# Patient Record
Sex: FEMALE | Race: WHITE | NOT HISPANIC OR LATINO | Employment: OTHER | URBAN - METROPOLITAN AREA
[De-identification: names, ages, dates, MRNs, and addresses within clinical notes are randomized per-mention and may not be internally consistent; named-entity substitution may affect disease eponyms.]

---

## 2018-04-24 ENCOUNTER — OFFICE VISIT (OUTPATIENT)
Dept: FAMILY MEDICINE CLINIC | Facility: CLINIC | Age: 68
End: 2018-04-24

## 2018-04-24 VITALS
HEIGHT: 57 IN | TEMPERATURE: 98 F | WEIGHT: 122 LBS | BODY MASS INDEX: 26.32 KG/M2 | DIASTOLIC BLOOD PRESSURE: 60 MMHG | OXYGEN SATURATION: 97 % | RESPIRATION RATE: 18 BRPM | HEART RATE: 69 BPM | SYSTOLIC BLOOD PRESSURE: 138 MMHG

## 2018-04-24 DIAGNOSIS — M54.41 RIGHT-SIDED LOW BACK PAIN WITH RIGHT-SIDED SCIATICA, UNSPECIFIED CHRONICITY: Primary | ICD-10-CM

## 2018-04-24 PROCEDURE — 72100 X-RAY EXAM L-S SPINE 2/3 VWS: CPT | Performed by: FAMILY MEDICINE

## 2018-04-24 PROCEDURE — 99203 OFFICE O/P NEW LOW 30 MIN: CPT | Performed by: FAMILY MEDICINE

## 2018-04-24 RX ORDER — METHYLPREDNISOLONE 4 MG/1
TABLET ORAL
Qty: 21 TABLET | Refills: 0 | Status: SHIPPED | OUTPATIENT
Start: 2018-04-24 | End: 2018-06-07

## 2018-04-24 RX ORDER — AMLODIPINE BESYLATE 10 MG/1
10 TABLET ORAL DAILY
COMMUNITY
End: 2018-06-07 | Stop reason: ALTCHOICE

## 2018-04-24 RX ORDER — ATORVASTATIN CALCIUM 10 MG/1
10 TABLET, FILM COATED ORAL NIGHTLY
COMMUNITY
End: 2022-03-15 | Stop reason: HOSPADM

## 2018-04-24 NOTE — PROGRESS NOTES
SUBJECTIVE:  The patient is a 68-year-old white female who comes in primarily because of low back pain with radiation of pain down her leg and numbness and tingling.  She has a history of degenerative disc disease and spinal stenosis.  She did not improve with epidurals.  She is unable to get any relief with Advil or Tylenol.  His gotten worse over the last couple months.  No new injury.  She is unable to take NSAIDs but she has taken Advil on occasion anyway.  She is a kidney transplant.  She has a history of hypertension and hyperlipidemia.  She is followed by a nephrologist.  She has no  symptoms    PAST MEDICAL HISTORY:  Reviewed.    REVIEW OF SYSTEMS:  Please see above; 14 point ROS negative.      OBJECTIVE: Vitals signs are reviewed and are stable.    .   Neck:  Supple.   Lungs:  Clear.    Heart:  Regular rate and rhythm.   Abdomen:   Soft, nontender.   Back: Tenderness is present in the right lower lumbar region.  Pain is present with flexion and extension.  Right straight leg raise equivocal positive at 90°.  Extremities:  No cyanosis, clubbing or edema.  Neurological.  Deep tendon reflexes symmetrical.  No gross motor or sensory deficits at this time.   Two-view x-ray of the lumbar spine is done here interpreted by me.  None for comparison.  Indication low back pain with radicular pain and paresthesia.  X-ray shows generous changes with scoliosis  disc space narrowing L2-L3      ASSESSMENT:   Low back pain with radicular pain and paresthesia.  History of spinal stenosis and degenerative disc disease.  She has not responded to conservative therapy.     PLAN: The patient does not want pain medication.  She is  Willing to take steroids.  I will encourage her to take a Medrol Dosepak know she has concerns about this as well.  I will prescribe Flexeril 5 mg 3 times a day when necessary but she states this almost made her pass out once little these will not be prescribed.  An MRI will be ordered.    Dictated  utilizing Dragon dictation.

## 2018-05-02 ENCOUNTER — HOSPITAL ENCOUNTER (OUTPATIENT)
Dept: MRI IMAGING | Facility: HOSPITAL | Age: 68
Discharge: HOME | End: 2018-05-02
Admitting: FAMILY MEDICINE

## 2018-05-02 PROCEDURE — 72148 MRI LUMBAR SPINE W/O DYE: CPT

## 2018-05-03 DIAGNOSIS — R93.7 ABNORMAL MRI, LUMBAR SPINE: Primary | ICD-10-CM

## 2018-06-07 ENCOUNTER — OFFICE VISIT (OUTPATIENT)
Dept: NEUROSURGERY | Facility: CLINIC | Age: 68
End: 2018-06-07

## 2018-06-07 VITALS
SYSTOLIC BLOOD PRESSURE: 152 MMHG | HEART RATE: 65 BPM | WEIGHT: 120 LBS | HEIGHT: 58 IN | DIASTOLIC BLOOD PRESSURE: 71 MMHG | BODY MASS INDEX: 25.19 KG/M2

## 2018-06-07 DIAGNOSIS — M54.16 LUMBAR RADICULOPATHY: Primary | ICD-10-CM

## 2018-06-07 PROCEDURE — 99204 OFFICE O/P NEW MOD 45 MIN: CPT | Performed by: NEUROLOGICAL SURGERY

## 2018-06-07 RX ORDER — AMLODIPINE BESYLATE 5 MG/1
5 TABLET ORAL NIGHTLY
Refills: 11 | COMMUNITY
Start: 2018-06-03 | End: 2022-03-15 | Stop reason: HOSPADM

## 2018-06-07 RX ORDER — DEXAMETHASONE 4 MG/1
8 TABLET ORAL TAKE AS DIRECTED
Qty: 2 TABLET | Refills: 0 | Status: SHIPPED | OUTPATIENT
Start: 2018-06-07 | End: 2018-06-14 | Stop reason: HOSPADM

## 2018-06-08 ENCOUNTER — TELEPHONE (OUTPATIENT)
Dept: NEUROSURGERY | Facility: CLINIC | Age: 68
End: 2018-06-08

## 2018-06-08 NOTE — TELEPHONE ENCOUNTER
Pt saw nephrologist today. He says myelo is ok but we need to order bolus before and after her myelo. Could you please put in the order for it?

## 2018-06-11 DIAGNOSIS — M48.00 CENTRAL SPINAL STENOSIS: Primary | ICD-10-CM

## 2018-06-14 ENCOUNTER — HOSPITAL ENCOUNTER (OUTPATIENT)
Dept: GENERAL RADIOLOGY | Facility: HOSPITAL | Age: 68
Discharge: HOME | End: 2018-06-14
Attending: NEUROLOGICAL SURGERY

## 2018-06-14 ENCOUNTER — HOSPITAL ENCOUNTER (OUTPATIENT)
Dept: CT IMAGING | Facility: HOSPITAL | Age: 68
Discharge: HOME | End: 2018-06-14
Attending: NEUROLOGICAL SURGERY | Admitting: NEUROLOGICAL SURGERY

## 2018-06-14 VITALS
WEIGHT: 120 LBS | BODY MASS INDEX: 25.19 KG/M2 | SYSTOLIC BLOOD PRESSURE: 140 MMHG | DIASTOLIC BLOOD PRESSURE: 76 MMHG | HEART RATE: 81 BPM | RESPIRATION RATE: 16 BRPM | OXYGEN SATURATION: 97 % | HEIGHT: 58 IN

## 2018-06-14 DIAGNOSIS — M54.16 LUMBAR RADICULOPATHY: ICD-10-CM

## 2018-06-14 LAB — CREAT BLDA-MCNC: 1.4 MG/DL (ref 0.6–1.3)

## 2018-06-14 PROCEDURE — 63710000001 HYDROCODONE-ACETAMINOPHEN 5-325 MG TABLET: Performed by: NEUROLOGICAL SURGERY

## 2018-06-14 PROCEDURE — 72240 MYELOGRAPHY NECK SPINE: CPT

## 2018-06-14 PROCEDURE — 62304 MYELOGRAPHY LUMBAR INJECTION: CPT

## 2018-06-14 PROCEDURE — A9270 NON-COVERED ITEM OR SERVICE: HCPCS | Performed by: NEUROLOGICAL SURGERY

## 2018-06-14 PROCEDURE — 0 IOPAMIDOL 41 % SOLUTION: Performed by: NEUROLOGICAL SURGERY

## 2018-06-14 PROCEDURE — 82565 ASSAY OF CREATININE: CPT

## 2018-06-14 PROCEDURE — 72114 X-RAY EXAM L-S SPINE BENDING: CPT

## 2018-06-14 PROCEDURE — 72131 CT LUMBAR SPINE W/O DYE: CPT

## 2018-06-14 RX ORDER — ACETAMINOPHEN,DIPHENHYDRAMINE HCL 500; 25 MG/1; MG/1
2 TABLET, FILM COATED ORAL NIGHTLY
COMMUNITY
End: 2018-08-19

## 2018-06-14 RX ORDER — LIDOCAINE HYDROCHLORIDE 10 MG/ML
10 INJECTION, SOLUTION INFILTRATION; PERINEURAL ONCE
Status: COMPLETED | OUTPATIENT
Start: 2018-06-14 | End: 2018-06-14

## 2018-06-14 RX ORDER — SODIUM CHLORIDE, SODIUM LACTATE, POTASSIUM CHLORIDE, CALCIUM CHLORIDE 600; 310; 30; 20 MG/100ML; MG/100ML; MG/100ML; MG/100ML
500 INJECTION, SOLUTION INTRAVENOUS CONTINUOUS
Status: DISCONTINUED | OUTPATIENT
Start: 2018-06-14 | End: 2018-06-15 | Stop reason: HOSPADM

## 2018-06-14 RX ORDER — ACETAMINOPHEN 325 MG/1
650 TABLET ORAL EVERY 4 HOURS PRN
Status: DISCONTINUED | OUTPATIENT
Start: 2018-06-14 | End: 2018-06-15 | Stop reason: HOSPADM

## 2018-06-14 RX ORDER — HYDROCODONE BITARTRATE AND ACETAMINOPHEN 5; 325 MG/1; MG/1
1 TABLET ORAL EVERY 4 HOURS PRN
Status: DISCONTINUED | OUTPATIENT
Start: 2018-06-14 | End: 2018-06-15 | Stop reason: HOSPADM

## 2018-06-14 RX ADMIN — SODIUM CHLORIDE, POTASSIUM CHLORIDE, SODIUM LACTATE AND CALCIUM CHLORIDE 500 ML/HR: 600; 310; 30; 20 INJECTION, SOLUTION INTRAVENOUS at 06:15

## 2018-06-14 RX ADMIN — LIDOCAINE HYDROCHLORIDE 7 ML: 10 INJECTION, SOLUTION INFILTRATION; PERINEURAL at 07:06

## 2018-06-14 RX ADMIN — IOPAMIDOL 20 ML: 408 INJECTION, SOLUTION INTRATHECAL at 07:10

## 2018-06-14 RX ADMIN — HYDROCODONE BITARTATE AND ACETAMINOPHEN 1 TABLET: 5; 325 TABLET ORAL at 08:00

## 2018-06-14 NOTE — H&P (VIEW-ONLY)
Subjective   Patient ID: Marlena Navarrete is a 68 y.o. female is being seen for consultation today at the request of Bradley Hernandez MD right side low back pain that radiates into her lower extremity with numbness and tingling.    History of Present Illness    This patient has been having pain in her lower back with radiation primarily into her right leg for about 5 years.  The pain is sharp and stabbing and quite severe.  It has been getting steadily worse.  It is located in the right side of her lower back and radiates into her right buttock and down the posterior lateral thigh posterior lateral calf and into the right foot.  She is also recently developed some pain in the left side as well although not as bad as the right.  She has no difficulty with bowel or bladder control or other associated symptoms.  She says that any kind of activity makes the pain worse.  She was treated with epidural blocks about 5 years ago.    The following portions of the patient's history were reviewed and updated as appropriate: allergies, current medications, past family history, past medical history, past social history, past surgical history and problem list.    Review of Systems   Constitutional: Positive for activity change.   Respiratory: Negative for chest tightness and shortness of breath.    Cardiovascular: Negative for chest pain.   Musculoskeletal: Positive for back pain and neck pain.        Lower extremity pain   Neurological: Positive for numbness.        Tingling   Psychiatric/Behavioral: Positive for agitation, dysphoric mood and sleep disturbance.   All other systems reviewed and are negative.      Objective   Physical Exam   Constitutional: She is oriented to person, place, and time. She appears well-developed and well-nourished.   HENT:   Head: Normocephalic and atraumatic.   Eyes: Conjunctivae and EOM are normal. Pupils are equal, round, and reactive to light.   Fundoscopic exam:       The right eye shows no papilledema.  The right eye shows venous pulsations.        The left eye shows no papilledema. The left eye shows venous pulsations.   Neck: Carotid bruit is not present.   Neurological: She is oriented to person, place, and time. She has a normal Finger-Nose-Finger Test and a normal Heel to Shin Test. Gait normal.   Reflex Scores:       Tricep reflexes are 2+ on the right side and 2+ on the left side.       Bicep reflexes are 2+ on the right side and 2+ on the left side.       Brachioradialis reflexes are 2+ on the right side and 2+ on the left side.       Patellar reflexes are 2+ on the right side and 2+ on the left side.       Achilles reflexes are 2+ on the right side and 2+ on the left side.  Psychiatric: Her speech is normal.     Neurologic Exam     Mental Status   Oriented to person, place, and time.   Registration of memory: Good recent and remote memory.   Attention: normal. Concentration: normal.   Speech: speech is normal   Level of consciousness: alert  Knowledge: consistent with education.     Cranial Nerves     CN II   Visual fields full to confrontation.   Visual acuity: normal    CN III, IV, VI   Pupils are equal, round, and reactive to light.  Extraocular motions are normal.     CN V   Facial sensation intact.   Right corneal reflex: normal  Left corneal reflex: normal    CN VII   Facial expression full, symmetric.   Right facial weakness: none  Left facial weakness: none    CN VIII   Hearing: intact    CN IX, X   Palate: symmetric    CN XI   Right sternocleidomastoid strength: normal  Left sternocleidomastoid strength: normal    CN XII   Tongue: not atrophic  Tongue deviation: none    Motor Exam   Muscle bulk: normal  Right arm tone: normal  Left arm tone: normal  Right leg tone: normal  Left leg tone: normal    Strength   Strength 5/5 except as noted.     Sensory Exam   Light touch normal.     Gait, Coordination, and Reflexes     Gait  Gait: normal    Coordination   Finger to nose coordination: normal  Heel to  shin coordination: normal    Reflexes   Right brachioradialis: 2+  Left brachioradialis: 2+  Right biceps: 2+  Left biceps: 2+  Right triceps: 2+  Left triceps: 2+  Right patellar: 2+  Left patellar: 2+  Right achilles: 2+  Left achilles: 2+  Right : 2+  Left : 2+      Assessment/Plan   Independent Review of Radiographic Studies:      I reviewed an MRI of the lumbar spine done on May 2 myself.  This looks okay in the lower thoracic spine at T12-L1 and L1 2 looks okay.  L2-3 shows some disc bulging especially to the right side and some bulging into the neural foramen at that level.  L3 4 is widely open as is L4 5 and L5-S1.  There is also an annular tear at L5-S1.    Medical Decision Making:      I told the patient about the imaging.  I really don't see anything here that I could correct with surgery.  Since she has had such severe pain I think we need to consider a lumbar myelogram.  I told the patient what a myelogram involves.  I explained that there is a 50% chance of developing a bad headache and nausea as a result of the test.  I explained that there is also a very small chance of infection, seizures, and bleeding.  I explained how we would treat a post myelogram headache including bedrest, caffeinated fluids, steroids, and blood patch.  The patient does ask to proceed.    Marlena was seen today for back pain and leg pain.    Diagnoses and all orders for this visit:    Lumbar radiculopathy  -     Obtain Informed Consent; Standing  -     IR Myelogram Lumbar Spine; Future  -     CT Lumbar Spine Without Contrast; Future  -     No Lab Testing Needed; Standing  -     dexamethasone (DECADRON) 4 MG tablet; Take 2 tablets by mouth Take As Directed. Take both tablets by mouth 2 hours before myelogram  -     XR Spine Lumbar Complete With Flex & Ext; Future      Return for After radiology test.

## 2018-06-14 NOTE — DISCHARGE INSTRUCTIONS
EDUCATION /DISCHARGE INSTRUCTIONS:  A myelogram is a special radiology procedure of the spinal cord, spinal nerves and other related structures.  You will be awake during the examination.  An area of your lower back will be cleansed with an antiseptic solution.  The physician will inject a numbing medication in your lower back.  While your back is numb, a needle will be placed in the lower back area.  A small amount of spinal fluid may be withdrawn and sent to the lab if ordered by your physician. While the needle is in the back, an injection of a contrast material (xray dye) will be given through the needle.  The contrast material will allow the physician to see the spinal cord and spinal nerves.  Once injected, the needle will be removed and a band aid will be placed over the injection site.  The table will be tilted during the process to allow the contrast material to flow to particular areas in the spine.  Following the injection and xrays, you will be taken to the CT scan where more pictures will be taken. After the procedure is finished, the contrast material will be absorbed by your body and eliminated through your kidneys.  The radiologist will study and interpret your myelogram and send the results to your physician.    Procedure risks of a myelogram include, but are not limited to:  *  Bleeding   *  seizure  *  Infection   *  Headache, possibly severe requiring  *  Contrast reaction      a blood patch  *  Nerve or cord injury  *  Paralysis and death    Benefits of the procedure:  *  Best examination for delineating pathology related to spinal cord compression from a disc and/or nerve root compression    Alternatives to the procedure:  MRI - a non invasive procedure requiring intravenous contrast injection.  Cannot be done on patients with certain pacemakers or metal in the body.  MRI risks include possible reaction to the contrast material, movement of metal located in the body.  Benefit to MRI:   Non-invasive and usually painless procedure.  THIS EDUCATION INFORMATION WAS REVIEWED PRIOR TO PROCEDURE AND CONSENT. Patient initials________________Time____0626______________    Important information following your myelogram:  *  Lie down with your head elevated no more than 2 pillows high today & tonight  *  Tomorrow, lie down with 1 pillow all day and all night  *  Sit up to eat meals and use the bathroom, otherwise, lie down  *  Do not drive for 48 hours following a myelogram  *  You may remove the bandage and shower in the morning  *  Increase your fluids for the next 24 hours.  Caffeinated drinks are encouraged.    Resume taking blood thinner or aspirin on _No Aspirin for 24 hours after the myelogram_    CALL YOUR PHYSICIAN FOR THE FOLLOWING:  * Pain at the injections site  * Reddness, swelling, bruising or drainage at the injection site.  * A fever by mouth of 101.0  * Any new symptoms    Headaches are a common side effect after a myelogram.  If you get a headache, you should stay flat in bed and drink plenty of fluids. If the headache persist and does not go away with rest/medication, CALL Dr. Paulino at [phone]

## 2018-06-14 NOTE — NURSING NOTE
Verbal and written D/C instructions given to patient and .  They voice understanding and are able to teach back D/C instructions.

## 2018-06-15 ENCOUNTER — TELEPHONE (OUTPATIENT)
Dept: INTERVENTIONAL RADIOLOGY/VASCULAR | Facility: HOSPITAL | Age: 68
End: 2018-06-15

## 2018-07-03 ENCOUNTER — OFFICE VISIT (OUTPATIENT)
Dept: NEUROSURGERY | Facility: CLINIC | Age: 68
End: 2018-07-03

## 2018-07-03 VITALS
BODY MASS INDEX: 25.19 KG/M2 | SYSTOLIC BLOOD PRESSURE: 140 MMHG | HEART RATE: 71 BPM | HEIGHT: 58 IN | DIASTOLIC BLOOD PRESSURE: 80 MMHG | WEIGHT: 120 LBS

## 2018-07-03 DIAGNOSIS — M54.16 LUMBAR RADICULOPATHY: Primary | ICD-10-CM

## 2018-07-03 PROCEDURE — 99213 OFFICE O/P EST LOW 20 MIN: CPT | Performed by: NEUROLOGICAL SURGERY

## 2018-07-03 RX ORDER — DEXAMETHASONE 1.5 MG 1.5 MG/1
1.5 TABLET ORAL TAKE AS DIRECTED
Qty: 51 EACH | Refills: 0 | Status: SHIPPED | OUTPATIENT
Start: 2018-07-03 | End: 2018-07-26 | Stop reason: HOSPADM

## 2018-07-16 ENCOUNTER — APPOINTMENT (OUTPATIENT)
Dept: PREADMISSION TESTING | Facility: HOSPITAL | Age: 68
End: 2018-07-16

## 2018-07-18 ENCOUNTER — APPOINTMENT (OUTPATIENT)
Dept: PREADMISSION TESTING | Facility: HOSPITAL | Age: 68
End: 2018-07-18

## 2018-07-18 ENCOUNTER — TELEPHONE (OUTPATIENT)
Dept: NEUROSURGERY | Facility: CLINIC | Age: 68
End: 2018-07-18

## 2018-07-18 VITALS
OXYGEN SATURATION: 98 % | HEART RATE: 66 BPM | SYSTOLIC BLOOD PRESSURE: 134 MMHG | BODY MASS INDEX: 29.43 KG/M2 | DIASTOLIC BLOOD PRESSURE: 82 MMHG | WEIGHT: 127.19 LBS | HEIGHT: 55 IN | TEMPERATURE: 98 F | RESPIRATION RATE: 16 BRPM

## 2018-07-18 LAB
ANION GAP SERPL CALCULATED.3IONS-SCNC: 13.6 MMOL/L
BASOPHILS # BLD AUTO: 0 10*3/MM3 (ref 0–0.2)
BASOPHILS NFR BLD AUTO: 0 % (ref 0–1.5)
BUN BLD-MCNC: 34 MG/DL (ref 8–23)
BUN/CREAT SERPL: 27 (ref 7–25)
CALCIUM SPEC-SCNC: 8.8 MG/DL (ref 8.6–10.5)
CHLORIDE SERPL-SCNC: 103 MMOL/L (ref 98–107)
CO2 SERPL-SCNC: 23.4 MMOL/L (ref 22–29)
CREAT BLD-MCNC: 1.26 MG/DL (ref 0.57–1)
DEPRECATED RDW RBC AUTO: 50.3 FL (ref 37–54)
EOSINOPHIL # BLD AUTO: 0 10*3/MM3 (ref 0–0.7)
EOSINOPHIL NFR BLD AUTO: 0 % (ref 0.3–6.2)
ERYTHROCYTE [DISTWIDTH] IN BLOOD BY AUTOMATED COUNT: 13.5 % (ref 11.7–13)
GFR SERPL CREATININE-BSD FRML MDRD: 42 ML/MIN/1.73
GLUCOSE BLD-MCNC: 102 MG/DL (ref 65–99)
HCT VFR BLD AUTO: 38.7 % (ref 35.6–45.5)
HGB BLD-MCNC: 11.9 G/DL (ref 11.9–15.5)
IMM GRANULOCYTES # BLD: 0.09 10*3/MM3 (ref 0–0.03)
IMM GRANULOCYTES NFR BLD: 0.5 % (ref 0–0.5)
LYMPHOCYTES # BLD AUTO: 0.96 10*3/MM3 (ref 0.9–4.8)
LYMPHOCYTES NFR BLD AUTO: 5.6 % (ref 19.6–45.3)
MCH RBC QN AUTO: 31.2 PG (ref 26.9–32)
MCHC RBC AUTO-ENTMCNC: 30.7 G/DL (ref 32.4–36.3)
MCV RBC AUTO: 101.6 FL (ref 80.5–98.2)
MONOCYTES # BLD AUTO: 0.48 10*3/MM3 (ref 0.2–1.2)
MONOCYTES NFR BLD AUTO: 2.8 % (ref 5–12)
NEUTROPHILS # BLD AUTO: 15.62 10*3/MM3 (ref 1.9–8.1)
NEUTROPHILS NFR BLD AUTO: 91.1 % (ref 42.7–76)
PLATELET # BLD AUTO: 349 10*3/MM3 (ref 140–500)
PMV BLD AUTO: 10.4 FL (ref 6–12)
POTASSIUM BLD-SCNC: 5 MMOL/L (ref 3.5–5.2)
RBC # BLD AUTO: 3.81 10*6/MM3 (ref 3.9–5.2)
SODIUM BLD-SCNC: 140 MMOL/L (ref 136–145)
WBC NRBC COR # BLD: 17.15 10*3/MM3 (ref 4.5–10.7)

## 2018-07-18 PROCEDURE — 36415 COLL VENOUS BLD VENIPUNCTURE: CPT

## 2018-07-18 PROCEDURE — 93005 ELECTROCARDIOGRAM TRACING: CPT

## 2018-07-18 PROCEDURE — 85025 COMPLETE CBC W/AUTO DIFF WBC: CPT | Performed by: NEUROLOGICAL SURGERY

## 2018-07-18 PROCEDURE — 80048 BASIC METABOLIC PNL TOTAL CA: CPT | Performed by: NEUROLOGICAL SURGERY

## 2018-07-18 PROCEDURE — 93010 ELECTROCARDIOGRAM REPORT: CPT | Performed by: INTERNAL MEDICINE

## 2018-07-18 NOTE — DISCHARGE INSTRUCTIONS
NO medications the morning of surgery:        General Instructions:  • Do not eat solid food after midnight the night before surgery.  • You may drink clear liquids day of surgery but must stop at least one hour before your hospital arrival time @ 0600 AM STOP DRINKING!!  • It is beneficial for you to have a clear drink that contains carbohydrates the day of surgery.  We suggest a 12 to 20 ounce bottle of Gatorade or Powerade for non-diabetic patients or a 12 to 20 ounce bottle of G2 or Powerade Zero for diabetic patients.     Clear liquids are liquids you can see through.  Nothing red in color.     Plain water                               Sports drinks  Sodas                                   Gelatin (Jell-O)  Fruit juices without pulp such as white grape juice and apple juice  Popsicles that contain no fruit or yogurt  Tea or coffee (no cream or milk added)  Gatorade / Powerade  G2 / Powerade Zero    • Patients who avoid smoking, chewing tobacco and alcohol for 4 weeks prior to surgery have a reduced risk of post-operative complications.  Quit smoking as many days before surgery as you can.  • Do not smoke, use chewing tobacco or drink alcohol the day of surgery.   • If applicable bring your C-PAP/ BI-PAP machine.  • Bring any papers given to you in the doctor’s office.  • Wear clean comfortable clothes and socks.  • Do not wear contact lenses or make-up.  Bring a case for your glasses.   • Bring crutches or walker if applicable.  • Remove all piercings.  Leave jewelry and any other valuables at home.  • Hair extensions with metal clips must be removed prior to surgery.  • The Pre-Admission Testing nurse will instruct you to bring medications if unable to obtain an accurate list in Pre-Admission Testing.        Preventing a Surgical Site Infection:  • For 2 to 3 days before surgery, avoid shaving with a razor because the razor can irritate skin and make it easier to develop an infection.    • Any areas of open  skin can increase the risk of a post-operative wound infection by allowing bacteria to enter and travel throughout the body.  Notify your surgeon if you have any skin wounds / rashes even if it is not near the expected surgical site.  The area will need assessed to determine if surgery should be delayed until it is healed.  • The night prior to surgery sleep in a clean bed with clean clothing.  Do not allow pets to sleep with you.  • Shower on the morning of surgery using a fresh bar of anti-bacterial soap (such as Dial) and clean washcloth.  Dry with a clean towel and dress in clean clothing.  • Ask your surgeon if you will be receiving antibiotics prior to surgery.  • Make sure you, your family, and all healthcare providers clean their hands with soap and water or an alcohol based hand  before caring for you or your wound.    Day of surgery:07- ARRIVE @ 0700 AM REPORT TO THE MAIN OR   Upon arrival, a Pre-op nurse and Anesthesiologist will review your health history, obtain vital signs, and answer questions you may have.  The only belongings needed at this time will be your home medications and if applicable your C-PAP/BI-PAP machine.  If you are staying overnight your family can leave the rest of your belongings in the car and bring them to your room later.  A Pre-op nurse will start an IV and you may receive medication in preparation for surgery, including something to help you relax.  Your family will be able to see you in the Pre-op area.  While you are in surgery your family should notify the waiting room  if they leave the waiting room area and provide a contact phone number.    Please be aware that surgery does come with discomfort.  We want to make every effort to control your discomfort so please discuss any uncontrolled symptoms with your nurse.   Your doctor will most likely have prescribed pain medications.      If you are going home after surgery you will receive  individualized written care instructions before being discharged.  A responsible adult must drive you to and from the hospital on the day of your surgery and stay with you for 24 hours.    If you are staying overnight following surgery, you will be transported to your hospital room following the recovery period.  [facility] has all private rooms.    You have received a list of surgical assistants for your reference.  If you have any questions please call Pre-Admission Testing at [phone].  Deductibles and co-payments are collected on the day of service. Please be prepared to pay the required co-pay, deductible or deposit on the day of service as defined by your plan.

## 2018-07-18 NOTE — TELEPHONE ENCOUNTER
PAT called as this patient had her PAT labs and on her CBC her WBC was elevated at 17.15. She is currently still taking the 13 day decadron that we prescribed her. She denies any fevers or any other associated symptoms other than her back problems.

## 2018-07-19 NOTE — TELEPHONE ENCOUNTER
Called the patient back and let her know that the longer she is off the steroids the count will come down.  The patient stated she feels good and when they took her temp yesterday she was not running one.  Patient said she will call if anything changes.

## 2018-07-19 NOTE — TELEPHONE ENCOUNTER
Called the patient to advise her of the below from Dr. Paulino she did not answer. I LVM for her to call the office. If she calls back please advise her that her elevated WBC are more than likely from her steroids.

## 2018-07-25 ENCOUNTER — APPOINTMENT (OUTPATIENT)
Dept: GENERAL RADIOLOGY | Facility: HOSPITAL | Age: 68
End: 2018-07-25

## 2018-07-25 ENCOUNTER — ANESTHESIA (OUTPATIENT)
Dept: PERIOP | Facility: HOSPITAL | Age: 68
End: 2018-07-25

## 2018-07-25 ENCOUNTER — HOSPITAL ENCOUNTER (INPATIENT)
Facility: HOSPITAL | Age: 68
LOS: 1 days | Discharge: HOME HEALTH | End: 2018-07-26
Attending: NEUROLOGICAL SURGERY | Admitting: NEUROLOGICAL SURGERY

## 2018-07-25 ENCOUNTER — ANESTHESIA EVENT (OUTPATIENT)
Dept: PERIOP | Facility: HOSPITAL | Age: 68
End: 2018-07-25

## 2018-07-25 DIAGNOSIS — R53.1 GENERALIZED WEAKNESS: ICD-10-CM

## 2018-07-25 DIAGNOSIS — M54.16 LUMBAR RADICULOPATHY: ICD-10-CM

## 2018-07-25 DIAGNOSIS — Z74.09 IMPAIRED FUNCTIONAL MOBILITY AND ACTIVITY TOLERANCE: Primary | ICD-10-CM

## 2018-07-25 PROCEDURE — 72100 X-RAY EXAM L-S SPINE 2/3 VWS: CPT

## 2018-07-25 PROCEDURE — 25010000002 METHOCARBAMOL 1000 MG/10ML SOLUTION: Performed by: NEUROLOGICAL SURGERY

## 2018-07-25 PROCEDURE — 25010000002 ONDANSETRON PER 1 MG: Performed by: NEUROLOGICAL SURGERY

## 2018-07-25 PROCEDURE — 01NB0ZZ RELEASE LUMBAR NERVE, OPEN APPROACH: ICD-10-PCS | Performed by: NEUROLOGICAL SURGERY

## 2018-07-25 PROCEDURE — 0SB20ZZ EXCISION OF LUMBAR VERTEBRAL DISC, OPEN APPROACH: ICD-10-PCS | Performed by: NEUROLOGICAL SURGERY

## 2018-07-25 PROCEDURE — 25010000002 PROPOFOL 10 MG/ML EMULSION: Performed by: NURSE ANESTHETIST, CERTIFIED REGISTERED

## 2018-07-25 PROCEDURE — 25010000002 HYDROMORPHONE PER 4 MG: Performed by: NURSE ANESTHETIST, CERTIFIED REGISTERED

## 2018-07-25 PROCEDURE — 25010000002 PROMETHAZINE PER 50 MG: Performed by: PHYSICIAN ASSISTANT

## 2018-07-25 PROCEDURE — 25010000002 DEXAMETHASONE PER 1 MG: Performed by: NURSE ANESTHETIST, CERTIFIED REGISTERED

## 2018-07-25 PROCEDURE — 00QT0ZZ REPAIR SPINAL MENINGES, OPEN APPROACH: ICD-10-PCS | Performed by: NEUROLOGICAL SURGERY

## 2018-07-25 PROCEDURE — 25010000002 ONDANSETRON PER 1 MG: Performed by: NURSE ANESTHETIST, CERTIFIED REGISTERED

## 2018-07-25 PROCEDURE — G0378 HOSPITAL OBSERVATION PER HR: HCPCS

## 2018-07-25 PROCEDURE — 25010000002 FENTANYL CITRATE (PF) 100 MCG/2ML SOLUTION: Performed by: NURSE ANESTHETIST, CERTIFIED REGISTERED

## 2018-07-25 PROCEDURE — 76000 FLUOROSCOPY <1 HR PHYS/QHP: CPT

## 2018-07-25 PROCEDURE — 25010000003 HYDROCORTISONE SOD SUCCINATE PF 250 MG RECONSTITUTED SOLUTION 1 EACH VIAL: Performed by: NEUROLOGICAL SURGERY

## 2018-07-25 PROCEDURE — 25010000002 MIDAZOLAM PER 1 MG: Performed by: ANESTHESIOLOGY

## 2018-07-25 PROCEDURE — 25010000002 PHENYLEPHRINE PER 1 ML: Performed by: NURSE ANESTHETIST, CERTIFIED REGISTERED

## 2018-07-25 PROCEDURE — 63047 LAM FACETEC & FORAMOT LUMBAR: CPT | Performed by: NEUROLOGICAL SURGERY

## 2018-07-25 PROCEDURE — 25010000002 HYDROCORTISONE SODIUM SUCCINATE 100 MG RECONSTITUTED SOLUTION 1 EACH VIAL: Performed by: NEUROLOGICAL SURGERY

## 2018-07-25 PROCEDURE — 25010000003 CEFAZOLIN IN DEXTROSE 2-4 GM/100ML-% SOLUTION: Performed by: NEUROLOGICAL SURGERY

## 2018-07-25 RX ORDER — LIDOCAINE HYDROCHLORIDE 10 MG/ML
0.5 INJECTION, SOLUTION EPIDURAL; INFILTRATION; INTRACAUDAL; PERINEURAL ONCE AS NEEDED
Status: DISCONTINUED | OUTPATIENT
Start: 2018-07-25 | End: 2018-07-25 | Stop reason: HOSPADM

## 2018-07-25 RX ORDER — ONDANSETRON 2 MG/ML
INJECTION INTRAMUSCULAR; INTRAVENOUS AS NEEDED
Status: DISCONTINUED | OUTPATIENT
Start: 2018-07-25 | End: 2018-07-25 | Stop reason: SURG

## 2018-07-25 RX ORDER — PROMETHAZINE HYDROCHLORIDE 25 MG/ML
12.5 INJECTION, SOLUTION INTRAMUSCULAR; INTRAVENOUS ONCE AS NEEDED
Status: DISCONTINUED | OUTPATIENT
Start: 2018-07-25 | End: 2018-07-25 | Stop reason: HOSPADM

## 2018-07-25 RX ORDER — PROMETHAZINE HYDROCHLORIDE 25 MG/1
12.5 TABLET ORAL ONCE AS NEEDED
Status: DISCONTINUED | OUTPATIENT
Start: 2018-07-25 | End: 2018-07-25

## 2018-07-25 RX ORDER — CEFAZOLIN SODIUM 2 G/100ML
2 INJECTION, SOLUTION INTRAVENOUS ONCE
Status: COMPLETED | OUTPATIENT
Start: 2018-07-25 | End: 2018-07-25

## 2018-07-25 RX ORDER — HYDROCODONE BITARTRATE AND ACETAMINOPHEN 7.5; 325 MG/1; MG/1
1 TABLET ORAL ONCE AS NEEDED
Status: COMPLETED | OUTPATIENT
Start: 2018-07-25 | End: 2018-07-25

## 2018-07-25 RX ORDER — SODIUM CHLORIDE 0.9 % (FLUSH) 0.9 %
1-10 SYRINGE (ML) INJECTION AS NEEDED
Status: DISCONTINUED | OUTPATIENT
Start: 2018-07-25 | End: 2018-07-26 | Stop reason: HOSPADM

## 2018-07-25 RX ORDER — OXYCODONE AND ACETAMINOPHEN 7.5; 325 MG/1; MG/1
1 TABLET ORAL ONCE AS NEEDED
Status: DISCONTINUED | OUTPATIENT
Start: 2018-07-25 | End: 2018-07-25

## 2018-07-25 RX ORDER — LABETALOL HYDROCHLORIDE 5 MG/ML
5 INJECTION, SOLUTION INTRAVENOUS
Status: DISCONTINUED | OUTPATIENT
Start: 2018-07-25 | End: 2018-07-25

## 2018-07-25 RX ORDER — EPHEDRINE SULFATE 50 MG/ML
5 INJECTION, SOLUTION INTRAVENOUS ONCE AS NEEDED
Status: DISCONTINUED | OUTPATIENT
Start: 2018-07-25 | End: 2018-07-25

## 2018-07-25 RX ORDER — PROMETHAZINE HYDROCHLORIDE 25 MG/1
25 SUPPOSITORY RECTAL ONCE AS NEEDED
Status: DISCONTINUED | OUTPATIENT
Start: 2018-07-25 | End: 2018-07-25

## 2018-07-25 RX ORDER — PROMETHAZINE HYDROCHLORIDE 25 MG/ML
6.25 INJECTION, SOLUTION INTRAMUSCULAR; INTRAVENOUS EVERY 6 HOURS PRN
Status: DISCONTINUED | OUTPATIENT
Start: 2018-07-25 | End: 2018-07-26 | Stop reason: HOSPADM

## 2018-07-25 RX ORDER — PROMETHAZINE HYDROCHLORIDE 25 MG/ML
12.5 INJECTION, SOLUTION INTRAMUSCULAR; INTRAVENOUS ONCE AS NEEDED
Status: DISCONTINUED | OUTPATIENT
Start: 2018-07-25 | End: 2018-07-25

## 2018-07-25 RX ORDER — HYDROMORPHONE HCL 110MG/55ML
PATIENT CONTROLLED ANALGESIA SYRINGE INTRAVENOUS AS NEEDED
Status: DISCONTINUED | OUTPATIENT
Start: 2018-07-25 | End: 2018-07-25 | Stop reason: SURG

## 2018-07-25 RX ORDER — DEXAMETHASONE SODIUM PHOSPHATE 10 MG/ML
INJECTION INTRAMUSCULAR; INTRAVENOUS AS NEEDED
Status: DISCONTINUED | OUTPATIENT
Start: 2018-07-25 | End: 2018-07-25 | Stop reason: SURG

## 2018-07-25 RX ORDER — ONDANSETRON 2 MG/ML
4 INJECTION INTRAMUSCULAR; INTRAVENOUS EVERY 6 HOURS PRN
Status: DISCONTINUED | OUTPATIENT
Start: 2018-07-25 | End: 2018-07-26 | Stop reason: HOSPADM

## 2018-07-25 RX ORDER — DIPHENHYDRAMINE HYDROCHLORIDE 50 MG/ML
12.5 INJECTION INTRAMUSCULAR; INTRAVENOUS
Status: DISCONTINUED | OUTPATIENT
Start: 2018-07-25 | End: 2018-07-25 | Stop reason: HOSPADM

## 2018-07-25 RX ORDER — SODIUM CHLORIDE 9 MG/ML
100 INJECTION, SOLUTION INTRAVENOUS CONTINUOUS
Status: DISCONTINUED | OUTPATIENT
Start: 2018-07-25 | End: 2018-07-26

## 2018-07-25 RX ORDER — ROCURONIUM BROMIDE 10 MG/ML
INJECTION, SOLUTION INTRAVENOUS AS NEEDED
Status: DISCONTINUED | OUTPATIENT
Start: 2018-07-25 | End: 2018-07-25 | Stop reason: SURG

## 2018-07-25 RX ORDER — FENTANYL CITRATE 50 UG/ML
INJECTION, SOLUTION INTRAMUSCULAR; INTRAVENOUS AS NEEDED
Status: DISCONTINUED | OUTPATIENT
Start: 2018-07-25 | End: 2018-07-25 | Stop reason: SURG

## 2018-07-25 RX ORDER — HYDROCODONE BITARTRATE AND ACETAMINOPHEN 5; 325 MG/1; MG/1
1 TABLET ORAL EVERY 4 HOURS PRN
Qty: 35 TABLET | Refills: 0
Start: 2018-07-25 | End: 2018-08-15 | Stop reason: SDUPTHER

## 2018-07-25 RX ORDER — PROPOFOL 10 MG/ML
VIAL (ML) INTRAVENOUS AS NEEDED
Status: DISCONTINUED | OUTPATIENT
Start: 2018-07-25 | End: 2018-07-25 | Stop reason: SURG

## 2018-07-25 RX ORDER — ATORVASTATIN CALCIUM 10 MG/1
10 TABLET, FILM COATED ORAL NIGHTLY
Status: DISCONTINUED | OUTPATIENT
Start: 2018-07-25 | End: 2018-07-26 | Stop reason: HOSPADM

## 2018-07-25 RX ORDER — HYDROMORPHONE HYDROCHLORIDE 1 MG/ML
0.5 INJECTION, SOLUTION INTRAMUSCULAR; INTRAVENOUS; SUBCUTANEOUS
Status: DISCONTINUED | OUTPATIENT
Start: 2018-07-25 | End: 2018-07-25

## 2018-07-25 RX ORDER — EPHEDRINE SULFATE 50 MG/ML
INJECTION, SOLUTION INTRAVENOUS AS NEEDED
Status: DISCONTINUED | OUTPATIENT
Start: 2018-07-25 | End: 2018-07-25 | Stop reason: SURG

## 2018-07-25 RX ORDER — PROMETHAZINE HYDROCHLORIDE 25 MG/ML
12.5 INJECTION, SOLUTION INTRAMUSCULAR; INTRAVENOUS EVERY 6 HOURS PRN
Status: DISCONTINUED | OUTPATIENT
Start: 2018-07-25 | End: 2018-07-26 | Stop reason: HOSPADM

## 2018-07-25 RX ORDER — FAMOTIDINE 10 MG/ML
20 INJECTION, SOLUTION INTRAVENOUS EVERY 12 HOURS SCHEDULED
Status: DISCONTINUED | OUTPATIENT
Start: 2018-07-25 | End: 2018-07-26

## 2018-07-25 RX ORDER — PROMETHAZINE HYDROCHLORIDE 25 MG/1
25 TABLET ORAL ONCE AS NEEDED
Status: DISCONTINUED | OUTPATIENT
Start: 2018-07-25 | End: 2018-07-25

## 2018-07-25 RX ORDER — HYDROCODONE BITARTRATE AND ACETAMINOPHEN 5; 325 MG/1; MG/1
1 TABLET ORAL EVERY 4 HOURS PRN
Status: DISCONTINUED | OUTPATIENT
Start: 2018-07-25 | End: 2018-07-26 | Stop reason: HOSPADM

## 2018-07-25 RX ORDER — MORPHINE SULFATE 2 MG/ML
1 INJECTION, SOLUTION INTRAMUSCULAR; INTRAVENOUS EVERY 4 HOURS PRN
Status: DISCONTINUED | OUTPATIENT
Start: 2018-07-25 | End: 2018-07-26 | Stop reason: HOSPADM

## 2018-07-25 RX ORDER — HYDROCODONE BITARTRATE AND ACETAMINOPHEN 5; 325 MG/1; MG/1
1 TABLET ORAL ONCE AS NEEDED
Status: DISCONTINUED | OUTPATIENT
Start: 2018-07-25 | End: 2018-07-26 | Stop reason: HOSPADM

## 2018-07-25 RX ORDER — NALOXONE HCL 0.4 MG/ML
0.4 VIAL (ML) INJECTION
Status: DISCONTINUED | OUTPATIENT
Start: 2018-07-25 | End: 2018-07-26 | Stop reason: HOSPADM

## 2018-07-25 RX ORDER — FENTANYL CITRATE 50 UG/ML
50 INJECTION, SOLUTION INTRAMUSCULAR; INTRAVENOUS
Status: DISCONTINUED | OUTPATIENT
Start: 2018-07-25 | End: 2018-07-25 | Stop reason: HOSPADM

## 2018-07-25 RX ORDER — SODIUM CHLORIDE, SODIUM LACTATE, POTASSIUM CHLORIDE, CALCIUM CHLORIDE 600; 310; 30; 20 MG/100ML; MG/100ML; MG/100ML; MG/100ML
9 INJECTION, SOLUTION INTRAVENOUS CONTINUOUS
Status: DISCONTINUED | OUTPATIENT
Start: 2018-07-25 | End: 2018-07-25

## 2018-07-25 RX ORDER — MIDAZOLAM HYDROCHLORIDE 1 MG/ML
1 INJECTION INTRAMUSCULAR; INTRAVENOUS
Status: DISCONTINUED | OUTPATIENT
Start: 2018-07-25 | End: 2018-07-25 | Stop reason: HOSPADM

## 2018-07-25 RX ORDER — METHOCARBAMOL 100 MG/ML
1000 INJECTION, SOLUTION INTRAMUSCULAR; INTRAVENOUS ONCE
Status: COMPLETED | OUTPATIENT
Start: 2018-07-25 | End: 2018-07-25

## 2018-07-25 RX ORDER — MIDAZOLAM HYDROCHLORIDE 1 MG/ML
2 INJECTION INTRAMUSCULAR; INTRAVENOUS
Status: DISCONTINUED | OUTPATIENT
Start: 2018-07-25 | End: 2018-07-25 | Stop reason: HOSPADM

## 2018-07-25 RX ORDER — SODIUM CHLORIDE 0.9 % (FLUSH) 0.9 %
1-10 SYRINGE (ML) INJECTION AS NEEDED
Status: DISCONTINUED | OUTPATIENT
Start: 2018-07-25 | End: 2018-07-25 | Stop reason: HOSPADM

## 2018-07-25 RX ORDER — FAMOTIDINE 10 MG/ML
20 INJECTION, SOLUTION INTRAVENOUS ONCE
Status: COMPLETED | OUTPATIENT
Start: 2018-07-25 | End: 2018-07-25

## 2018-07-25 RX ORDER — DOCUSATE SODIUM 100 MG/1
100 CAPSULE, LIQUID FILLED ORAL 2 TIMES DAILY
Status: DISCONTINUED | OUTPATIENT
Start: 2018-07-25 | End: 2018-07-26 | Stop reason: HOSPADM

## 2018-07-25 RX ORDER — LIDOCAINE HYDROCHLORIDE 40 MG/ML
SOLUTION TOPICAL AS NEEDED
Status: DISCONTINUED | OUTPATIENT
Start: 2018-07-25 | End: 2018-07-25 | Stop reason: SURG

## 2018-07-25 RX ORDER — ONDANSETRON 2 MG/ML
4 INJECTION INTRAMUSCULAR; INTRAVENOUS ONCE AS NEEDED
Status: COMPLETED | OUTPATIENT
Start: 2018-07-25 | End: 2018-07-25

## 2018-07-25 RX ORDER — FLUMAZENIL 0.1 MG/ML
0.2 INJECTION INTRAVENOUS AS NEEDED
Status: DISCONTINUED | OUTPATIENT
Start: 2018-07-25 | End: 2018-07-25

## 2018-07-25 RX ORDER — FENTANYL CITRATE 50 UG/ML
50 INJECTION, SOLUTION INTRAMUSCULAR; INTRAVENOUS
Status: DISCONTINUED | OUTPATIENT
Start: 2018-07-25 | End: 2018-07-25

## 2018-07-25 RX ORDER — AMLODIPINE BESYLATE 5 MG/1
5 TABLET ORAL NIGHTLY
Status: DISCONTINUED | OUTPATIENT
Start: 2018-07-25 | End: 2018-07-26 | Stop reason: HOSPADM

## 2018-07-25 RX ORDER — LIDOCAINE HYDROCHLORIDE 20 MG/ML
INJECTION, SOLUTION INFILTRATION; PERINEURAL AS NEEDED
Status: DISCONTINUED | OUTPATIENT
Start: 2018-07-25 | End: 2018-07-25 | Stop reason: SURG

## 2018-07-25 RX ORDER — NALOXONE HCL 0.4 MG/ML
0.2 VIAL (ML) INJECTION AS NEEDED
Status: DISCONTINUED | OUTPATIENT
Start: 2018-07-25 | End: 2018-07-25

## 2018-07-25 RX ADMIN — FAMOTIDINE 20 MG: 10 INJECTION INTRAVENOUS at 08:11

## 2018-07-25 RX ADMIN — PROPOFOL 120 MG: 10 INJECTION, EMULSION INTRAVENOUS at 08:29

## 2018-07-25 RX ADMIN — HYDROMORPHONE HYDROCHLORIDE 0.25 MG: 2 INJECTION INTRAMUSCULAR; INTRAVENOUS; SUBCUTANEOUS at 09:59

## 2018-07-25 RX ADMIN — SODIUM CHLORIDE 250 MG: 9 INJECTION, SOLUTION INTRAVENOUS at 16:00

## 2018-07-25 RX ADMIN — SODIUM CHLORIDE, POTASSIUM CHLORIDE, SODIUM LACTATE AND CALCIUM CHLORIDE 9 ML/HR: 600; 310; 30; 20 INJECTION, SOLUTION INTRAVENOUS at 08:11

## 2018-07-25 RX ADMIN — ONDANSETRON 4 MG: 2 INJECTION INTRAMUSCULAR; INTRAVENOUS at 11:20

## 2018-07-25 RX ADMIN — FENTANYL CITRATE 50 MCG: 50 INJECTION INTRAMUSCULAR; INTRAVENOUS at 10:06

## 2018-07-25 RX ADMIN — FAMOTIDINE 20 MG: 10 INJECTION, SOLUTION INTRAVENOUS at 16:00

## 2018-07-25 RX ADMIN — DOCUSATE SODIUM 100 MG: 100 CAPSULE, LIQUID FILLED ORAL at 14:51

## 2018-07-25 RX ADMIN — MIDAZOLAM 2 MG: 1 INJECTION INTRAMUSCULAR; INTRAVENOUS at 08:11

## 2018-07-25 RX ADMIN — DEXAMETHASONE SODIUM PHOSPHATE 8 MG: 10 INJECTION INTRAMUSCULAR; INTRAVENOUS at 08:55

## 2018-07-25 RX ADMIN — PROMETHAZINE HYDROCHLORIDE 6.25 MG: 25 INJECTION INTRAMUSCULAR; INTRAVENOUS at 14:51

## 2018-07-25 RX ADMIN — ONDANSETRON 4 MG: 2 INJECTION INTRAMUSCULAR; INTRAVENOUS at 21:49

## 2018-07-25 RX ADMIN — EPHEDRINE SULFATE 10 MG: 50 INJECTION INTRAMUSCULAR; INTRAVENOUS; SUBCUTANEOUS at 08:45

## 2018-07-25 RX ADMIN — CEFAZOLIN SODIUM 1 G: 2 INJECTION, SOLUTION INTRAVENOUS at 09:50

## 2018-07-25 RX ADMIN — SODIUM CHLORIDE, POTASSIUM CHLORIDE, SODIUM LACTATE AND CALCIUM CHLORIDE: 600; 310; 30; 20 INJECTION, SOLUTION INTRAVENOUS at 09:40

## 2018-07-25 RX ADMIN — DOCUSATE SODIUM 100 MG: 100 CAPSULE, LIQUID FILLED ORAL at 21:50

## 2018-07-25 RX ADMIN — ROCURONIUM BROMIDE 30 MG: 10 INJECTION INTRAVENOUS at 08:29

## 2018-07-25 RX ADMIN — CEFAZOLIN SODIUM 2 G: 2 INJECTION, SOLUTION INTRAVENOUS at 08:35

## 2018-07-25 RX ADMIN — AMLODIPINE BESYLATE 5 MG: 5 TABLET ORAL at 23:05

## 2018-07-25 RX ADMIN — HYDROCODONE BITARTRATE AND ACETAMINOPHEN 1 TABLET: 7.5; 325 TABLET ORAL at 11:08

## 2018-07-25 RX ADMIN — ATORVASTATIN CALCIUM 10 MG: 10 TABLET, FILM COATED ORAL at 21:50

## 2018-07-25 RX ADMIN — HYDROMORPHONE HYDROCHLORIDE 0.5 MG: 1 INJECTION, SOLUTION INTRAMUSCULAR; INTRAVENOUS; SUBCUTANEOUS at 11:55

## 2018-07-25 RX ADMIN — HYDROMORPHONE HYDROCHLORIDE 0.25 MG: 2 INJECTION INTRAMUSCULAR; INTRAVENOUS; SUBCUTANEOUS at 09:58

## 2018-07-25 RX ADMIN — LIDOCAINE HYDROCHLORIDE 1 EACH: 40 SOLUTION TOPICAL at 08:31

## 2018-07-25 RX ADMIN — EPHEDRINE SULFATE 10 MG: 50 INJECTION INTRAMUSCULAR; INTRAVENOUS; SUBCUTANEOUS at 08:40

## 2018-07-25 RX ADMIN — PHENYLEPHRINE HYDROCHLORIDE 100 MCG: 10 INJECTION INTRAVENOUS at 09:27

## 2018-07-25 RX ADMIN — FENTANYL CITRATE 50 MCG: 50 INJECTION INTRAMUSCULAR; INTRAVENOUS at 10:10

## 2018-07-25 RX ADMIN — PHENYLEPHRINE HYDROCHLORIDE 100 MCG: 10 INJECTION INTRAVENOUS at 08:50

## 2018-07-25 RX ADMIN — FENTANYL CITRATE 50 MCG: 50 INJECTION, SOLUTION INTRAMUSCULAR; INTRAVENOUS at 10:15

## 2018-07-25 RX ADMIN — HYDROMORPHONE HYDROCHLORIDE 0.5 MG: 1 INJECTION, SOLUTION INTRAMUSCULAR; INTRAVENOUS; SUBCUTANEOUS at 11:21

## 2018-07-25 RX ADMIN — PHENYLEPHRINE HYDROCHLORIDE 100 MCG: 10 INJECTION INTRAVENOUS at 09:10

## 2018-07-25 RX ADMIN — METHOCARBAMOL 1000 MG: 100 INJECTION INTRAMUSCULAR; INTRAVENOUS at 10:55

## 2018-07-25 RX ADMIN — SODIUM CHLORIDE 250 MG: 9 INJECTION, SOLUTION INTRAVENOUS at 21:51

## 2018-07-25 RX ADMIN — HYDROCODONE BITARTRATE AND ACETAMINOPHEN 1 TABLET: 5; 325 TABLET ORAL at 15:59

## 2018-07-25 RX ADMIN — LIDOCAINE HYDROCHLORIDE 60 MG: 20 INJECTION, SOLUTION INFILTRATION; PERINEURAL at 08:29

## 2018-07-25 RX ADMIN — FENTANYL CITRATE 50 MCG: 50 INJECTION, SOLUTION INTRAMUSCULAR; INTRAVENOUS at 10:30

## 2018-07-25 RX ADMIN — ONDANSETRON 4 MG: 2 INJECTION INTRAMUSCULAR; INTRAVENOUS at 09:52

## 2018-07-25 RX ADMIN — SUGAMMADEX 200 MG: 100 INJECTION, SOLUTION INTRAVENOUS at 09:55

## 2018-07-25 RX ADMIN — SODIUM CHLORIDE 100 ML/HR: 9 INJECTION, SOLUTION INTRAVENOUS at 14:52

## 2018-07-25 RX ADMIN — HYDROCODONE BITARTRATE AND ACETAMINOPHEN 1 TABLET: 5; 325 TABLET ORAL at 21:52

## 2018-07-25 RX ADMIN — FENTANYL CITRATE 100 MCG: 50 INJECTION INTRAMUSCULAR; INTRAVENOUS at 08:29

## 2018-07-25 RX ADMIN — HYDROMORPHONE HYDROCHLORIDE 0.5 MG: 1 INJECTION, SOLUTION INTRAMUSCULAR; INTRAVENOUS; SUBCUTANEOUS at 10:35

## 2018-07-25 RX ADMIN — FAMOTIDINE 20 MG: 10 INJECTION, SOLUTION INTRAVENOUS at 23:05

## 2018-07-25 NOTE — ANESTHESIA PROCEDURE NOTES
Airway  Urgency: elective    Airway not difficult    General Information and Staff    Patient location during procedure: OR  Anesthesiologist: ISABEL MARTINEZ  CRNA: BERNIE WHITE    Indications and Patient Condition  Indications for airway management: airway protection    Preoxygenated: yes  MILS not maintained throughout  Mask difficulty assessment: 1 - vent by mask    Final Airway Details  Final airway type: endotracheal airway      Successful airway: ETT  Cuffed: yes   Successful intubation technique: direct laryngoscopy  Facilitating devices/methods: cricoid pressure  Endotracheal tube insertion site: oral  Blade: Annabelle  Blade size: #3  Cormack-Lehane Classification: grade I - full view of glottis  Placement verified by: chest auscultation and capnometry   Measured from: lips  Number of attempts at approach: 1    Additional Comments  Dentition intact and unchanged. CBEBS.  +ETCO2.

## 2018-07-25 NOTE — ANESTHESIA POSTPROCEDURE EVALUATION
"Patient: Marlena Navarrete    Procedure Summary     Date:  07/25/18 Room / Location:  [facility] OR  / [facility] MAIN OR    Anesthesia Start:  0827 Anesthesia Stop:  1013    Procedure:  Right L2-3 laminectomy with Metrix (Right Spine Lumbar) Diagnosis:       Lumbar radiculopathy      (Lumbar radiculopathy [M54.16])    Surgeon:  Oscar Paulino MD Provider:  Sun Florence MD    Anesthesia Type:  general ASA Status:  2          Anesthesia Type: general  Last vitals  BP   129/54 (07/25/18 1300)   Temp   36.6 °C (97.9 °F) (07/25/18 1010)   Pulse   76 (07/25/18 1300)   Resp   16 (07/25/18 1300)     SpO2   100 % (07/25/18 1300)     Post Anesthesia Care and Evaluation    Patient location during evaluation: PACU  Patient participation: complete - patient participated  Level of consciousness: awake and alert  Pain management: adequate  Airway patency: patent  Anesthetic complications: No anesthetic complications    Cardiovascular status: acceptable  Respiratory status: acceptable  Hydration status: acceptable    Comments: /54   Pulse 76   Temp 36.6 °C (97.9 °F) (Oral)   Resp 16   Ht 146.1 cm (57.5\")   Wt 57.3 kg (126 lb 5 oz)   SpO2 100%   BMI 26.86 kg/m²               "

## 2018-07-25 NOTE — PLAN OF CARE
Problem: Patient Care Overview  Goal: Plan of Care Review  Outcome: Ongoing (interventions implemented as appropriate)   07/25/18 3200   Coping/Psychosocial   Plan of Care Reviewed With patient;spouse   Plan of Care Review   Progress no change   OTHER   Outcome Summary Patient A+Ox4. Still c/o numbness right side down posterior hip to toes. Ambulated x2 in room w/ walker and gait belt. Patient unable to bear weight on right side and leg buckle when she did apply weight. HOB flat. Had to give phenergan on top of zofran for nausea. Pain well controlled with norco- incision on back feels good; leg pain is worse. Solu-cortef IVPB given. Dressing is CDI will monitor.

## 2018-07-25 NOTE — ANESTHESIA PREPROCEDURE EVALUATION
Anesthesia Evaluation     Patient summary reviewed and Nursing notes reviewed   NPO Solid Status: > 8 hours  NPO Liquid Status: > 2 hours           Airway   Mallampati: II  TM distance: >3 FB  Neck ROM: full  Dental - normal exam     Pulmonary - normal exam   (+) a smoker Former,   Cardiovascular - normal exam    ECG reviewed    (+) hypertension, hyperlipidemia,     ROS comment: SINUS RHYTHM  NO SIGNIFICANT CHANGE FROM PREVIOUS ECG    Neuro/Psych  (+) headaches, numbness,     GI/Hepatic/Renal/Endo    (+)   renal disease,     ROS Comment: Renal transplant    Musculoskeletal     Abdominal  - normal exam    Bowel sounds: normal.   Substance History - negative use     OB/GYN negative ob/gyn ROS         Other   (+) arthritis                     Anesthesia Plan    ASA 2     general     intravenous induction   Anesthetic plan and risks discussed with patient.

## 2018-07-25 NOTE — H&P (VIEW-ONLY)
Subjective   Patient ID: Marlena Navarrete is a 68 y.o. female is here today for follow-up with a new Lumbar Myelogram ordered for right side low back pain that radiates into his lower extremity with numbness and tingling. Patients symptoms remain unchanged since last visit.    History of Present Illness    This patient has been having severe pain in her back with radiation into her right leg and right buttock for a long time.  Over the past week or 2 the pain has developed more in her right anterior thigh.  She still has some pain radiating all the way down her right leg into her foot.    The following portions of the patient's history were reviewed and updated as appropriate: allergies, current medications, past family history, past medical history, past social history, past surgical history and problem list.    Review of Systems   Respiratory: Negative for chest tightness and shortness of breath.    Cardiovascular: Negative for chest pain.   Musculoskeletal: Positive for back pain.        Leg pain   Neurological: Positive for numbness.        Tingling   All other systems reviewed and are negative.      Objective   Physical Exam   Constitutional: She is oriented to person, place, and time. She appears well-developed and well-nourished.   Eyes: EOM are normal. Pupils are equal, round, and reactive to light.   Neurological: She is oriented to person, place, and time. She has a normal Finger-Nose-Finger Test and a normal Heel to Shin Test. Gait normal.   Reflex Scores:       Tricep reflexes are 2+ on the right side and 2+ on the left side.       Bicep reflexes are 2+ on the right side and 2+ on the left side.       Brachioradialis reflexes are 2+ on the right side and 2+ on the left side.       Patellar reflexes are 2+ on the right side and 2+ on the left side.       Achilles reflexes are 2+ on the right side and 2+ on the left side.  Psychiatric: Her speech is normal.     Neurologic Exam     Mental Status   Oriented to  person, place, and time.   Registration of memory: Good recent and remote memory.   Attention: normal. Concentration: normal.   Speech: speech is normal   Level of consciousness: alert  Knowledge: consistent with education.     Cranial Nerves     CN II   Visual fields full to confrontation.   Visual acuity: normal    CN III, IV, VI   Pupils are equal, round, and reactive to light.  Extraocular motions are normal.     CN V   Facial sensation intact.   Right corneal reflex: normal  Left corneal reflex: normal    CN VII   Facial expression full, symmetric.   Right facial weakness: none  Left facial weakness: none    CN VIII   Hearing: intact    CN IX, X   Palate: symmetric    CN XI   Right sternocleidomastoid strength: normal  Left sternocleidomastoid strength: normal    CN XII   Tongue: not atrophic  Tongue deviation: none    Motor Exam   Muscle bulk: normal  Right arm tone: normal  Left arm tone: normal  Right leg tone: normal  Left leg tone: normal    Strength   Strength 5/5 except as noted.     Sensory Exam   Light touch normal.     Gait, Coordination, and Reflexes     Gait  Gait: normal    Coordination   Finger to nose coordination: normal  Heel to shin coordination: normal    Reflexes   Right brachioradialis: 2+  Left brachioradialis: 2+  Right biceps: 2+  Left biceps: 2+  Right triceps: 2+  Left triceps: 2+  Right patellar: 2+  Left patellar: 2+  Right achilles: 2+  Left achilles: 2+  Right : 2+  Left : 2+      Assessment/Plan   Independent Review of Radiographic Studies:      I reviewed her plain films, myelogram, and CT scan myself.  The plain films show severe degenerative disease but no evidence of instability on flexion and extension.  On the myelogram itself there is really no evidence of nerve root compression at any level of the lumbar spine except on the standing films where there is some nerve root compression on the right side at L2-3.  This can also be seen on the lateral films.  On the post  myelographic CT scan there is some spondylitic change in the lower thoracic spine but no evidence of neural compression down to the middle of the L2 vertebral body.  At L2-3 there is some stenosis in the right lateral recess consistent with the myelogram films.  L3 4 looks okay as does L4 5 and L5-S1.  The radiologist unfortunately did not read the myelogram itself which shows the significant nerve root compression on the right side at L2-3.    Medical Decision Making:      I told the patient and her  about the imaging.  I told them that could certainly consider surgery on the right side at L2-3.  I explained that we could either do a minimally invasive surgery or we could do a more major decompression and fusion.  I repeated the discussion several times because I was not sure that she was hearing me.  I told the patient about the risks, complications and expected outcome of the lumbar surgery.  I explained that there was an 80% chance of getting rid of the pain in the leg.  I explained that there would still be back pain after the surgery.  Initially this will be quite severe but will improve over time.  There is a 2 or 3% chance of infection, bleeding, CSF leak, damage to the nerve as a result of surgery, paralysis, as well as anesthetic risk.  There is a 5% chance of late instability which could require a fusion later on and a 10% chance of recurrent disc herniation.  We discussed the postoperative hospital and home course.  She is concerned about putting up with the pain during a fishing trip they have scheduled for next week but does want to proceed with surgery.  I told her that we could try her on a stronger steroid but that I cannot give her any narcotic medications.    She would like to proceed and needs to be scheduled for a: Right L2-3 laminectomy with Metriestela Mendiola was seen today for follow-up and back pain.    Diagnoses and all orders for this visit:    Lumbar radiculopathy  -     Case Request;  Standing  -     ceFAZolin (ANCEF) 2 g in sodium chloride 0.9 % 100 mL IVPB; Infuse 2 g into a venous catheter 1 (One) Time.  -     Case Request    Other orders  -     Follow anesthesia standing orders.  -     Obtain informed consent  -     Follow anesthesia standing orders.; Standing  -     TIMOTHY hose- To be placed on patient in pre-op; Standing  -     SCD (sequential compression device)- to be placed on patient in Pre-op; Standing  -     dexamethasone (DEACDRON) 1.5 MG (51) tablet therapy pack; Take 2.25 mg by mouth Take As Directed.      Return for 2-3 week post op.

## 2018-07-25 NOTE — DISCHARGE INSTRUCTIONS
LUMBAR SURGERY - ELIZABETH GIRALDO M.D.  [address], [address]  [place] [address]  [phone]    Instructions & Care After Your Lumbar Surgery    1. No sitting except on the commode.  You may lie on a firm couch but not on a waterbed or recliner.  You may lie in any position that is comfortable, using only one pillow under your head. Either stand at a counter or lie on your side for meals. Three weeks after surgery you may begin sitting for 30 minutes 3 times per day.    2. No driving for three weeks.  You may ride in the car in a passenger seat that reclines or lying down in the back seat.      3. No bending. If you drop something allow someone else to pick it up.    4. Don’t lift anything heavier than a coffee cup or paperback book.    5. Gradually increase your activity each day.  You should get out of bed every hour during the day.  Walk outside as soon as you feel up to it.  Walk short distances frequently rather than making a long trip.  Frequency is more important than distance.  Your goal is to be walking 2 to 3 miles per day when you return for your post-operative visit. (Never do this in one trip.)    6. You may climb stairs.    7. Remove your bandage the second day after surgery and leave it off.  If you notice any redness, swelling or drainage, call the office.  There are steri-strips across the incision.  If these are still present ten days after surgery, remove them gently.      8. You may shower five days after surgery.  Keep the incision dry until then.  Don’t let the water beat directly on the incision and gently pat it dry.    9. Physical Therapy will be arranged at your post-operative visit if needed.    10. Your prescription for pain medication may be filled for half the original amount prior to your return office visit.  Due to changes in Federal Law in order to have this medication refilled you must contact the office four days prior to the date and make arrangements to pick the  prescription up in the office.  This prescription refill cannot be called in to the pharmacy. Your prescription will be ready for pick-up the day the refill is due.    Don’t be alarmed if you experience some of your pre-operative symptoms after going home.  This is not uncommon and normally goes away in a few days but may last longer.  If you have any questions or concerns, please call our office.

## 2018-07-25 NOTE — BRIEF OP NOTE
LUMBAR DISCECTOMY POSTERIOR WITH METRIX  Progress Note    Marlena Navarrete  7/25/2018    Pre-op Diagnosis:   Lumbar radiculopathy [M54.16]       Post-Op Diagnosis Codes:     * Lumbar radiculopathy [M54.16]    Procedure/CPT® Codes:      Procedure(s):  Right L2-3 laminectomy with Metrix    Surgeon(s):  Oscar Paulino MD    Anesthesia: General    Staff:   Circulator: Chey Jewell RN; Guillermina Pickett RN  Radiology Technologist: Isabella Saenz RRT  Scrub Person: Kathia Bui  Assistant: Zofai Rangel CSA    Estimated Blood Loss: 100ml    Urine Voided: * No values recorded between 7/25/2018  8:26 AM and 7/25/2018 10:04 AM *    Specimens:                None      Drains:      Findings: herniated disc and dural dehisence    Complications: none      Oscar Paulino MD     Date: 7/25/2018  Time: 10:13 AM

## 2018-07-26 VITALS
TEMPERATURE: 97.9 F | OXYGEN SATURATION: 96 % | HEIGHT: 58 IN | SYSTOLIC BLOOD PRESSURE: 114 MMHG | DIASTOLIC BLOOD PRESSURE: 51 MMHG | RESPIRATION RATE: 18 BRPM | BODY MASS INDEX: 26.51 KG/M2 | WEIGHT: 126.31 LBS | HEART RATE: 64 BPM

## 2018-07-26 LAB
ANION GAP SERPL CALCULATED.3IONS-SCNC: 9.1 MMOL/L
BUN BLD-MCNC: 22 MG/DL (ref 8–23)
BUN/CREAT SERPL: 17.3 (ref 7–25)
CALCIUM SPEC-SCNC: 8.7 MG/DL (ref 8.6–10.5)
CHLORIDE SERPL-SCNC: 110 MMOL/L (ref 98–107)
CO2 SERPL-SCNC: 21.9 MMOL/L (ref 22–29)
CREAT BLD-MCNC: 1.27 MG/DL (ref 0.57–1)
DEPRECATED RDW RBC AUTO: 48.4 FL (ref 37–54)
ERYTHROCYTE [DISTWIDTH] IN BLOOD BY AUTOMATED COUNT: 13.3 % (ref 11.7–13)
GFR SERPL CREATININE-BSD FRML MDRD: 42 ML/MIN/1.73
GLUCOSE BLD-MCNC: 144 MG/DL (ref 65–99)
HCT VFR BLD AUTO: 27.7 % (ref 35.6–45.5)
HGB BLD-MCNC: 8.9 G/DL (ref 11.9–15.5)
MCH RBC QN AUTO: 31.9 PG (ref 26.9–32)
MCHC RBC AUTO-ENTMCNC: 32.1 G/DL (ref 32.4–36.3)
MCV RBC AUTO: 99.3 FL (ref 80.5–98.2)
PLATELET # BLD AUTO: 227 10*3/MM3 (ref 140–500)
PMV BLD AUTO: 9.9 FL (ref 6–12)
POTASSIUM BLD-SCNC: 4.8 MMOL/L (ref 3.5–5.2)
RBC # BLD AUTO: 2.79 10*6/MM3 (ref 3.9–5.2)
SODIUM BLD-SCNC: 141 MMOL/L (ref 136–145)
WBC NRBC COR # BLD: 16.56 10*3/MM3 (ref 4.5–10.7)

## 2018-07-26 PROCEDURE — 80048 BASIC METABOLIC PNL TOTAL CA: CPT | Performed by: NEUROLOGICAL SURGERY

## 2018-07-26 PROCEDURE — G8979 MOBILITY GOAL STATUS: HCPCS

## 2018-07-26 PROCEDURE — 97162 PT EVAL MOD COMPLEX 30 MIN: CPT

## 2018-07-26 PROCEDURE — G8980 MOBILITY D/C STATUS: HCPCS

## 2018-07-26 PROCEDURE — 25010000003 HYDROCORTISONE SOD SUCCINATE PF 250 MG RECONSTITUTED SOLUTION 1 EACH VIAL: Performed by: NEUROLOGICAL SURGERY

## 2018-07-26 PROCEDURE — G8978 MOBILITY CURRENT STATUS: HCPCS

## 2018-07-26 PROCEDURE — 97110 THERAPEUTIC EXERCISES: CPT

## 2018-07-26 PROCEDURE — 99024 POSTOP FOLLOW-UP VISIT: CPT | Performed by: NURSE PRACTITIONER

## 2018-07-26 PROCEDURE — 85027 COMPLETE CBC AUTOMATED: CPT | Performed by: NEUROLOGICAL SURGERY

## 2018-07-26 RX ORDER — HYDROCODONE BITARTRATE AND ACETAMINOPHEN 5; 325 MG/1; MG/1
1 TABLET ORAL EVERY 4 HOURS PRN
Qty: 35 TABLET | Refills: 0
Start: 2018-07-26 | End: 2018-08-01 | Stop reason: HOSPADM

## 2018-07-26 RX ORDER — METHYLPREDNISOLONE 4 MG/1
TABLET ORAL
Qty: 1 EACH | Refills: 0 | Status: SHIPPED | OUTPATIENT
Start: 2018-07-26 | End: 2018-08-01 | Stop reason: HOSPADM

## 2018-07-26 RX ORDER — CEPHALEXIN 500 MG/1
500 CAPSULE ORAL 4 TIMES DAILY
Qty: 20 CAPSULE | Refills: 0
Start: 2018-07-26 | End: 2018-08-01 | Stop reason: HOSPADM

## 2018-07-26 RX ORDER — FAMOTIDINE 10 MG/ML
20 INJECTION, SOLUTION INTRAVENOUS DAILY
Status: DISCONTINUED | OUTPATIENT
Start: 2018-07-27 | End: 2018-07-26 | Stop reason: HOSPADM

## 2018-07-26 RX ADMIN — FAMOTIDINE 20 MG: 10 INJECTION, SOLUTION INTRAVENOUS at 09:17

## 2018-07-26 RX ADMIN — DOCUSATE SODIUM 100 MG: 100 CAPSULE, LIQUID FILLED ORAL at 09:17

## 2018-07-26 RX ADMIN — SODIUM CHLORIDE 250 MG: 9 INJECTION, SOLUTION INTRAVENOUS at 03:58

## 2018-07-26 RX ADMIN — HYDROCODONE BITARTRATE AND ACETAMINOPHEN 1 TABLET: 5; 325 TABLET ORAL at 02:32

## 2018-07-26 RX ADMIN — HYDROCODONE BITARTRATE AND ACETAMINOPHEN 1 TABLET: 5; 325 TABLET ORAL at 06:51

## 2018-07-26 RX ADMIN — SODIUM CHLORIDE 250 MG: 9 INJECTION, SOLUTION INTRAVENOUS at 10:35

## 2018-07-26 RX ADMIN — HYDROCODONE BITARTRATE AND ACETAMINOPHEN 1 TABLET: 5; 325 TABLET ORAL at 14:51

## 2018-07-26 NOTE — PLAN OF CARE
"Problem: Patient Care Overview  Goal: Plan of Care Review  Outcome: Ongoing (interventions implemented as appropriate)   07/25/18 2152 07/26/18 0600   Coping/Psychosocial   Plan of Care Reviewed With patient;family --    Plan of Care Review   Progress --  no change   OTHER   Outcome Summary --  c/o persistent parasthesia manifested by tingling pain RLE radiating from hip to foot rated at \"5\"/10 consistently. c/o persistent weakness in RLE, weak plantar flex, absent dorsiflex. Ax1 w/ walker to BR; tolerates moderately well. Refuses to bear weight on RLE citing weakness. HOB flat. Initial post-op nausea reported, now resolved.       Problem: Pain, Acute (Adult)  Goal: Identify Related Risk Factors and Signs and Symptoms  Outcome: Ongoing (interventions implemented as appropriate)   07/26/18 0742   Pain, Acute (Adult)   Related Risk Factors (Acute Pain) patient perception;procedure/treatment   Signs and Symptoms (Acute Pain) fatigue/weakness     Goal: Acceptable Pain Control/Comfort Level  Outcome: Ongoing (interventions implemented as appropriate)   07/26/18 0600   Pain, Acute (Adult)   Acceptable Pain Control/Comfort Level making progress toward outcome         "

## 2018-07-26 NOTE — PLAN OF CARE
Problem: Patient Care Overview  Goal: Plan of Care Review   07/26/18 1329   Coping/Psychosocial   Plan of Care Reviewed With patient;spouse   Plan of Care Review   Progress improving   OTHER   Outcome Summary VSS. Up with walker. Pain meds given once. DC to home with [facility] to follow.

## 2018-07-26 NOTE — PLAN OF CARE
Problem: Patient Care Overview  Goal: Plan of Care Review   07/26/18 1701   Coping/Psychosocial   Plan of Care Reviewed With patient   Plan of Care Review   Progress improving   OTHER   Outcome Summary Oral pain meds given once. PCA Dilaudid in use. New IV ordered R/T pulling out from rolling in bed. Pt likes to be naked in bed. No numbness or tingling.

## 2018-07-26 NOTE — PROGRESS NOTES
Post Op Visit      Unable to go home post op due to R leg pain and associated numbness/weakness. Reports it is getting better with IV steroids. Numbness now only from knee down whereas yesterday it involved the entire R leg. Still having weakness in R foot but was able to ambulate with PT this am. PT to see later today. Denies headache. Voiding okay.     Vitals:    07/26/18 1330   BP: 114/51   Pulse: 64   Resp: 18   Temp: 97.9 °F (36.6 °C)   SpO2: 96%     Lumbar incision okay.   NO calf swelling or tenderness with palpation.        Ref. Range 7/26/2018 05:59   WBC Latest Ref Range: 4.50 - 10.70 10*3/mm3 16.56 (H)   Hemoglobin Latest Ref Range: 11.9 - 15.5 g/dL 8.9 (L)   Hematocrit Latest Ref Range: 35.6 - 45.5 % 27.7 (L)       POD 1 R L2/3 lami with MetRx and repair of CSF leak  Post op radiculitis - secondary to nerve manipulation during surgery; improved with steroids  Post op leukocytosis r/t steroids      Continue to mobilize  CBC in am  Continue steroids  Home later today if doing better      ADDENDUM    Patient did well with PT this afternoon. Able to ascend and descend stairs well with shoes on. Pain/numbness R leg much better. Now just having some numbness in R foot. Patient states she is voiding well. She wishes to go home. [facility] has arranged home health.

## 2018-07-26 NOTE — PLAN OF CARE
Problem: Patient Care Overview  Goal: Plan of Care Review  Outcome: Ongoing (interventions implemented as appropriate)   07/26/18 1431   Coping/Psychosocial   Plan of Care Reviewed With patient   Plan of Care Review   Progress improving   OTHER   Outcome Summary Pt tolerated treatment well this afternoon. Practiced stair training, and pt performed well using Rw and backwards technique to ascend w/ CGA.  educated on technique as well. Pt would benefit from continued skilled PT from home health once dc'd home to address functional mobility and strength deficits.

## 2018-07-26 NOTE — DISCHARGE SUMMARY
Marlena Navarrete  1950    Patient Care Team:  Bradley Hernandez MD as PCP - General (Family Medicine)    Date of Admit: 7/25/2018    Date of Discharge:  7/26/2018    Discharge Diagnosis:Principal Problem:    Lumbar radiculopathy      Procedures Performed  Procedure(s):  Right L2-3 laminectomy with Metrix  Post op radiculitis; R foot weakness/numbness       Complications: None    Consultants:   Consults     No orders found for last 30 day(s).          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 Images revealed severe R lateral recess stenosis. Patient was brought to the OR electively for the above surgical procedure. Please see admission H&P for further details.     Hospital Course: Patient was scheduled as an outpatient. There was some dural dehiscence that was repaired during the surgery. Post op, the patient had difficulty ambulating due to numbness and weakness in the R leg. She was subsequently admitted and started on IV solucortef. This morning, after working with PT, the patient noted resolution in numbness and weakness in R thigh. Numbness still persisted below the knee. Dr. Paulino is aware. He had to perform quite a bit of nerve manipulation during the surgery. Symptoms should improve. The patient has continued to mobilize through the day. PT re-evaluated her this afternoon and weakness is significantly better. Patient able to navigate stairs and ambulate in halls safely. Patient feels comfortable with going home. She was offered another night's stay but declined. The patient will be followed by home health for PT at home. She will also continue on an oral steroid taper after discharge. She had also been on oral steroids pre-operatively. This is the cause of the persistent leukocytosis. The patient has remained afebrile, vital signs stable. Lumbar incision well approximated. No redness, swelling or  drainage. She denies any headache. She is voiding and tolerating a diet without difficulty. Dr. Paulino and the patient are in agreement with the discharge plan.       Temp:  [97.8 °F (36.6 °C)-98.1 °F (36.7 °C)] 97.9 °F (36.6 °C)  Heart Rate:  [64-88] 64  Resp:  [18] 18  BP: (102-114)/(48-82) 114/51    Current labs:  Lab Results (last 24 hours)     Procedure Component Value Units Date/Time    CBC (No Diff) [[id number]]  (Abnormal) Collected:  07/26/18 0559    Specimen:  Blood Updated:  07/26/18 0652     WBC 16.56 (H) 10*3/mm3      RBC 2.79 (L) 10*6/mm3      Hemoglobin 8.9 (L) g/dL      Hematocrit 27.7 (L) %      MCV 99.3 (H) fL      MCH 31.9 pg      MCHC 32.1 (L) g/dL      RDW 13.3 (H) %      RDW-SD 48.4 fl      MPV 9.9 fL      Platelets 227 10*3/mm3     Basic Metabolic Panel [[id number]]  (Abnormal) Collected:  07/26/18 0559    Specimen:  Blood Updated:  07/26/18 0751     Glucose 144 (H) mg/dL      BUN 22 mg/dL      Creatinine 1.27 (H) mg/dL      Sodium 141 mmol/L      Potassium 4.8 mmol/L      Chloride 110 (H) mmol/L      CO2 21.9 (L) mmol/L      Calcium 8.7 mg/dL      eGFR Non African Amer 42 (L) mL/min/1.73      BUN/Creatinine Ratio 17.3     Anion Gap 9.1 mmol/L     Narrative:       GFR Normal >60  Chronic Kidney Disease <60  Kidney Failure <15            Discharge Medications  Brody has been reviewed and narcotic consent is on file in the patient's chart.     Your medication list      START taking these medications      Instructions Last Dose Given Next Dose Due   cephalexin 500 MG capsule  Commonly known as:  KEFLEX      Take 1 capsule by mouth 4 (Four) Times a Day for 5 days.       HYDROcodone-acetaminophen 5-325 MG per tablet  Commonly known as:  NORCO      Take 1 tablet by mouth Every 4 (Four) Hours As Needed for Moderate Pain  (pain).       HYDROcodone-acetaminophen 5-325 MG per tablet  Commonly known as:  NORCO      Take 1 tablet by mouth Every 4 (Four) Hours As Needed for Moderate Pain  (Pain).        MethylPREDNISolone 4 MG tablet  Commonly known as:  MEDROL (MARILEE)      follow package directions          CONTINUE taking these medications      Instructions Last Dose Given Next Dose Due   amLODIPine 5 MG tablet  Commonly known as:  NORVASC      Take 5 mg by mouth Every Night.       atorvastatin 10 MG tablet  Commonly known as:  LIPITOR      Take 10 mg by mouth Every Night.       diphenhydrAMINE-acetaminophen  MG tablet per tablet  Commonly known as:  TYLENOL PM      Take 2 tablets by mouth Every Night.          STOP taking these medications    dexamethasone 1.5 MG (51) tablet therapy pack  Commonly known as:  DEACDRON              Where to Get Your Medications      These medications were sent to [facility] [address] [place] [address] - [address] [facility] AT [facility] [address] - [phone]  - [phone] FX  [address] [facility] [address]    Phone:  [phone]   · MethylPREDNISolone 4 MG tablet     Information about where to get these medications is not yet available    Ask your nurse or doctor about these medications  · cephalexin 500 MG capsule  · HYDROcodone-acetaminophen 5-325 MG per tablet  · HYDROcodone-acetaminophen 5-325 MG per tablet         Discharge Diet:   Diet Instructions     Advance Diet as Tolerated           Diet Order   Procedures   • Diet Regular       Activity at Discharge:   Activity Instructions     Other Restrictions (Specify)       Patient to follow activities listed on instruction sheet          Call for: any incisional redness, swelling or drainage, T> 100.5; worsening pain; questions or concerns.     Follow-up Appointments  Future Appointments  Date Time Provider Department Center   8/10/2018 12:30 PM DANY Garcia [facility] None     Follow-up Information     Bradley Hernandez MD .    Specialty:  Family Medicine  Contact information:  [address] [place] [id number] [phone]                 Additional Instructions for the  Follow-ups that You Need to Schedule     Discharge Follow-up with Specialty: keep previously arranged appointment with Dr. Giraldo in 2 weeks.; 2 Weeks    As directed      Specialty:  keep previously arranged appointment with Dr. Giraldo in 2 weeks.    Follow Up:  2 Weeks         Referral to Home Health    As directed      Face to Face Visit Date:  7/26/2018    Follow-up Provider for Plan of Care?:  I will be treating the patient on an ongoing basis.  Please send me the Plan of Care for signature.    Follow-up Provider:  ELIZABETH GIRALDO [[id number]]    Reason/Clinical Findings:  post op    Describe mobility limitations that make leaving home difficult:  R foot numbness/weak    Nursing/Therapeutic Services Requested:  Physical Therapy Skilled Nursing    Skilled nursing orders:  Neurovascular assessments    PT orders:  Gait Training Transfer training Strengthening    Weight Bearing Status:  As Tolerated    Frequency:  1 Week 1               Test Results Pending at Discharge     None    I discussed the discharge instructions with patient and family    DANY Garcia  07/26/18  4:21 PM

## 2018-07-26 NOTE — THERAPY EVALUATION
"Acute Care - Physical Therapy Initial Evaluation  [facility]     Patient Name: Marlena Navarrete  : 1950  MRN: [id number]  Today's Date: 2018   Onset of Illness/Injury or Date of Surgery: 18  Date of Referral to PT: 18  Referring Physician: Dr. Paulino      Admit Date: 2018    Visit Dx:     ICD-10-CM ICD-9-CM   1. Impaired functional mobility and activity tolerance Z74.09 V49.89   2. Lumbar radiculopathy M54.16 724.4   3. Generalized weakness R53.1 780.79     Patient Active Problem List   Diagnosis   • Lumbar radiculopathy     Past Medical History:   Diagnosis Date   • Arthritis     OSTEO   • Elevated cholesterol    • History of transfusion    • Hypertension    • Kidney failure     [facility]   • Kidney transplant recipient 1979   • Spinal headache      Past Surgical History:   Procedure Laterality Date   • ADENOIDECTOMY     •  SECTION     •  SECTION     • COLONOSCOPY     • SHOULDER ROTATOR CUFF REPAIR Left    • TONSILLECTOMY     • TRANSPLANTATION RENAL Right     [facility]    • TUBAL ABDOMINAL LIGATION          PT ASSESSMENT (last 12 hours)      Physical Therapy Evaluation     Row Name 18 0800          PT Evaluation Time/Intention    Subjective Information complains of;weakness;fatigue;pain;numbness   \"can't lift R foot up\"  -MA     Document Type evaluation  -MA     Mode of Treatment physical therapy  -MA (r) YB (t) MA (c)     Patient Effort good  -MA (r) YB (t) MA (c)     Symptoms Noted During/After Treatment fatigue  -MA (r) YB (t) MA (c)     Comment Pt in no acute distress upon PT arrival. Noted impaired motor control with R ankle DF- unable to perform actively.  -MA     Row Name 18 0800          General Information    Patient Profile Reviewed? yes  -MA (r) YB (t) MA (c)     Onset of Illness/Injury or Date of Surgery 18  -MA (r) YB (t) MA (c)     Referring Physician Dr. Paulino  -MA     Patient Observations " alert;cooperative;agree to therapy  -MA (r) YB (t) MA (c)     Patient/Family Observations Pt supine with SCDs donned and family in room upon PT arrival  -MA (r) YB (t) MA (c)     Prior Level of Function independent:;all household mobility;community mobility;ADL's  -MA (r) YB (t) MA (c)     Equipment Currently Used at Home cane, straight;walker, standard   does not use at baseline- access to  -MA     Pertinent History of Current Functional Problem Pt admitted for lumbar radiculopathy with RLE numbness/tingling. POD1 R L2-3 laminectomy, foraminotomy, medial facetectomy & discectomy.  -MA (r) YB (t) MA (c)     Existing Precautions/Restrictions fall;spinal;other (see comments)   High difficulty with WB on RLE, c/o buckling of knee   -MA (r) YB (t) MA (c)     Equipment Issued to Patient gait belt  -MA (r) YB (t) MA (c)     Risks Reviewed patient and family:;increased discomfort  -MA (r) YB (t) MA (c)     Benefits Reviewed patient and family:;improve function;increase independence;increase strength;increase balance;decrease pain;increase knowledge  -MA (r) YB (t) MA (c)     Barriers to Rehab medically complex  -MA (r) YB (t) MA (c)     Row Name 07/26/18 0800          Relationship/Environment    Primary Source of Support/Comfort spouse  -MA (r) YB (t) MA (c)     Lives With spouse  -MA (r) YB (t) MA (c)     Family Caregiver if Needed spouse  -MA (r) YB (t) MA (c)     Row Name 07/26/18 0800          Resource/Environmental Concerns    Current Living Arrangements home/apartment/condo  -MA (r) YB (t) MA (c)     Resource/Environmental Concerns none  -MA (r) YB (t) MA (c)     Home Accessibility Concerns stairs to enter home  -MA (r) YB (t) MA (c)     Row Name 07/26/18 0800          Home Main Entrance    Number of Stairs, Main Entrance two  -MA (r) YB (t) MA (c)     Stairs Comment, Main Entrance Has two steps to enter in back of house  -MA (r) YB (t) MA (c)     Row Name 07/26/18 0800          Cognitive Assessment/Intervention-  PT/OT    Orientation Status (Cognition) oriented x 4  -MA (r) YB (t) MA (c)     Follows Commands (Cognition) WFL  -MA (r) YB (t) MA (c)     Personal Safety Interventions fall prevention program maintained;gait belt;nonskid shoes/slippers when out of bed  -MA (r) YB (t) MA (c)     Row Name 07/26/18 0800          Safety Issues, Functional Mobility    Safety Issues Affecting Function (Mobility) impulsivity;positioning of assistive device  -MA (r) YB (t) MA (c)     Impairments Affecting Function (Mobility) balance;endurance/activity tolerance;motor control;pain;sensation/sensory awareness;strength  -MA (r) YB (t) MA (c)     Comment, Safety Issues/Impairments (Mobility) Limited control of RLE, evidence of foot drop, all weight on LLE and RW  -MA (r) YB (t) MA (c)     Row Name 07/26/18 0800          Bed Mobility Assessment/Treatment    Bed Mobility Assessment/Treatment bed mobility (all) activities  -MA (r) YB (t) MA (c)     [place] Level (Bed Mobility) standby assist;1 person assist;verbal cues  -MA (r) YB (t) MA (c)     Bed Mobility, Safety Issues decreased use of legs for bridging/pushing  -MA (r) YB (t) MA (c)     Assistive Device (Bed Mobility) bed rails  -MA (r) YB (t) MA (c)     Comment (Bed Mobility) Demonstrated proper log-roll technique  -MA (r) YB (t) MA (c)     Row Name 07/26/18 0800          Transfer Assessment/Treatment    Transfer Assessment/Treatment sit-stand transfer;stand-sit transfer  -MA (r) YB (t) MA (c)     Comment (Transfers) All weight on LLE  -MA (r) YB (t) MA (c)     Sit-Stand [place] (Transfers) contact guard;1 person assist;nonverbal cues (demo/gesture)  -MA (r) YB (t) MA (c)     Stand-Sit [place] (Transfers) contact guard;1 person assist;verbal cues  -MA (r) YB (t) MA (c)     Row Name 07/26/18 0800          Sit-Stand Transfer    Assistive Device (Sit-Stand Transfers) walker, front-wheeled  -MA (r) YB (t) MA (c)     Row Name 07/26/18 0800          Stand-Sit Transfer    Assistive  Device (Stand-Sit Transfers) walker, front-wheeled  -MA (r) YB (t) MA (c)     Row Name 07/26/18 0800          Gait/Stairs Assessment/Training    Gait/Stairs Assessment/Training gait/ambulation assistive device;distance ambulated  -MA (r) YB (t) MA (c)     [place] Level (Gait) minimum assist (75% patient effort);1 person assist;verbal cues  -MA (r) YB (t) MA (c)     Assistive Device (Gait) walker, front-wheeled  -MA (r) YB (t) MA (c)     Distance in Feet (Gait) 5  -MA (r) YB (t) MA (c)     Pattern (Gait) 3-point;other (see comments)   attempted to WB on R unsuccessfully  -MA (r) YB (t) MA (c)     Deviations/Abnormal Patterns (Gait) antalgic;gait speed decreased;steppage  -MA (r) YB (t) MA (c)     Right Sided Gait Deviations foot drop/toe drag;forward flexed posture;knee buckling, right side   Pt stated knee buckling much worse yesterday  -MA (r) YB (t) MA (c)     Comment (Gait/Stairs) Distance limited due to fatigue and faulty gait mechanics  -MA (r) YB (t) MA (c)     Row Name 07/26/18 0800          General ROM    GENERAL ROM COMMENTS LLE WFL, RLE- hip flexion decreased and DF absent  -MA (r) YB (t) MA (c)     Row Name 07/26/18 0800          General Assessment (Manual Muscle Testing)    General Manual Muscle Testing (MMT) Assessment --  -MA (r) YB (t) MA (c)     Comment, General Manual Muscle Testing (MMT) Assessment LLE WFL, RLE- hip flexion 2+/5, knee extension 4/5, DF 2-/5  -MA (r) YB (t) MA (c)     Row Name 07/26/18 0800          Motor Assessment/Intervention    Additional Documentation Balance (Group);Therapeutic Exercise Interventions (Group)  -MA (r) YB (t) MA (c)     Row Name 07/26/18 0800          Balance    Balance static sitting balance;static standing balance  -MA (r) YB (t) MA (c)     Row Name 07/26/18 0800          Static Sitting Balance    Level of [place] (Unsupported Sitting, Static Balance) supervision  -MA (r) YB (t) MA (c)     Sitting Position (Unsupported Sitting, Static Balance)  sitting on edge of bed  -MA (r) YB (t) MA (c)     Time Able to Maintain Position (Unsupported Sitting, Static Balance) 45 to 60 seconds  -MA (r) YB (t) MA (c)     Level of [place] (Supported Sitting, Static Balance) supervision  -MA (r) YB (t) MA (c)     Sitting Position (Supported Sitting, Static Balance) sitting on edge of bed  -MA (r) YB (t) MA (c)     Time Able to Maintain Position (Supported Sitting, Static Balance) 2 to 3 minutes  -MA (r) YB (t) MA (c)     Row Name 07/26/18 00          Static Standing Balance    Level of [place] (Supported Standing, Static Balance) contact guard assist  -MA (r) YB (t) MA (c)     Time Able to Maintain Position (Supported Standing, Static Balance) 1 to 2 minutes  -MA (r) YB (t) MA (c)     Assistive Device Utilized (Supported Standing, Static Balance) rolling walker  -MA (r) YB (t) MA (c)     Row Name 07/26/18 00          Sensory Assessment/Intervention    Sensory General Assessment light touch sensation deficits identified   Only on RLE, LLE intact  -MA (r) YB (t) MA (c)     Row Name 07/26/18 0900 07/26/18 0800       Vision Assessment/Intervention    Visual Impairment/Limitations --  -MA (r) YB (t) MA (c) WFL  -MA (r) YB (t) MA (c)    Row Name 07/26/18 0900 07/26/18 0800       Pain Assessment    Additional Documentation --  -MA (r) YB (t) MA (c) Pain Scale: Numbers Pre/Post-Treatment (Group)  -MA (r) YB (t) MA (c)    Row Name 07/26/18 00          Pain Scale: Numbers Pre/Post-Treatment    Pain Scale: Numbers, Pretreatment 4/10  -MA (r) YB (t) MA (c)     Pain Scale: Numbers, Post-Treatment 4/10  -MA (r) YB (t) MA (c)     Pain Location - Orientation incisional  -MA (r) YB (t) MA (c)     Pain Location back  -MA (r) YB (t) MA (c)     Pre/Post Treatment Pain Comment Incisional back pain and some RLE pain  -MA (r) YB (t) MA (c)     Pain Intervention(s) Repositioned;Rest  -MA (r) YB (t) MA (c)     Row Name             Wound 07/25/18 0956 Right back incision    Wound -  Properties Group Date first assessed: 07/25/18  -KM Time first assessed: 0956 -KM Side: Right  -KM Location: back  -KM Type: incision  -KM    Row Name 07/26/18 0800          Plan of Care Review    Plan of Care Reviewed With patient;family  -MA (r) NABEEL (t) MA (c)     Row Name 07/26/18 0800          Physical Therapy Clinical Impression    Date of Referral to PT 07/26/18  -MA (r) YB (t) MA (c)     PT Diagnosis (PT Clinical Impression) Generalized weakness and impaired functional mobility  -MA (r) YB (t) MA (c)     Prognosis (PT Clinical Impression) Fair, due to PLOF, family support and clinical presentation  -MA (r) YB (t) MA (c)     Criteria for Skilled Interventions Met (PT Clinical Impression) yes;treatment indicated  -MA (r) YB (t) MA (c)     Pathology/Pathophysiology Noted (Describe Specifically for Each System) musculoskeletal;neuromuscular  -MA (r) YB (t) MA (c)     Impairments Found (describe specific impairments) aerobic capacity/endurance;ergonomics and body mechanics;gait, locomotion, and balance;motor function;neuromotor development and sensory integration;posture;ROM;sensory Integrity  -MA (r) YB (t) MA (c)     Rehab Potential (PT Clinical Summary) fair, will monitor progress closely  -MA (r) YB (t) MA (c)     Care Plan Review (PT) evaluation/treatment results reviewed;risks/benefits reviewed;care plan/treatment goals reviewed  -MA (r) YB (t) MA (c)     Care Plan Review, Other Participant (PT Clinical Impression) significant other  -MA (r) YB (t) MA (c)     Row Name 07/26/18 0800          Vital Signs    O2 Delivery Pre Treatment room air  -MA (r) YB (t) MA (c)     O2 Delivery Intra Treatment room air  -MA (r) YB (t) MA (c)     O2 Delivery Post Treatment room air  -MA (r) YB (t) MA (c)     Pre Patient Position --  -MA (r) YB (t) MA (c)     Post Patient Position --  -MA (r) YB (t) MA (c)     Row Name 07/26/18 0800          Physical Therapy Goals    Bed Mobility Goal Selection (PT) bed mobility, PT goal 1   -MA (r) YB (t) MA (c)     Transfer Goal Selection (PT) transfer, PT goal 1  -MA (r) YB (t) MA (c)     Gait Training Goal Selection (PT) gait training, PT goal 1  -MA (r) YB (t) MA (c)     Stairs Goal Selection (PT) stairs, PT goal 1  -MA (r) YB (t) MA (c)     Additional Documentation Stairs Goal Selection (PT) (Row)  -MA (r) YB (t) MA (c)     Row Name 07/26/18 0800          Bed Mobility Goal 1 (PT)    Activity/Assistive Device (Bed Mobility Goal 1, PT) bed mobility activities, all  -MA (r) YB (t) MA (c)     [place] Level/Cues Needed (Bed Mobility Goal 1, PT) conditional independence  -MA (r) YB (t) MA (c)     Time Frame (Bed Mobility Goal 1, PT) 1 week  -MA (r) YB (t) MA (c)     Progress/Outcomes (Bed Mobility Goal 1, PT) goal ongoing  -MA (r) YB (t) MA (c)     Row Name 07/26/18 0800          Transfer Goal 1 (PT)    Activity/Assistive Device (Transfer Goal 1, PT) sit-to-stand/stand-to-sit;walker, rolling  -MA (r) YB (t) MA (c)     [place] Level/Cues Needed (Transfer Goal 1, PT) supervision required  -MA (r) YB (t) MA (c)     Time Frame (Transfer Goal 1, PT) 1 week  -MA (r) YB (t) MA (c)     Progress/Outcome (Transfer Goal 1, PT) goal ongoing  -MA (r) YB (t) MA (c)     Row Name 07/26/18 0800          Gait Training Goal 1 (PT)    Activity/Assistive Device (Gait Training Goal 1, PT) gait (walking locomotion);assistive device use;decrease fall risk;improve balance and speed;increase endurance/gait distance;walker, rolling  -MA (r) YB (t) MA (c)     [place] Level (Gait Training Goal 1, PT) supervision required  -MA (r) YB (t) MA (c)     Distance (Gait Goal 1, PT) 25  -MA (r) YB (t) MA (c)     Time Frame (Gait Training Goal 1, PT) 1 week  -MA (r) YB (t) MA (c)     Barriers (Gait Training Goal 1, PT) Limited WB on R  -MA (r) YB (t) MA (c)     Progress/Outcome (Gait Training Goal 1, PT) goal ongoing  -MA (r) YB (t) MA (c)     Row Name 07/26/18 0800          Stairs Goal 1 (PT)    Activity/Assistive Device  (Stairs Goal 1, PT) ascending stairs;descending stairs;step-to-step  -MA (r) YB (t) MA (c)     [place] Level/Cues Needed (Stairs Goal 1, PT) minimum assist (75% or more patient effort)  -MA (r) YB (t) MA (c)     Number of Stairs (Stairs Goal 1, PT) 2  -MA (r) YB (t) MA (c)     Time Frame (Stairs Goal 1, PT) 1 week  -MA (r) YB (t) MA (c)     Barriers (Stairs Goal 1, PT) Limited WB on R  -MA (r) YB (t) MA (c)     Progress/Outcome (Stairs Goal 1, PT) goal ongoing  -MA (r) YB (t) MA (c)     Row Name 07/26/18 0800          Positioning and Restraints    Pre-Treatment Position in bed  -MA (r) YB (t) MA (c)     Post Treatment Position bed  -MA (r) YB (t) MA (c)     In Bed notified nsg;supine;call light within reach;encouraged to call for assist;with family/caregiver;SCD pump applied  -MA (r) YB (t) MA (c)     Row Name 07/26/18 0800          Living Environment    Home Accessibility stairs to enter home  -MA (r) YB (t) MA (c)       User Key  (r) = Recorded By, (t) = Taken By, (c) = Cosigned By    Initials Name Provider Type    THAO Pickett, RN Registered Nurse    TODD Casey, PT Physical Therapist    NABEEL Jacobs, PT Student PT Student          Physical Therapy Education     Title: PT OT SLP Therapies (Active)     Topic: Physical Therapy (Active)     Point: Mobility training (Active)    Learning Progress Summary     Learner Status Readiness Method Response Comment Documented by    Patient Active Acceptance E NR   07/26/18 0851    Family Active Acceptance E NR   07/26/18 0851          Point: Home exercise program (Active)    Learning Progress Summary     Learner Status Readiness Method Response Comment Documented by    Patient Active Acceptance E NR   07/26/18 0851    Family Active Acceptance E NR   07/26/18 0851          Point: Body mechanics (Active)    Learning Progress Summary     Learner Status Readiness Method Response Comment Documented by    Patient Active Acceptance E NR   07/26/18  0851    Family Active Acceptance E NR  YB 07/26/18 0851          Point: Precautions (Active)    Learning Progress Summary     Learner Status Readiness Method Response Comment Documented by    Patient Active Acceptance E NR  YB 07/26/18 0851    Family Active Acceptance E NR  YB 07/26/18 0851                      User Key     Initials Effective Dates Name Provider Type Discipline    YB 06/22/18 -  Сергей Jacobs, PT Student PT Student PT                PT Recommendation and Plan  Anticipated Discharge Disposition (PT): home with assist, home with home health, home with OP services, other (see comments) (pending pt progress)  Planned Therapy Interventions (PT Eval): balance training, bed mobility training, gait training, home exercise program, motor coordination training, neuromuscular re-education, patient/family education, ROM (range of motion), stair training, strengthening, transfer training  Outcome Summary/Treatment Plan (PT)  Anticipated Discharge Disposition (PT): home with assist, home with home health, home with OP services, other (see comments) (pending pt progress)  Plan of Care Reviewed With: patient, family  Outcome Summary: Pt is a pleasant 68 y.o. female admitted to [facility] for lumbar radiculopathy with RLE numbness/tingling. POD1 R L2-3 laminectomy, foraminotomy, medial facetectomy & discectomy. PLOF indep with household and community mobility with standard walker sometimes. At eval, pt c/o limited WB ability on RLE and 4/10 pain at back incision and RLE. She was SBAx1 for bed mobility, CGAx1 with RW for transfers, and minAx1 with RW for gait 5'. Pt WB on L and cautiously attempt on R with limited success but less knee buckling than she claimed to have had yesterday. R-sided foot drop and hip flexion deficits noted as well. May benefit from skilled PT acutely to address functional deficits as needed and improve strength, motor control, ROM, and independence with mobility.            Outcome Measures     Row  Name 07/26/18 0800             How much help from another person do you currently need...    Turning from your back to your side while in flat bed without using bedrails? 4  -MA (r) YB (t) MA (c)      Moving from lying on back to sitting on the side of a flat bed without bedrails? 4  -MA (r) YB (t) MA (c)      Moving to and from a bed to a chair (including a wheelchair)? 3  -MA (r) YB (t) MA (c)      Standing up from a chair using your arms (e.g., wheelchair, bedside chair)? 3  -MA (r) YB (t) MA (c)      Climbing 3-5 steps with a railing? 2  -MA (r) YB (t) MA (c)      To walk in hospital room? 3  -MA (r) YB (t) MA (c)      AM-PAC 6 Clicks Score 19  -MA (r) YB (t)         Functional Assessment    Outcome Measure Options --  -MA (r) YB (t) MA (c)        User Key  (r) = Recorded By, (t) = Taken By, (c) = Cosigned By    Initials Name Provider Type    TODD Casey, PT Physical Therapist    NABEEL Jacobs, PT Student PT Student           Time Calculation:         PT Charges     Row Name 07/26/18 0850             Time Calculation    Start Time 0828  -MA (r) YB (t) MA (c)      Stop Time 0845  -MA (r) YB (t) MA (c)      Time Calculation (min) 17 min  -MA (r) YB (t)      PT Received On 07/26/18  -MA (r) YB (t) MA (c)      PT - Next Appointment 07/26/18  -MA (r) YB (t) MA (c)      PT Goal Re-Cert Due Date 08/02/18  -MA (r) YB (t) MA (c)        User Key  (r) = Recorded By, (t) = Taken By, (c) = Cosigned By    Initials Name Provider Type    TODD Casey, PT Physical Therapist    NABEEL Jacobs, PT Student PT Student        Therapy Suggested Charges     Code   Minutes Charges    None           Therapy Charges for Today     Code Description Service Date Service Provider Modifiers Qty    73377363934 HC PT EVAL MOD COMPLEXITY 2 7/26/2018 Сергей Jacobs, PT Student GP 1    86451032619 HC PT THER PROC EA 15 MIN 7/26/2018 Сергей Jacobs, PT Student GP 1          PT G-Codes  PT Professional Judgement Used?: Yes  Functional  Limitation: Mobility: Walking and moving around  Mobility: Walking and Moving Around Current Status (): At least 20 percent but less than 40 percent impaired, limited or restricted  Mobility: Walking and Moving Around Goal Status (): At least 1 percent but less than 20 percent impaired, limited or restricted  Mobility: Walking and Moving Around Discharge Status (): 0 percent impaired, limited or restricted      Сергей Jacobs, PT Student  7/26/2018

## 2018-07-26 NOTE — THERAPY TREATMENT NOTE
Acute Care - Physical Therapy Treatment Note  [facility]     Patient Name: Marlena Navarrete  : 1950  MRN: [id number]  Today's Date: 2018  Onset of Illness/Injury or Date of Surgery: 18  Date of Referral to PT: 18  Referring Physician: Dr. Paulino    Admit Date: 2018    Visit Dx:    ICD-10-CM ICD-9-CM   1. Impaired functional mobility and activity tolerance Z74.09 V49.89   2. Lumbar radiculopathy M54.16 724.4   3. Generalized weakness R53.1 780.79     Patient Active Problem List   Diagnosis   • Lumbar radiculopathy       Therapy Treatment          Rehabilitation Treatment Summary     Row Name 18 1350             Treatment Time/Intention    Discipline physical therapy assistant  -      Document Type therapy note (daily note)  -SM      Subjective Information complains of;weakness  -SM      Mode of Treatment physical therapy  -SM      Patient Effort good  -SM2      Existing Precautions/Restrictions fall;spinal  -SM2      Treatment Considerations/Comments R foot drop  -SM2      Recorded by [SM] Davina Ames, [facility] 18 1419  [SM2] Davina Ames, [facility] 18 1430      Row Name 18 1350             Cognitive Assessment/Intervention- PT/OT    Orientation Status (Cognition) oriented x 4  -SM      Follows Commands (Cognition) WFL  -SM      Personal Safety Interventions fall prevention program maintained;gait belt;nonskid shoes/slippers when out of bed  -SM      Recorded by [SM] Davina Ames, [facility] 18 1430      Row Name 18 1350             Safety Issues, Functional Mobility    Safety Issues Affecting Function (Mobility) awareness of need for assistance  -SM      Impairments Affecting Function (Mobility) balance;sensation/sensory awareness;strength  -SM      Comment, Safety Issues/Impairments (Mobility) R foot drop  -SM      Recorded by [SM] Davina Ames [facility] 18 1430      Row Name 18 1350             Bed Mobility Assessment/Treatment    Bed  Mobility Assessment/Treatment rolling right;sidelying-sit;sit-sidelying  -SM      Rolling Right [place] (Bed Mobility) supervision  -      Sidelying-Sit [place] (Bed Mobility) supervision  -      Sit-Sidelying [place] (Bed Mobility) supervision  -      Assistive Device (Bed Mobility) bed rails  -SM      Recorded by [SM] Davina Ames, [facility] 07/26/18 1430      Row Name 07/26/18 1350             Transfer Assessment/Treatment    Transfer Assessment/Treatment sit-stand transfer;stand-sit transfer  -      Recorded by [SM] Davina Ames, [facility] 07/26/18 1430      Row Name 07/26/18 1350             Sit-Stand Transfer    Sit-Stand [place] (Transfers) contact guard  -      Assistive Device (Sit-Stand Transfers) walker, front-wheeled  -      Recorded by [SM] Davina Ames, [facility] 07/26/18 1430      Row Name 07/26/18 1350             Stand-Sit Transfer    Stand-Sit [place] (Transfers) contact guard  -      Assistive Device (Stand-Sit Transfers) walker, front-wheeled  -SM      Recorded by [SM] Daivna Ames, [facility] 07/26/18 1430      Row Name 07/26/18 1350             Gait/Stairs Assessment/Training    Gait/Stairs Assessment/Training gait/ambulation independence;gait/ambulation assistive device;distance ambulated;gait pattern;gait deviations;stairs negotiation  -      [place] Level (Gait) contact guard;minimum assist (75% patient effort)  -      Assistive Device (Gait) walker, front-wheeled  -      Distance in Feet (Gait) 50   total  -      Pattern (Gait) step-to  -      Deviations/Abnormal Patterns (Gait) antalgic;sanjuana decreased;steppage;stride length decreased  -      Right Sided Gait Deviations foot drop/toe drag;forward flexed posture  -      Negotiation (Stairs) stairs independence;stairs assistive device;handrail location;number of steps;ascending technique;descending technique  -      [place] Level (Stairs) contact guard;verbal cues;2 person  assist  -SM      Assistive Device (Stairs) walker, front-wheeled  -SM      Handrail Location (Stairs) none  -SM      Number of Steps (Stairs) 3  -SM      Ascending Technique (Stairs) step-to-step   backwards  -SM      Descending Technique (Stairs) step-to-step  -SM      Stairs, Impairments strength decreased  -SM      Comment (Gait/Stairs) pt's  educated on technique as well  -SM      Recorded by [] Davina Ames, \[facility]\"" 07/26/18 1430      Row Name 07/26/18 1350             Positioning and Restraints    Pre-Treatment Position in bed  -SM      Post Treatment Position bed  -SM      In Bed supine;call light within reach;encouraged to call for assist;notified nsg  -SM      Recorded by [] Davina Ames, \[facility]\"" 07/26/18 1430      Row Name 07/26/18 1350             Pain Assessment    Additional Documentation Pain Scale: Numbers Pre/Post-Treatment (Group)  -SM      Recorded by [] Davina Ames PTA 07/26/18 1430      Row Name 07/26/18 1350             Pain Scale: Numbers Pre/Post-Treatment    Pain Scale: Numbers, Pretreatment 0/10 - no pain  -SM      Recorded by [] Davina Ames, \[facility]\"" 07/26/18 1430      Row Name                Wound 07/25/18 0956 Right back incision    Wound - Properties Group Date first assessed: 07/25/18 [KM] Time first assessed: 0956 [KM] Side: Right [KM] Location: back [KM] Type: incision [KM] Recorded by:  [KM] Guillermina Pickett RN 07/25/18 0956      User Key  (r) = Recorded By, (t) = Taken By, (c) = Cosigned By    Initials Name Effective Dates Discipline    KM Guillermina Pickett RN 11/27/17 -  Nurse    SM Davina Ames, PTA 03/07/18 -  PT          Wound 07/25/18 0956 Right back incision (Active)   Dressing Appearance dry;intact;no drainage 7/26/2018  8:40 AM   Closure FARAZ 7/25/2018  9:52 PM   Base dressing in place, unable to visualize 7/25/2018  9:52 PM   Drainage Amount none 7/25/2018  9:52 PM   Dressing Care, Wound gauze, dry 7/25/2018  9:52 PM               PT Rehab  Goals     Row Name 07/26/18 0800             Bed Mobility Goal 1 (PT)    Activity/Assistive Device (Bed Mobility Goal 1, PT) bed mobility activities, all  -MA (r) YB (t) MA (c)      Cobb Level/Cues Needed (Bed Mobility Goal 1, PT) conditional independence  -MA (r) YB (t) MA (c)      Time Frame (Bed Mobility Goal 1, PT) 1 week  -MA (r) YB (t) MA (c)      Progress/Outcomes (Bed Mobility Goal 1, PT) goal ongoing  -MA (r) YB (t) MA (c)         Transfer Goal 1 (PT)    Activity/Assistive Device (Transfer Goal 1, PT) sit-to-stand/stand-to-sit;walker, rolling  -MA (r) YB (t) MA (c)      [place] Level/Cues Needed (Transfer Goal 1, PT) supervision required  -MA (r) YB (t) MA (c)      Time Frame (Transfer Goal 1, PT) 1 week  -MA (r) YB (t) MA (c)      Progress/Outcome (Transfer Goal 1, PT) goal ongoing  -MA (r) YB (t) MA (c)         Gait Training Goal 1 (PT)    Activity/Assistive Device (Gait Training Goal 1, PT) gait (walking locomotion);assistive device use;decrease fall risk;improve balance and speed;increase endurance/gait distance;walker, rolling  -MA (r) YB (t) MA (c)      [place]obb Level (Gait Training Goal 1, PT) supervision required  -MA (r) YB (t) MA (c)      Distance (Gait Goal 1, PT) 25  -MA (r) YB (t) MA (c)      Time Frame (Gait Training Goal 1, PT) 1 week  -MA (r) YB (t) MA (c)      Barriers (Gait Training Goal 1, PT) Limited WB on R  -MA (r) YB (t) MA (c)      Progress/Outcome (Gait Training Goal 1, PT) goal ongoing  -MA (r) YB (t) MA (c)         Stairs Goal 1 (PT)    Activity/Assistive Device (Stairs Goal 1, PT) ascending stairs;descending stairs;step-to-step  -MA (r) YB (t) MA (c)      [place] Level/Cues Needed (Stairs Goal 1, PT) minimum assist (75% or more patient effort)  -MA (r) YB (t) MA (c)      Number of Stairs (Stairs Goal 1, PT) 2  -MA (r) YB (t) MA (c)      Time Frame (Stairs Goal 1, PT) 1 week  -MA (r) YB (t) MA (c)      Barriers (Stairs Goal 1, PT) Limited WB on R  -MA (r) YB  (t) MA (c)      Progress/Outcome (Stairs Goal 1, PT) goal ongoing  -MA (r) YB (t) MA (c)        User Key  (r) = Recorded By, (t) = Taken By, (c) = Cosigned By    Initials Name Provider Type Discipline    TODD Casey, PT Physical Therapist PT    YB Сергей Jacobs, PT Student PT Student PT          Physical Therapy Education     Title: PT OT SLP Therapies (Active)     Topic: Physical Therapy (Active)     Point: Mobility training (Active)    Learning Progress Summary     Learner Status Readiness Method Response Comment Documented by    Patient Done Acceptance ELUCY D [facility] 07/26/18 1430     Active Acceptance E NR   07/26/18 0851    Family Active Acceptance E NR   07/26/18 0851          Point: Home exercise program (Active)    Learning Progress Summary     Learner Status Readiness Method Response Comment Documented by    Patient Done Acceptance LUCY BEARD D [facility] 07/26/18 1430     Active Acceptance E NR   07/26/18 0851    Family Active Acceptance E NR   07/26/18 0851          Point: Body mechanics (Active)    Learning Progress Summary     Learner Status Readiness Method Response Comment Documented by    Patient Done Acceptance LUCY BEARD D [facility] 07/26/18 1430     Active Acceptance E NR   07/26/18 0851    Family Active Acceptance E NR   07/26/18 0851          Point: Precautions (Active)    Learning Progress Summary     Learner Status Readiness Method Response Comment Documented by    Patient Done Acceptance LUCY BEARD D [facility] 07/26/18 1430     Active Acceptance E NR   07/26/18 0851    Family Active Acceptance E NR   07/26/18 0851                      User Key     Initials Effective Dates Name Provider Type Discipline     03/07/18 -  Davina Ames, PTA Physical Therapy Assistant PT     06/22/18 -  Сергей Jacobs, PT Student PT Student PT                    PT Recommendation and Plan     Plan of Care Reviewed With: patient  Progress: improving  Outcome Summary: Pt tolerated treatment well this afternoon.  Practiced stair training, and pt performed well using Rw and backwards technique to ascend w/ CGA.  educated on technique as well. Pt would benefit from continued skilled PT from home health once dc'd home to address functional mobility and strength deficits.          Outcome Measures     Row Name 07/26/18 1400 07/26/18 0800          How much help from another person do you currently need...    Turning from your back to your side while in flat bed without using bedrails? 4  -SM 4  -MA (r) YB (t) MA (c)     Moving from lying on back to sitting on the side of a flat bed without bedrails? 4  -SM 4  -MA (r) YB (t) MA (c)     Moving to and from a bed to a chair (including a wheelchair)? 3  -SM 3  -MA (r) YB (t) MA (c)     Standing up from a chair using your arms (e.g., wheelchair, bedside chair)? 3  -SM 3  -MA (r) YB (t) MA (c)     Climbing 3-5 steps with a railing? 3  -SM 2  -MA (r) YB (t) MA (c)     To walk in hospital room? 3  -SM 3  -MA (r) YB (t) MA (c)     AM-PAC 6 Clicks Score 20  - 19  -MA (r) YB (t)        Functional Assessment    Outcome Measure Options AM-PAC 6 Clicks Basic Mobility (PT)  - --  -MA (r) YB (t) MA (c)       User Key  (r) = Recorded By, (t) = Taken By, (c) = Cosigned By    Initials Name Provider Type     Davina Ames, PTA Physical Therapy Assistant    TODD Casey, PT Physical Therapist    NABEEL Jacobs, PT Student PT Student           Time Calculation:         PT Charges     Row Name 07/26/18 1434 07/26/18 0850          Time Calculation    Start Time 1350  - 0828  -MA (r) YB (t) MA (c)     Stop Time 1418  - 0845  -MA (r) YB (t) MA (c)     Time Calculation (min) 28 min  - 17 min  -MA (r) YB (t)     PT Received On 07/26/18  - 07/26/18  -MA (r) YB (t) MA (c)     PT - Next Appointment 07/27/18  -SM 07/26/18  -MA (r) NABEEL (t) MA (c)     PT Goal Re-Cert Due Date  -- 08/02/18  -MA (r) NABEEL (sandra) MA (valentín)       User Key  (r) = Recorded By, (t) = Taken By, (c) = Cosigned By     Initials Name Provider Type     Davina Ames PTA Physical Therapy Assistant    TODD Casey, PT Physical Therapist    NABEEL Jacobs, PT Student PT Student        Therapy Suggested Charges     Code   Minutes Charges    None           Therapy Charges for Today     Code Description Service Date Service Provider Modifiers Qty    73716263898 HC PT THER PROC EA 15 MIN 7/26/2018 Davina Ames PTA GP 2    11623226672 HC PT THER SUPP EA 15 MIN 7/26/2018 Davina Ames PTA GP 1          PT G-Codes  PT Professional Judgement Used?: Yes  Outcome Measure Options: AM-PAC 6 Clicks Basic Mobility (PT)  Functional Limitation: Mobility: Walking and moving around  Mobility: Walking and Moving Around Current Status (): At least 20 percent but less than 40 percent impaired, limited or restricted  Mobility: Walking and Moving Around Goal Status (): At least 1 percent but less than 20 percent impaired, limited or restricted  Mobility: Walking and Moving Around Discharge Status (): 0 percent impaired, limited or restricted    Davina Ames PTA  7/26/2018

## 2018-07-26 NOTE — PLAN OF CARE
Problem: Patient Care Overview  Goal: Plan of Care Review   07/26/18 0923   Coping/Psychosocial   Plan of Care Reviewed With patient;family   OTHER   Outcome Summary Pt is a pleasant 68 y.o. female admitted to [facility] for lumbar radiculopathy with RLE numbness/tingling. POD1 R L2-3 laminectomy, foraminotomy, medial facetectomy & discectomy. PLOF was indep with household and community mobility with standard walker sometimes. At [facility], pt c/o limited WB ability on RLE and 4/10 pain at back incision and RLE. She was SBAx1 for bed mobility, CGAx1 with RW for transfers, and minAx1 with RW for gait 5'. Pt WB on L and cautiously attempt on R with limited success but less knee buckling than she claimed to have had yesterday. R-sided foot drop and hip flexion deficits noted as well. May benefit from skilled PT acutely to address functional deficits as needed and improve strength, motor control, ROM, & independence with mobility.

## 2018-07-26 NOTE — PLAN OF CARE
Problem: Patient Care Overview  Goal: Plan of Care Review   07/26/18 0923   Coping/Psychosocial   Plan of Care Reviewed With patient;family   OTHER   Outcome Summary Pt is a pleasant 68 y.o. female admitted to [facility] for lumbar radiculopathy with RLE numbness/tingling. POD1 R L2-3 laminectomy, foraminotomy, medial facetectomy & discectomy. PLOF indep with household and community mobility with standard walker sometimes. At [facility], pt c/o limited WB ability on RLE and 4/10 pain at back incision and RLE. She was SBAx1 for bed mobility, CGAx1 with RW for transfers, and minAx1 with RW for gait 5'. Pt WB on L and cautiously attempt on R with limited success but less knee buckling than she claimed to have had yesterday. R-sided foot drop and hip flexion deficits noted as well. May benefit from skilled PT acutely to address functional deficits as needed and improve strength, motor control, ROM, and independence with mobility.

## 2018-07-27 NOTE — PROGRESS NOTES
Case Management Discharge Note    Final Note: HOme with [facility]    Destination     No service has been selected for the patient.      Durable Medical Equipment     No service has been selected for the patient.      Dialysis/Infusion     No service has been selected for the patient.      Home Medical Care     Service Request Status Selected Specialties Address Phone Number Fax Number    [facility] Selected [facility] Health Services [address] [id number] [phone]        Mary Kay Romeo RN 7/26/2018 1400    Referral called to [facility]     No service has been selected for the patient.             Final Discharge Disposition Code: 06 - home with home health care

## 2018-07-28 ENCOUNTER — HOSPITAL ENCOUNTER (INPATIENT)
Facility: HOSPITAL | Age: 68
LOS: 3 days | Discharge: HOME HEALTH | End: 2018-08-01
Attending: EMERGENCY MEDICINE | Admitting: NEUROLOGICAL SURGERY

## 2018-07-28 ENCOUNTER — APPOINTMENT (OUTPATIENT)
Dept: MRI IMAGING | Facility: HOSPITAL | Age: 68
End: 2018-07-28

## 2018-07-28 DIAGNOSIS — M54.16 LUMBAR RADICULOPATHY, ACUTE: Primary | ICD-10-CM

## 2018-07-28 DIAGNOSIS — M54.16 LUMBAR RADICULOPATHY: ICD-10-CM

## 2018-07-28 PROCEDURE — 25810000003 SODIUM CHLORIDE 0.9 % WITH KCL 20 MEQ 20-0.9 MEQ/L-% SOLUTION: Performed by: NEUROLOGICAL SURGERY

## 2018-07-28 PROCEDURE — G0378 HOSPITAL OBSERVATION PER HR: HCPCS

## 2018-07-28 PROCEDURE — 0 GADOBENATE DIMEGLUMINE 529 MG/ML SOLUTION: Performed by: EMERGENCY MEDICINE

## 2018-07-28 PROCEDURE — 99284 EMERGENCY DEPT VISIT MOD MDM: CPT

## 2018-07-28 PROCEDURE — A9577 INJ MULTIHANCE: HCPCS | Performed by: EMERGENCY MEDICINE

## 2018-07-28 PROCEDURE — 72158 MRI LUMBAR SPINE W/O & W/DYE: CPT

## 2018-07-28 PROCEDURE — 25010000002 DEXAMETHASONE PER 1 MG: Performed by: EMERGENCY MEDICINE

## 2018-07-28 PROCEDURE — 25010000003 HYDROCORTISONE SOD SUCCINATE PF 250 MG RECONSTITUTED SOLUTION 1 EACH VIAL: Performed by: NEUROLOGICAL SURGERY

## 2018-07-28 RX ORDER — FAMOTIDINE 10 MG/ML
20 INJECTION, SOLUTION INTRAVENOUS DAILY
Status: DISCONTINUED | OUTPATIENT
Start: 2018-07-28 | End: 2018-07-30 | Stop reason: CLARIF

## 2018-07-28 RX ORDER — ATORVASTATIN CALCIUM 10 MG/1
10 TABLET, FILM COATED ORAL NIGHTLY
Status: DISCONTINUED | OUTPATIENT
Start: 2018-07-28 | End: 2018-08-01 | Stop reason: HOSPADM

## 2018-07-28 RX ORDER — SODIUM CHLORIDE 0.9 % (FLUSH) 0.9 %
10 SYRINGE (ML) INJECTION AS NEEDED
Status: DISCONTINUED | OUTPATIENT
Start: 2018-07-28 | End: 2018-08-01 | Stop reason: HOSPADM

## 2018-07-28 RX ORDER — SODIUM CHLORIDE 0.9 % (FLUSH) 0.9 %
1-10 SYRINGE (ML) INJECTION AS NEEDED
Status: DISCONTINUED | OUTPATIENT
Start: 2018-07-28 | End: 2018-08-01 | Stop reason: HOSPADM

## 2018-07-28 RX ORDER — HYDROCODONE BITARTRATE AND ACETAMINOPHEN 5; 325 MG/1; MG/1
1 TABLET ORAL EVERY 4 HOURS PRN
Status: DISCONTINUED | OUTPATIENT
Start: 2018-07-28 | End: 2018-08-01 | Stop reason: HOSPADM

## 2018-07-28 RX ORDER — CEPHALEXIN 500 MG/1
500 CAPSULE ORAL 4 TIMES DAILY
Status: DISCONTINUED | OUTPATIENT
Start: 2018-07-28 | End: 2018-07-29

## 2018-07-28 RX ORDER — HYDROCODONE BITARTRATE AND ACETAMINOPHEN 5; 325 MG/1; MG/1
1 TABLET ORAL EVERY 4 HOURS PRN
Status: DISCONTINUED | OUTPATIENT
Start: 2018-07-28 | End: 2018-07-28 | Stop reason: SDUPTHER

## 2018-07-28 RX ORDER — ACETAMINOPHEN,DIPHENHYDRAMINE HCL 500; 25 MG/1; MG/1
2 TABLET, FILM COATED ORAL NIGHTLY
Status: DISCONTINUED | OUTPATIENT
Start: 2018-07-28 | End: 2018-07-28 | Stop reason: SDUPTHER

## 2018-07-28 RX ORDER — AMLODIPINE BESYLATE 5 MG/1
5 TABLET ORAL NIGHTLY
Status: DISCONTINUED | OUTPATIENT
Start: 2018-07-28 | End: 2018-08-01 | Stop reason: HOSPADM

## 2018-07-28 RX ORDER — SODIUM CHLORIDE AND POTASSIUM CHLORIDE 150; 900 MG/100ML; MG/100ML
100 INJECTION, SOLUTION INTRAVENOUS CONTINUOUS
Status: DISCONTINUED | OUTPATIENT
Start: 2018-07-28 | End: 2018-07-30

## 2018-07-28 RX ORDER — ACETAMINOPHEN 500 MG
1000 TABLET ORAL NIGHTLY
Status: DISCONTINUED | OUTPATIENT
Start: 2018-07-28 | End: 2018-08-01 | Stop reason: HOSPADM

## 2018-07-28 RX ORDER — DIPHENHYDRAMINE HCL 25 MG
50 CAPSULE ORAL NIGHTLY
Status: DISCONTINUED | OUTPATIENT
Start: 2018-07-28 | End: 2018-08-01 | Stop reason: HOSPADM

## 2018-07-28 RX ADMIN — ATORVASTATIN CALCIUM 10 MG: 10 TABLET, FILM COATED ORAL at 20:21

## 2018-07-28 RX ADMIN — POTASSIUM CHLORIDE AND SODIUM CHLORIDE 100 ML/HR: 900; 150 INJECTION, SOLUTION INTRAVENOUS at 18:13

## 2018-07-28 RX ADMIN — AMLODIPINE BESYLATE 5 MG: 5 TABLET ORAL at 20:21

## 2018-07-28 RX ADMIN — CEPHALEXIN 500 MG: 500 CAPSULE ORAL at 18:13

## 2018-07-28 RX ADMIN — GADOBENATE DIMEGLUMINE 12 ML: 529 INJECTION, SOLUTION INTRAVENOUS at 14:12

## 2018-07-28 RX ADMIN — DEXAMETHASONE SODIUM PHOSPHATE 10 MG: 10 INJECTION INTRAMUSCULAR; INTRAVENOUS at 13:14

## 2018-07-28 RX ADMIN — HYDROCODONE BITARTRATE AND ACETAMINOPHEN 1 TABLET: 5; 325 TABLET ORAL at 18:13

## 2018-07-28 RX ADMIN — SODIUM CHLORIDE 250 MG: 9 INJECTION, SOLUTION INTRAVENOUS at 18:13

## 2018-07-28 RX ADMIN — CEPHALEXIN 500 MG: 500 CAPSULE ORAL at 20:21

## 2018-07-28 RX ADMIN — FAMOTIDINE 20 MG: 10 INJECTION, SOLUTION INTRAVENOUS at 18:13

## 2018-07-28 RX ADMIN — HYDROCODONE BITARTRATE AND ACETAMINOPHEN 1 TABLET: 5; 325 TABLET ORAL at 22:40

## 2018-07-29 LAB
BASOPHILS # BLD AUTO: 0 10*3/MM3 (ref 0–0.2)
BASOPHILS NFR BLD AUTO: 0 % (ref 0–1.5)
DEPRECATED RDW RBC AUTO: 46 FL (ref 37–54)
EOSINOPHIL # BLD AUTO: 0 10*3/MM3 (ref 0–0.7)
EOSINOPHIL NFR BLD AUTO: 0 % (ref 0.3–6.2)
ERYTHROCYTE [DISTWIDTH] IN BLOOD BY AUTOMATED COUNT: 12.9 % (ref 11.7–13)
HCT VFR BLD AUTO: 31.3 % (ref 35.6–45.5)
HGB BLD-MCNC: 10.3 G/DL (ref 11.9–15.5)
IMM GRANULOCYTES # BLD: 0.03 10*3/MM3 (ref 0–0.03)
IMM GRANULOCYTES NFR BLD: 0.3 % (ref 0–0.5)
LYMPHOCYTES # BLD AUTO: 0.78 10*3/MM3 (ref 0.9–4.8)
LYMPHOCYTES NFR BLD AUTO: 8.5 % (ref 19.6–45.3)
MCH RBC QN AUTO: 32.2 PG (ref 26.9–32)
MCHC RBC AUTO-ENTMCNC: 32.9 G/DL (ref 32.4–36.3)
MCV RBC AUTO: 97.8 FL (ref 80.5–98.2)
MONOCYTES # BLD AUTO: 0.19 10*3/MM3 (ref 0.2–1.2)
MONOCYTES NFR BLD AUTO: 2.1 % (ref 5–12)
NEUTROPHILS # BLD AUTO: 8.17 10*3/MM3 (ref 1.9–8.1)
NEUTROPHILS NFR BLD AUTO: 89.4 % (ref 42.7–76)
PLATELET # BLD AUTO: 294 10*3/MM3 (ref 140–500)
PMV BLD AUTO: 10 FL (ref 6–12)
RBC # BLD AUTO: 3.2 10*6/MM3 (ref 3.9–5.2)
WBC NRBC COR # BLD: 9.14 10*3/MM3 (ref 4.5–10.7)

## 2018-07-29 PROCEDURE — 97110 THERAPEUTIC EXERCISES: CPT | Performed by: PHYSICAL THERAPIST

## 2018-07-29 PROCEDURE — 63710000001 DIPHENHYDRAMINE PER 50 MG: Performed by: NEUROLOGICAL SURGERY

## 2018-07-29 PROCEDURE — 25010000003 HYDROCORTISONE SOD SUCCINATE PF 250 MG RECONSTITUTED SOLUTION 1 EACH VIAL: Performed by: NEUROLOGICAL SURGERY

## 2018-07-29 PROCEDURE — 85025 COMPLETE CBC W/AUTO DIFF WBC: CPT | Performed by: NEUROLOGICAL SURGERY

## 2018-07-29 PROCEDURE — 97162 PT EVAL MOD COMPLEX 30 MIN: CPT | Performed by: PHYSICAL THERAPIST

## 2018-07-29 PROCEDURE — 25810000003 SODIUM CHLORIDE 0.9 % WITH KCL 20 MEQ 20-0.9 MEQ/L-% SOLUTION: Performed by: NEUROLOGICAL SURGERY

## 2018-07-29 RX ORDER — POLYETHYLENE GLYCOL 3350 17 G/17G
17 POWDER, FOR SOLUTION ORAL DAILY
Status: DISCONTINUED | OUTPATIENT
Start: 2018-07-29 | End: 2018-08-01 | Stop reason: HOSPADM

## 2018-07-29 RX ORDER — DOCUSATE SODIUM 100 MG/1
100 CAPSULE, LIQUID FILLED ORAL 2 TIMES DAILY
Status: DISCONTINUED | OUTPATIENT
Start: 2018-07-29 | End: 2018-07-30

## 2018-07-29 RX ADMIN — SODIUM CHLORIDE 250 MG: 9 INJECTION, SOLUTION INTRAVENOUS at 21:35

## 2018-07-29 RX ADMIN — HYDROCODONE BITARTRATE AND ACETAMINOPHEN 1 TABLET: 5; 325 TABLET ORAL at 04:56

## 2018-07-29 RX ADMIN — HYDROCODONE BITARTRATE AND ACETAMINOPHEN 1 TABLET: 5; 325 TABLET ORAL at 12:59

## 2018-07-29 RX ADMIN — CEPHALEXIN 500 MG: 500 CAPSULE ORAL at 11:44

## 2018-07-29 RX ADMIN — AMLODIPINE BESYLATE 5 MG: 5 TABLET ORAL at 21:36

## 2018-07-29 RX ADMIN — HYDROCODONE BITARTRATE AND ACETAMINOPHEN 1 TABLET: 5; 325 TABLET ORAL at 08:56

## 2018-07-29 RX ADMIN — POTASSIUM CHLORIDE AND SODIUM CHLORIDE 100 ML/HR: 900; 150 INJECTION, SOLUTION INTRAVENOUS at 16:45

## 2018-07-29 RX ADMIN — POTASSIUM CHLORIDE AND SODIUM CHLORIDE 100 ML/HR: 900; 150 INJECTION, SOLUTION INTRAVENOUS at 04:57

## 2018-07-29 RX ADMIN — POLYETHYLENE GLYCOL 3350 17 G: 17 POWDER, FOR SOLUTION ORAL at 11:43

## 2018-07-29 RX ADMIN — ACETAMINOPHEN 1000 MG: 500 TABLET, FILM COATED ORAL at 21:36

## 2018-07-29 RX ADMIN — FAMOTIDINE 20 MG: 10 INJECTION, SOLUTION INTRAVENOUS at 08:37

## 2018-07-29 RX ADMIN — SODIUM CHLORIDE 250 MG: 9 INJECTION, SOLUTION INTRAVENOUS at 00:22

## 2018-07-29 RX ADMIN — DIPHENHYDRAMINE HYDROCHLORIDE 50 MG: 25 CAPSULE ORAL at 21:36

## 2018-07-29 RX ADMIN — CEPHALEXIN 500 MG: 500 CAPSULE ORAL at 08:36

## 2018-07-29 RX ADMIN — DOCUSATE SODIUM 100 MG: 100 CAPSULE, LIQUID FILLED ORAL at 21:36

## 2018-07-29 RX ADMIN — DOCUSATE SODIUM 100 MG: 100 CAPSULE, LIQUID FILLED ORAL at 11:43

## 2018-07-29 RX ADMIN — ATORVASTATIN CALCIUM 10 MG: 10 TABLET, FILM COATED ORAL at 21:36

## 2018-07-29 RX ADMIN — SODIUM CHLORIDE 250 MG: 9 INJECTION, SOLUTION INTRAVENOUS at 05:53

## 2018-07-29 RX ADMIN — SODIUM CHLORIDE 250 MG: 9 INJECTION, SOLUTION INTRAVENOUS at 12:55

## 2018-07-29 RX ADMIN — HYDROCODONE BITARTRATE AND ACETAMINOPHEN 1 TABLET: 5; 325 TABLET ORAL at 17:04

## 2018-07-30 LAB
BASOPHILS # BLD AUTO: 0 10*3/MM3 (ref 0–0.2)
BASOPHILS NFR BLD AUTO: 0 % (ref 0–1.5)
DEPRECATED RDW RBC AUTO: 46.5 FL (ref 37–54)
EOSINOPHIL # BLD AUTO: 0 10*3/MM3 (ref 0–0.7)
EOSINOPHIL NFR BLD AUTO: 0 % (ref 0.3–6.2)
ERYTHROCYTE [DISTWIDTH] IN BLOOD BY AUTOMATED COUNT: 12.9 % (ref 11.7–13)
HCT VFR BLD AUTO: 31 % (ref 35.6–45.5)
HGB BLD-MCNC: 9.9 G/DL (ref 11.9–15.5)
IMM GRANULOCYTES # BLD: 0.03 10*3/MM3 (ref 0–0.03)
IMM GRANULOCYTES NFR BLD: 0.2 % (ref 0–0.5)
LYMPHOCYTES # BLD AUTO: 1.02 10*3/MM3 (ref 0.9–4.8)
LYMPHOCYTES NFR BLD AUTO: 8.4 % (ref 19.6–45.3)
MCH RBC QN AUTO: 31.5 PG (ref 26.9–32)
MCHC RBC AUTO-ENTMCNC: 31.9 G/DL (ref 32.4–36.3)
MCV RBC AUTO: 98.7 FL (ref 80.5–98.2)
MONOCYTES # BLD AUTO: 0.49 10*3/MM3 (ref 0.2–1.2)
MONOCYTES NFR BLD AUTO: 4 % (ref 5–12)
NEUTROPHILS # BLD AUTO: 10.58 10*3/MM3 (ref 1.9–8.1)
NEUTROPHILS NFR BLD AUTO: 87.4 % (ref 42.7–76)
PLATELET # BLD AUTO: 330 10*3/MM3 (ref 140–500)
PMV BLD AUTO: 10 FL (ref 6–12)
RBC # BLD AUTO: 3.14 10*6/MM3 (ref 3.9–5.2)
WBC NRBC COR # BLD: 12.12 10*3/MM3 (ref 4.5–10.7)

## 2018-07-30 PROCEDURE — 25010000003 HYDROCORTISONE SOD SUCCINATE PF 250 MG RECONSTITUTED SOLUTION 1 EACH VIAL: Performed by: NEUROLOGICAL SURGERY

## 2018-07-30 PROCEDURE — 97110 THERAPEUTIC EXERCISES: CPT

## 2018-07-30 PROCEDURE — 85025 COMPLETE CBC W/AUTO DIFF WBC: CPT | Performed by: NEUROLOGICAL SURGERY

## 2018-07-30 PROCEDURE — 25010000003 HYDROCORTISONE SOD SUCCINATE PF 250 MG RECONSTITUTED SOLUTION: Performed by: NURSE PRACTITIONER

## 2018-07-30 PROCEDURE — 63710000001 DIPHENHYDRAMINE PER 50 MG: Performed by: NEUROLOGICAL SURGERY

## 2018-07-30 PROCEDURE — 99024 POSTOP FOLLOW-UP VISIT: CPT | Performed by: NURSE PRACTITIONER

## 2018-07-30 RX ORDER — FAMOTIDINE 20 MG/1
20 TABLET, FILM COATED ORAL DAILY
Status: DISCONTINUED | OUTPATIENT
Start: 2018-07-31 | End: 2018-08-01 | Stop reason: HOSPADM

## 2018-07-30 RX ORDER — DOCUSATE SODIUM 100 MG/1
200 CAPSULE, LIQUID FILLED ORAL 2 TIMES DAILY
Status: DISCONTINUED | OUTPATIENT
Start: 2018-07-30 | End: 2018-08-01 | Stop reason: HOSPADM

## 2018-07-30 RX ADMIN — DOCUSATE SODIUM 200 MG: 100 CAPSULE, LIQUID FILLED ORAL at 20:14

## 2018-07-30 RX ADMIN — POLYETHYLENE GLYCOL 3350 17 G: 17 POWDER, FOR SOLUTION ORAL at 09:28

## 2018-07-30 RX ADMIN — HYDROCODONE BITARTRATE AND ACETAMINOPHEN 1 TABLET: 5; 325 TABLET ORAL at 09:28

## 2018-07-30 RX ADMIN — DOCUSATE SODIUM 100 MG: 100 CAPSULE, LIQUID FILLED ORAL at 09:28

## 2018-07-30 RX ADMIN — HYDROCORTISONE SODIUM SUCCINATE 125 MG: 250 INJECTION, POWDER, FOR SOLUTION INTRAMUSCULAR; INTRAVENOUS at 21:40

## 2018-07-30 RX ADMIN — FAMOTIDINE 20 MG: 10 INJECTION, SOLUTION INTRAVENOUS at 09:28

## 2018-07-30 RX ADMIN — DIPHENHYDRAMINE HYDROCHLORIDE 50 MG: 25 CAPSULE ORAL at 20:14

## 2018-07-30 RX ADMIN — HYDROCODONE BITARTRATE AND ACETAMINOPHEN 1 TABLET: 5; 325 TABLET ORAL at 04:16

## 2018-07-30 RX ADMIN — ACETAMINOPHEN 1000 MG: 500 TABLET, FILM COATED ORAL at 20:14

## 2018-07-30 RX ADMIN — SODIUM CHLORIDE 250 MG: 9 INJECTION, SOLUTION INTRAVENOUS at 09:28

## 2018-07-30 RX ADMIN — ATORVASTATIN CALCIUM 10 MG: 10 TABLET, FILM COATED ORAL at 20:14

## 2018-07-30 RX ADMIN — HYDROCODONE BITARTRATE AND ACETAMINOPHEN 1 TABLET: 5; 325 TABLET ORAL at 15:24

## 2018-07-30 RX ADMIN — AMLODIPINE BESYLATE 5 MG: 5 TABLET ORAL at 20:14

## 2018-07-30 RX ADMIN — SODIUM CHLORIDE 250 MG: 9 INJECTION, SOLUTION INTRAVENOUS at 16:19

## 2018-07-30 RX ADMIN — SODIUM CHLORIDE 250 MG: 9 INJECTION, SOLUTION INTRAVENOUS at 05:00

## 2018-07-31 LAB
DEPRECATED RDW RBC AUTO: 44.3 FL (ref 37–54)
ERYTHROCYTE [DISTWIDTH] IN BLOOD BY AUTOMATED COUNT: 12.8 % (ref 11.7–13)
HCT VFR BLD AUTO: 30.4 % (ref 35.6–45.5)
HGB BLD-MCNC: 10.1 G/DL (ref 11.9–15.5)
MCH RBC QN AUTO: 31.9 PG (ref 26.9–32)
MCHC RBC AUTO-ENTMCNC: 33.2 G/DL (ref 32.4–36.3)
MCV RBC AUTO: 95.9 FL (ref 80.5–98.2)
PLATELET # BLD AUTO: 404 10*3/MM3 (ref 140–500)
PMV BLD AUTO: 9.6 FL (ref 6–12)
RBC # BLD AUTO: 3.17 10*6/MM3 (ref 3.9–5.2)
WBC NRBC COR # BLD: 10.19 10*3/MM3 (ref 4.5–10.7)

## 2018-07-31 PROCEDURE — 25010000002 HYDROCORTISONE SODIUM SUCCINATE 100 MG RECONSTITUTED SOLUTION: Performed by: PHYSICIAN ASSISTANT

## 2018-07-31 PROCEDURE — 25010000003 HYDROCORTISONE SOD SUCCINATE PF 250 MG RECONSTITUTED SOLUTION: Performed by: PHYSICIAN ASSISTANT

## 2018-07-31 PROCEDURE — 99024 POSTOP FOLLOW-UP VISIT: CPT | Performed by: PHYSICIAN ASSISTANT

## 2018-07-31 PROCEDURE — 97110 THERAPEUTIC EXERCISES: CPT

## 2018-07-31 PROCEDURE — 85027 COMPLETE CBC AUTOMATED: CPT | Performed by: NURSE PRACTITIONER

## 2018-07-31 PROCEDURE — 25010000003 HYDROCORTISONE SOD SUCCINATE PF 250 MG RECONSTITUTED SOLUTION: Performed by: NURSE PRACTITIONER

## 2018-07-31 RX ADMIN — HYDROCORTISONE SODIUM SUCCINATE 125 MG: 250 INJECTION, POWDER, FOR SOLUTION INTRAMUSCULAR; INTRAVENOUS at 03:33

## 2018-07-31 RX ADMIN — HYDROCODONE BITARTRATE AND ACETAMINOPHEN 1 TABLET: 5; 325 TABLET ORAL at 09:00

## 2018-07-31 RX ADMIN — ATORVASTATIN CALCIUM 10 MG: 10 TABLET, FILM COATED ORAL at 20:11

## 2018-07-31 RX ADMIN — ACETAMINOPHEN 1000 MG: 500 TABLET, FILM COATED ORAL at 20:11

## 2018-07-31 RX ADMIN — HYDROCORTISONE SODIUM SUCCINATE 50 MG: 250 INJECTION, POWDER, FOR SOLUTION INTRAMUSCULAR; INTRAVENOUS at 21:59

## 2018-07-31 RX ADMIN — HYDROCORTISONE SODIUM SUCCINATE 125 MG: 250 INJECTION, POWDER, FOR SOLUTION INTRAMUSCULAR; INTRAVENOUS at 09:00

## 2018-07-31 RX ADMIN — DOCUSATE SODIUM 200 MG: 100 CAPSULE, LIQUID FILLED ORAL at 09:00

## 2018-07-31 RX ADMIN — HYDROCORTISONE SODIUM SUCCINATE 50 MG: 250 INJECTION, POWDER, FOR SOLUTION INTRAMUSCULAR; INTRAVENOUS at 15:37

## 2018-07-31 RX ADMIN — HYDROCODONE BITARTRATE AND ACETAMINOPHEN 1 TABLET: 5; 325 TABLET ORAL at 18:03

## 2018-07-31 RX ADMIN — HYDROCODONE BITARTRATE AND ACETAMINOPHEN 1 TABLET: 5; 325 TABLET ORAL at 00:32

## 2018-07-31 RX ADMIN — DOCUSATE SODIUM 200 MG: 100 CAPSULE, LIQUID FILLED ORAL at 20:10

## 2018-07-31 RX ADMIN — AMLODIPINE BESYLATE 5 MG: 5 TABLET ORAL at 20:11

## 2018-07-31 RX ADMIN — FAMOTIDINE 20 MG: 20 TABLET, FILM COATED ORAL at 09:00

## 2018-07-31 RX ADMIN — POLYETHYLENE GLYCOL 3350 17 G: 17 POWDER, FOR SOLUTION ORAL at 08:59

## 2018-08-01 VITALS
HEIGHT: 57 IN | WEIGHT: 131.4 LBS | OXYGEN SATURATION: 97 % | SYSTOLIC BLOOD PRESSURE: 148 MMHG | TEMPERATURE: 97.9 F | RESPIRATION RATE: 18 BRPM | BODY MASS INDEX: 28.35 KG/M2 | DIASTOLIC BLOOD PRESSURE: 75 MMHG | HEART RATE: 65 BPM

## 2018-08-01 LAB
BASOPHILS # BLD AUTO: 0 10*3/MM3 (ref 0–0.2)
BASOPHILS NFR BLD AUTO: 0 % (ref 0–1.5)
DEPRECATED RDW RBC AUTO: 44.8 FL (ref 37–54)
EOSINOPHIL # BLD AUTO: 0 10*3/MM3 (ref 0–0.7)
EOSINOPHIL NFR BLD AUTO: 0 % (ref 0.3–6.2)
ERYTHROCYTE [DISTWIDTH] IN BLOOD BY AUTOMATED COUNT: 12.9 % (ref 11.7–13)
HCT VFR BLD AUTO: 32.9 % (ref 35.6–45.5)
HGB BLD-MCNC: 10.7 G/DL (ref 11.9–15.5)
IMM GRANULOCYTES # BLD: 0.09 10*3/MM3 (ref 0–0.03)
IMM GRANULOCYTES NFR BLD: 0.8 % (ref 0–0.5)
LYMPHOCYTES # BLD AUTO: 1.83 10*3/MM3 (ref 0.9–4.8)
LYMPHOCYTES NFR BLD AUTO: 15.6 % (ref 19.6–45.3)
MCH RBC QN AUTO: 31.5 PG (ref 26.9–32)
MCHC RBC AUTO-ENTMCNC: 32.5 G/DL (ref 32.4–36.3)
MCV RBC AUTO: 96.8 FL (ref 80.5–98.2)
MONOCYTES # BLD AUTO: 0.9 10*3/MM3 (ref 0.2–1.2)
MONOCYTES NFR BLD AUTO: 7.7 % (ref 5–12)
NEUTROPHILS # BLD AUTO: 8.89 10*3/MM3 (ref 1.9–8.1)
NEUTROPHILS NFR BLD AUTO: 75.9 % (ref 42.7–76)
PLATELET # BLD AUTO: 453 10*3/MM3 (ref 140–500)
PMV BLD AUTO: 9.7 FL (ref 6–12)
RBC # BLD AUTO: 3.4 10*6/MM3 (ref 3.9–5.2)
WBC NRBC COR # BLD: 11.71 10*3/MM3 (ref 4.5–10.7)

## 2018-08-01 PROCEDURE — 85025 COMPLETE CBC W/AUTO DIFF WBC: CPT | Performed by: PHYSICIAN ASSISTANT

## 2018-08-01 PROCEDURE — 97110 THERAPEUTIC EXERCISES: CPT

## 2018-08-01 PROCEDURE — 25010000002 HYDROCORTISONE SODIUM SUCCINATE 100 MG RECONSTITUTED SOLUTION: Performed by: PHYSICIAN ASSISTANT

## 2018-08-01 RX ORDER — DEXAMETHASONE 1.5 MG 1.5 MG/1
1.5 TABLET ORAL 2 TIMES DAILY WITH MEALS
Status: DISCONTINUED | OUTPATIENT
Start: 2018-08-09 | End: 2018-08-01 | Stop reason: HOSPADM

## 2018-08-01 RX ORDER — DEXAMETHASONE 1.5 MG 1.5 MG/1
3 TABLET ORAL 2 TIMES DAILY WITH MEALS
Qty: 16 TABLET | Refills: 0 | Status: SHIPPED | OUTPATIENT
Start: 2018-08-06 | End: 2018-08-10

## 2018-08-01 RX ORDER — DEXAMETHASONE 1.5 MG 1.5 MG/1
3 TABLET ORAL 2 TIMES DAILY WITH MEALS
Status: DISCONTINUED | OUTPATIENT
Start: 2018-08-05 | End: 2018-08-01 | Stop reason: HOSPADM

## 2018-08-01 RX ORDER — DEXAMETHASONE 1.5 MG 1.5 MG/1
4.5 TABLET ORAL 2 TIMES DAILY WITH MEALS
Qty: 27 TABLET | Refills: 0 | Status: SHIPPED | OUTPATIENT
Start: 2018-08-01 | End: 2018-08-06

## 2018-08-01 RX ORDER — DEXAMETHASONE 1.5 MG 1.5 MG/1
1.5 TABLET ORAL 2 TIMES DAILY WITH MEALS
Qty: 8 TABLET | Refills: 0 | Status: SHIPPED | OUTPATIENT
Start: 2018-08-10 | End: 2018-08-14

## 2018-08-01 RX ORDER — DEXAMETHASONE 1.5 MG 1.5 MG/1
4.5 TABLET ORAL ONCE
Status: DISCONTINUED | OUTPATIENT
Start: 2018-08-01 | End: 2018-08-01 | Stop reason: HOSPADM

## 2018-08-01 RX ORDER — DEXAMETHASONE 1.5 MG 1.5 MG/1
4.5 TABLET ORAL 2 TIMES DAILY WITH MEALS
Status: DISCONTINUED | OUTPATIENT
Start: 2018-08-01 | End: 2018-08-01 | Stop reason: HOSPADM

## 2018-08-01 RX ADMIN — DOCUSATE SODIUM 200 MG: 100 CAPSULE, LIQUID FILLED ORAL at 09:12

## 2018-08-01 RX ADMIN — HYDROCODONE BITARTRATE AND ACETAMINOPHEN 1 TABLET: 5; 325 TABLET ORAL at 02:02

## 2018-08-01 RX ADMIN — HYDROCODONE BITARTRATE AND ACETAMINOPHEN 1 TABLET: 5; 325 TABLET ORAL at 11:54

## 2018-08-01 RX ADMIN — HYDROCORTISONE SODIUM SUCCINATE 50 MG: 250 INJECTION, POWDER, FOR SOLUTION INTRAMUSCULAR; INTRAVENOUS at 09:13

## 2018-08-01 RX ADMIN — HYDROCORTISONE SODIUM SUCCINATE 50 MG: 250 INJECTION, POWDER, FOR SOLUTION INTRAMUSCULAR; INTRAVENOUS at 04:01

## 2018-08-01 RX ADMIN — FAMOTIDINE 20 MG: 20 TABLET, FILM COATED ORAL at 09:12

## 2018-08-01 RX ADMIN — POLYETHYLENE GLYCOL 3350 17 G: 17 POWDER, FOR SOLUTION ORAL at 09:12

## 2018-08-01 RX ADMIN — HYDROCORTISONE SODIUM SUCCINATE 50 MG: 250 INJECTION, POWDER, FOR SOLUTION INTRAMUSCULAR; INTRAVENOUS at 16:04

## 2018-08-01 NOTE — PROGRESS NOTES
Subjective   Patient ID: Marlena Navarrete is a 68 y.o. female is here today for follow-up. She is 2 weeks out from a Right L2-3 laminectomy, foraminotomy, medial facetectomy and discectomy by Dr. Paulino on 07/25/2018. Patient is currently taking Norco 5-325 mg PRN for pain. Patient is currently going to PT and says it is helping with the upper thigh pain and weakness.  She continues to have rather severe right lateral calf and foot pain as well as complete numbness from the mid calf region down through the entire foot.    Ms. Navarrete is here for 2 week postoperative evaluation following a right L2-3 laminectomy, discectomy procedure with Dr. Paulino.  She was subsequently admitted for complete right leg numbness, pain, and weakness.  She was observed for a couple of days and given some IV steroids.  Her symptoms improved and she was at least able to get up and ambulate with a walker and assistance from physical therapy.  She was discharged home and then readmitted to the hospital the following day for return in severe right leg pain and assistance of worsening right leg weakness.  She was readmitted to the hospital an MRI of the lumbar spine with and without contrast was performed.  She was observed with IV steroids.  Her symptoms did improve somewhat.  She was able to walk with therapy but was still not able to completely bear weight on the right foot.  No real improvement in the pain in the mid lateral calf and right foot.  He is described as a severe burning sensation with associated numbness.  She cannot place the right foot on the floor without significant discomfort and she has significant right foot weakness.  She is still on the 13 day dexamethasone pack, she has only a couple of days left before she is finished with that.    Leg Pain    The incident occurred more than 1 week ago. The pain is present in the right ankle, right heel, right foot and right toes. The quality of the pain is described as burning. The pain  is moderate. The pain has been constant since onset. Associated symptoms include a loss of sensation, muscle weakness, numbness (From mid calf down through the foot on the right. ) and tingling. The symptoms are aggravated by movement and weight bearing. Treatments tried: Steroids. The treatment provided mild relief.       Review of Systems   Respiratory: Negative for chest tightness and shortness of breath.    Cardiovascular: Negative for chest pain.   Musculoskeletal: Positive for arthralgias and back pain.   Neurological: Positive for tingling, weakness (R foot weakness. ) and numbness (From mid calf down through the foot on the right. ).   All other systems reviewed and are negative.      Objective   Physical Exam   Constitutional: She is oriented to person, place, and time. She appears well-developed and well-nourished. She is cooperative. No distress.   HENT:   Head: Normocephalic and atraumatic.   Eyes: Conjunctivae are normal.   Neck: Normal range of motion. Neck supple.   Cardiovascular: Normal rate.    Pulmonary/Chest: Effort normal. No respiratory distress.   Abdominal: Soft. She exhibits no distension. There is no tenderness.   Musculoskeletal: Normal range of motion. She exhibits no edema.   Neurological: She is alert and oriented to person, place, and time. A sensory deficit is present. Coordination abnormal. GCS eye subscore is 4. GCS verbal subscore is 5. GCS motor subscore is 6.   Increased sensitivity to light touch in the R mid lateral calf down through the R foot. SLR + on the left at 30°.  Continues with significant right foot weakness tibialis anterior 0/5; right gastroc 3/5.  Patient is unable to raise the right heel or toes.  Patient observed walking with a walker and has a right steppage gait.   Skin: Skin is warm and dry. No rash noted. She is not diaphoretic.   The lumbar incision is healing well.   Psychiatric: She has a normal mood and affect. Thought content normal.   Vitals  reviewed.    Neurologic Exam     Mental Status   Oriented to person, place, and time.       Assessment/Plan       Medical Decision Making:      Ms. Navarrete returns to the office today.  This is her first postoperative visit since her lumbar surgery.  She had a complicated course with some severe right leg pain and weakness.  The upper leg pain and weakness has improved.  She is still having considerable amount of discomfort in the right lower leg and foot as well as weakness.  She is using the walker but has significance to steppage gait.  While sitting in a chair she prefers to keep her right foot elevated off the floor.  Straight leg raise positive on the right.  There is increased sensitivity to light touch in the right lower leg and foot.    I have discussed the above with Dr. Kemp and was in the office with me today.  Dr. Paulino is currently out of town.  Given the history and above exam findings, I have recommended thoracic and lumbar myelography.    I have discussed the myelogram procedure at length with the patient. The risks of the procedure were explained. There is a risk of infection, bleeding, increased pain and positional headache possibly requiring a blood patch. The best way to avoid the positional headache is by taking it easy and participating in no strenuous activity for a couple of days following the myelogram. Drinking plenty of fluids including caffeinated beverages was encouraged. Should the patient develop a positional headache, I recommended calling the office for further instructions. The patient verbalized understanding of these risks and asked to proceed.     We will contact her nephrologist for clearance for the myelogram as the patient has a history of kidney transplant 39 years ago.  We may need to administer IV hydration before and after.  We will wait for Dr. Erwin's recommendations.     Ms. Navarrete will return to the office after the myelogram for reevaluation with   Lora.      Marlena was seen today for post-op.    Diagnoses and all orders for this visit:    Lumbar neuritis  -     CT Thoracic Spine Without Contrast; Future  -     CT Lumbar Spine Without Contrast; Future  -     Obtain Informed Consent; Standing  -     IR Myelogram 2 or More Areas; Future  -     XR Spine Lumbar Complete With Flex & Ext; Future  -     No Lab Testing Needed; Standing  -     Ambulatory Referral to Nephrology    Right foot drop  -     CT Thoracic Spine Without Contrast; Future  -     CT Lumbar Spine Without Contrast; Future  -     Obtain Informed Consent; Standing  -     IR Myelogram 2 or More Areas; Future  -     XR Spine Lumbar Complete With Flex & Ext; Future  -     No Lab Testing Needed; Standing  -     Ambulatory Referral to Nephrology    Right foot paresthesias  -     CT Thoracic Spine Without Contrast; Future  -     CT Lumbar Spine Without Contrast; Future  -     Obtain Informed Consent; Standing  -     IR Myelogram 2 or More Areas; Future  -     XR Spine Lumbar Complete With Flex & Ext; Future  -     No Lab Testing Needed; Standing  -     Ambulatory Referral to Nephrology    Surgical followup visit  -     CT Thoracic Spine Without Contrast; Future  -     CT Lumbar Spine Without Contrast; Future  -     Obtain Informed Consent; Standing  -     IR Myelogram 2 or More Areas; Future  -     XR Spine Lumbar Complete With Flex & Ext; Future  -     No Lab Testing Needed; Standing  -     Ambulatory Referral to Nephrology      Return for after radiographic imaging please schedule ASAP and follow-up with Dr. Paulino, after radiographic josiah.

## 2018-08-01 NOTE — PLAN OF CARE
Problem: Patient Care Overview  Goal: Plan of Care Review   08/01/18 1104   Coping/Psychosocial   Plan of Care Reviewed With patient   OTHER   Outcome Summary Pt is progressing well with PT. Increased ambulation distance this date, for 400 ft with 2 standing rest breaks with FWW and SBA-CGA for safety. Pt will benefit from home health upon DC. Likely DC home today with assist from .

## 2018-08-01 NOTE — PLAN OF CARE
Problem: Patient Care Overview  Goal: Plan of Care Review  Outcome: Ongoing (interventions implemented as appropriate)   08/01/18 3253   Coping/Psychosocial   Plan of Care Reviewed With patient   Plan of Care Review   Progress improving   OTHER   Outcome Summary vitals stable. pt expressed pain. meds given per orders. HOB flat per MD order. will continue to monitor.        Problem: Fall Risk (Adult)  Goal: Absence of Fall  Outcome: Ongoing (interventions implemented as appropriate)      Problem: Skin Injury Risk (Adult)  Goal: Identify Related Risk Factors and Signs and Symptoms  Outcome: Ongoing (interventions implemented as appropriate)    Goal: Skin Health and Integrity  Outcome: Ongoing (interventions implemented as appropriate)

## 2018-08-01 NOTE — THERAPY TREATMENT NOTE
Acute Care - Physical Therapy Treatment Note  [facility]     Patient Name: Marlena Navarrete  : 1950  MRN: [id number]  Today's Date: 2018             Admit Date: 2018    Visit Dx:    ICD-10-CM ICD-9-CM   1. Lumbar radiculopathy, acute M54.16 724.4   2. Lumbar radiculopathy M54.16 724.4     Patient Active Problem List   Diagnosis   • Lumbar radiculopathy   • Lumbar radiculopathy, acute       Therapy Treatment          Rehabilitation Treatment Summary     Row Name 18 1101             Treatment Time/Intention    Discipline physical therapist  -CA      Document Type therapy note (daily note)  -CA      Subjective Information no complaints  -CA      Mode of Treatment physical therapy  -CA      Patient/Family Observations pt supine in bed, no acute distress, family at bedside  -CA      Patient Effort excellent  -CA      Existing Precautions/Restrictions fall;spinal  -CA      Recorded by [CA] Zoya Ortiz, PT 18 1104      Row Name 18 1101             Cognitive Assessment/Intervention- PT/OT    Orientation Status (Cognition) oriented x 4  -CA      Follows Commands (Cognition) WFL  -CA      Personal Safety Interventions fall prevention program maintained;gait belt   tennis shoes worn OOB  -CA      Recorded by [CA] Zoya Ortiz, PT 18 1104      Row Name 18 1101             Bed Mobility Assessment/Treatment    [place] Level (Bed Mobility) supervision  -CA      Rolling Right [place] (Bed Mobility) supervision  -CA      Sidelying-Sit [place] (Bed Mobility) supervision  -CA      Sit-Sidelying [place] (Bed Mobility) supervision  -CA      Assistive Device (Bed Mobility) bed rails  -CA      Recorded by [CA] Zoya Ortiz, PT 18 1104      Row Name 18 1101             Sit-Stand Transfer    Sit-Stand [place] (Transfers) supervision  -CA      Assistive Device (Sit-Stand Transfers) walker, front-wheeled  -CA      Recorded by [CA] Zoya Ortiz, PT  08/01/18 1104      Row Name 08/01/18 1101             Stand-Sit Transfer    Stand-Sit [place] (Transfers) supervision  -CA      Assistive Device (Stand-Sit Transfers) walker, front-wheeled  -CA      Recorded by [CA] Zoya Ortiz, PT 08/01/18 1104      Row Name 08/01/18 1101             Gait/Stairs Assessment/Training    Gait/Stairs Assessment/Training gait/ambulation independence;gait/ambulation assistive device;distance ambulated;gait pattern;gait deviations  -CA      [place] Level (Gait) stand by assist;contact guard  -CA      Assistive Device (Gait) walker, front-wheeled  -CA      Distance in Feet (Gait) 400  -CA      Pattern (Gait) step-through  -CA      Deviations/Abnormal Patterns (Gait) antalgic;base of support, narrow;[place] decreased;stride length decreased  -CA      Right Sided Gait Deviations foot drop/toe drag  -CA      Comment (Gait/Stairs) 2 standing rest breaks  -CA2      Recorded by [CA] Zoya Ortiz, PT 08/01/18 1104  [CA2] Zoya Ortiz, PT 08/01/18 1106      Row Name 08/01/18 1101             Positioning and Restraints    Pre-Treatment Position in bed  -CA      Post Treatment Position bed  -CA      In Bed supine;call light within reach;encouraged to call for assist;with family/caregiver;side rails up x2  -CA      Recorded by [CA] Zoya Ortiz, PT 08/01/18 1104      Row Name 08/01/18 1101             Pain Scale: Numbers Pre/Post-Treatment    Pain Scale: Numbers, Pretreatment 0/10 - no pain  -CA      Recorded by [CA] Zoya Ortiz, PT 08/01/18 1104      Row Name                Wound 07/31/18 2006 lower;midline back incision    Wound - Properties Group Date first assessed: 07/31/18 [JR] Time first assessed: 2006 [JR] Orientation: lower;midline [JR] Location: back [JR] Type: incision [JR] Recorded by:  [JR] Rishabh Rueda RN 07/31/18 7407    Row Name 08/01/18 1101             Outcome Summary/Treatment Plan (PT)    Daily Summary of Progress (PT) progress toward functional goals is  good  -CA      Anticipated Discharge Disposition (PT) home with home health  -CA      Recorded by [CA] Zoya Ortiz, PT 08/01/18 1104        User Key  (r) = Recorded By, (t) = Taken By, (c) = Cosigned By    Initials Name Effective Dates Discipline    JR Rishabh Rueda, RN 06/23/17 -  Nurse    SOBIA Ortiz, PT 06/13/18 -  PT          Wound 07/31/18 2006 lower;midline back incision (Active)   Dressing Appearance open to air 7/31/2018  8:06 PM   Closure Liquid skin adhesive 7/31/2018  8:06 PM   Base other (see comments) 7/31/2018  8:06 PM   Periwound intact 7/31/2018  8:06 PM   Drainage Amount none 7/31/2018  8:06 PM   Dressing Care, Wound open to air 7/31/2018  8:06 PM             Physical Therapy Education     Title: PT OT SLP Therapies (Done)     Topic: Physical Therapy (Done)     Point: Mobility training (Done)    Learning Progress Summary     Learner Status Readiness Method Response Comment Documented by    Patient Done Acceptance E VU  CA 08/01/18 1104     Done Acceptance E,TB,D VU  SM 07/31/18 1639     Done Acceptance E,TB,D VU  SM 07/30/18 1520          Point: Home exercise program (Done)    Learning Progress Summary     Learner Status Readiness Method Response Comment Documented by    Patient Done Acceptance E VU  CA 08/01/18 1104     Done Acceptance E,TB,D VU  SM 07/31/18 1639     Done Acceptance E,TB,D VU  SM 07/30/18 1520          Point: Body mechanics (Done)    Learning Progress Summary     Learner Status Readiness Method Response Comment Documented by    Patient Done Acceptance E VU  CA 08/01/18 1104     Done Acceptance E,TB,D VU  SM 07/31/18 1639     Done Acceptance E,TB,D VU  SM 07/30/18 1520          Point: Precautions (Done)    Learning Progress Summary     Learner Status Readiness Method Response Comment Documented by    Patient Done Acceptance E VU  CA 08/01/18 1104     Done Acceptance E,TB,D VU  SM 07/31/18 1639     Done Acceptance E,TB,D VU  SM 07/30/18 1520                      User Key      Initials Effective Dates Name Provider Type Discipline     03/07/18 -  Davina Ames, PTA Physical Therapy Assistant PT    CA 06/13/18 -  Zoya Ortiz, PT Physical Therapist PT                    PT Recommendation and Plan  Anticipated Discharge Disposition (PT): home with home health  Outcome Summary/Treatment Plan (PT)  Daily Summary of Progress (PT): progress toward functional goals is good  Anticipated Discharge Disposition (PT): home with home health  Plan of Care Reviewed With: patient  Outcome Summary: Pt is progressing well with PT. Increased ambulation distance this date, for 400 ft with 2 standing rest breaks with FWW and SBA-CGA for safety. Pt will benefit from home health upon DC. Likely DC home today with assist from .          Outcome Measures     Row Name 08/01/18 1100 07/31/18 1600 07/30/18 1500       How much help from another person do you currently need...    Turning from your back to your side while in flat bed without using bedrails? 4  -CA 4  -SM 3  -SM    Moving from lying on back to sitting on the side of a flat bed without bedrails? 4  -CA 4  -SM 3  -SM    Moving to and from a bed to a chair (including a wheelchair)? 3  -CA 3  -SM 3  -SM    Standing up from a chair using your arms (e.g., wheelchair, bedside chair)? 4  -CA 3  -SM 3  -SM    Climbing 3-5 steps with a railing? 3  -CA 3  -SM 3  -SM    To walk in hospital room? 3  -CA 3  -SM 3  -SM    AM-PAC 6 Clicks Score 21  -CA 20  -SM 18  -SM       Functional Assessment    Outcome Measure Options AM-PAC 6 Clicks Basic Mobility (PT)  -CA AM-PAC 6 Clicks Basic Mobility (PT)  -SM AM-PAC 6 Clicks Basic Mobility (PT)  -SM    Row Name 07/29/18 1400             How much help from another person do you currently need...    Turning from your back to your side while in flat bed without using bedrails? 3  -DH      Moving from lying on back to sitting on the side of a flat bed without bedrails? 3  -DH      Moving to and from a bed to a  chair (including a wheelchair)? 3  -DH      Standing up from a chair using your arms (e.g., wheelchair, bedside chair)? 3  -DH      Climbing 3-5 steps with a railing? 2  -DH      To walk in hospital room? 3  -DH      AM-PAC 6 Clicks Score 17  -DH         Functional Assessment    Outcome Measure Options AM-PAC 6 Clicks Basic Mobility (PT)  -        User Key  (r) = Recorded By, (t) = Taken By, (c) = Cosigned By    Initials Name Provider Type     Abilio Brooks, PT Physical Therapist     Davina Ames, PTA Physical Therapy Assistant    SOBIA Ortiz, PT Physical Therapist           Time Calculation:         PT Charges     Row Name 08/01/18 1106             Time Calculation    Start Time 1056  -CA      Stop Time 1106  -CA      Time Calculation (min) 10 min  -CA      PT Received On 08/01/18  -CA      PT - Next Appointment 08/02/18  -CA         Time Calculation- PT    Total Timed Code Minutes- PT 10 minute(s)  -CA        User Key  (r) = Recorded By, (t) = Taken By, (c) = Cosigned By    Initials Name Provider Type    SOBIA Ortiz, CARLOS ENRIQUE Physical Therapist        Therapy Suggested Charges     Code   Minutes Charges    None           Therapy Charges for Today     Code Description Service Date Service Provider Modifiers Qty    79575035659 HC PT THER PROC EA 15 MIN 8/1/2018 Zoya Ortiz, PT GP 1          PT G-Codes  Outcome Measure Options: AM-PAC 6 Clicks Basic Mobility (PT)    Zoya Ortiz PT  8/1/2018

## 2018-08-02 ENCOUNTER — READMISSION MANAGEMENT (OUTPATIENT)
Dept: CALL CENTER | Facility: HOSPITAL | Age: 68
End: 2018-08-02

## 2018-08-02 ENCOUNTER — EPISODE CHANGES (OUTPATIENT)
Dept: CASE MANAGEMENT | Facility: OTHER | Age: 68
End: 2018-08-02

## 2018-08-02 NOTE — PROGRESS NOTES
Case Management Discharge Note    Final Note: Home w/ spouse and [facility]    Destination     No service has been selected for the patient.      Durable Medical Equipment     No service has been selected for the patient.      Dialysis/Infusion     No service has been selected for the patient.      Home Medical Care     No service has been selected for the patient.      Social Care     No service has been selected for the patient.             Final Discharge Disposition Code: 06 - home with home health care     Continued Stay Note  [facility]     Patient Name: Marlena Navarrete  MRN: [id number]  Today's Date: 8/2/2018    Admit Date: 7/28/2018          Discharge Plan     Row Name 08/02/18 0842       Plan    Plan Home w/ spouse and [facility]    Plan Comments Orders faxed to [facility] for home delivery today.  Confirmed with Ruth that [facility] was aware of d/c and will follow.  Called patient and notified via .        Final Discharge Disposition Code 06 - home with home health care    Final Note Home w/ spouse and [facility]    Row Name 08/02/18 0756       Plan    Plan Comments Noted patient dc last evening- home health was ordered and a walker.                    Expected Discharge Date and Time     Expected Discharge Date Expected Discharge Time    Aug 1, 2018             Debby Trinidad RN

## 2018-08-02 NOTE — PROGRESS NOTES
Continued Stay Note  [facility]     Patient Name: Marlena Navarrete  MRN: [id number]  Today's Date: 8/2/2018    Admit Date: 7/28/2018          Discharge Plan     Row Name 08/02/18 0842       Plan    Plan Home w/ spouse and [facility]    Plan Comments Orders faxed to [facility] for home delivery today.  Confirmed with Ruth that [facility] was aware of d/c and will follow.  Called patient and notified.      Final Discharge Disposition Code 06 - home with home health care    Final Note Home w/ spouse and [facility]    Row Name 08/02/18 0756       Plan    Plan Comments Noted patient dc last evening- home health was ordered and a walker.                 Expected Discharge Date and Time     Expected Discharge Date Expected Discharge Time    Aug 1, 2018             Debby Trinidad RN  Case Management Discharge Note    Final Note: Home w/ spouse and [facility]    Destination     No service has been selected for the patient.      Durable Medical Equipment     No service has been selected for the patient.      Dialysis/Infusion     No service has been selected for the patient.      Home Medical Care - Selection Complete     Service Request Status Selected Specialties Address Phone Number Fax Number    [facility] Selected Home Health Services [address] [address] [phone]      Social Care     No service has been selected for the patient.             Final Discharge Disposition Code: 06 - home with home health care

## 2018-08-02 NOTE — OUTREACH NOTE
Prep Survey      Responses   Facility patient discharged from?  [place]   Is patient eligible?  Yes   Discharge diagnosis  Lumbar radiculopathy, acute     Postoperative lumbar radiculitis,  right foot weakness   Does the patient have one of the following disease processes/diagnoses(primary or secondary)?  Other   Does the patient have Home health ordered?  Yes   What is the Home health agency?   [facility]   Is there a DME ordered?  Yes   What DME was ordered?  [employer]  [facility] [employer]   Medication alerts for this patient  [facility] HAS MULTIPLE START DATES [[facility] HAS MULTIPLE START DATES]   Prep survey completed?  Yes          Sol Meyer RN

## 2018-08-03 ENCOUNTER — READMISSION MANAGEMENT (OUTPATIENT)
Dept: CALL CENTER | Facility: HOSPITAL | Age: 68
End: 2018-08-03

## 2018-08-03 NOTE — OUTREACH NOTE
Medical Week 1 Survey      Responses   Facility patient discharged from?  [place]   Does the patient have one of the following disease processes/diagnoses(primary or secondary)?  Other   Is there a successful TCM telephone encounter documented?  No   Week 1 attempt successful?  Yes   Call start time  1511   Call end time  1517   Discharge diagnosis  Lumbar radiculopathy, acute     Postoperative lumbar radiculitis,  right foot weakness   Meds reviewed with patient/caregiver?  Yes   Is the patient having any side effects they believe may be caused by any medication additions or changes?  No   Does the patient have all medications ordered at discharge?  Yes   Is the patient taking all medications as directed (includes completed medication regime)?  Yes   Medication comments  Doing well with meds. Weaning steroids.   Does the patient have a primary care provider?   Yes   Does the patient have an appointment with their PCP within 7 days of discharge?  No   Has the patient kept scheduled appointments due by today?  N/A   Comments  Reviewed appt.   What is the Home health agency?   [facility]   Psychosocial issues?  No   Did the patient receive a copy of their discharge instructions?  Yes   What is the patient's perception of their health status since discharge?  Improving   Is the patient/caregiver able to teach back signs and symptoms related to disease process for when to call PCP?  Yes   Is the patient/caregiver able to teach back signs and symptoms related to disease process for when to call 911?  Yes   Is the patient/caregiver able to teach back the hierarchy of who to call/visit for symptoms/problems? PCP, Specialist, Home health nurse, Urgent Care, ED, 911  No   Week 1 call completed?  Yes          Jonathan Pitt RN

## 2018-08-10 ENCOUNTER — READMISSION MANAGEMENT (OUTPATIENT)
Dept: CALL CENTER | Facility: HOSPITAL | Age: 68
End: 2018-08-10

## 2018-08-10 ENCOUNTER — OFFICE VISIT (OUTPATIENT)
Dept: NEUROSURGERY | Facility: CLINIC | Age: 68
End: 2018-08-10

## 2018-08-10 VITALS
WEIGHT: 131 LBS | BODY MASS INDEX: 28.26 KG/M2 | SYSTOLIC BLOOD PRESSURE: 141 MMHG | HEART RATE: 87 BPM | DIASTOLIC BLOOD PRESSURE: 72 MMHG | HEIGHT: 57 IN

## 2018-08-10 DIAGNOSIS — Z09 SURGICAL FOLLOWUP VISIT: ICD-10-CM

## 2018-08-10 DIAGNOSIS — M21.371 RIGHT FOOT DROP: ICD-10-CM

## 2018-08-10 DIAGNOSIS — M54.16 LUMBAR NEURITIS: Primary | ICD-10-CM

## 2018-08-10 DIAGNOSIS — R20.2 RIGHT LEG PARESTHESIAS: ICD-10-CM

## 2018-08-10 PROCEDURE — 99024 POSTOP FOLLOW-UP VISIT: CPT | Performed by: NURSE PRACTITIONER

## 2018-08-10 NOTE — OUTREACH NOTE
Medical Week 2 Survey      Responses   Facility patient discharged from?  [place]   Does the patient have one of the following disease processes/diagnoses(primary or secondary)?  Other   Week 2 attempt successful?  Yes   Call start time  1704   Discharge diagnosis  Lumbar radiculopathy, acute     Postoperative lumbar radiculitis,  right foot weakness   Call end time  1712   Meds reviewed with patient/caregiver?  Yes   Is the patient having any side effects they believe may be caused by any medication additions or changes?  No   Does the patient have all medications ordered at discharge?  Yes   Is the patient taking all medications as directed (includes completed medication regime)?  Yes   Comments regarding appointments  Had appt with neurosurgery today 08/10   Does the patient have a primary care provider?   Yes   Does the patient have an appointment with their PCP within 7 days of discharge?  No   Comments regarding PCP  Encouraged patient to followup with PCP as well.    What is preventing the patient from scheduling follow up appointments within 7 days of discharge?  Haven't had time   Nursing Interventions  Educated patient on importance of making appointment, Advised patient to make appointment, Verified appointment date/time/provider   Has the patient kept scheduled appointments due by today?  Yes   What is the Home health agency?   [facility]   What DME was ordered?  [employer]  [facility]   Psychosocial issues?  No   Comments  Patient is having some right foot weakness and numbness- seen by surgeon today 08/10 There are concerns for foot drop- office will schedule a mylegram for sometime next week.    Did the patient receive a copy of their discharge instructions?  Yes   Nursing interventions  Reviewed instructions with patient   What is the patient's perception of their health status since discharge?  Improving   Is the patient/caregiver able to teach back signs and symptoms related to disease process for  when to call PCP?  Yes   Is the patient/caregiver able to teach back signs and symptoms related to disease process for when to call 911?  Yes   Is the patient/caregiver able to teach back the hierarchy of who to call/visit for symptoms/problems? PCP, Specialist, Home health nurse, Urgent Care, ED, 911  Yes   Week 2 Call Completed?  Yes          Jovani Middleton RN

## 2018-08-15 RX ORDER — METHYLPREDNISOLONE 4 MG/1
TABLET ORAL
Qty: 21 TABLET | Refills: 0 | Status: SHIPPED | OUTPATIENT
Start: 2018-08-15 | End: 2018-10-25

## 2018-08-15 RX ORDER — HYDROCODONE BITARTRATE AND ACETAMINOPHEN 5; 325 MG/1; MG/1
1 TABLET ORAL EVERY 8 HOURS PRN
Qty: 35 TABLET | Refills: 0 | Status: SHIPPED | OUTPATIENT
Start: 2018-08-15 | End: 2018-08-29 | Stop reason: HOSPADM

## 2018-08-15 NOTE — TELEPHONE ENCOUNTER
Patient is requesting refill of Norco 5-325 mg. She is 3 weeks out from having a Right L2-3 laminectomy, foraminotomy, medial facetectomy and discectomy on 07/25/18. Patient is scheduled to have another myelogram on 08/23. Is this okay to refill?      Patient is also requesting a MDP. Per verbal conversation with , he said it is okay to give the patient a MDP. Patient is aware.

## 2018-08-17 RX ORDER — DEXAMETHASONE 4 MG/1
4 TABLET ORAL
Qty: 2 TABLET | Refills: 0 | Status: SHIPPED | OUTPATIENT
Start: 2018-08-17 | End: 2018-08-17

## 2018-08-23 ENCOUNTER — HOSPITAL ENCOUNTER (OUTPATIENT)
Dept: GENERAL RADIOLOGY | Facility: HOSPITAL | Age: 68
Discharge: HOME | End: 2018-08-23

## 2018-08-23 ENCOUNTER — READMISSION MANAGEMENT (OUTPATIENT)
Dept: CALL CENTER | Facility: HOSPITAL | Age: 68
End: 2018-08-23

## 2018-08-23 ENCOUNTER — HOSPITAL ENCOUNTER (OUTPATIENT)
Dept: CT IMAGING | Facility: HOSPITAL | Age: 68
Discharge: HOME | End: 2018-08-23
Admitting: NURSE PRACTITIONER

## 2018-08-23 VITALS
DIASTOLIC BLOOD PRESSURE: 83 MMHG | HEIGHT: 57 IN | WEIGHT: 125 LBS | BODY MASS INDEX: 26.97 KG/M2 | RESPIRATION RATE: 16 BRPM | SYSTOLIC BLOOD PRESSURE: 145 MMHG | HEART RATE: 98 BPM | TEMPERATURE: 97.8 F | OXYGEN SATURATION: 97 %

## 2018-08-23 DIAGNOSIS — Z09 SURGICAL FOLLOWUP VISIT: ICD-10-CM

## 2018-08-23 DIAGNOSIS — M21.371 RIGHT FOOT DROP: ICD-10-CM

## 2018-08-23 DIAGNOSIS — M54.16 LUMBAR NEURITIS: ICD-10-CM

## 2018-08-23 DIAGNOSIS — R20.2 RIGHT LEG PARESTHESIAS: ICD-10-CM

## 2018-08-23 LAB — CREAT BLDA-MCNC: 1.1 MG/DL (ref 0.6–1.3)

## 2018-08-23 PROCEDURE — 62305 MYELOGRAPHY LUMBAR INJECTION: CPT

## 2018-08-23 PROCEDURE — 82565 ASSAY OF CREATININE: CPT

## 2018-08-23 PROCEDURE — 63710000001 HYDROCODONE-ACETAMINOPHEN 5-325 MG TABLET: Performed by: NEUROLOGICAL SURGERY

## 2018-08-23 PROCEDURE — 72128 CT CHEST SPINE W/O DYE: CPT

## 2018-08-23 PROCEDURE — 25010000002 IOPAMIDOL 61 % SOLUTION: Performed by: NEUROLOGICAL SURGERY

## 2018-08-23 PROCEDURE — A9270 NON-COVERED ITEM OR SERVICE: HCPCS | Performed by: NEUROLOGICAL SURGERY

## 2018-08-23 PROCEDURE — 72240 MYELOGRAPHY NECK SPINE: CPT

## 2018-08-23 PROCEDURE — 72131 CT LUMBAR SPINE W/O DYE: CPT

## 2018-08-23 PROCEDURE — B01B1ZZ FLUOROSCOPY OF SPINAL CORD USING LOW OSMOLAR CONTRAST: ICD-10-PCS | Performed by: NEUROLOGICAL SURGERY

## 2018-08-23 PROCEDURE — 72114 X-RAY EXAM L-S SPINE BENDING: CPT

## 2018-08-23 RX ORDER — SODIUM CHLORIDE 9 MG/ML
100 INJECTION, SOLUTION INTRAVENOUS ONCE
Status: DISCONTINUED | OUTPATIENT
Start: 2018-08-23 | End: 2018-08-23

## 2018-08-23 RX ORDER — HYDROCODONE BITARTRATE AND ACETAMINOPHEN 5; 325 MG/1; MG/1
1 TABLET ORAL EVERY 4 HOURS PRN
Status: DISCONTINUED | OUTPATIENT
Start: 2018-08-23 | End: 2018-08-24 | Stop reason: HOSPADM

## 2018-08-23 RX ORDER — LIDOCAINE HYDROCHLORIDE 10 MG/ML
10 INJECTION, SOLUTION INFILTRATION; PERINEURAL ONCE
Status: COMPLETED | OUTPATIENT
Start: 2018-08-23 | End: 2018-08-23

## 2018-08-23 RX ORDER — SODIUM CHLORIDE 9 MG/ML
100 INJECTION, SOLUTION INTRAVENOUS CONTINUOUS
Status: ACTIVE | OUTPATIENT
Start: 2018-08-23 | End: 2018-08-23

## 2018-08-23 RX ORDER — ACETAMINOPHEN 325 MG/1
650 TABLET ORAL EVERY 4 HOURS PRN
Status: DISCONTINUED | OUTPATIENT
Start: 2018-08-23 | End: 2018-08-24 | Stop reason: HOSPADM

## 2018-08-23 RX ADMIN — SODIUM CHLORIDE 100 ML/HR: 9 INJECTION, SOLUTION INTRAVENOUS at 08:58

## 2018-08-23 RX ADMIN — LIDOCAINE HYDROCHLORIDE 8 ML: 10 INJECTION, SOLUTION INFILTRATION; PERINEURAL at 08:00

## 2018-08-23 RX ADMIN — SODIUM CHLORIDE 250 ML: 0.9 INJECTION, SOLUTION INTRAVENOUS at 06:45

## 2018-08-23 RX ADMIN — IOPAMIDOL 15 ML: 612 INJECTION, SOLUTION INTRATHECAL at 08:01

## 2018-08-23 RX ADMIN — HYDROCODONE BITARTATE AND ACETAMINOPHEN 1 TABLET: 5; 325 TABLET ORAL at 08:50

## 2018-08-23 NOTE — NURSING NOTE
Patient in radiology for a thoracic and lumbar myelogram by Dr. Paulino.  Patient also receiving IV hydration for kidneys.

## 2018-08-23 NOTE — DISCHARGE INSTRUCTIONS
EDUCATION /DISCHARGE INSTRUCTIONS:  A myelogram is a special radiology procedure of the spinal cord, spinal nerves and other related structures.  You will be awake during the examination.  An area of your lower back will be cleansed with an antiseptic solution.  The physician will inject a numbing medication in your lower back.  While your back is numb, a needle will be placed in the lower back area.  A small amount of spinal fluid may be withdrawn and sent to the lab if ordered by your physician. While the needle is in the back, an injection of a contrast material (xray dye) will be given through the needle.  The contrast material will allow the physician to see the spinal cord and spinal nerves.  Once injected, the needle will be removed and a band aid will be placed over the injection site.  The table will be tilted during the process to allow the contrast material to flow to particular areas in the spine.  Following the injection and xrays, you will be taken to the CT scan where more pictures will be taken. After the procedure is finished, the contrast material will be absorbed by your body and eliminated through your kidneys.  The radiologist will study and interpret your myelogram and send the results to your physician.    Procedure risks of a myelogram include, but are not limited to:  *  Bleeding   *  seizure  *  Infection   *  Headache, possibly severe requiring  *  Contrast reaction      a blood patch  *  Nerve or cord injury  *  Paralysis and death    Benefits of the procedure:  *  Best examination for delineating pathology related to spinal cord compression from a disc and/or nerve root compression    Alternatives to the procedure:  MRI - a non invasive procedure requiring intravenous contrast injection.  Cannot be done on patients with certain pacemakers or metal in the body.  MRI risks include possible reaction to the contrast material, movement of metal located in the body.  Benefit to MRI:   Non-invasive and usually painless procedure.  THIS EDUCATION INFORMATION WAS REVIEWED PRIOR TO PROCEDURE AND CONSENT. Patient initials____AH____________Time 0700    Important information following your myelogram:  * ACTIVITY:   *  Lie down with your head elevated no more than 2 pillows high today & tonight  *  Sit up to eat your meals and use the restroom, otherwise, lie down.  *  Remain less active for two to three days.  *  Do not drive for 48 hours following a myelogram  *  You may remove the bandage and shower in the morning  *  Increase your fluids for the next 24 hours.  Caffeinated drinks are encouraged.    Resume taking blood thinner or aspirin on 8/24/18    CALL YOUR PHYSICIAN FOR THE FOLLOWING:  * Pain at the injections site  * Reddness, swelling, bruising or drainage at the injection site.  * A fever by mouth of 101.0  * Any new symptoms    Headaches are a common side effect after a myelogram.  If you get a headache, you should stay flat in bed and drink plenty of fluids. If the headache persist and does not go away with rest/medication, CALL Dr. Paulino at [phone]

## 2018-08-23 NOTE — OUTREACH NOTE
Medical Week 3 Survey      Responses   Facility patient discharged from?  [place]   Does the patient have one of the following disease processes/diagnoses(primary or secondary)?  Other   Week 3 attempt successful?  No   Unsuccessful attempts  Attempt 1          Liane Guerra RN

## 2018-08-24 ENCOUNTER — TELEPHONE (OUTPATIENT)
Dept: INTERVENTIONAL RADIOLOGY/VASCULAR | Facility: HOSPITAL | Age: 68
End: 2018-08-24

## 2018-08-25 ENCOUNTER — READMISSION MANAGEMENT (OUTPATIENT)
Dept: CALL CENTER | Facility: HOSPITAL | Age: 68
End: 2018-08-25

## 2018-08-25 ENCOUNTER — HOSPITAL ENCOUNTER (INPATIENT)
Facility: HOSPITAL | Age: 68
LOS: 4 days | Discharge: HOME HEALTH | End: 2018-08-29
Attending: EMERGENCY MEDICINE | Admitting: NEUROLOGICAL SURGERY

## 2018-08-25 DIAGNOSIS — M21.371 FOOT DROP, RIGHT FOOT: ICD-10-CM

## 2018-08-25 DIAGNOSIS — M54.16 LUMBAR RADICULOPATHY: Primary | ICD-10-CM

## 2018-08-25 DIAGNOSIS — M21.371 FOOT DROP, RIGHT: ICD-10-CM

## 2018-08-25 DIAGNOSIS — Z74.09 IMPAIRED FUNCTIONAL MOBILITY, BALANCE, GAIT, AND ENDURANCE: ICD-10-CM

## 2018-08-25 DIAGNOSIS — M54.16 LUMBAR RADICULOPATHY, ACUTE: ICD-10-CM

## 2018-08-25 DIAGNOSIS — Z98.890 HISTORY OF LUMBAR LAMINECTOMY FOR SPINAL CORD DECOMPRESSION: ICD-10-CM

## 2018-08-25 PROBLEM — I10 HYPERTENSION: Status: ACTIVE | Noted: 2018-08-25

## 2018-08-25 PROCEDURE — 25010000002 HYDROMORPHONE PER 4 MG: Performed by: EMERGENCY MEDICINE

## 2018-08-25 PROCEDURE — 25010000002 ONDANSETRON PER 1 MG: Performed by: EMERGENCY MEDICINE

## 2018-08-25 PROCEDURE — 25010000002 HYDROMORPHONE PER 4 MG: Performed by: INTERNAL MEDICINE

## 2018-08-25 PROCEDURE — 25010000002 METHYLPREDNISOLONE PER 125 MG: Performed by: EMERGENCY MEDICINE

## 2018-08-25 PROCEDURE — 99284 EMERGENCY DEPT VISIT MOD MDM: CPT

## 2018-08-25 PROCEDURE — 99221 1ST HOSP IP/OBS SF/LOW 40: CPT | Performed by: NEUROLOGICAL SURGERY

## 2018-08-25 RX ORDER — AMLODIPINE BESYLATE 5 MG/1
5 TABLET ORAL NIGHTLY
Status: DISCONTINUED | OUTPATIENT
Start: 2018-08-25 | End: 2018-08-29 | Stop reason: HOSPADM

## 2018-08-25 RX ORDER — GABAPENTIN 100 MG/1
200 CAPSULE ORAL EVERY 8 HOURS SCHEDULED
Status: DISCONTINUED | OUTPATIENT
Start: 2018-08-25 | End: 2018-08-27

## 2018-08-25 RX ORDER — ATORVASTATIN CALCIUM 10 MG/1
10 TABLET, FILM COATED ORAL NIGHTLY
Status: DISCONTINUED | OUTPATIENT
Start: 2018-08-25 | End: 2018-08-29 | Stop reason: HOSPADM

## 2018-08-25 RX ORDER — METHYLPREDNISOLONE SODIUM SUCCINATE 125 MG/2ML
125 INJECTION, POWDER, LYOPHILIZED, FOR SOLUTION INTRAMUSCULAR; INTRAVENOUS ONCE
Status: COMPLETED | OUTPATIENT
Start: 2018-08-25 | End: 2018-08-25

## 2018-08-25 RX ORDER — ONDANSETRON 2 MG/ML
4 INJECTION INTRAMUSCULAR; INTRAVENOUS ONCE
Status: COMPLETED | OUTPATIENT
Start: 2018-08-25 | End: 2018-08-25

## 2018-08-25 RX ORDER — SODIUM CHLORIDE 0.9 % (FLUSH) 0.9 %
1-10 SYRINGE (ML) INJECTION AS NEEDED
Status: DISCONTINUED | OUTPATIENT
Start: 2018-08-25 | End: 2018-08-29

## 2018-08-25 RX ORDER — HYDROCODONE BITARTRATE AND ACETAMINOPHEN 5; 325 MG/1; MG/1
1 TABLET ORAL EVERY 8 HOURS PRN
Status: DISCONTINUED | OUTPATIENT
Start: 2018-08-25 | End: 2018-08-26

## 2018-08-25 RX ORDER — ONDANSETRON 2 MG/ML
4 INJECTION INTRAMUSCULAR; INTRAVENOUS EVERY 6 HOURS PRN
Status: DISCONTINUED | OUTPATIENT
Start: 2018-08-25 | End: 2018-08-29

## 2018-08-25 RX ORDER — PANTOPRAZOLE SODIUM 40 MG/1
40 TABLET, DELAYED RELEASE ORAL EVERY MORNING
Status: DISCONTINUED | OUTPATIENT
Start: 2018-08-26 | End: 2018-08-29 | Stop reason: HOSPADM

## 2018-08-25 RX ORDER — METHYLPREDNISOLONE SODIUM SUCCINATE 125 MG/2ML
125 INJECTION, POWDER, LYOPHILIZED, FOR SOLUTION INTRAMUSCULAR; INTRAVENOUS EVERY 8 HOURS
Status: DISCONTINUED | OUTPATIENT
Start: 2018-08-25 | End: 2018-08-25

## 2018-08-25 RX ORDER — METHYLPREDNISOLONE SODIUM SUCCINATE 40 MG/ML
20 INJECTION, POWDER, LYOPHILIZED, FOR SOLUTION INTRAMUSCULAR; INTRAVENOUS EVERY 6 HOURS
Status: DISCONTINUED | OUTPATIENT
Start: 2018-08-25 | End: 2018-08-27

## 2018-08-25 RX ORDER — ACETAMINOPHEN 325 MG/1
650 TABLET ORAL EVERY 4 HOURS PRN
Status: DISCONTINUED | OUTPATIENT
Start: 2018-08-25 | End: 2018-08-29 | Stop reason: HOSPADM

## 2018-08-25 RX ADMIN — HYDROMORPHONE HYDROCHLORIDE 1 MG: 1 INJECTION, SOLUTION INTRAMUSCULAR; INTRAVENOUS; SUBCUTANEOUS at 18:05

## 2018-08-25 RX ADMIN — GABAPENTIN 200 MG: 100 CAPSULE ORAL at 23:05

## 2018-08-25 RX ADMIN — HYDROMORPHONE HYDROCHLORIDE 0.5 MG: 1 INJECTION, SOLUTION INTRAMUSCULAR; INTRAVENOUS; SUBCUTANEOUS at 23:54

## 2018-08-25 RX ADMIN — METHYLPREDNISOLONE SODIUM SUCCINATE 125 MG: 125 INJECTION, POWDER, FOR SOLUTION INTRAMUSCULAR; INTRAVENOUS at 18:03

## 2018-08-25 RX ADMIN — ONDANSETRON 4 MG: 2 INJECTION INTRAMUSCULAR; INTRAVENOUS at 18:02

## 2018-08-26 LAB
ALBUMIN SERPL-MCNC: 3.4 G/DL (ref 3.5–5.2)
ALBUMIN/GLOB SERPL: 1.2 G/DL
ALP SERPL-CCNC: 65 U/L (ref 39–117)
ALT SERPL W P-5'-P-CCNC: 12 U/L (ref 1–33)
ANION GAP SERPL CALCULATED.3IONS-SCNC: 9 MMOL/L
AST SERPL-CCNC: 14 U/L (ref 1–32)
BASOPHILS # BLD AUTO: 0 10*3/MM3 (ref 0–0.2)
BASOPHILS NFR BLD AUTO: 0 % (ref 0–1.5)
BILIRUB SERPL-MCNC: 0.2 MG/DL (ref 0.1–1.2)
BUN BLD-MCNC: 29 MG/DL (ref 8–23)
BUN/CREAT SERPL: 27.9 (ref 7–25)
CALCIUM SPEC-SCNC: 9 MG/DL (ref 8.6–10.5)
CHLORIDE SERPL-SCNC: 104 MMOL/L (ref 98–107)
CO2 SERPL-SCNC: 21 MMOL/L (ref 22–29)
CREAT BLD-MCNC: 1.04 MG/DL (ref 0.57–1)
DEPRECATED RDW RBC AUTO: 50.9 FL (ref 37–54)
EOSINOPHIL # BLD AUTO: 0 10*3/MM3 (ref 0–0.7)
EOSINOPHIL NFR BLD AUTO: 0 % (ref 0.3–6.2)
ERYTHROCYTE [DISTWIDTH] IN BLOOD BY AUTOMATED COUNT: 13.8 % (ref 11.7–13)
GFR SERPL CREATININE-BSD FRML MDRD: 53 ML/MIN/1.73
GLOBULIN UR ELPH-MCNC: 2.8 GM/DL
GLUCOSE BLD-MCNC: 139 MG/DL (ref 65–99)
HCT VFR BLD AUTO: 36.2 % (ref 35.6–45.5)
HGB BLD-MCNC: 11.6 G/DL (ref 11.9–15.5)
IMM GRANULOCYTES # BLD: 0.05 10*3/MM3 (ref 0–0.03)
IMM GRANULOCYTES NFR BLD: 0.4 % (ref 0–0.5)
LYMPHOCYTES # BLD AUTO: 1.53 10*3/MM3 (ref 0.9–4.8)
LYMPHOCYTES NFR BLD AUTO: 12.4 % (ref 19.6–45.3)
MCH RBC QN AUTO: 32.3 PG (ref 26.9–32)
MCHC RBC AUTO-ENTMCNC: 32 G/DL (ref 32.4–36.3)
MCV RBC AUTO: 100.8 FL (ref 80.5–98.2)
MONOCYTES # BLD AUTO: 0.13 10*3/MM3 (ref 0.2–1.2)
MONOCYTES NFR BLD AUTO: 1.1 % (ref 5–12)
NEUTROPHILS # BLD AUTO: 10.66 10*3/MM3 (ref 1.9–8.1)
NEUTROPHILS NFR BLD AUTO: 86.5 % (ref 42.7–76)
PLATELET # BLD AUTO: 424 10*3/MM3 (ref 140–500)
PMV BLD AUTO: 9.5 FL (ref 6–12)
POTASSIUM BLD-SCNC: 4.9 MMOL/L (ref 3.5–5.2)
PROT SERPL-MCNC: 6.2 G/DL (ref 6–8.5)
RBC # BLD AUTO: 3.59 10*6/MM3 (ref 3.9–5.2)
SODIUM BLD-SCNC: 134 MMOL/L (ref 136–145)
WBC NRBC COR # BLD: 12.32 10*3/MM3 (ref 4.5–10.7)

## 2018-08-26 PROCEDURE — 85025 COMPLETE CBC W/AUTO DIFF WBC: CPT | Performed by: INTERNAL MEDICINE

## 2018-08-26 PROCEDURE — 97162 PT EVAL MOD COMPLEX 30 MIN: CPT

## 2018-08-26 PROCEDURE — 80053 COMPREHEN METABOLIC PANEL: CPT | Performed by: INTERNAL MEDICINE

## 2018-08-26 PROCEDURE — 99024 POSTOP FOLLOW-UP VISIT: CPT | Performed by: NEUROLOGICAL SURGERY

## 2018-08-26 PROCEDURE — 25010000002 METHYLPREDNISOLONE PER 40 MG: Performed by: NEUROLOGICAL SURGERY

## 2018-08-26 PROCEDURE — 97110 THERAPEUTIC EXERCISES: CPT

## 2018-08-26 RX ORDER — DOCUSATE SODIUM 100 MG/1
100 CAPSULE, LIQUID FILLED ORAL 2 TIMES DAILY
Status: DISCONTINUED | OUTPATIENT
Start: 2018-08-26 | End: 2018-08-29 | Stop reason: HOSPADM

## 2018-08-26 RX ORDER — HYDROCODONE BITARTRATE AND ACETAMINOPHEN 5; 325 MG/1; MG/1
1 TABLET ORAL EVERY 6 HOURS PRN
Status: DISCONTINUED | OUTPATIENT
Start: 2018-08-26 | End: 2018-08-29 | Stop reason: HOSPADM

## 2018-08-26 RX ORDER — POLYETHYLENE GLYCOL 3350 17 G/17G
17 POWDER, FOR SOLUTION ORAL DAILY
Status: DISCONTINUED | OUTPATIENT
Start: 2018-08-26 | End: 2018-08-29 | Stop reason: HOSPADM

## 2018-08-26 RX ADMIN — DOCUSATE SODIUM 100 MG: 100 CAPSULE, LIQUID FILLED ORAL at 14:34

## 2018-08-26 RX ADMIN — POLYETHYLENE GLYCOL 3350 17 G: 17 POWDER, FOR SOLUTION ORAL at 14:35

## 2018-08-26 RX ADMIN — DOCUSATE SODIUM 100 MG: 100 CAPSULE, LIQUID FILLED ORAL at 21:20

## 2018-08-26 RX ADMIN — METHYLPREDNISOLONE SODIUM SUCCINATE 20 MG: 40 INJECTION, POWDER, LYOPHILIZED, FOR SOLUTION INTRAMUSCULAR; INTRAVENOUS at 06:48

## 2018-08-26 RX ADMIN — METHYLPREDNISOLONE SODIUM SUCCINATE 20 MG: 40 INJECTION, POWDER, LYOPHILIZED, FOR SOLUTION INTRAMUSCULAR; INTRAVENOUS at 01:02

## 2018-08-26 RX ADMIN — HYDROCODONE BITARTRATE AND ACETAMINOPHEN 1 TABLET: 5; 325 TABLET ORAL at 17:20

## 2018-08-26 RX ADMIN — HYDROCODONE BITARTRATE AND ACETAMINOPHEN 1 TABLET: 5; 325 TABLET ORAL at 11:13

## 2018-08-26 RX ADMIN — ATORVASTATIN CALCIUM 10 MG: 10 TABLET, FILM COATED ORAL at 01:01

## 2018-08-26 RX ADMIN — AMLODIPINE BESYLATE 5 MG: 5 TABLET ORAL at 21:21

## 2018-08-26 RX ADMIN — GABAPENTIN 200 MG: 100 CAPSULE ORAL at 14:35

## 2018-08-26 RX ADMIN — HYDROCODONE BITARTRATE AND ACETAMINOPHEN 1 TABLET: 5; 325 TABLET ORAL at 23:37

## 2018-08-26 RX ADMIN — PANTOPRAZOLE SODIUM 40 MG: 40 TABLET, DELAYED RELEASE ORAL at 06:41

## 2018-08-26 RX ADMIN — GABAPENTIN 200 MG: 100 CAPSULE ORAL at 21:20

## 2018-08-26 RX ADMIN — ATORVASTATIN CALCIUM 10 MG: 10 TABLET, FILM COATED ORAL at 21:20

## 2018-08-26 RX ADMIN — AMLODIPINE BESYLATE 5 MG: 5 TABLET ORAL at 01:02

## 2018-08-26 RX ADMIN — HYDROCODONE BITARTRATE AND ACETAMINOPHEN 1 TABLET: 5; 325 TABLET ORAL at 05:05

## 2018-08-26 RX ADMIN — METHYLPREDNISOLONE SODIUM SUCCINATE 20 MG: 40 INJECTION, POWDER, LYOPHILIZED, FOR SOLUTION INTRAMUSCULAR; INTRAVENOUS at 14:36

## 2018-08-26 RX ADMIN — METHYLPREDNISOLONE SODIUM SUCCINATE 20 MG: 40 INJECTION, POWDER, LYOPHILIZED, FOR SOLUTION INTRAMUSCULAR; INTRAVENOUS at 19:08

## 2018-08-26 RX ADMIN — GABAPENTIN 200 MG: 100 CAPSULE ORAL at 06:41

## 2018-08-26 NOTE — OUTREACH NOTE
Medical Week 3 Survey      Responses   Facility patient discharged from?  [place]   Does the patient have one of the following disease processes/diagnoses(primary or secondary)?  Other   Week 3 attempt successful?  No   Revoke  Readmitted          Zoya Lau RN

## 2018-08-27 PROCEDURE — 25010000002 METHYLPREDNISOLONE PER 40 MG: Performed by: NEUROLOGICAL SURGERY

## 2018-08-27 PROCEDURE — 99024 POSTOP FOLLOW-UP VISIT: CPT | Performed by: NURSE PRACTITIONER

## 2018-08-27 PROCEDURE — 25010000003 HYDROCORTISONE SOD SUCCINATE PF 250 MG RECONSTITUTED SOLUTION: Performed by: NURSE PRACTITIONER

## 2018-08-27 PROCEDURE — 97110 THERAPEUTIC EXERCISES: CPT

## 2018-08-27 RX ORDER — GABAPENTIN 300 MG/1
300 CAPSULE ORAL EVERY 8 HOURS SCHEDULED
Status: DISCONTINUED | OUTPATIENT
Start: 2018-08-27 | End: 2018-08-29

## 2018-08-27 RX ADMIN — PANTOPRAZOLE SODIUM 40 MG: 40 TABLET, DELAYED RELEASE ORAL at 06:49

## 2018-08-27 RX ADMIN — GABAPENTIN 300 MG: 300 CAPSULE ORAL at 21:46

## 2018-08-27 RX ADMIN — GABAPENTIN 200 MG: 100 CAPSULE ORAL at 13:02

## 2018-08-27 RX ADMIN — HYDROCODONE BITARTRATE AND ACETAMINOPHEN 1 TABLET: 5; 325 TABLET ORAL at 12:58

## 2018-08-27 RX ADMIN — ATORVASTATIN CALCIUM 10 MG: 10 TABLET, FILM COATED ORAL at 21:46

## 2018-08-27 RX ADMIN — GABAPENTIN 200 MG: 100 CAPSULE ORAL at 05:32

## 2018-08-27 RX ADMIN — METHYLPREDNISOLONE SODIUM SUCCINATE 20 MG: 40 INJECTION, POWDER, LYOPHILIZED, FOR SOLUTION INTRAMUSCULAR; INTRAVENOUS at 06:49

## 2018-08-27 RX ADMIN — HYDROCODONE BITARTRATE AND ACETAMINOPHEN 1 TABLET: 5; 325 TABLET ORAL at 18:55

## 2018-08-27 RX ADMIN — HYDROCORTISONE SODIUM SUCCINATE 125 MG: 250 INJECTION, POWDER, FOR SOLUTION INTRAMUSCULAR; INTRAVENOUS at 18:55

## 2018-08-27 RX ADMIN — POLYETHYLENE GLYCOL 3350 17 G: 17 POWDER, FOR SOLUTION ORAL at 09:47

## 2018-08-27 RX ADMIN — AMLODIPINE BESYLATE 5 MG: 5 TABLET ORAL at 21:46

## 2018-08-27 RX ADMIN — HYDROCODONE BITARTRATE AND ACETAMINOPHEN 1 TABLET: 5; 325 TABLET ORAL at 05:32

## 2018-08-27 RX ADMIN — METHYLPREDNISOLONE SODIUM SUCCINATE 20 MG: 40 INJECTION, POWDER, LYOPHILIZED, FOR SOLUTION INTRAMUSCULAR; INTRAVENOUS at 13:01

## 2018-08-27 RX ADMIN — METHYLPREDNISOLONE SODIUM SUCCINATE 20 MG: 40 INJECTION, POWDER, LYOPHILIZED, FOR SOLUTION INTRAMUSCULAR; INTRAVENOUS at 00:49

## 2018-08-27 RX ADMIN — DOCUSATE SODIUM 100 MG: 100 CAPSULE, LIQUID FILLED ORAL at 09:47

## 2018-08-28 LAB
DEPRECATED RDW RBC AUTO: 52.3 FL (ref 37–54)
ERYTHROCYTE [DISTWIDTH] IN BLOOD BY AUTOMATED COUNT: 14.1 % (ref 11.7–13)
HCT VFR BLD AUTO: 35.5 % (ref 35.6–45.5)
HGB BLD-MCNC: 11.1 G/DL (ref 11.9–15.5)
MCH RBC QN AUTO: 31.9 PG (ref 26.9–32)
MCHC RBC AUTO-ENTMCNC: 31.3 G/DL (ref 32.4–36.3)
MCV RBC AUTO: 102 FL (ref 80.5–98.2)
PLATELET # BLD AUTO: 496 10*3/MM3 (ref 140–500)
PMV BLD AUTO: 9.7 FL (ref 6–12)
RBC # BLD AUTO: 3.48 10*6/MM3 (ref 3.9–5.2)
WBC NRBC COR # BLD: 18.29 10*3/MM3 (ref 4.5–10.7)

## 2018-08-28 PROCEDURE — 97110 THERAPEUTIC EXERCISES: CPT

## 2018-08-28 PROCEDURE — 99024 POSTOP FOLLOW-UP VISIT: CPT | Performed by: PHYSICIAN ASSISTANT

## 2018-08-28 PROCEDURE — 63710000001 METHYLPREDNISOLONE 4 MG TABLET THERAPY PACK 21 EACH DISP PACK: Performed by: PHYSICIAN ASSISTANT

## 2018-08-28 PROCEDURE — 85027 COMPLETE CBC AUTOMATED: CPT | Performed by: NURSE PRACTITIONER

## 2018-08-28 PROCEDURE — 25010000003 HYDROCORTISONE SOD SUCCINATE PF 250 MG RECONSTITUTED SOLUTION: Performed by: NURSE PRACTITIONER

## 2018-08-28 RX ORDER — METHYLPREDNISOLONE 4 MG/1
4 TABLET ORAL
Status: DISCONTINUED | OUTPATIENT
Start: 2018-08-29 | End: 2018-08-29 | Stop reason: HOSPADM

## 2018-08-28 RX ORDER — METHYLPREDNISOLONE 4 MG/1
4 TABLET ORAL
Status: COMPLETED | OUTPATIENT
Start: 2018-08-28 | End: 2018-08-28

## 2018-08-28 RX ORDER — METHYLPREDNISOLONE 4 MG/1
8 TABLET ORAL
Status: DISCONTINUED | OUTPATIENT
Start: 2018-08-29 | End: 2018-08-29 | Stop reason: HOSPADM

## 2018-08-28 RX ORDER — METHYLPREDNISOLONE 4 MG/1
4 TABLET ORAL 2 TIMES DAILY
Status: DISCONTINUED | OUTPATIENT
Start: 2018-09-01 | End: 2018-08-29 | Stop reason: HOSPADM

## 2018-08-28 RX ORDER — METHYLPREDNISOLONE 4 MG/1
4 TABLET ORAL
Status: DISCONTINUED | OUTPATIENT
Start: 2018-08-31 | End: 2018-08-29 | Stop reason: HOSPADM

## 2018-08-28 RX ORDER — METHYLPREDNISOLONE 4 MG/1
4 TABLET ORAL
Status: DISCONTINUED | OUTPATIENT
Start: 2018-09-02 | End: 2018-08-29 | Stop reason: HOSPADM

## 2018-08-28 RX ORDER — METHYLPREDNISOLONE 4 MG/1
4 TABLET ORAL
Status: DISCONTINUED | OUTPATIENT
Start: 2018-08-30 | End: 2018-08-29 | Stop reason: HOSPADM

## 2018-08-28 RX ORDER — LOPERAMIDE HYDROCHLORIDE 2 MG/1
2 CAPSULE ORAL 4 TIMES DAILY PRN
Status: DISCONTINUED | OUTPATIENT
Start: 2018-08-28 | End: 2018-08-29 | Stop reason: HOSPADM

## 2018-08-28 RX ORDER — METHYLPREDNISOLONE 4 MG/1
8 TABLET ORAL
Status: COMPLETED | OUTPATIENT
Start: 2018-08-28 | End: 2018-08-28

## 2018-08-28 RX ORDER — METHYLPREDNISOLONE 4 MG/1
8 TABLET ORAL
Status: COMPLETED | OUTPATIENT
Start: 2018-08-29 | End: 2018-08-29

## 2018-08-28 RX ADMIN — METHYLPREDNISOLONE 8 MG: 4 TABLET ORAL at 21:38

## 2018-08-28 RX ADMIN — HYDROCORTISONE SODIUM SUCCINATE 125 MG: 250 INJECTION, POWDER, FOR SOLUTION INTRAMUSCULAR; INTRAVENOUS at 06:41

## 2018-08-28 RX ADMIN — GABAPENTIN 300 MG: 300 CAPSULE ORAL at 06:41

## 2018-08-28 RX ADMIN — LOPERAMIDE HYDROCHLORIDE 2 MG: 2 CAPSULE ORAL at 09:27

## 2018-08-28 RX ADMIN — LOPERAMIDE HYDROCHLORIDE 2 MG: 2 CAPSULE ORAL at 15:11

## 2018-08-28 RX ADMIN — METHYLPREDNISOLONE 4 MG: 4 TABLET ORAL at 18:37

## 2018-08-28 RX ADMIN — ATORVASTATIN CALCIUM 10 MG: 10 TABLET, FILM COATED ORAL at 21:38

## 2018-08-28 RX ADMIN — HYDROCORTISONE SODIUM SUCCINATE 125 MG: 250 INJECTION, POWDER, FOR SOLUTION INTRAMUSCULAR; INTRAVENOUS at 11:58

## 2018-08-28 RX ADMIN — LOPERAMIDE HYDROCHLORIDE 2 MG: 2 CAPSULE ORAL at 21:38

## 2018-08-28 RX ADMIN — GABAPENTIN 300 MG: 300 CAPSULE ORAL at 21:38

## 2018-08-28 RX ADMIN — AMLODIPINE BESYLATE 5 MG: 5 TABLET ORAL at 21:38

## 2018-08-28 RX ADMIN — METHYLPREDNISOLONE 4 MG: 4 TABLET ORAL at 15:15

## 2018-08-28 RX ADMIN — HYDROCORTISONE SODIUM SUCCINATE 125 MG: 250 INJECTION, POWDER, FOR SOLUTION INTRAMUSCULAR; INTRAVENOUS at 01:11

## 2018-08-28 RX ADMIN — PANTOPRAZOLE SODIUM 40 MG: 40 TABLET, DELAYED RELEASE ORAL at 06:41

## 2018-08-28 RX ADMIN — GABAPENTIN 300 MG: 300 CAPSULE ORAL at 15:14

## 2018-08-29 ENCOUNTER — EPISODE CHANGES (OUTPATIENT)
Dept: CASE MANAGEMENT | Facility: OTHER | Age: 68
End: 2018-08-29

## 2018-08-29 VITALS
HEIGHT: 58 IN | RESPIRATION RATE: 18 BRPM | BODY MASS INDEX: 27.29 KG/M2 | DIASTOLIC BLOOD PRESSURE: 70 MMHG | HEART RATE: 87 BPM | TEMPERATURE: 97.6 F | SYSTOLIC BLOOD PRESSURE: 135 MMHG | WEIGHT: 130 LBS | OXYGEN SATURATION: 95 %

## 2018-08-29 LAB
BASOPHILS # BLD AUTO: 0.01 10*3/MM3 (ref 0–0.2)
BASOPHILS NFR BLD AUTO: 0 % (ref 0–1.5)
DEPRECATED RDW RBC AUTO: 52.4 FL (ref 37–54)
EOSINOPHIL # BLD AUTO: 0 10*3/MM3 (ref 0–0.7)
EOSINOPHIL NFR BLD AUTO: 0 % (ref 0.3–6.2)
ERYTHROCYTE [DISTWIDTH] IN BLOOD BY AUTOMATED COUNT: 14.1 % (ref 11.7–13)
HCT VFR BLD AUTO: 32 % (ref 35.6–45.5)
HGB BLD-MCNC: 10.1 G/DL (ref 11.9–15.5)
IMM GRANULOCYTES # BLD: 0.39 10*3/MM3 (ref 0–0.03)
IMM GRANULOCYTES NFR BLD: 1.9 % (ref 0–0.5)
LYMPHOCYTES # BLD AUTO: 2.3 10*3/MM3 (ref 0.9–4.8)
LYMPHOCYTES NFR BLD AUTO: 11.3 % (ref 19.6–45.3)
MCH RBC QN AUTO: 32.1 PG (ref 26.9–32)
MCHC RBC AUTO-ENTMCNC: 31.6 G/DL (ref 32.4–36.3)
MCV RBC AUTO: 101.6 FL (ref 80.5–98.2)
MONOCYTES # BLD AUTO: 1.37 10*3/MM3 (ref 0.2–1.2)
MONOCYTES NFR BLD AUTO: 6.7 % (ref 5–12)
NEUTROPHILS # BLD AUTO: 16.68 10*3/MM3 (ref 1.9–8.1)
NEUTROPHILS NFR BLD AUTO: 82 % (ref 42.7–76)
NRBC BLD MANUAL-RTO: 0.1 /100 WBC (ref 0–0)
PLATELET # BLD AUTO: 517 10*3/MM3 (ref 140–500)
PMV BLD AUTO: 9.8 FL (ref 6–12)
RBC # BLD AUTO: 3.15 10*6/MM3 (ref 3.9–5.2)
WBC NRBC COR # BLD: 20.36 10*3/MM3 (ref 4.5–10.7)

## 2018-08-29 PROCEDURE — 63710000001 METHYLPREDNISOLONE 4 MG TABLET THERAPY PACK 21 EACH DISP PACK: Performed by: PHYSICIAN ASSISTANT

## 2018-08-29 PROCEDURE — 85025 COMPLETE CBC W/AUTO DIFF WBC: CPT | Performed by: PHYSICIAN ASSISTANT

## 2018-08-29 PROCEDURE — 97110 THERAPEUTIC EXERCISES: CPT

## 2018-08-29 RX ORDER — GABAPENTIN 400 MG/1
400 CAPSULE ORAL EVERY 8 HOURS SCHEDULED
Status: DISCONTINUED | OUTPATIENT
Start: 2018-08-29 | End: 2018-08-29 | Stop reason: HOSPADM

## 2018-08-29 RX ORDER — GABAPENTIN 400 MG/1
400 CAPSULE ORAL EVERY 8 HOURS SCHEDULED
Qty: 90 CAPSULE | Refills: 1 | Status: SHIPPED | OUTPATIENT
Start: 2018-08-29 | End: 2018-08-30 | Stop reason: DRUGHIGH

## 2018-08-29 RX ORDER — HYDROCODONE BITARTRATE AND ACETAMINOPHEN 5; 325 MG/1; MG/1
1 TABLET ORAL EVERY 6 HOURS PRN
Qty: 35 TABLET | Refills: 0 | Status: SHIPPED | OUTPATIENT
Start: 2018-08-29 | End: 2018-12-04

## 2018-08-29 RX ORDER — METHYLPREDNISOLONE 4 MG/1
TABLET ORAL
Start: 2018-08-29 | End: 2018-10-25

## 2018-08-29 RX ADMIN — LOPERAMIDE HYDROCHLORIDE 2 MG: 2 CAPSULE ORAL at 14:18

## 2018-08-29 RX ADMIN — PANTOPRAZOLE SODIUM 40 MG: 40 TABLET, DELAYED RELEASE ORAL at 06:35

## 2018-08-29 RX ADMIN — HYDROCODONE BITARTRATE AND ACETAMINOPHEN 1 TABLET: 5; 325 TABLET ORAL at 14:18

## 2018-08-29 RX ADMIN — METHYLPREDNISOLONE 4 MG: 4 TABLET ORAL at 06:35

## 2018-08-29 RX ADMIN — GABAPENTIN 300 MG: 300 CAPSULE ORAL at 06:35

## 2018-08-29 RX ADMIN — METHYLPREDNISOLONE 8 MG: 4 TABLET ORAL at 06:35

## 2018-08-29 RX ADMIN — METHYLPREDNISOLONE 4 MG: 4 TABLET ORAL at 14:18

## 2018-08-30 ENCOUNTER — READMISSION MANAGEMENT (OUTPATIENT)
Dept: CALL CENTER | Facility: HOSPITAL | Age: 68
End: 2018-08-30

## 2018-08-30 RX ORDER — GABAPENTIN 300 MG/1
300 CAPSULE ORAL 3 TIMES DAILY
Qty: 90 CAPSULE | Refills: 0 | Status: SHIPPED | OUTPATIENT
Start: 2018-08-30 | End: 2018-09-11 | Stop reason: SDUPTHER

## 2018-08-30 NOTE — OUTREACH NOTE
Prep Survey      Responses   Facility patient discharged from?  [place]   Is patient eligible?  Yes   Discharge diagnosis  Lumbar radiculopathy, acute   Does the patient have one of the following disease processes/diagnoses(primary or secondary)?  Other   Does the patient have Home health ordered?  Yes   What is the Home health agency?   [facility]   Is there a DME ordered?  No   Prep survey completed?  Yes          Nikki Boyd RN

## 2018-08-30 NOTE — TELEPHONE ENCOUNTER
The patient states that her Gabapentin was changed to 400 mg after she left the hospital and she states that she is having side effects from it. She would like to go back down to 300 mg and she will need to have a new Rx for the 300 mg.

## 2018-09-04 ENCOUNTER — HOSPITAL ENCOUNTER (OUTPATIENT)
Dept: CARDIOLOGY | Facility: HOSPITAL | Age: 68
Discharge: HOME | End: 2018-09-04
Attending: NEUROLOGICAL SURGERY | Admitting: NEUROLOGICAL SURGERY

## 2018-09-04 ENCOUNTER — HOSPITAL ENCOUNTER (EMERGENCY)
Facility: HOSPITAL | Age: 68
Discharge: HOME | End: 2018-09-04
Attending: EMERGENCY MEDICINE | Admitting: EMERGENCY MEDICINE

## 2018-09-04 ENCOUNTER — TELEPHONE (OUTPATIENT)
Dept: NEUROSURGERY | Facility: CLINIC | Age: 68
End: 2018-09-04

## 2018-09-04 ENCOUNTER — READMISSION MANAGEMENT (OUTPATIENT)
Dept: CALL CENTER | Facility: HOSPITAL | Age: 68
End: 2018-09-04

## 2018-09-04 VITALS
DIASTOLIC BLOOD PRESSURE: 79 MMHG | SYSTOLIC BLOOD PRESSURE: 138 MMHG | WEIGHT: 130 LBS | HEART RATE: 85 BPM | BODY MASS INDEX: 27.29 KG/M2 | RESPIRATION RATE: 16 BRPM | TEMPERATURE: 98.1 F | HEIGHT: 58 IN | OXYGEN SATURATION: 99 %

## 2018-09-04 DIAGNOSIS — M79.89 RIGHT LEG SWELLING: ICD-10-CM

## 2018-09-04 DIAGNOSIS — Z09 SURGERY FOLLOW-UP EXAMINATION: ICD-10-CM

## 2018-09-04 DIAGNOSIS — I82.4Z1 ACUTE DEEP VEIN THROMBOSIS (DVT) OF DISTAL VEIN OF RIGHT LOWER EXTREMITY (HCC): Primary | ICD-10-CM

## 2018-09-04 DIAGNOSIS — M79.89 RIGHT LEG SWELLING: Primary | ICD-10-CM

## 2018-09-04 LAB
ALBUMIN SERPL-MCNC: 3.5 G/DL (ref 3.5–5.2)
ALBUMIN/GLOB SERPL: 1.3 G/DL
ALP SERPL-CCNC: 59 U/L (ref 39–117)
ALT SERPL W P-5'-P-CCNC: 21 U/L (ref 1–33)
ANION GAP SERPL CALCULATED.3IONS-SCNC: 10.9 MMOL/L
AST SERPL-CCNC: 19 U/L (ref 1–32)
BASOPHILS # BLD AUTO: 0.02 10*3/MM3 (ref 0–0.2)
BASOPHILS NFR BLD AUTO: 0.1 % (ref 0–1.5)
BILIRUB SERPL-MCNC: 0.3 MG/DL (ref 0.1–1.2)
BUN BLD-MCNC: 23 MG/DL (ref 8–23)
BUN/CREAT SERPL: 23.7 (ref 7–25)
CALCIUM SPEC-SCNC: 9 MG/DL (ref 8.6–10.5)
CHLORIDE SERPL-SCNC: 103 MMOL/L (ref 98–107)
CO2 SERPL-SCNC: 26.1 MMOL/L (ref 22–29)
CREAT BLD-MCNC: 0.97 MG/DL (ref 0.57–1)
DEPRECATED RDW RBC AUTO: 55.1 FL (ref 37–54)
EOSINOPHIL # BLD AUTO: 0.12 10*3/MM3 (ref 0–0.7)
EOSINOPHIL NFR BLD AUTO: 0.8 % (ref 0.3–6.2)
ERYTHROCYTE [DISTWIDTH] IN BLOOD BY AUTOMATED COUNT: 14.5 % (ref 11.7–13)
GFR SERPL CREATININE-BSD FRML MDRD: 57 ML/MIN/1.73
GLOBULIN UR ELPH-MCNC: 2.7 GM/DL
GLUCOSE BLD-MCNC: 88 MG/DL (ref 65–99)
HCT VFR BLD AUTO: 36.8 % (ref 35.6–45.5)
HGB BLD-MCNC: 11.3 G/DL (ref 11.9–15.5)
IMM GRANULOCYTES # BLD: 0.09 10*3/MM3 (ref 0–0.03)
IMM GRANULOCYTES NFR BLD: 0.6 % (ref 0–0.5)
LYMPHOCYTES # BLD AUTO: 3.2 10*3/MM3 (ref 0.9–4.8)
LYMPHOCYTES NFR BLD AUTO: 21.3 % (ref 19.6–45.3)
MCH RBC QN AUTO: 31.8 PG (ref 26.9–32)
MCHC RBC AUTO-ENTMCNC: 30.7 G/DL (ref 32.4–36.3)
MCV RBC AUTO: 103.7 FL (ref 80.5–98.2)
MONOCYTES # BLD AUTO: 1.4 10*3/MM3 (ref 0.2–1.2)
MONOCYTES NFR BLD AUTO: 9.3 % (ref 5–12)
NEUTROPHILS # BLD AUTO: 10.18 10*3/MM3 (ref 1.9–8.1)
NEUTROPHILS NFR BLD AUTO: 67.9 % (ref 42.7–76)
PLATELET # BLD AUTO: 381 10*3/MM3 (ref 140–500)
PMV BLD AUTO: 9.6 FL (ref 6–12)
POTASSIUM BLD-SCNC: 4.3 MMOL/L (ref 3.5–5.2)
PROT SERPL-MCNC: 6.2 G/DL (ref 6–8.5)
RBC # BLD AUTO: 3.55 10*6/MM3 (ref 3.9–5.2)
SODIUM BLD-SCNC: 140 MMOL/L (ref 136–145)
WBC NRBC COR # BLD: 15.01 10*3/MM3 (ref 4.5–10.7)

## 2018-09-04 PROCEDURE — 80053 COMPREHEN METABOLIC PANEL: CPT | Performed by: PHYSICIAN ASSISTANT

## 2018-09-04 PROCEDURE — 93971 EXTREMITY STUDY: CPT

## 2018-09-04 PROCEDURE — 36415 COLL VENOUS BLD VENIPUNCTURE: CPT

## 2018-09-04 PROCEDURE — 85025 COMPLETE CBC W/AUTO DIFF WBC: CPT | Performed by: PHYSICIAN ASSISTANT

## 2018-09-04 PROCEDURE — 99283 EMERGENCY DEPT VISIT LOW MDM: CPT

## 2018-09-04 RX ORDER — SODIUM CHLORIDE 0.9 % (FLUSH) 0.9 %
10 SYRINGE (ML) INJECTION AS NEEDED
Status: DISCONTINUED | OUTPATIENT
Start: 2018-09-04 | End: 2018-09-04 | Stop reason: HOSPADM

## 2018-09-04 RX ADMIN — RIVAROXABAN 15 MG: 15 TABLET, FILM COATED ORAL at 20:21

## 2018-09-04 NOTE — OUTREACH NOTE
Medical Week 1 Survey      Responses   Facility patient discharged from?  [facility]   Does the patient have one of the following disease processes/diagnoses(primary or secondary)?  Other   Is there a successful TCM telephone encounter documented?  No   Week 1 attempt successful?  Yes   Call start time  1117   Call end time  1137   Discharge diagnosis  Lumbar radiculopathy, acute   Medication alerts for this patient  no new meds but some changes in meds are reviewed with patient.    Meds reviewed with patient/caregiver?  Yes   Is the patient having any side effects they believe may be caused by any medication additions or changes?  No   Does the patient have all medications ordered at discharge?  Yes   Is the patient taking all medications as directed (includes completed medication regime)?  Yes   Comments regarding appointments  Has followup scheduled with Dr Paulino on 09/11/2018 (neurosurgeon)   Does the patient have a primary care provider?   Yes   Does the patient have an appointment with their PCP within 7 days of discharge?  No   Comments regarding PCP  Dr Hernandez- PCP Patient - patient currently does not have appt with Dr Hernandez- encouraged her to do so.    What is preventing the patient from scheduling follow up appointments within 7 days of discharge?  Haven't had time   Nursing Interventions  Verified appointment date/time/provider, Educated patient on importance of making appointment, Advised patient to make appointment   Has the patient kept scheduled appointments due by today?  N/A   What is the Home health agency?   [facility]   Has home health visited the patient within 72 hours of discharge?  Yes   Psychosocial issues?  No   Did the patient receive a copy of their discharge instructions?  Yes   Nursing interventions  Reviewed instructions with patient   What is the patient's perception of their health status since discharge?  Worsening [Patient will call neurosurgeon about right leg swelling. No redness or  pain.  No fever. ]   Is the patient/caregiver able to teach back signs and symptoms related to disease process for when to call PCP?  Yes   Is the patient/caregiver able to teach back signs and symptoms related to disease process for when to call 911?  Yes   Is the patient/caregiver able to teach back the hierarchy of who to call/visit for symptoms/problems? PCP, Specialist, Home health nurse, Urgent Care, ED, 911  Yes   Additional teach back comments  Patient states she has swelling of her right leg. No pain or redness on leg. Encouraged patient to elevate leg and ice as much as possible. She verbalized understanding. I also encouraged patient to call MD for advice. She stated she would call.    Week 1 call completed?  Yes          Jovani Middleton RN

## 2018-09-04 NOTE — PROGRESS NOTES
EV doppler completed.  Preliminary results:  RT leg is positive for new calf DVT with no extension into the popliteal.  Results given to Davina Tate at 1820. She contacted Dr. Oscar Paulino and he instructed for the patient to go to the ER for treatment. Patient was wheeled to the ER at 1830 and registered in triage.

## 2018-09-04 NOTE — TELEPHONE ENCOUNTER
CARLOS ENRIQUE Bernal was advised of Dr. Paulino orders. I called and scheduled the patient for today 9/4/2018 for her to arrive at 545 for a 600 appointment.    445- Attempted to call the patient, she did not answer. Then called the patient's , Patient is aware and will arrive at the hospital

## 2018-09-04 NOTE — TELEPHONE ENCOUNTER
Dolores, a physical therapist with [facility] called patient Marlena Navarrete has increased swelling in her right leg in the last 3 days anything they should do please advise, [phone]

## 2018-09-04 NOTE — ED PROVIDER NOTES
EMERGENCY DEPARTMENT ENCOUNTER    CHIEF COMPLAINT  Chief Complaint: Leg Pain  History given by: Patient  History limited by: none  Room Number: 41/41  PMD: Bradley Hernandez MD      HPI:  Pt is a 68 y.o. female who presents complaining of right leg edema that began four days ago. Pt reports diffuse right calf pain, but denies CP and SOA. Pt had a discectomy on 18 by Dr. Paulino.  He ordered an outpt Doppler today that shows an isolated calf DVT without extension.  Pt states she has had a foot drop since surgery, but her overall pain is resolved. PT states she was inactive for three weeks after discectomy.    Duration:  Four days ago  Onset: gradual  Timing: constant  Location: right leg  Radiation: none  Quality: edema  Intensity/Severity: moderate  Progression: unchanged  Associated Symptoms: diffuse right calf pain    PAST MEDICAL HISTORY  Active Ambulatory Problems     Diagnosis Date Noted   • Lumbar radiculopathy 2018   • Lumbar radiculopathy, acute 2018   • Hypertension 2018   • Foot drop, right 2018   • Kidney transplant recipient 1979   • History of lumbar laminectomy for spinal cord decompression 2018     Resolved Ambulatory Problems     Diagnosis Date Noted   • No Resolved Ambulatory Problems     Past Medical History:   Diagnosis Date   • Arthritis    • Elevated cholesterol    • Foot drop, right    • History of transfusion    • Hypertension    • Kidney failure    • Kidney transplant recipient 1979   • Spinal headache        PAST SURGICAL HISTORY  Past Surgical History:   Procedure Laterality Date   • ADENOIDECTOMY     •  SECTION     •  SECTION     • COLONOSCOPY     • LUMBAR DISCECTOMY FUSION INSTRUMENTATION Right 2018    Procedure: Right L2-3 laminectomy with [employer];  Surgeon: Oscar Paulino MD;  Location: [facility] OR;  Service: Neurosurgery   • SHOULDER ROTATOR CUFF REPAIR Left    • TONSILLECTOMY     • TRANSPLANTATION RENAL Right      SISTER GAVE PT    • TUBAL ABDOMINAL LIGATION         FAMILY HISTORY  Family History   Problem Relation Age of Onset   • Aneurysm Mother         BRAIN ANEURYSM   • Heart failure Father    • Kidney failure Father    • Lung cancer Sister    • Lung cancer Sister    • Lung cancer Son    • Malig Hyperthermia Neg Hx        SOCIAL HISTORY  Social History     Social History   • Marital status:      Spouse name: N/A   • Number of children: 1   • Years of education: 12     Occupational History   • HOUSEWIFE      Social History Main Topics   • Smoking status: Former Smoker     Packs/day: 1.00     Years: 30.00     Types: Cigarettes     Quit date: 4/24/2012   • Smokeless tobacco: Never Used   • Alcohol use No   • Drug use: No   • Sexual activity: Defer     Other Topics Concern   • Not on file     Social History Narrative    LIVES WITH        ALLERGIES  Patient has no known allergies.    REVIEW OF SYSTEMS  Review of Systems   Constitutional: Negative for fever.   Respiratory: Negative for cough and shortness of breath.    Cardiovascular: Positive for leg swelling (right calf). Negative for chest pain.   Gastrointestinal: Negative for abdominal pain, diarrhea and vomiting.   Genitourinary: Negative for dysuria.   Musculoskeletal: Positive for myalgias (diffuse right calf). Negative for neck pain.   Skin: Negative for rash.   Neurological: Negative for weakness, numbness and headaches.   Psychiatric/Behavioral: Negative.    All other systems reviewed and are negative.      PHYSICAL EXAM  ED Triage Vitals [09/04/18 1831]   Temp Heart Rate Resp BP SpO2   98.1 °F (36.7 °C) 89 16 -- 96 %      Temp src Heart Rate Source Patient Position BP Location FiO2 (%)   Tympanic -- -- -- --       Physical Exam   Constitutional: She is oriented to person, place, and time. No distress.   HENT:   Head: Normocephalic and atraumatic.   Eyes: Pupils are equal, round, and reactive to light. EOM are normal.   Neck: Normal range of motion.  Neck supple.   Cardiovascular: Normal rate, regular rhythm and normal heart sounds.    Pulses:       Dorsalis pedis pulses are 2+ on the right side, and 2+ on the left side.        Posterior tibial pulses are 2+ on the right side, and 2+ on the left side.   Pulmonary/Chest: Effort normal and breath sounds normal. No respiratory distress.   Abdominal: Soft. There is no tenderness. There is no rebound and no guarding.   Musculoskeletal: Normal range of motion. She exhibits edema (mild right calf) and tenderness (Mild right calf).   Well-healed surgical incisions of lumbar spine. Right foot drop.    Neurological: She is alert and oriented to person, place, and time. She has normal sensation and normal strength.   Skin: Skin is warm and dry. No rash noted.   Psychiatric: Mood and affect normal.   Nursing note and vitals reviewed.      LAB RESULTS  Lab Results (last 24 hours)     Procedure Component Value Units Date/Time    CBC & Differential [[id number]] Collected:  09/04/18 1927    Specimen:  Blood Updated:  09/04/18 1938    Narrative:       The following orders were created for panel order CBC & Differential.  Procedure                               Abnormality         Status                     ---------                               -----------         ------                     CBC Auto Differential[[id number]]        Abnormal            Final result                 Please view results for these tests on the individual orders.    Comprehensive Metabolic Panel [[id number]]  (Abnormal) Collected:  09/04/18 1927    Specimen:  Blood Updated:  09/04/18 1959     Glucose 88 mg/dL      BUN 23 mg/dL      Creatinine 0.97 mg/dL      Sodium 140 mmol/L      Potassium 4.3 mmol/L      Chloride 103 mmol/L      CO2 26.1 mmol/L      Calcium 9.0 mg/dL      Total Protein 6.2 g/dL      Albumin 3.50 g/dL      ALT (SGPT) 21 U/L      AST (SGOT) 19 U/L      Alkaline Phosphatase 59 U/L      Total Bilirubin 0.3 mg/dL      eGFR Non  Amer  57 (L) mL/min/1.73      Globulin 2.7 gm/dL      A/G Ratio 1.3 g/dL      BUN/Creatinine Ratio 23.7     Anion Gap 10.9 mmol/L     CBC Auto Differential [[id number]]  (Abnormal) Collected:  09/04/18 1927    Specimen:  Blood Updated:  09/04/18 1938     WBC 15.01 (H) 10*3/mm3      RBC 3.55 (L) 10*6/mm3      Hemoglobin 11.3 (L) g/dL      Hematocrit 36.8 %      .7 (H) fL      MCH 31.8 pg      MCHC 30.7 (L) g/dL      RDW 14.5 (H) %      RDW-SD 55.1 (H) fl      MPV 9.6 fL      Platelets 381 10*3/mm3      Neutrophil % 67.9 %      Lymphocyte % 21.3 %      Monocyte % 9.3 %      Eosinophil % 0.8 %      Basophil % 0.1 %      Immature Grans % 0.6 (H) %      Neutrophils, Absolute 10.18 (H) 10*3/mm3      Lymphocytes, Absolute 3.20 10*3/mm3      Monocytes, Absolute 1.40 (H) 10*3/mm3      Eosinophils, Absolute 0.12 10*3/mm3      Basophils, Absolute 0.02 10*3/mm3      Immature Grans, Absolute 0.09 (H) 10*3/mm3           I ordered the above labs and reviewed the results.      PROCEDURES  Procedures      PROGRESS AND CONSULTS          1917  Discussed with pt the plan to further evaluate with lab work.     1918 Lab work ordered.     1930  Reviewed pt's history and workup with Dr. Orona.  After a bedside evaluation; Dr Orona agrees with the plan of care    2000   Reviewed labs with pt, will start on Xarelto.          MEDICAL DECISION MAKING  Results were reviewed/discussed with the patient and they were also made aware of online access. Pt also made aware that some labs, such as cultures, will not be resulted during ER visit and follow up with PMD is necessary.     MDM  Number of Diagnoses or Management Options     Amount and/or Complexity of Data Reviewed  Clinical lab tests: ordered  Decide to obtain previous medical records or to obtain history from someone other than the patient: yes  Review and summarize past medical records: yes (Pt had a discectomy on 7/25/18. Pt had outpatient US that shows right calf DVT with no  extension to the popliteal. )           DIAGNOSIS  Final diagnoses:   Acute deep vein thrombosis (DVT) of distal vein of right lower extremity (CMS/[facility])       DISPOSITION  DISCHARGE    Patient discharged in stable condition.    Reviewed implications of results, diagnosis, meds, responsibility to follow up, warning signs and symptoms of possible worsening, potential complications and reasons to return to ER.    Patient/Family voiced understanding of above instructions.    Discussed plan for discharge, as there is no emergent indication for admission. Patient referred to primary care provider for BP management due to today's BP. Pt/family is agreeable and understands need for follow up and repeat testing.  Pt is aware that discharge does not mean that nothing is wrong but it indicates no emergency is present that requires admission and they must continue care with follow-up as given below or physician of their choice.     FOLLOW-UP  Bradley Hernanedz MD  [address] [place], [place] [id number] [phone]    In 1 week  for recheck of symptoms         Medication List      New Prescriptions    rivaroxaban 15 MG tablet  Commonly known as:  XARELTO  Take 1 tablet by mouth 2 (Two) Times a Day With Meals.              Latest Documented Vital Signs:  As of 8:07 PM  BP- 140/68 HR- 83 Temp- 98.1 °F (36.7 °C) (Tympanic) O2 sat- 98%    --  Documentation assistance provided by demetris Bustamante for Edwin Corona PA-C.  Information recorded by the scribe was done at my direction and has been verified and validated by me.     Rishabh Bustamante  09/04/18 1950       Edwin Corona PA  09/04/18 2009

## 2018-09-05 ENCOUNTER — EPISODE CHANGES (OUTPATIENT)
Dept: CASE MANAGEMENT | Facility: OTHER | Age: 68
End: 2018-09-05

## 2018-09-05 LAB
BH CV LOW VAS RIGHT PERONEAL VESSEL: 1
BH CV LOWER VASCULAR LEFT COMMON FEMORAL AUGMENT: NORMAL
BH CV LOWER VASCULAR LEFT COMMON FEMORAL COMPETENT: NORMAL
BH CV LOWER VASCULAR LEFT COMMON FEMORAL COMPRESS: NORMAL
BH CV LOWER VASCULAR LEFT COMMON FEMORAL PHASIC: NORMAL
BH CV LOWER VASCULAR LEFT COMMON FEMORAL SPONT: NORMAL
BH CV LOWER VASCULAR RIGHT COMMON FEMORAL AUGMENT: NORMAL
BH CV LOWER VASCULAR RIGHT COMMON FEMORAL COMPETENT: NORMAL
BH CV LOWER VASCULAR RIGHT COMMON FEMORAL COMPRESS: NORMAL
BH CV LOWER VASCULAR RIGHT COMMON FEMORAL PHASIC: NORMAL
BH CV LOWER VASCULAR RIGHT COMMON FEMORAL SPONT: NORMAL
BH CV LOWER VASCULAR RIGHT DISTAL FEMORAL COMPRESS: NORMAL
BH CV LOWER VASCULAR RIGHT GASTRONEMIUS COMPRESS: NORMAL
BH CV LOWER VASCULAR RIGHT GREATER SAPH AK COMPRESS: NORMAL
BH CV LOWER VASCULAR RIGHT GREATER SAPH BK COMPRESS: NORMAL
BH CV LOWER VASCULAR RIGHT MID FEMORAL AUGMENT: NORMAL
BH CV LOWER VASCULAR RIGHT MID FEMORAL COMPETENT: NORMAL
BH CV LOWER VASCULAR RIGHT MID FEMORAL COMPRESS: NORMAL
BH CV LOWER VASCULAR RIGHT MID FEMORAL PHASIC: NORMAL
BH CV LOWER VASCULAR RIGHT MID FEMORAL SPONT: NORMAL
BH CV LOWER VASCULAR RIGHT PERONEAL COMPRESS: NORMAL
BH CV LOWER VASCULAR RIGHT PERONEAL THROMBUS: NORMAL
BH CV LOWER VASCULAR RIGHT POPLITEAL AUGMENT: NORMAL
BH CV LOWER VASCULAR RIGHT POPLITEAL COMPETENT: NORMAL
BH CV LOWER VASCULAR RIGHT POPLITEAL COMPRESS: NORMAL
BH CV LOWER VASCULAR RIGHT POPLITEAL PHASIC: NORMAL
BH CV LOWER VASCULAR RIGHT POPLITEAL SPONT: NORMAL
BH CV LOWER VASCULAR RIGHT POSTERIOR TIBIAL COMPRESS: NORMAL
BH CV LOWER VASCULAR RIGHT PROXIMAL FEMORAL COMPRESS: NORMAL
BH CV LOWER VASCULAR RIGHT SAPHENOFEMORAL JUNCTION AUGMENT: NORMAL
BH CV LOWER VASCULAR RIGHT SAPHENOFEMORAL JUNCTION COMPRESS: NORMAL
BH CV LOWER VASCULAR RIGHT SAPHENOFEMORAL JUNCTION PHASIC: NORMAL
BH CV LOWER VASCULAR RIGHT SAPHENOFEMORAL JUNCTION SPONT: NORMAL

## 2018-09-05 NOTE — ED PROVIDER NOTES
Pt presents to the ED c/o R leg pain and edema that began 4 days ago. On exam, there is swelling and tenderness to R calf. Heart, lungs, abd are normal Pt has a DVT.     Discussed plan to discharge with Xarelto.     Attestation:  The VINNIE and I have discussed this patient's history, physical exam, and treatment plan.  I have reviewed the documentation and personally had a face to face interaction with the patient. I affirm the documentation and agree with the treatment and plan.  The attached note describes my personal findings.      Documentation assistance provided by demetris Cook for Dr. Orona. Information recorded by the demetris was done at my direction and has been verified and validated by me.     Tracy Cook  09/04/18 2006       Cory Orona MD  09/04/18 2809

## 2018-09-10 NOTE — PROGRESS NOTES
Subjective   Patient ID: Marlena Navarrete is a 68 y.o. female is here today for follow-up. She is 1 month and 3 weeks out from a Right L2-3 laminectomy, foraminotomy, medial facetectomy and discectomy done on 07/25/2018. She has finished her steroids. Her back pain is good. She has some pain in her right buttock and hip. Her right foot is better.    History of Present Illness    This patient continues with trouble in her right leg.  It is however improving and I read her physical therapy report which also says it is improving.  Her pain is not as bad as it was.    The following portions of the patient's history were reviewed and updated as appropriate: allergies, current medications, past family history, past medical history, past social history, past surgical history and problem list.    Review of Systems   Respiratory: Negative for chest tightness and shortness of breath.    Cardiovascular: Negative for chest pain.   Musculoskeletal: Positive for back pain ( Right buttock pain).        Right leg pain   Neurological: Positive for weakness ( Right foot, improving with AFO).   All other systems reviewed and are negative.      Objective   Physical Exam   Constitutional: She is oriented to person, place, and time. She appears well-developed and well-nourished.   Neurological: She is oriented to person, place, and time.     Neurologic Exam     Mental Status   Oriented to person, place, and time.       Assessment/Plan   Independent Review of Radiographic Studies:      Are no new x-rays to review    Medical Decision Making:      I told the patient and her  that she can finish up with her home physical therapy and start going to outpatient physical therapy.  In addition we'll reduce her Neurontin from 400 mg 3 times a day to 300 mg 3 times a day.  I will check her again in about 6 weeks.  She will talk to Dr. Hernandez about her xarelto.  The medication is very expensive and she would like to get off of that as soon as  possible.  I see no reason why she could not be switched to Plavix.    Marlena was seen today for post-op and leg pain.    Diagnoses and all orders for this visit:    Follow-up examination following surgery  -     Ambulatory Referral to Physical Therapy    Other orders  -     gabapentin (NEURONTIN) 300 MG capsule; Take 1 capsule by mouth 3 (Three) Times a Day.      Return in about 6 weeks (around 10/23/2018).

## 2018-09-11 ENCOUNTER — TELEPHONE (OUTPATIENT)
Dept: NEUROSURGERY | Facility: CLINIC | Age: 68
End: 2018-09-11

## 2018-09-11 ENCOUNTER — OFFICE VISIT (OUTPATIENT)
Dept: NEUROSURGERY | Facility: CLINIC | Age: 68
End: 2018-09-11

## 2018-09-11 VITALS — SYSTOLIC BLOOD PRESSURE: 136 MMHG | HEART RATE: 85 BPM | DIASTOLIC BLOOD PRESSURE: 68 MMHG

## 2018-09-11 DIAGNOSIS — Z09 FOLLOW-UP EXAMINATION FOLLOWING SURGERY: Primary | ICD-10-CM

## 2018-09-11 PROCEDURE — 99024 POSTOP FOLLOW-UP VISIT: CPT | Performed by: NEUROLOGICAL SURGERY

## 2018-09-11 RX ORDER — GABAPENTIN 300 MG/1
300 CAPSULE ORAL 3 TIMES DAILY
Qty: 90 CAPSULE | Refills: 2 | Status: SHIPPED | OUTPATIENT
Start: 2018-09-11 | End: 2018-11-02 | Stop reason: SDUPTHER

## 2018-09-11 NOTE — TELEPHONE ENCOUNTER
Patient called given message to stop the gabapentin 400mg and start Gabapentin 300mg patient understood

## 2018-09-11 NOTE — TELEPHONE ENCOUNTER
[facility] called concerning Gabapentin Rx 300 mg that was sent electronically today. Per Dr. Paulino it was ok to fill. Pt is to start weaning Gabapentin.  I called spoke to Pt she is aware to start taking the 300 mg Rx that was prescribed today and not the 400 mg capsules that she has.  The 400 mg rx had been discontinued along with the refills prescribed by Lesly SAENZ.

## 2018-09-13 ENCOUNTER — READMISSION MANAGEMENT (OUTPATIENT)
Dept: CALL CENTER | Facility: HOSPITAL | Age: 68
End: 2018-09-13

## 2018-09-13 ENCOUNTER — OFFICE VISIT (OUTPATIENT)
Dept: FAMILY MEDICINE CLINIC | Facility: CLINIC | Age: 68
End: 2018-09-13

## 2018-09-13 VITALS
BODY MASS INDEX: 27.29 KG/M2 | WEIGHT: 130 LBS | TEMPERATURE: 97.7 F | HEIGHT: 58 IN | OXYGEN SATURATION: 97 % | SYSTOLIC BLOOD PRESSURE: 120 MMHG | DIASTOLIC BLOOD PRESSURE: 66 MMHG | HEART RATE: 84 BPM

## 2018-09-13 DIAGNOSIS — Z09 ENCOUNTER FOR FOLLOW-UP OF ACUTE DEEP VEIN THROMBOSIS (DVT) OF RIGHT LOWER EXTREMITY: Primary | ICD-10-CM

## 2018-09-13 DIAGNOSIS — Z86.718 ENCOUNTER FOR FOLLOW-UP OF ACUTE DEEP VEIN THROMBOSIS (DVT) OF RIGHT LOWER EXTREMITY: Primary | ICD-10-CM

## 2018-09-13 PROCEDURE — 99213 OFFICE O/P EST LOW 20 MIN: CPT | Performed by: FAMILY MEDICINE

## 2018-09-13 NOTE — PROGRESS NOTES
SUBJECTIVE:  The patient is a 68-year-old white female comes in for follow-up.  She was diagnosed with acute DVT of the right lower extremity on September 4.  She was seen in the emergency room.  She was placed on Xarelto.  She was in a few weeks out from surgery on her lumbar spine.    PAST MEDICAL HISTORY:  Reviewed.    REVIEW OF SYSTEMS:  Please see above; 14 point ROS negative.      OBJECTIVE: Vitals signs are reviewed and are stable.    HEENT: PERRLA.   Neck:  Supple.   Lungs:  Clear.    Heart:  Regular rate and rhythm.   Abdomen:   Soft, nontender.   Extremities:  .  Splint is noted on the right foot and ankle    ASSESSMENT:    Recent DVT lower extremity.     Plan:   She will continue Xarelto 15 mg twice a day for a total of 3 weeks.  She will then take 20 mg daily thereafter.  She will then have a repeat Doppler in 6 weeks.  She will call if problems.    Dictated utilizing Dragon dictation.

## 2018-09-13 NOTE — OUTREACH NOTE
Medical Week 2 Survey      Responses   Facility patient discharged from?  [facility]   Does the patient have one of the following disease processes/diagnoses(primary or secondary)?  Other   Week 2 attempt successful?  No   Unsuccessful attempts  Attempt 1          Meme Engel RN

## 2018-09-16 ENCOUNTER — READMISSION MANAGEMENT (OUTPATIENT)
Dept: CALL CENTER | Facility: HOSPITAL | Age: 68
End: 2018-09-16

## 2018-09-16 NOTE — OUTREACH NOTE
Medical Week 2 Survey      Responses   Facility patient discharged from?  [facility]   Does the patient have one of the following disease processes/diagnoses(primary or secondary)?  Other   Week 2 attempt successful?  Yes   Call start time  1409   Call end time  1412   Meds reviewed with patient/caregiver?  Yes   Does the patient have all medications ordered at discharge?  Yes   Is the patient taking all medications as directed (includes completed medication regime)?  Yes   Has the patient kept scheduled appointments due by today?  Yes   What is the patient's perception of their health status since discharge?  Improving   Week 2 Call Completed?  Yes          Mary Longo, RN

## 2018-09-26 ENCOUNTER — READMISSION MANAGEMENT (OUTPATIENT)
Dept: CALL CENTER | Facility: HOSPITAL | Age: 68
End: 2018-09-26

## 2018-09-26 NOTE — OUTREACH NOTE
Medical Week 3 Survey      Responses   Facility patient discharged from?  [facility]   Does the patient have one of the following disease processes/diagnoses(primary or secondary)?  Other   Week 3 attempt successful?  Yes   Call start time  0856   Call end time  0859   Meds reviewed with patient/caregiver?  Yes   Is the patient having any side effects they believe may be caused by any medication additions or changes?  No   Does the patient have all medications ordered at discharge?  Yes   Is the patient taking all medications as directed (includes completed medication regime)?  Yes   Does the patient have a primary care provider?   Yes   Has the patient kept scheduled appointments due by today?  Yes   Has home health visited the patient within 72 hours of discharge?  Yes   Psychosocial issues?  No   Comments  Continues with right foot pain/numbness-states improving with use of Gabapentin.   Did the patient receive a copy of their discharge instructions?  Yes   Nursing interventions  Reviewed instructions with patient, Educated on MyChart   What is the patient's perception of their health status since discharge?  Improving   Is the patient/caregiver able to teach back signs and symptoms related to disease process for when to call PCP?  Yes   Is the patient/caregiver able to teach back signs and symptoms related to disease process for when to call 911?  Yes   Is the patient/caregiver able to teach back the hierarchy of who to call/visit for symptoms/problems? PCP, Specialist, Home health nurse, Urgent Care, ED, 911  Yes   Additional teach back comments  States is improving-continues with PT.   Week 3 Call Completed?  Yes          Genesis Fung RN

## 2018-10-01 ENCOUNTER — TELEPHONE (OUTPATIENT)
Dept: NEUROSURGERY | Facility: CLINIC | Age: 68
End: 2018-10-01

## 2018-10-01 NOTE — TELEPHONE ENCOUNTER
I would not stop it.  The recurrence of some pain and N/T as she increases her activity is normal.  It will take 12-18mos for those nerves to completely recover.  She needs to be more mobile so unless she is walking all day every day (not just around the house) then I think that she needs PT to get stronger and more mobile. If there are particular exercises that bother her she can discuss that with her therapist and try to avoid those.    She needs to discuss the swelling in her feet with her PCP since that is who is managing her blood clot

## 2018-10-01 NOTE — TELEPHONE ENCOUNTER
She is 2 months out from a L2-3 laminectomy by Dr. Paulino 7/25/18.  She had started outpatient therapy two weeks ago. She has noticed n/t returned back in her foot and right leg pain in buttock an back is returning.  She states pain was better after surgery and before she started outpatient PT with taking Gabapentin.  She is also complaining about R leg pain and swelling after PT or standing on feet for long periods of time she had a blood clot and is now taking Xarelto 20 mg QD. She would like to know if she should stop PT?

## 2018-10-01 NOTE — TELEPHONE ENCOUNTER
Pt notified. She will cont. with PT and have them alter/adjust different exercises with her. She will call her PCP regarding swelling in her leg and feet.

## 2018-10-05 ENCOUNTER — READMISSION MANAGEMENT (OUTPATIENT)
Dept: CALL CENTER | Facility: HOSPITAL | Age: 68
End: 2018-10-05

## 2018-10-05 NOTE — OUTREACH NOTE
Medical Week 4 Survey      Responses   Facility patient discharged from?  [facility]   Does the patient have one of the following disease processes/diagnoses(primary or secondary)?  Other   Week 4 attempt successful?  Yes   Call start time  1806   Call end time  1808   Discharge diagnosis  Lumbar radiculopathy, acute   Meds reviewed with patient/caregiver?  Yes   Is the patient having any side effects they believe may be caused by any medication additions or changes?  No   Is the patient taking all medications as directed (includes completed medication regime)?  Yes   Has the patient kept scheduled appointments due by today?  Yes   Is the patient still receiving Home Health Services?  N/A   Psychosocial issues?  No   Comments  Continues with right foot pain/numbness-states improving with use of Gabapentin.   What is the patient's perception of their health status since discharge?  Improving   Is the patient/caregiver able to teach back signs and symptoms related to disease process for when to call PCP?  Yes   Is the patient/caregiver able to teach back the hierarchy of who to call/visit for symptoms/problems? PCP, Specialist, Home health nurse, Urgent Care, ED, 911  Yes   Additional teach back comments  States is improving-continues with PT.   Week 4 Call Completed?  Yes   Would the patient like one additional call?  No          Chey Gomez RN

## 2018-10-09 NOTE — PROGRESS NOTES
Subjective   Patient ID: Marlena Navarrete is a 68 y.o. female is here today for follow-up. She is 3 months out from a Right L2-3 laminectomy, foraminotomy, medial facetectomy and discectomy done on 07/25/2018 by Dr. Paulino. She states that she is starting to have right sided low back pain again along with numbness in the right buttock calf and foot.        History of Present Illness    This patient returns today.  She was doing a little bit better with regard to the numbness and weakness in her right leg and the pain in her right buttock.  Then she started therapy and subsequent to that has had an increase in the pain in her back as well as with the numbness and tingling in her right leg as well as weakness.  She still has a pretty severe L5 radiculopathy on the right side.    The following portions of the patient's history were reviewed and updated as appropriate: allergies, current medications, past family history, past medical history, past social history, past surgical history and problem list.    Review of Systems   Respiratory: Negative for chest tightness and shortness of breath.    Cardiovascular: Negative for chest pain.   All other systems reviewed and are negative.      Objective   Physical Exam   Constitutional: She is oriented to person, place, and time. She appears well-developed and well-nourished.   Neurological: She is oriented to person, place, and time.     Neurologic Exam     Mental Status   Oriented to person, place, and time.       Assessment/Plan   Independent Review of Radiographic Studies:      I again reviewed her myelogram that was done at the end of August.  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    Medical Decision Making:      I again told the patient and her  that I don't see anything here that I think I could help with further surgery.  She is taking the gabapentin 3 times a day and I told her to increase it to 4.   I did have to remove the joints at the time of surgery and so I think it would be wise to check some x-rays of her lumbar spine just to be sure she isn't developing instability.  We'll call her with the results of the plain films and otherwise we will see her back in 6 weeks.    Marlena was seen today for post-op.    Diagnoses and all orders for this visit:    Lumbar radiculopathy  -     XR Spine Lumbar Complete With Flex & Ext; Future      Return in about 6 weeks (around 12/6/2018) for Call with results.

## 2018-10-16 ENCOUNTER — HOSPITAL ENCOUNTER (OUTPATIENT)
Dept: CARDIOLOGY | Facility: HOSPITAL | Age: 68
Discharge: HOME | End: 2018-10-16
Admitting: FAMILY MEDICINE

## 2018-10-16 DIAGNOSIS — Z09 ENCOUNTER FOR FOLLOW-UP OF ACUTE DEEP VEIN THROMBOSIS (DVT) OF RIGHT LOWER EXTREMITY: ICD-10-CM

## 2018-10-16 DIAGNOSIS — Z86.718 ENCOUNTER FOR FOLLOW-UP OF ACUTE DEEP VEIN THROMBOSIS (DVT) OF RIGHT LOWER EXTREMITY: ICD-10-CM

## 2018-10-16 LAB
BH CV LOWER VASCULAR LEFT COMMON FEMORAL AUGMENT: NORMAL
BH CV LOWER VASCULAR LEFT COMMON FEMORAL COMPETENT: NORMAL
BH CV LOWER VASCULAR LEFT COMMON FEMORAL COMPRESS: NORMAL
BH CV LOWER VASCULAR LEFT COMMON FEMORAL PHASIC: NORMAL
BH CV LOWER VASCULAR LEFT COMMON FEMORAL SPONT: NORMAL
BH CV LOWER VASCULAR RIGHT COMMON FEMORAL AUGMENT: NORMAL
BH CV LOWER VASCULAR RIGHT COMMON FEMORAL COMPETENT: NORMAL
BH CV LOWER VASCULAR RIGHT COMMON FEMORAL COMPRESS: NORMAL
BH CV LOWER VASCULAR RIGHT COMMON FEMORAL PHASIC: NORMAL
BH CV LOWER VASCULAR RIGHT COMMON FEMORAL SPONT: NORMAL
BH CV LOWER VASCULAR RIGHT DISTAL FEMORAL COMPRESS: NORMAL
BH CV LOWER VASCULAR RIGHT GASTRONEMIUS COMPRESS: NORMAL
BH CV LOWER VASCULAR RIGHT GREATER SAPH AK COMPRESS: NORMAL
BH CV LOWER VASCULAR RIGHT GREATER SAPH BK COMPRESS: NORMAL
BH CV LOWER VASCULAR RIGHT MID FEMORAL AUGMENT: NORMAL
BH CV LOWER VASCULAR RIGHT MID FEMORAL COMPETENT: NORMAL
BH CV LOWER VASCULAR RIGHT MID FEMORAL COMPRESS: NORMAL
BH CV LOWER VASCULAR RIGHT MID FEMORAL PHASIC: NORMAL
BH CV LOWER VASCULAR RIGHT MID FEMORAL SPONT: NORMAL
BH CV LOWER VASCULAR RIGHT PERONEAL COMPRESS: NORMAL
BH CV LOWER VASCULAR RIGHT POPLITEAL AUGMENT: NORMAL
BH CV LOWER VASCULAR RIGHT POPLITEAL COMPETENT: NORMAL
BH CV LOWER VASCULAR RIGHT POPLITEAL COMPRESS: NORMAL
BH CV LOWER VASCULAR RIGHT POPLITEAL PHASIC: NORMAL
BH CV LOWER VASCULAR RIGHT POPLITEAL SPONT: NORMAL
BH CV LOWER VASCULAR RIGHT POSTERIOR TIBIAL COMPRESS: NORMAL
BH CV LOWER VASCULAR RIGHT PROXIMAL FEMORAL COMPRESS: NORMAL
BH CV LOWER VASCULAR RIGHT SAPHENOFEMORAL JUNCTION AUGMENT: NORMAL
BH CV LOWER VASCULAR RIGHT SAPHENOFEMORAL JUNCTION COMPETENT: NORMAL
BH CV LOWER VASCULAR RIGHT SAPHENOFEMORAL JUNCTION COMPRESS: NORMAL
BH CV LOWER VASCULAR RIGHT SAPHENOFEMORAL JUNCTION PHASIC: NORMAL
BH CV LOWER VASCULAR RIGHT SAPHENOFEMORAL JUNCTION SPONT: NORMAL

## 2018-10-16 PROCEDURE — 93971 EXTREMITY STUDY: CPT

## 2018-10-17 ENCOUNTER — TELEPHONE (OUTPATIENT)
Dept: FAMILY MEDICINE CLINIC | Facility: CLINIC | Age: 68
End: 2018-10-17

## 2018-10-17 NOTE — TELEPHONE ENCOUNTER
Patient informed she should stay on Xarelto for a total of 6 months per Dr Hernandez since she developed the thrombosis post operatively.

## 2018-10-25 ENCOUNTER — HOSPITAL ENCOUNTER (OUTPATIENT)
Dept: GENERAL RADIOLOGY | Facility: HOSPITAL | Age: 68
Discharge: HOME | End: 2018-10-25
Attending: NEUROLOGICAL SURGERY | Admitting: NEUROLOGICAL SURGERY

## 2018-10-25 ENCOUNTER — OFFICE VISIT (OUTPATIENT)
Dept: NEUROSURGERY | Facility: CLINIC | Age: 68
End: 2018-10-25

## 2018-10-25 VITALS
BODY MASS INDEX: 27.29 KG/M2 | HEART RATE: 87 BPM | DIASTOLIC BLOOD PRESSURE: 78 MMHG | WEIGHT: 130 LBS | SYSTOLIC BLOOD PRESSURE: 127 MMHG | HEIGHT: 58 IN

## 2018-10-25 DIAGNOSIS — M54.16 LUMBAR RADICULOPATHY: Primary | ICD-10-CM

## 2018-10-25 DIAGNOSIS — M54.16 LUMBAR RADICULOPATHY: ICD-10-CM

## 2018-10-25 PROCEDURE — 99213 OFFICE O/P EST LOW 20 MIN: CPT | Performed by: NEUROLOGICAL SURGERY

## 2018-10-25 PROCEDURE — 72114 X-RAY EXAM L-S SPINE BENDING: CPT

## 2018-10-30 ENCOUNTER — TELEPHONE (OUTPATIENT)
Dept: NEUROSURGERY | Facility: CLINIC | Age: 68
End: 2018-10-30

## 2018-10-30 NOTE — TELEPHONE ENCOUNTER
I talked to this patient today on the phone.  Her x-rays do not show any obvious instability but she does have a lot of back pain particularly when she bends.  Consequently I think we should get her into see an orthopedic spine surgeon for a fusion evaluation and then see her back in the office after that.  I will give us a little bit more time to see if she improves and if not we will be ready to go.

## 2018-11-02 NOTE — TELEPHONE ENCOUNTER
Patient is requesting refill of Neurontin 300 mg QID. AT the last office visit you informed the patient to increase it 1 QID. IF this is okay, how would you like for it to be filled?

## 2018-11-02 NOTE — TELEPHONE ENCOUNTER
I l/m for pt today regarding her appt with Dr. Melgoza. She called earlier to talk about her Gabapentin Rx. She left v/m stating that it was increased but she needs a new Rx sent to pharmacy. I was unable to speak with her directly so I'm not sure about her dosage. I told her that we would call her back sometime today.

## 2018-11-05 RX ORDER — GABAPENTIN 300 MG/1
300 CAPSULE ORAL 4 TIMES DAILY
Qty: 120 CAPSULE | Refills: 0 | Status: SHIPPED | OUTPATIENT
Start: 2018-11-05 | End: 2018-12-01 | Stop reason: SDUPTHER

## 2018-12-04 ENCOUNTER — OFFICE VISIT (OUTPATIENT)
Dept: ORTHOPEDIC SURGERY | Facility: CLINIC | Age: 68
End: 2018-12-04

## 2018-12-04 VITALS — HEIGHT: 58 IN | TEMPERATURE: 97.5 F | WEIGHT: 135 LBS | BODY MASS INDEX: 28.34 KG/M2

## 2018-12-04 DIAGNOSIS — M54.50 LUMBAR BACK PAIN: Primary | ICD-10-CM

## 2018-12-04 DIAGNOSIS — G03.9 ARACHNOIDITIS: ICD-10-CM

## 2018-12-04 PROCEDURE — 99204 OFFICE O/P NEW MOD 45 MIN: CPT | Performed by: ORTHOPAEDIC SURGERY

## 2018-12-04 RX ORDER — GABAPENTIN 300 MG/1
CAPSULE ORAL
Qty: 120 CAPSULE | Refills: 0 | Status: SHIPPED | OUTPATIENT
Start: 2018-12-04 | End: 2018-12-31 | Stop reason: DRUGHIGH

## 2018-12-04 NOTE — PROGRESS NOTES
New patient or new problem visit    Chief Complaint   Patient presents with   • Lumbar Spine - Establish Care       HPI: She complains of right leg pain and burning radiation into the foot and weakness in dorsiflexion of the right foot as well as the lumbar back pain.  The back pain component has worsened since an L2-3 laminectomy last July after which she was noted to have a foot drop.  The pain is failed to improve and she thinks that the presently the leg pain exceeds the back pain despite the gabapentin therapy and the time and rest.  The pain is constant severe right sided aching crushing.  She is seen today request of Dr. Paulino.    PFSH: See chart- reviewed.  She had a successful renal transplant from her sister 36 years ago    Review of Systems   Constitutional: Positive for unexpected weight change. Negative for chills and fever.   HENT: Negative.  Negative for trouble swallowing and voice change.    Eyes: Negative.  Negative for visual disturbance.   Respiratory: Negative.  Negative for cough and shortness of breath.    Cardiovascular: Negative.  Negative for chest pain and leg swelling.   Gastrointestinal: Negative.  Negative for abdominal pain, nausea and vomiting.   Endocrine: Negative.  Negative for cold intolerance and heat intolerance.   Genitourinary: Negative.  Negative for difficulty urinating, frequency and urgency.   Musculoskeletal: Positive for back pain, gait problem, joint swelling and myalgias.   Skin: Negative.  Negative for rash and wound.   Allergic/Immunologic: Negative.  Negative for immunocompromised state.   Neurological: Positive for numbness. Negative for weakness.   Hematological: Bruises/bleeds easily.   Psychiatric/Behavioral: Positive for dysphoric mood. The patient is nervous/anxious.        PE: Constitutional: Vital signs above-noted.  Awake, alert and oriented    Psychiatric: Affect and insight do not appear grossly disturbed.    Pulmonary: Breathing is unlabored, color is  good.    Skin: Warm, dry and normal turgor    Cardiac:  pedal pulses intact.  No edema.    Eyesight and hearing appear adequate for examination purposes      Musculoskeletal:  There is mild tenderness to percussion and palpation of the spine. Motion appears undisturbed.  Posture is unremarkable to coronal and sagittal inspection.    The skin about the area is intact.  There is no palpable or visible deformity.  There is no local spasm.       Neurologic:   Reflexes are 2+ and symmetrical in the patellae and absent in the Achilles.   Motor function is undisturbed in quadriceps, EHL, and gastrocnemius on the left but she does have a right EHL severe weakness and she is in an AFO splint   Sensation appears symmetrically intact to light touch   .  In the bilateral lower extremities there is no evidence of atrophy.   Clonus is absent..  Gait appears undisturbed.  SLR test negative      MEDICAL DECISION MAKING    XRAY: Plain film x-rays of the lumbar spine including flexion-extension films which show a 5 mm retrolisthesis at L2-3 which is not unstable and bending x-rays.  Slight scoliosis is noted.  Myelogram and CT scan along with MRI were performed before and after her surgery in July and these are compared she has some clumping of the L2-3 nerve roots on the right side at the laminectomy site where the appearance is otherwise unremarkable there is no pseudomeningocele is no evidence of neurologic compression.  No other source of neurologic compression or added at any other level as noted and widespread degenerative changes of the lumbar spine are noted but no focal point of interest.      Other: I reviewed the operative report as well as Dr. Paulino's notes.  She had a dural defect at L23 which was repaired without event.  Currently he is trying her on gabapentin.    Impression: Lumbar disc degeneration and lumbar retrolisthesis but no instability.  2 level lumbar mild DJD and persistent right leg pain presumably  secondary to arachnoiditis.    Plan: I don't presently see an indication for surgical treatment of the back pain, since this is not her main complaint I don't think she would be apt to appreciated it much even if it did work.  Instead I would encourage her to hold off of anything aggressively interventional.  She is going to go ahead and see Dr. Shepard for pain management.  I will see her back as needed for back pain.

## 2018-12-10 NOTE — PROGRESS NOTES
Subjective   Patient ID: Marlena Navarrete is a 68 y.o. female is here today for follow-up. She is 4 1/2 months out from a Right L2-3 laminectomy, foraminotomy, medial facetectomy and discectomy done on 07/25/2018. She has back pain still with right leg pain, numbness and tingling.    History of Present Illness     This patient returns today.  She at one point was beginning to feel a little bit better but is now feeling worse again.  She still has weakness in her right leg as well as numbness and tingling and pain.    The following portions of the patient's history were reviewed and updated as appropriate: allergies, current medications, past family history, past medical history, past social history, past surgical history and problem list.    Review of Systems   Respiratory: Negative for chest tightness and shortness of breath.    Cardiovascular: Negative for chest pain.   Musculoskeletal: Positive for back pain, gait problem and myalgias.        Right leg pain   Neurological: Positive for numbness.        Tingling   All other systems reviewed and are negative.      Objective   Physical Exam   Constitutional: She is oriented to person, place, and time. She appears well-developed and well-nourished.   Neurological: She is oriented to person, place, and time.     Neurologic Exam     Mental Status   Oriented to person, place, and time.       Assessment/Plan   Independent Review of Radiographic Studies:      I again reviewed her myelogram which was done in August.  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    Medical Decision Making:      I told her that I am reasonably certain that the problems she is having is due to manipulation of the nerve that was done during surgery.  I am quite certain it is due to actual nerve damage.  Hopefully this will improve over time.  She did see Dr. Melgoza who did not think surgery was a good idea and I would agree at least at this point.  We will get her into see someone from  pain management.  She is going to talk to her renal doctor about increasing her Neurontin and if they are okay with it we will call if the increased dose ourselves.    Marlena was seen today for back pain.    Diagnoses and all orders for this visit:    Lumbar radiculopathy  -     Ambulatory Referral to Pain Management      Return in about 3 months (around 3/11/2019).

## 2018-12-11 ENCOUNTER — OFFICE VISIT (OUTPATIENT)
Dept: NEUROSURGERY | Facility: CLINIC | Age: 68
End: 2018-12-11

## 2018-12-11 VITALS — SYSTOLIC BLOOD PRESSURE: 145 MMHG | DIASTOLIC BLOOD PRESSURE: 77 MMHG | HEART RATE: 75 BPM

## 2018-12-11 DIAGNOSIS — M54.16 LUMBAR RADICULOPATHY: Primary | ICD-10-CM

## 2018-12-11 PROCEDURE — 99212 OFFICE O/P EST SF 10 MIN: CPT | Performed by: NEUROLOGICAL SURGERY

## 2018-12-18 ENCOUNTER — TELEPHONE (OUTPATIENT)
Dept: NEUROSURGERY | Facility: CLINIC | Age: 68
End: 2018-12-18

## 2018-12-18 NOTE — TELEPHONE ENCOUNTER
Patient called was in last week discussed increasing Gabapentin patient spoke to her Kidney doctor he said it was ok to increase the Gabapentin. Please advise [phone]

## 2018-12-18 NOTE — TELEPHONE ENCOUNTER
Spoke with the patient. She will increase her nightly dosage for a week by 300 mg. She will then call back to the office and tell us how she tolerated that increased dosage as she was d/c from the hospital on 400 mg and it made her somewhat loopy.

## 2018-12-26 NOTE — TELEPHONE ENCOUNTER
Patient will increase her Gabapentin by another 300 mg for this week and call next week to see if there is any improvement. Currently she has not had any. She still has the buttock and foot pain.

## 2018-12-27 ENCOUNTER — TELEPHONE (OUTPATIENT)
Dept: FAMILY MEDICINE CLINIC | Facility: CLINIC | Age: 68
End: 2018-12-27

## 2018-12-31 RX ORDER — GABAPENTIN 300 MG/1
CAPSULE ORAL
Qty: 180 CAPSULE | Refills: 1 | Status: SHIPPED | OUTPATIENT
Start: 2018-12-31 | End: 2019-03-12 | Stop reason: ALTCHOICE

## 2018-12-31 RX ORDER — GABAPENTIN 300 MG/1
CAPSULE ORAL
Qty: 180 CAPSULE | Refills: 1 | Status: CANCELLED | OUTPATIENT
Start: 2018-12-31

## 2018-12-31 RX ORDER — GABAPENTIN 300 MG/1
CAPSULE ORAL
Qty: 120 CAPSULE | Refills: 0 | OUTPATIENT
Start: 2018-12-31

## 2019-01-22 ENCOUNTER — TELEPHONE (OUTPATIENT)
Dept: FAMILY MEDICINE CLINIC | Facility: CLINIC | Age: 69
End: 2019-01-22

## 2019-02-20 ENCOUNTER — OFFICE VISIT (OUTPATIENT)
Dept: FAMILY MEDICINE CLINIC | Facility: CLINIC | Age: 69
End: 2019-02-20

## 2019-02-20 VITALS
OXYGEN SATURATION: 98 % | RESPIRATION RATE: 18 BRPM | HEIGHT: 58 IN | WEIGHT: 143 LBS | SYSTOLIC BLOOD PRESSURE: 130 MMHG | HEART RATE: 71 BPM | TEMPERATURE: 98 F | BODY MASS INDEX: 30.02 KG/M2 | DIASTOLIC BLOOD PRESSURE: 70 MMHG

## 2019-02-20 DIAGNOSIS — Z86.718 HISTORY OF DVT (DEEP VEIN THROMBOSIS): ICD-10-CM

## 2019-02-20 DIAGNOSIS — Z00.00 MEDICARE ANNUAL WELLNESS VISIT, INITIAL: Primary | ICD-10-CM

## 2019-02-20 DIAGNOSIS — I82.4Y1 ACUTE EMBOLISM AND THROMBOSIS OF DEEP VEIN OF RIGHT PROXIMAL LOWER EXTREMITY (HCC): ICD-10-CM

## 2019-02-20 DIAGNOSIS — I82.401 ACUTE DEEP VEIN THROMBOSIS (DVT) OF RIGHT LOWER EXTREMITY, UNSPECIFIED VEIN (HCC): ICD-10-CM

## 2019-02-20 PROCEDURE — 99214 OFFICE O/P EST MOD 30 MIN: CPT | Performed by: FAMILY MEDICINE

## 2019-02-20 PROCEDURE — G0438 PPPS, INITIAL VISIT: HCPCS | Performed by: FAMILY MEDICINE

## 2019-02-20 RX ORDER — PREGABALIN 150 MG/1
150 CAPSULE ORAL DAILY
COMMUNITY
End: 2022-03-15 | Stop reason: HOSPADM

## 2019-02-20 RX ORDER — CLOTRIMAZOLE AND BETAMETHASONE DIPROPIONATE 10; .64 MG/G; MG/G
CREAM TOPICAL 2 TIMES DAILY
Qty: 1 EACH | Refills: 0 | Status: SHIPPED | OUTPATIENT
Start: 2019-02-20 | End: 2020-03-09

## 2019-02-20 NOTE — PROGRESS NOTES
QUICK REFERENCE INFORMATION:  The ABCs of the Annual Wellness Visit    Initial Medicare Wellness Visit    HEALTH RISK ASSESSMENT    1950    Recent Hospitalizations:  Recently treated at the following:  [facility].        Current Medical Providers:  Patient Care Team:  Bradley Hernandez MD as PCP - General (Family Medicine)  Oscar Paulino MD as PCP - Claims Attributed  Paul Gamez MD as Consulting Physician (Pain Medicine)        Smoking Status:  Social History     Tobacco Use   Smoking Status Former Smoker   • Packs/day: 1.00   • Years: 30.00   • Pack years: 30.00   • Types: Cigarettes   • Last attempt to quit: 2012   • Years since quittin.8   Smokeless Tobacco Never Used       Alcohol Consumption:  Social History     Substance and Sexual Activity   Alcohol Use No       Depression Screen:   PHQ-2/PHQ-9 Depression Screening 2019   Little interest or pleasure in doing things 0   Feeling down, depressed, or hopeless 2   Trouble falling or staying asleep, or sleeping too much 0   Feeling tired or having little energy 0   Poor appetite or overeating 0   Feeling bad about yourself - or that you are a failure or have let yourself or your family down 0   Trouble concentrating on things, such as reading the newspaper or watching television 0   Moving or speaking so slowly that other people could have noticed. Or the opposite - being so fidgety or restless that you have been moving around a lot more than usual 0   Thoughts that you would be better off dead, or of hurting yourself in some way 0   Total Score 2   If you checked off any problems, how difficult have these problems made it for you to do your work, take care of things at home, or get along with other people? Very difficult       Health Habits and Functional and Cognitive Screening:  Functional & Cognitive Status 2019   Do you have difficulty preparing food and eating? Yes   Do you have difficulty bathing yourself,  getting dressed or grooming yourself? No   Do you have difficulty using the toilet? No   Do you have difficulty moving around from place to place? Yes   Do you have trouble with steps or getting out of a bed or a chair? No   In the past year have you fallen or experienced a near fall? No   Current Diet Well Balanced Diet   Dental Exam Up to date   Eye Exam Up to date   Exercise (times per week) 3 times per week   Current Exercise Activities Include Walking   Do you need help using the phone?  No   Are you deaf or do you have serious difficulty hearing?  No   Do you need help with transportation? No   Do you need help shopping? No   Do you need help preparing meals?  No   Do you need help with housework?  No   Do you need help with laundry? Yes   Do you need help taking your medications? No   Do you need help managing money? No   Do you ever drive or ride in a car without wearing a seat belt? No   Have you felt unusual stress, anger or loneliness in the last month? No   Who do you live with? Community   If you need help, do you have trouble finding someone available to you? No   Have you been bothered in the last four weeks by sexual problems? No   Do you have difficulty concentrating, remembering or making decisions? No           Does the patient have evidence of cognitive impairment? No    Asiprin use counseling: Contraindicated from taking ASA      Recent Lab Results:    Visual Acuity:  No exam data present    Age-appropriate Screening Schedule:  Refer to the list below for future screening recommendations based on patient's age, sex and/or medical conditions. Orders for these recommended tests are listed in the plan section. The patient has been provided with a written plan.    Health Maintenance   Topic Date Due   • TDAP/TD VACCINES (1 - Tdap) 02/17/1971   • ZOSTER VACCINE (1 of 2) 02/17/2002   • MAMMOGRAM  04/24/2018   • COLONOSCOPY  04/24/2018   • LIPID PANEL  07/18/2018   • PNEUMOCOCCAL VACCINES (65+  "LOW/MEDIUM RISK) (2 of 2 - PPSV23) 09/25/2019   • INFLUENZA VACCINE  Completed        Subjective   History of Present Illness    Marlena Navarrete is a 69 y.o. female who presents for an Annual Wellness Visit.    The following portions of the patient's history were reviewed and updated as appropriate: past family history and past medical history.    Outpatient Medications Prior to Visit   Medication Sig Dispense Refill   • amLODIPine (NORVASC) 5 MG tablet Take 5 mg by mouth Every Night.  11   • atorvastatin (LIPITOR) 10 MG tablet Take 10 mg by mouth Every Night.     • CBD (cannabidiol) oral oil Take  by mouth Daily.     • pregabalin (LYRICA) 75 MG capsule Take 75 mg by mouth 2 (Two) Times a Day.     • rivaroxaban (XARELTO) 20 MG tablet Take 1 tablet by mouth Daily. 28 tablet 0   • gabapentin (NEURONTIN) 300 MG capsule Take 1 po QID, 2 po  capsule 1     No facility-administered medications prior to visit.        Patient Active Problem List   Diagnosis   • Lumbar radiculopathy   • Hypertension   • Foot drop, right   • Kidney transplant recipient   • History of lumbar laminectomy for spinal cord decompression       Advance Care Planning:  has NO advance directive - information provided to the patient today    Identification of Risk Factors:  Risk factors include: increased fall risk and chronic pain.    Review of Systems    Compared to one year ago, the patient feels her physical health is worse.  Compared to one year ago, the patient feels her mental health is worse.    Objective     Physical Exam    Vitals:    02/20/19 1321   BP: 130/70   BP Location: Left arm   Patient Position: Sitting   Pulse: 71   Resp: 18   Temp: 98 °F (36.7 °C)   TempSrc: Oral   SpO2: 98%   Weight: 64.9 kg (143 lb)   Height: 147.3 cm (58\")   PainSc:   8   PainLoc: Back       Patient's Body mass index is 29.89 kg/m². BMI is above normal parameters. Recommendations include: no follow-up required.      Assessment/Plan   Patient Self-Management " and Personalized Health Advice  The patient has been provided with information about: fall prevention and designing advance directives and preventive services including:   · Advance directive, Fall Risk assessment done.    Visit Diagnoses:    ICD-10-CM ICD-9-CM   1. Medicare annual wellness visit, initial Z00.00 V70.0   2. Acute deep vein thrombosis (DVT) of right lower extremity, unspecified vein (CMS/[facility]) I82.401 453.40   3. Acute embolism and thrombosis of deep vein of right proximal lower extremity (CMS/[facility])  I82.4Y1 453.41   4. History of DVT (deep vein thrombosis) Z86.718 V12.51       No orders of the defined types were placed in this encounter.      Outpatient Encounter Medications as of 2/20/2019   Medication Sig Dispense Refill   • amLODIPine (NORVASC) 5 MG tablet Take 5 mg by mouth Every Night.  11   • atorvastatin (LIPITOR) 10 MG tablet Take 10 mg by mouth Every Night.     • CBD (cannabidiol) oral oil Take  by mouth Daily.     • pregabalin (LYRICA) 75 MG capsule Take 75 mg by mouth 2 (Two) Times a Day.     • rivaroxaban (XARELTO) 20 MG tablet Take 1 tablet by mouth Daily. 28 tablet 0   • clotrimazole-betamethasone (LOTRISONE) 1-0.05 % cream Apply  topically to the appropriate area as directed 2 (Two) Times a Day. 1 each 0   • gabapentin (NEURONTIN) 300 MG capsule Take 1 po QID, 2 po  capsule 1     No facility-administered encounter medications on file as of 2/20/2019.        Reviewed use of high risk medication in the elderly: yes  Reviewed for potential of harmful drug interactions in the elderly: yes    Follow Up:  No Follow-up on file.     An After Visit Summary and PPPS with all of these plans were given to the patient.

## 2019-02-20 NOTE — PROGRESS NOTES
SUBJECTIVE:  The patient is a 69-year-old white female here for follow-up.  She has a history of venous thrombosis following surgery in September.  She has been on Xarelto since then.  Her only complaint today is rash and some back discomfort.  She is currently seeing pain management for her back and leg discomfort.    PAST MEDICAL HISTORY:  Reviewed.    REVIEW OF SYSTEMS:  Please see above; 14 point ROS negative.      OBJECTIVE:   Vitals signs are reviewed and are stable.    General:  Well-nourished.  Alert and oriented x3 in no acute distress.  HEENT: PERRLA.   Neck:  Supple.   Lungs:  Clear.    Heart:  Regular rate and rhythm.   Integument.  Dry reddish rash beneath the breasts present    ASSESSMENT:    History of DVT  Tinea corporis  PLAN: We will plan repeat venous Doppler and when it has been 6 months time will discontinue the Xarelto should that be normal.  Lotrisone twice daily for rash.  She will follow-up with her neurosurgeon as scheduled in April.  She will call if problems.    Dictated utilizing Dragon dictation.

## 2019-02-20 NOTE — PATIENT INSTRUCTIONS
Medicare Wellness  Personal Prevention Plan of Service     Date of Office Visit:  2019  Encounter Provider:  Bradley Hernandez MD  Place of Service:  [facility]  Patient Name: Marlena Navarrete  :  1950    As part of the Medicare Wellness portion of your visit today, we are providing you with this personalized preventive plan of services (PPPS). This plan is based upon recommendations of the [place] Preventive Services Task Force (USPSTF) and the Advisory Committee on Immunization Practices (ACIP).    This lists the preventive care services that should be considered, and provides dates of when you are due. Items listed as completed are up-to-date and do not require any further intervention.    Health Maintenance   Topic Date Due   • TDAP/TD VACCINES (1 - Tdap) 1971   • ZOSTER VACCINE (1 of 2) 2002   • LUNG CANCER SCREENING  2007   • HEPATITIS C SCREENING  2018   • MEDICARE ANNUAL WELLNESS  2018   • MAMMOGRAM  2018   • COLONOSCOPY  2018   • LIPID PANEL  2018   • PNEUMOCOCCAL VACCINES (65+ LOW/MEDIUM RISK) (2 of 2 - PPSV23) 2019   • INFLUENZA VACCINE  Completed       No orders of the defined types were placed in this encounter.      No Follow-up on file.

## 2019-03-05 DIAGNOSIS — I82.4Y1 DEEP VEIN THROMBOSIS (DVT) OF PROXIMAL VEIN OF RIGHT LOWER EXTREMITY, UNSPECIFIED CHRONICITY (HCC): ICD-10-CM

## 2019-03-05 DIAGNOSIS — M79.604 PAIN OF RIGHT LOWER EXTREMITY: Primary | ICD-10-CM

## 2019-03-06 ENCOUNTER — HOSPITAL ENCOUNTER (OUTPATIENT)
Dept: CARDIOLOGY | Facility: HOSPITAL | Age: 69
Discharge: HOME | End: 2019-03-06
Admitting: FAMILY MEDICINE

## 2019-03-06 DIAGNOSIS — Z86.718 HISTORY OF DVT (DEEP VEIN THROMBOSIS): ICD-10-CM

## 2019-03-06 LAB
BH CV LOWER VASCULAR LEFT COMMON FEMORAL AUGMENT: NORMAL
BH CV LOWER VASCULAR LEFT COMMON FEMORAL COMPETENT: NORMAL
BH CV LOWER VASCULAR LEFT COMMON FEMORAL COMPRESS: NORMAL
BH CV LOWER VASCULAR LEFT COMMON FEMORAL PHASIC: NORMAL
BH CV LOWER VASCULAR LEFT COMMON FEMORAL SPONT: NORMAL
BH CV LOWER VASCULAR RIGHT COMMON FEMORAL AUGMENT: NORMAL
BH CV LOWER VASCULAR RIGHT COMMON FEMORAL COMPETENT: NORMAL
BH CV LOWER VASCULAR RIGHT COMMON FEMORAL COMPRESS: NORMAL
BH CV LOWER VASCULAR RIGHT COMMON FEMORAL PHASIC: NORMAL
BH CV LOWER VASCULAR RIGHT COMMON FEMORAL SPONT: NORMAL
BH CV LOWER VASCULAR RIGHT DISTAL FEMORAL COMPRESS: NORMAL
BH CV LOWER VASCULAR RIGHT GASTRONEMIUS COMPRESS: NORMAL
BH CV LOWER VASCULAR RIGHT GREATER SAPH AK COMPRESS: NORMAL
BH CV LOWER VASCULAR RIGHT GREATER SAPH BK COMPRESS: NORMAL
BH CV LOWER VASCULAR RIGHT LESSER SAPH COMPRESS: NORMAL
BH CV LOWER VASCULAR RIGHT MID FEMORAL AUGMENT: NORMAL
BH CV LOWER VASCULAR RIGHT MID FEMORAL COMPETENT: NORMAL
BH CV LOWER VASCULAR RIGHT MID FEMORAL COMPRESS: NORMAL
BH CV LOWER VASCULAR RIGHT MID FEMORAL PHASIC: NORMAL
BH CV LOWER VASCULAR RIGHT MID FEMORAL SPONT: NORMAL
BH CV LOWER VASCULAR RIGHT PERONEAL COMPRESS: NORMAL
BH CV LOWER VASCULAR RIGHT POPLITEAL AUGMENT: NORMAL
BH CV LOWER VASCULAR RIGHT POPLITEAL COMPETENT: NORMAL
BH CV LOWER VASCULAR RIGHT POPLITEAL COMPRESS: NORMAL
BH CV LOWER VASCULAR RIGHT POPLITEAL PHASIC: NORMAL
BH CV LOWER VASCULAR RIGHT POPLITEAL SPONT: NORMAL
BH CV LOWER VASCULAR RIGHT POSTERIOR TIBIAL COMPRESS: NORMAL
BH CV LOWER VASCULAR RIGHT PROXIMAL FEMORAL COMPRESS: NORMAL
BH CV LOWER VASCULAR RIGHT SAPHENOFEMORAL JUNCTION AUGMENT: NORMAL
BH CV LOWER VASCULAR RIGHT SAPHENOFEMORAL JUNCTION COMPETENT: NORMAL
BH CV LOWER VASCULAR RIGHT SAPHENOFEMORAL JUNCTION COMPRESS: NORMAL
BH CV LOWER VASCULAR RIGHT SAPHENOFEMORAL JUNCTION PHASIC: NORMAL
BH CV LOWER VASCULAR RIGHT SAPHENOFEMORAL JUNCTION SPONT: NORMAL

## 2019-03-06 PROCEDURE — 93971 EXTREMITY STUDY: CPT

## 2019-03-11 NOTE — PROGRESS NOTES
Subjective   Patient ID: Marlena Navarrete is a 69 y.o. female is here today for follow-up on back pain that radaites into the right leg. Patient also complains of N/T. Patient is almost 7 months out from having a Right L2-3 laminectomy, foraminotomy, medial facetectomy and discectomy done on 07/25/2018.     History of Present Illness    This patient returns today.  She continues with pain and numbness in her right leg.  Those symptoms are still quite severe.  She has occasional shooting pains down her right leg.  She has no difficulty with bowel or bladder control and no trouble with the left leg at all.    The following portions of the patient's history were reviewed and updated as appropriate: allergies, current medications, past family history, past medical history, past social history, past surgical history and problem list.    Review of Systems   Respiratory: Negative for chest tightness and shortness of breath.    Cardiovascular: Negative for chest pain.   Musculoskeletal: Positive for back pain.   Neurological: Positive for numbness.   All other systems reviewed and are negative.      Objective   Physical Exam   Constitutional: She is oriented to person, place, and time. She appears well-developed and well-nourished.   Neurological: She is oriented to person, place, and time.     Neurologic Exam     Mental Status   Oriented to person, place, and time.     She does still have a foot drop on the right side    Assessment/Plan   Independent Review of Radiographic Studies:      Reviewed her plain films, myelogram, and CT scan which were done in August of last year.  The plain films do not show any evidence of instability.  The myelogram itself looks pretty good at the levels below L2-3.  There is some posterior disc bulging at L2-3 and significant degenerative disc disease at that level.  The L2-3 level is not well-visualized on any of the myelogram films.  I reviewed the post myelographic CT scan which looks okay  throughout the thoracic spine.  There is some clumping of the nerve roots on the right side at L2-3 where the defect from the laminectomy is.  There is not a lot of compression of the nerves at that level and the neural foramina are fairly open.    Medical Decision Making:      I told the patient and her  about the myelogram that we did last August.  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  If that is the case then means that further surgery is not very likely to help her.  I told her that we could certainly consider doing an aggressive decompression at L2-3 and then proceeding with a fusion.  I am not at all convinced that this will help her very much and so I would really tend to hold off on this as long as she possibly can.  She did ask about a spinal cord stimulator which the pain management doctors have discussed with her and I explained that in my experience her particular situation is 1 of the more appropriate indications for a spinal cord stimulator.  Her primary problem is the leg pain and the spinal cord stimulator is most likely to help that.  I told her that we will give her a copy of my office notes so that she can discuss the spinal cord stimulator with the pain management doctors.  We will also try her on a little bit more physical therapy and then check her again in about 3 months.    Marlena was seen today for back pain and leg pain.    Diagnoses and all orders for this visit:    Lumbar radiculopathy  -     Ambulatory Referral to Physical Therapy    Spinal enthesopathy of thoracic region (CMS/HCC)      Return in about 3 months (around 6/12/2019).

## 2019-03-12 ENCOUNTER — OFFICE VISIT (OUTPATIENT)
Dept: NEUROSURGERY | Facility: CLINIC | Age: 69
End: 2019-03-12

## 2019-03-12 VITALS — HEART RATE: 73 BPM | SYSTOLIC BLOOD PRESSURE: 132 MMHG | DIASTOLIC BLOOD PRESSURE: 68 MMHG

## 2019-03-12 DIAGNOSIS — M54.16 LUMBAR RADICULOPATHY: Primary | ICD-10-CM

## 2019-03-12 DIAGNOSIS — M46.04 SPINAL ENTHESOPATHY OF THORACIC REGION (HCC): ICD-10-CM

## 2019-03-12 PROCEDURE — 99213 OFFICE O/P EST LOW 20 MIN: CPT | Performed by: NEUROLOGICAL SURGERY

## 2019-06-10 NOTE — PROGRESS NOTES
Subjective   Patient ID: Marlena Navarrete is a 69 y.o. female is here today for follow-up. She is almost 11 months out from having a Right L2-3 laminectomy, foraminotomy, medial facetectomy and discectomy done on 07/25/2018. She has back pain that radiates into her right leg with numbness and tingling. She is getting a permeant SCS placed today by Dr. Gamez.    History of Present Illness    This patient returns today.  She continues with severe pain in her back with radiation into her right leg.  She got complete relief of the pain with a temporary spinal cord stimulator and plans to have a permanent one placed today.    The following portions of the patient's history were reviewed and updated as appropriate: allergies, current medications, past family history, past medical history, past social history, past surgical history and problem list.    Review of Systems   Respiratory: Negative for chest tightness and shortness of breath.    Cardiovascular: Negative for chest pain.   Musculoskeletal: Positive for back pain.   Neurological: Positive for numbness.   All other systems reviewed and are negative.      Objective   Physical Exam   Constitutional: She is oriented to person, place, and time. She appears well-developed and well-nourished.   Neurological: She is oriented to person, place, and time.     Neurologic Exam     Mental Status   Oriented to person, place, and time.       Assessment/Plan   Independent Review of Radiographic Studies:      Medical Decision Making:      I told the patient to call me a couple of weeks after the spinal cord stimulator and let me know how she is doing.  If she is better we will do nothing further and if she has not we will get her back into the office.    Marlena was seen today for follow-up.    Diagnoses and all orders for this visit:    Lumbar radiculopathy      Return for Recheck and call after treatment or consultation.

## 2019-06-13 ENCOUNTER — OFFICE VISIT (OUTPATIENT)
Dept: NEUROSURGERY | Facility: CLINIC | Age: 69
End: 2019-06-13

## 2019-06-13 VITALS — SYSTOLIC BLOOD PRESSURE: 147 MMHG | HEART RATE: 74 BPM | DIASTOLIC BLOOD PRESSURE: 78 MMHG

## 2019-06-13 DIAGNOSIS — M54.16 LUMBAR RADICULOPATHY: Primary | ICD-10-CM

## 2019-06-13 PROCEDURE — 99212 OFFICE O/P EST SF 10 MIN: CPT | Performed by: NEUROLOGICAL SURGERY

## 2019-12-13 ENCOUNTER — TELEPHONE (OUTPATIENT)
Dept: NEUROSURGERY | Facility: CLINIC | Age: 69
End: 2019-12-13

## 2019-12-13 NOTE — TELEPHONE ENCOUNTER
Pt is calling because [facility]  told her that Dr Paulino does not do the paddle but Dr Kemp does. She said [facility] is suppose to send her records over,     Tried to explain to her that she saw Dr Paulino this year and we will have to ask them if she can switch.  Marlena [phone]

## 2020-01-08 ENCOUNTER — TRANSCRIBE ORDERS (OUTPATIENT)
Dept: ADMINISTRATIVE | Facility: HOSPITAL | Age: 70
End: 2020-01-08

## 2020-01-08 DIAGNOSIS — Z94.0 TRANSPLANTED KIDNEY: Primary | ICD-10-CM

## 2020-01-14 ENCOUNTER — HOSPITAL ENCOUNTER (OUTPATIENT)
Dept: ULTRASOUND IMAGING | Facility: HOSPITAL | Age: 70
Discharge: HOME | End: 2020-01-14
Admitting: INTERNAL MEDICINE

## 2020-01-14 DIAGNOSIS — Z94.0 TRANSPLANTED KIDNEY: ICD-10-CM

## 2020-01-14 PROCEDURE — 76775 US EXAM ABDO BACK WALL LIM: CPT

## 2020-01-14 PROCEDURE — 76776 US EXAM K TRANSPL W/DOPPLER: CPT

## 2020-02-07 NOTE — PROGRESS NOTES
Subjective   Patient ID: Marlena Navarrete is a 70 y.o. female is here today for follow-up to discuss changing out her SCS that was placed  In July 2019 , she also had lead revision in August 2019 that was placed by Dr Gamez.  Patient states that she met with [employer] rep most recently in January for reprogramming which has not helped.    Patient states that she received a lot of improvement with her pain with the trial and has not had much relief with the current system.    Patient is currently having constant low back, right buttock, right leg pain, right foot/toes N/T and right leg and foot weakness.  She has a right foot drop, but denies urinary incontinence or problems with her balance and gait.    She had surgery with Dr Paulino 7.25.18,  Right L2-3 laminectomy, foraminotomy, medial facetectomy and discectomy    This patient had a right L2-L3 laminectomy by Dr. Paulino about 2 years ago.  Unfortunately, she continued to have low back pain, right buttock and leg pain and developed some weakness after the surgery.  Postoperative imaging did not show any surgically treatable problem and she was sent to pain management.  She had a spinal cord stimulator trial which helped quite a bit and they put a spinal cord stimulator in, in the summer of 2019.  However, the permanent system really did not work as well as the trial.  She even had 1 revision.  I am being asked to see her in regards to whether a paddle lead could be used.  I told her that is very reasonable, but I would like to get some pictures of the system first, so will get plain x-rays of the thoracolumbar spine and pelvis and have her come back to discuss it.  We would be focusing on trying to approximate the best position for the lead based on the trial information.  We will have the [employer] representative be with her at the next visit.        Back Pain   The problem occurs constantly. The problem is unchanged. The pain is present in the lumbar spine and gluteal.  The pain radiates to the right thigh. The pain is at a severity of 5/10. The pain is moderate. The symptoms are aggravated by standing, sitting, twisting, lying down, position and bending. Associated symptoms include leg pain, numbness and weakness.   Leg Pain    The pain is present in the right leg. The pain is at a severity of 5/10. The pain is moderate. The pain has been constant since onset. Associated symptoms include numbness.   Extremity Weakness    The pain is present in the right upper leg, right lower leg and right foot. The problem occurs constantly. The problem has been unchanged. The pain is at a severity of 5/10. The pain is moderate. Associated symptoms include numbness.       The following portions of the patient's history were reviewed and updated as appropriate: allergies, current medications, past family history, past medical history, past social history, past surgical history and problem list.    Review of Systems   Musculoskeletal: Positive for back pain and extremity weakness. Negative for arthralgias, gait problem and myalgias.   Neurological: Positive for weakness and numbness.   All other systems reviewed and are negative.      Objective   Physical Exam   Constitutional: She is oriented to person, place, and time. She appears well-developed and well-nourished.   HENT:   Head: Normocephalic and atraumatic.   Eyes: Pupils are equal, round, and reactive to light. Conjunctivae and EOM are normal.   Fundoscopic exam:       The right eye shows no papilledema. The right eye shows venous pulsations.        The left eye shows no papilledema. The left eye shows venous pulsations.   Neck: Carotid bruit is not present.   Neurological: She is oriented to person, place, and time. She has a normal Finger-Nose-Finger Test and a normal Heel to Shin Test. Gait normal.   Reflex Scores:       Tricep reflexes are 2+ on the right side and 2+ on the left side.       Bicep reflexes are 2+ on the right side and 2+  on the left side.       Brachioradialis reflexes are 2+ on the right side and 2+ on the left side.       Patellar reflexes are 2+ on the right side and 2+ on the left side.       Achilles reflexes are 2+ on the right side and 2+ on the left side.  Psychiatric: Her speech is normal.     Neurologic Exam     Mental Status   Oriented to person, place, and time.   Registration of memory: Good recent and remote memory.   Attention: normal. Concentration: normal.   Speech: speech is normal   Level of consciousness: alert  Knowledge: consistent with education.     Cranial Nerves     CN II   Visual fields full to confrontation.   Visual acuity: normal    CN III, IV, VI   Pupils are equal, round, and reactive to light.  Extraocular motions are normal.     CN V   Facial sensation intact.   Right corneal reflex: normal  Left corneal reflex: normal    CN VII   Facial expression full, symmetric.   Right facial weakness: none  Left facial weakness: none    CN VIII   Hearing: intact    CN IX, X   Palate: symmetric    CN XI   Right sternocleidomastoid strength: normal  Left sternocleidomastoid strength: normal    CN XII   Tongue: not atrophic  Tongue deviation: none    Motor Exam   Muscle bulk: normal  Right arm tone: normal  Left arm tone: normal  Right leg tone: normal  Left leg tone: normal    Strength   Strength 5/5 except as noted.   Right anterior tibial: 3/5  Right posterior tibial: 3/5  Right peroneal: 3/5    Sensory Exam   Light touch normal.     Gait, Coordination, and Reflexes     Gait  Gait: normal    Coordination   Finger to nose coordination: normal  Heel to shin coordination: normal    Reflexes   Right brachioradialis: 2+  Left brachioradialis: 2+  Right biceps: 2+  Left biceps: 2+  Right triceps: 2+  Left triceps: 2+  Right patellar: 2+  Left patellar: 2+  Right achilles: 2+  Left achilles: 2+  Right : 2+  Left : 2+      Assessment/Plan   Independent Review of Radiographic Studies:    THORACIC AND LUMBAR  MYELOGRAMS AND POST MYELOGRAM CT SCANS OF THE  THORACIC AND LUMBAR SPINE     HISTORY: Recent right laminectomy at L2-3. Severe back pain extending  into the right calf and right foot. Numbness.     FINDINGS: The thoracic and lumbar myelograms and post myelogram CT scans  of the thoracic and lumbar spine demonstrate the followin. There are mild scattered degenerative changes throughout the thoracic  spine predominantly with lateral spurring. There is no central or  foraminal encroachment. The thoracic spinal cord appears normal in size  and position. At T9-T10 there is some localized spurring of the left  posterior facet causing slight posterolateral indentation of the thecal  sac but there is no central or foraminal encroachment. At T10-11 there  is some localized spurring of the right posterior facet producing a  similar appearance without central or foraminal encroachment.     2. The conus appears normal. At T12-L1 there is no significant disc or  bony abnormality. At L1-2 there is slight annular disc bulge without  central or foraminal encroachment.     3. At L2-3 the patient has had recent right laminectomy and there is  localized soft tissue density at the laminectomy site that extends into  the spinal canal on the right with localized indentation of the thecal  sac. The soft tissue density extending into the right aspect of the  spinal canal measures up to 10 mm in transverse diameter and 15 mm in AP  diameter. Some of this may represent postsurgical seroma as suggested on  the MRI scan dated 2018. There is also a prominent clumped  appearance of the adjacent nerve roots consistent with arachnoiditis  that is unchanged. There is slight effacement of the epidural fat planes  in the right neural foramen in continuity with the right sided soft  tissue density. There is very slight posterior disc bulge extending more  to the right. There is no left foraminal encroachment.     4. At L3-4 there is no  disc abnormality. There is slight facet  hypertrophy without central or foraminal encroachment.     5. At L4-5 there is slight disc degeneration with a vacuum phenomenon.  There is minimal annular disc bulge and slight facet hypertrophy without  central or foraminal encroachment.     6. At L5-S1 there is some increased soft tissue density in the right  neural foramen which appears to relate to a conjoined nerve root on the  recent MRI scan dated 07/28/2018. There is slight hypertrophy of the  posterior facets without central or foraminal encroachment.        Radiation dose reduction techniques were utilized, including automated  exposure control and exposure modulation based on body size.       Medical Decision Making:    I think revision of the system with a surgical lead is feasible but I would like to get pictures of the entire system for so we will get thoracic lumbar x-rays with pelvic x-rays and have her come back to discuss them.  We will have the [employer] rep come at the same time.  We will talk about the specifics of revision of the system using a surgical lead.      Marlena was seen today for back pain, leg pain, numbness and extremity weakness.    Diagnoses and all orders for this visit:    Right foot drop    Postlaminectomy syndrome, lumbar region  -     XR Spine Lumbar Complete With Flex & Ext; Future  -     XR Spine Thoracic 2 View; Future  -     XR Pelvis 1 or 2 View; Future    Chronic bilateral low back pain with right-sided sciatica  -     XR Spine Lumbar Complete With Flex & Ext; Future  -     XR Spine Thoracic 2 View; Future  -     XR Pelvis 1 or 2 View; Future      Return in about 1 week (around 2/24/2020) for After tests.

## 2020-02-17 ENCOUNTER — HOSPITAL ENCOUNTER (OUTPATIENT)
Dept: GENERAL RADIOLOGY | Facility: HOSPITAL | Age: 70
Discharge: HOME | End: 2020-02-17

## 2020-02-17 ENCOUNTER — HOSPITAL ENCOUNTER (OUTPATIENT)
Dept: GENERAL RADIOLOGY | Facility: HOSPITAL | Age: 70
Discharge: HOME | End: 2020-02-17
Admitting: NEUROLOGICAL SURGERY

## 2020-02-17 ENCOUNTER — OFFICE VISIT (OUTPATIENT)
Dept: NEUROSURGERY | Facility: CLINIC | Age: 70
End: 2020-02-17

## 2020-02-17 VITALS
HEART RATE: 74 BPM | HEIGHT: 58 IN | DIASTOLIC BLOOD PRESSURE: 72 MMHG | SYSTOLIC BLOOD PRESSURE: 140 MMHG | BODY MASS INDEX: 29.9 KG/M2

## 2020-02-17 DIAGNOSIS — G89.29 CHRONIC BILATERAL LOW BACK PAIN WITH RIGHT-SIDED SCIATICA: ICD-10-CM

## 2020-02-17 DIAGNOSIS — M96.1 POSTLAMINECTOMY SYNDROME, LUMBAR REGION: ICD-10-CM

## 2020-02-17 DIAGNOSIS — M21.371 RIGHT FOOT DROP: Primary | ICD-10-CM

## 2020-02-17 DIAGNOSIS — M54.41 CHRONIC BILATERAL LOW BACK PAIN WITH RIGHT-SIDED SCIATICA: ICD-10-CM

## 2020-02-17 PROCEDURE — 72070 X-RAY EXAM THORAC SPINE 2VWS: CPT

## 2020-02-17 PROCEDURE — 99214 OFFICE O/P EST MOD 30 MIN: CPT | Performed by: NEUROLOGICAL SURGERY

## 2020-02-17 PROCEDURE — 72170 X-RAY EXAM OF PELVIS: CPT

## 2020-02-17 PROCEDURE — 72114 X-RAY EXAM L-S SPINE BENDING: CPT

## 2020-02-17 RX ORDER — OXYCODONE AND ACETAMINOPHEN 10; 325 MG/1; MG/1
1 TABLET ORAL 2 TIMES DAILY
COMMUNITY
End: 2022-03-15 | Stop reason: HOSPADM

## 2020-02-19 ENCOUNTER — TELEPHONE (OUTPATIENT)
Dept: NEUROSURGERY | Facility: CLINIC | Age: 70
End: 2020-02-19

## 2020-02-19 NOTE — TELEPHONE ENCOUNTER
Pt saw Dr BENAVIDEZ on 2/17 and had all her xrays done after appt.  Was told to f/u in one week to discuss and to have the [employer] rep at the visit.  She is confused because she is scheduled to come back on 3/9.  Is that soone enough?   [phone]

## 2020-03-02 NOTE — PROGRESS NOTES
Subjective   Patient ID: Marlena Navarrete is a 70 y.o. female is here today for follow-up to discuss lumbar and thoracic spine plain films and meet with her [employer] rep to reprogram her SCS and discuss possibly revising the SCS that was placed by Dr Gamez    Patient was last seen 2.17.20 for low back ,right buttock, right leg pain,right foot toes N/T and foot weakness    She had surgery with Dr Paulino 7.25.18,  Right L2-3 laminectomy, foraminotomy, medial facetectomy and discectomy    Her symptoms are unchanged from her previous visit 2/17/2019.    She is with Tracy, the representative from [facility]. We looked at her x-rays and it looked like, compared to the initial surgical trial, that one of the leads was pulled down from the T8 to the T9 level. In any event, the original percutaneous trial, which helped her back and her right leg, was never really re-created despite a revision. I think it is reasonable to put a surgical lead in at T8-T9 via a T10 laminotomy with in-hospital programming and removal and replacement of the internal pulse generator in the left gluteal region the following day. We went over this at length. I stressed to her that this will not take all of her pain away, but if we can reduce it and improve the quality of life and her functional abilities, we will have accomplished something. She takes pain medications from Dr. Gamez, her pain physician, at [facility]. I will give her any additional medications that she needs to cover her surgical pain, but beyond that, I will leave it to Dr. Gamez's discretion and whether or not he feels it would be reasonable to wean her from the pain medications later on, I will leave up to him.      History of Present Illness    The following portions of the patient's history were reviewed and updated as appropriate: allergies, current medications, past family history, past medical history, past social history, past surgical history and problem list.    Review of  Systems   Respiratory: Negative for chest tightness and shortness of breath.    Cardiovascular: Negative for chest pain.   Musculoskeletal: Positive for back pain. Negative for arthralgias and gait problem.   Neurological: Positive for weakness. Negative for numbness.       Objective   Physical Exam   Constitutional: She is oriented to person, place, and time. She appears well-developed and well-nourished.   HENT:   Head: Normocephalic and atraumatic.   Eyes: Pupils are equal, round, and reactive to light. Conjunctivae and EOM are normal.   Fundoscopic exam:       The right eye shows no papilledema. The right eye shows venous pulsations.        The left eye shows no papilledema. The left eye shows venous pulsations.   Neck: Carotid bruit is not present.   Neurological: She is oriented to person, place, and time. She has a normal Finger-Nose-Finger Test and a normal Heel to Shin Test. Gait normal.   Reflex Scores:       Tricep reflexes are 2+ on the right side and 2+ on the left side.       Bicep reflexes are 2+ on the right side and 2+ on the left side.       Brachioradialis reflexes are 2+ on the right side and 2+ on the left side.       Patellar reflexes are 2+ on the right side and 2+ on the left side.       Achilles reflexes are 2+ on the right side and 2+ on the left side.  Psychiatric: Her speech is normal.     Neurologic Exam     Mental Status   Oriented to person, place, and time.   Registration of memory: Good recent and remote memory.   Attention: normal. Concentration: normal.   Speech: speech is normal   Level of consciousness: alert  Knowledge: consistent with education.     Cranial Nerves     CN II   Visual fields full to confrontation.   Visual acuity: normal    CN III, IV, VI   Pupils are equal, round, and reactive to light.  Extraocular motions are normal.     CN V   Facial sensation intact.   Right corneal reflex: normal  Left corneal reflex: normal    CN VII   Facial expression full, symmetric.    Right facial weakness: none  Left facial weakness: none    CN VIII   Hearing: intact    CN IX, X   Palate: symmetric    CN XI   Right sternocleidomastoid strength: normal  Left sternocleidomastoid strength: normal    CN XII   Tongue: not atrophic  Tongue deviation: none    Motor Exam   Muscle bulk: normal  Right arm tone: normal  Left arm tone: normal  Right leg tone: normal  Left leg tone: normal    Strength   Strength 5/5 except as noted.     Sensory Exam   Light touch normal.     Gait, Coordination, and Reflexes     Gait  Gait: normal    Coordination   Finger to nose coordination: normal  Heel to shin coordination: normal    Reflexes   Right brachioradialis: 2+  Left brachioradialis: 2+  Right biceps: 2+  Left biceps: 2+  Right triceps: 2+  Left triceps: 2+  Right patellar: 2+  Left patellar: 2+  Right achilles: 2+  Left achilles: 2+  Right : 2+  Left : 2+      Assessment/Plan   Independent Review of Radiographic Studies:    I reviewed the pictures of the leads done on 2/17/2020 which show that they extend no higher than T9.  The leads during the trial had extended up to T8, at least 1 of them, so it appears that 1 of the leads slid downwards.  Agree with the reports.      Medical Decision Making:    We will move forward with a two-stage procedure on successive days starting with a T10 laminotomy for placement of a surgical lead at T10 8 T9 using the [employer] system preceded by removal of the percutaneous leads.  She will be admitted to the hospital and programmed.  If the programming re-creates what her original trial did which is to cover her back and right leg pain then she will be taken to the operating room the next day and the old battery will be replaced in the left gluteal region with a new battery.  The goal of surgery is to reduce her overall back and right leg pain and improve the quality of life and functional abilities.  The risks include, but are not limited to, infection, hemorrhage  requiring transfusion or reoperation, CSF leak requiring reoperation, incomplete relief of symptoms, potential need for additional surgeries in the future because of malfunction or infection, stroke, paralysis, coma, and death.  She agrees to proceed.    Marlena was seen today for back pain.    Diagnoses and all orders for this visit:    Postlaminectomy syndrome, lumbar region    Displacement of other nervous system device, implant or graft, initial encounter (CMS/[facility])      Return for 2 weeks with extender.

## 2020-03-09 ENCOUNTER — OFFICE VISIT (OUTPATIENT)
Dept: NEUROSURGERY | Facility: CLINIC | Age: 70
End: 2020-03-09

## 2020-03-09 ENCOUNTER — PREP FOR SURGERY (OUTPATIENT)
Dept: OTHER | Facility: HOSPITAL | Age: 70
End: 2020-03-09

## 2020-03-09 VITALS — SYSTOLIC BLOOD PRESSURE: 131 MMHG | HEART RATE: 76 BPM | DIASTOLIC BLOOD PRESSURE: 69 MMHG

## 2020-03-09 DIAGNOSIS — M96.1 POSTLAMINECTOMY SYNDROME, LUMBAR REGION: ICD-10-CM

## 2020-03-09 DIAGNOSIS — T85.625A: ICD-10-CM

## 2020-03-09 DIAGNOSIS — T85.625A: Primary | ICD-10-CM

## 2020-03-09 DIAGNOSIS — Z01.818 PRE-OP TESTING: ICD-10-CM

## 2020-03-09 DIAGNOSIS — M96.1 POSTLAMINECTOMY SYNDROME, LUMBAR REGION: Primary | ICD-10-CM

## 2020-03-09 PROCEDURE — 99214 OFFICE O/P EST MOD 30 MIN: CPT | Performed by: NEUROLOGICAL SURGERY

## 2020-03-09 RX ORDER — ACETAMINOPHEN,DIPHENHYDRAMINE HCL 500; 25 MG/1; MG/1
1 TABLET, FILM COATED ORAL NIGHTLY PRN
COMMUNITY
End: 2022-03-15 | Stop reason: HOSPADM

## 2020-03-17 ENCOUNTER — APPOINTMENT (OUTPATIENT)
Dept: PREADMISSION TESTING | Facility: HOSPITAL | Age: 70
End: 2020-03-17

## 2020-05-06 ENCOUNTER — TELEPHONE (OUTPATIENT)
Dept: NEUROSURGERY | Facility: CLINIC | Age: 70
End: 2020-05-06

## 2020-05-06 DIAGNOSIS — M96.1 POSTLAMINECTOMY SYNDROME, LUMBAR REGION: Primary | ICD-10-CM

## 2020-05-07 ENCOUNTER — HOSPITAL ENCOUNTER (OUTPATIENT)
Facility: HOSPITAL | Age: 70
Setting detail: HOSPITAL OUTPATIENT SURGERY
End: 2020-05-07
Attending: NEUROLOGICAL SURGERY | Admitting: NEUROLOGICAL SURGERY

## 2020-05-08 NOTE — PROGRESS NOTES
Subjective   History of Present Illness: Marlena Navarrete is a 70 y.o. female for televisit to discuss surgery scheduled for 6.4.20 and 6.5.20, placement of SCS    She had surgery with Dr Paulino 7.25.18,  Right L2-3 laminectomy, foraminotomy, medial facetectomy and discectomy    Patient was last seen 3.9.20 for low back, right leg and buttock pain, right foot N/T and weakness    You have chosen to receive care through a telephone visit. Do you consent to use a telephone visit for your medical care today? Yes, she is at home    This was a televisit that I made from the hospital. The patient was at home. The visit lasted 10 minutes. She has a history of postlaminectomy syndrome after previous lumbar surgeries with chronic longstanding low back and right buttock and leg pain. We had planned on revising her spinal cord stimulator, which was a [employer] system, but we had to postpone it because of the coronavirus pandemic. She still has the same symptoms. The plan was outlined below. We will both revise the lead through a T10 laminotomy and placing it at T8-T9 level, preceded by removal of the percutaneous leads. The next day we will take her to the operating room again to place the left gluteal new battery. The goals, risks, and benefits are explained below. This is scheduled for June 5th and June 4th.     My last note on 3/9/2020 is reproduced below:  She is with Tracy, the representative from [employer]. We looked at her x-rays and it looked like, compared to the initial surgical trial, that one of the leads was pulled down from the T8 to the T9 level. In any event, the original percutaneous trial, which helped her back and her right leg, was never really re-created despite a revision. I think it is reasonable to put a surgical lead in at T8-T9 via a T10 laminotomy with in-hospital programming and removal and replacement of the internal pulse generator in the left gluteal region the following day. We went over this at  length. I stressed to her that this will not take all of her pain away, but if we can reduce it and improve the quality of life and her functional abilities, we will have accomplished something. She takes pain medications from Dr. Gamez, her pain physician, at [facility]. I will give her any additional medications that she needs to cover her surgical pain, but beyond that, I will leave it to Dr. Gamez's discretion and whether or not he feels it would be reasonable to wean her from the pain medications later on, I will leave up to him.    I reminded her that I would give her some pain medications after the surgery but will defer to Dr. Gamez her pain physician after that.  I told her that the goal is not necessarily to wean her off of all pain medications but to reduce of the possible and improve the overall quality of life by reducing her pain in her back and her right leg.      History of Present Illness    The following portions of the patient's history were reviewed and updated as appropriate: allergies, current medications, past family history, past medical history, past social history, past surgical history and problem list.    Review of Systems    Objective           Physical Exam   Deferred  Neurologic Exam   Deferred        Assessment/Plan   Independent Review of Radiographic Studies:      I personally reviewed the images from the following studies.    I reviewed the pictures of the leads done on 2/17/2020 which show that they extend no higher than T9.  The leads during the trial had extended up to T8, at least 1 of them, so it appears that 1 of the leads slid downwards.  Agree with the reports.    Medical Decision Making:    We will move forward with a two-stage procedure on successive days starting with a T10 laminotomy for placement of a surgical lead at T8 T9 using the [employer] system preceded by removal of the percutaneous leads.  She will be admitted to the hospital and programmed.  If the  programming re-creates what her original trial did which is to cover her back and right leg pain then she will be taken to the operating room the next day and the old battery will be replaced in the left gluteal region with a new battery.  The goal of surgery is to reduce her overall back and right leg pain and improve the quality of life and functional abilities.  The risks include, but are not limited to, infection, hemorrhage requiring transfusion or reoperation, CSF leak requiring reoperation, incomplete relief of symptoms, potential need for additional surgeries in the future because of malfunction or infection, stroke, paralysis, coma, and death.  She agrees to proceed.      Diagnoses and all orders for this visit:    Postlaminectomy syndrome, lumbar region    Displacement of other nervous system device, implant or graft, initial encounter (CMS/[facility])      No follow-ups on file.

## 2020-05-14 ENCOUNTER — OFFICE VISIT (OUTPATIENT)
Dept: NEUROSURGERY | Facility: CLINIC | Age: 70
End: 2020-05-14

## 2020-05-14 DIAGNOSIS — M96.1 POSTLAMINECTOMY SYNDROME, LUMBAR REGION: Primary | ICD-10-CM

## 2020-05-14 DIAGNOSIS — T85.625A: ICD-10-CM

## 2020-05-14 PROCEDURE — 99213 OFFICE O/P EST LOW 20 MIN: CPT | Performed by: NEUROLOGICAL SURGERY

## 2020-06-02 ENCOUNTER — APPOINTMENT (OUTPATIENT)
Dept: PREADMISSION TESTING | Facility: HOSPITAL | Age: 70
End: 2020-06-02

## 2020-06-02 VITALS
OXYGEN SATURATION: 95 % | SYSTOLIC BLOOD PRESSURE: 160 MMHG | BODY MASS INDEX: 31.91 KG/M2 | HEART RATE: 88 BPM | DIASTOLIC BLOOD PRESSURE: 75 MMHG | WEIGHT: 152 LBS | RESPIRATION RATE: 20 BRPM | TEMPERATURE: 97.6 F | HEIGHT: 58 IN

## 2020-06-02 DIAGNOSIS — Z01.818 PRE-OP TESTING: ICD-10-CM

## 2020-06-02 LAB
ANION GAP SERPL CALCULATED.3IONS-SCNC: 9.5 MMOL/L (ref 5–15)
APTT PPP: 32.5 SECONDS (ref 22.7–35.4)
BACTERIA UR QL AUTO: ABNORMAL /HPF
BASOPHILS # BLD AUTO: 0.06 10*3/MM3 (ref 0–0.2)
BASOPHILS NFR BLD AUTO: 0.7 % (ref 0–1.5)
BILIRUB UR QL STRIP: NEGATIVE
BUN BLD-MCNC: 26 MG/DL (ref 8–23)
BUN/CREAT SERPL: 15.8 (ref 7–25)
CALCIUM SPEC-SCNC: 8.8 MG/DL (ref 8.6–10.5)
CHLORIDE SERPL-SCNC: 103 MMOL/L (ref 98–107)
CLARITY UR: CLEAR
CO2 SERPL-SCNC: 22.5 MMOL/L (ref 22–29)
COLOR UR: YELLOW
CREAT BLD-MCNC: 1.65 MG/DL (ref 0.57–1)
DEPRECATED RDW RBC AUTO: 41.4 FL (ref 37–54)
EOSINOPHIL # BLD AUTO: 0.25 10*3/MM3 (ref 0–0.4)
EOSINOPHIL NFR BLD AUTO: 3.1 % (ref 0.3–6.2)
ERYTHROCYTE [DISTWIDTH] IN BLOOD BY AUTOMATED COUNT: 12.3 % (ref 12.3–15.4)
GFR SERPL CREATININE-BSD FRML MDRD: 31 ML/MIN/1.73
GLUCOSE BLD-MCNC: 151 MG/DL (ref 65–99)
GLUCOSE UR STRIP-MCNC: ABNORMAL MG/DL
HCT VFR BLD AUTO: 34.6 % (ref 34–46.6)
HGB BLD-MCNC: 11.8 G/DL (ref 12–15.9)
HGB UR QL STRIP.AUTO: NEGATIVE
HYALINE CASTS UR QL AUTO: ABNORMAL /LPF
IMM GRANULOCYTES # BLD AUTO: 0.02 10*3/MM3 (ref 0–0.05)
IMM GRANULOCYTES NFR BLD AUTO: 0.2 % (ref 0–0.5)
INR PPP: 0.99 (ref 0.9–1.1)
KETONES UR QL STRIP: NEGATIVE
LEUKOCYTE ESTERASE UR QL STRIP.AUTO: ABNORMAL
LYMPHOCYTES # BLD AUTO: 2.99 10*3/MM3 (ref 0.7–3.1)
LYMPHOCYTES NFR BLD AUTO: 36.6 % (ref 19.6–45.3)
MCH RBC QN AUTO: 31.6 PG (ref 26.6–33)
MCHC RBC AUTO-ENTMCNC: 34.1 G/DL (ref 31.5–35.7)
MCV RBC AUTO: 92.5 FL (ref 79–97)
MONOCYTES # BLD AUTO: 0.72 10*3/MM3 (ref 0.1–0.9)
MONOCYTES NFR BLD AUTO: 8.8 % (ref 5–12)
NEUTROPHILS # BLD AUTO: 4.13 10*3/MM3 (ref 1.7–7)
NEUTROPHILS NFR BLD AUTO: 50.6 % (ref 42.7–76)
NITRITE UR QL STRIP: NEGATIVE
NRBC BLD AUTO-RTO: 0 /100 WBC (ref 0–0.2)
PH UR STRIP.AUTO: 6 [PH] (ref 5–8)
PLATELET # BLD AUTO: 301 10*3/MM3 (ref 140–450)
PMV BLD AUTO: 10.6 FL (ref 6–12)
POTASSIUM BLD-SCNC: 3.9 MMOL/L (ref 3.5–5.2)
PROT UR QL STRIP: ABNORMAL
PROTHROMBIN TIME: 12.8 SECONDS (ref 11.7–14.2)
RBC # BLD AUTO: 3.74 10*6/MM3 (ref 3.77–5.28)
RBC # UR: ABNORMAL /HPF
REF LAB TEST METHOD: ABNORMAL
SODIUM BLD-SCNC: 135 MMOL/L (ref 136–145)
SP GR UR STRIP: 1.02 (ref 1–1.03)
SQUAMOUS #/AREA URNS HPF: ABNORMAL /HPF
UROBILINOGEN UR QL STRIP: ABNORMAL
WBC NRBC COR # BLD: 8.17 10*3/MM3 (ref 3.4–10.8)
WBC UR QL AUTO: ABNORMAL /HPF

## 2020-06-02 PROCEDURE — C9803 HOPD COVID-19 SPEC COLLECT: HCPCS

## 2020-06-02 PROCEDURE — 87086 URINE CULTURE/COLONY COUNT: CPT | Performed by: NEUROLOGICAL SURGERY

## 2020-06-02 PROCEDURE — 85025 COMPLETE CBC W/AUTO DIFF WBC: CPT | Performed by: NEUROLOGICAL SURGERY

## 2020-06-02 PROCEDURE — U0004 COV-19 TEST NON-CDC HGH THRU: HCPCS | Performed by: NURSE PRACTITIONER

## 2020-06-02 PROCEDURE — 80048 BASIC METABOLIC PNL TOTAL CA: CPT | Performed by: NEUROLOGICAL SURGERY

## 2020-06-02 PROCEDURE — 93005 ELECTROCARDIOGRAM TRACING: CPT

## 2020-06-02 PROCEDURE — 87147 CULTURE TYPE IMMUNOLOGIC: CPT | Performed by: NEUROLOGICAL SURGERY

## 2020-06-02 PROCEDURE — 85730 THROMBOPLASTIN TIME PARTIAL: CPT | Performed by: NEUROLOGICAL SURGERY

## 2020-06-02 PROCEDURE — 93010 ELECTROCARDIOGRAM REPORT: CPT | Performed by: INTERNAL MEDICINE

## 2020-06-02 PROCEDURE — 85610 PROTHROMBIN TIME: CPT | Performed by: NEUROLOGICAL SURGERY

## 2020-06-02 PROCEDURE — 81001 URINALYSIS AUTO W/SCOPE: CPT | Performed by: NEUROLOGICAL SURGERY

## 2020-06-02 PROCEDURE — 36415 COLL VENOUS BLD VENIPUNCTURE: CPT

## 2020-06-02 RX ORDER — CHLORHEXIDINE GLUCONATE 500 MG/1
1 CLOTH TOPICAL
COMMUNITY
End: 2020-06-06 | Stop reason: HOSPADM

## 2020-06-02 RX ORDER — ACETAMINOPHEN 500 MG
500 TABLET ORAL EVERY 6 HOURS PRN
COMMUNITY
End: 2022-03-09

## 2020-06-02 NOTE — DISCHARGE INSTRUCTIONS
Take the following medications the morning of surgery:    Pain med    General Instructions:  • Do not eat solid food after midnight the night before surgery.  • You may drink clear liquids day of surgery but must stop at least one hour before your hospital arrival time.  • It is beneficial for you to have a clear drink that contains carbohydrates the day of surgery.  We suggest a 12 to 20 ounce bottle of Gatorade or Powerade for non-diabetic patients or a 12 to 20 ounce bottle of G2 or Powerade Zero for diabetic patients. (Pediatric patients, are not advised to drink a 12 to 20 ounce carbohydrate drink)    Clear liquids are liquids you can see through.  Nothing red in color.     Plain water                               Sports drinks  Sodas                                   Gelatin (Jell-O)  Fruit juices without pulp such as white grape juice and apple juice  Popsicles that contain no fruit or yogurt  Tea or coffee (no cream or milk added)  Gatorade / Powerade  G2 / Powerade Zero    • Infants may have breast milk up to four hours before surgery.  • Infants drinking formula may drink formula up to six hours before surgery.   • Patients who avoid smoking, chewing tobacco and alcohol for 4 weeks prior to surgery have a reduced risk of post-operative complications.  Quit smoking as many days before surgery as you can.  • Do not smoke, use chewing tobacco or drink alcohol the day of surgery.   • If applicable bring your C-PAP/ BI-PAP machine.  • Bring any papers given to you in the doctor’s office.  • Wear clean comfortable clothes.  • Do not wear contact lenses, false eyelashes or make-up.  Bring a case for your glasses.   • Bring crutches or walker if applicable.  • Remove all piercings.  Leave jewelry and any other valuables at home.  • Hair extensions with metal clips must be removed prior to surgery.  • The Pre-Admission Testing nurse will instruct you to bring medications if unable to obtain an accurate list in  Pre-Admission Testing.        If you were given a blood bank ID arm band remember to bring it with you the day of surgery.    Preventing a Surgical Site Infection:  • For 2 to 3 days before surgery, avoid shaving with a razor because the razor can irritate skin and make it easier to develop an infection.    • Any areas of open skin can increase the risk of a post-operative wound infection by allowing bacteria to enter and travel throughout the body.  Notify your surgeon if you have any skin wounds / rashes even if it is not near the expected surgical site.  The area will need assessed to determine if surgery should be delayed until it is healed.  • The night prior to surgery shower using a fresh bar of anti-bacterial soap (such as Dial) and clean washcloth.  Sleep in a clean bed with clean clothing.  Do not allow pets to sleep with you.  • Shower on the morning of surgery using a fresh bar of anti-bacterial soap (such as Dial) and clean washcloth.  Dry with a clean towel and dress in clean clothing.  • Ask your surgeon if you will be receiving antibiotics prior to surgery.  • Make sure you, your family, and all healthcare providers clean their hands with soap and water or an alcohol based hand  before caring for you or your wound.    Day of surgery:6/4/2020   0600  Your arrival time is approximately two hours before your scheduled surgery time.  Upon arrival, a Pre-op nurse and Anesthesiologist will review your health history, obtain vital signs, and answer questions you may have.  The only belongings needed at this time will be a list of your home medications and if applicable your C-PAP/BI-PAP machine.  If you are staying overnight your family can leave the rest of your belongings in the car and bring them to your room later.  A Pre-op nurse will start an IV and you may receive medication in preparation for surgery, including something to help you relax.  Your family will be able to see you in the Pre-op  area.  Two visitors at a time will be allowed in the Pre-op room.  While you are in surgery your family should notify the waiting room  if they leave the waiting room area and provide a contact phone number.    Please be aware that surgery does come with discomfort.  We want to make every effort to control your discomfort so please discuss any uncontrolled symptoms with your nurse.   Your doctor will most likely have prescribed pain medications.      If you are going home after surgery you will receive individualized written care instructions before being discharged.  A responsible adult must drive you to and from the hospital on the day of your surgery and stay with you for 24 hours.    If you are staying overnight following surgery, you will be transported to your hospital room following the recovery period.  [facility] has all private rooms.    If you have any questions please call Pre-Admission Testing at [phone].  Deductibles and co-payments are collected on the day of service. Please be prepared to pay the required co-pay, deductible or deposit on the day of service as defined by your plan.    Self-Quarantine Prior to Surgery    • Stay at home. Do not leave your home after you have been given the Covid test for your upcoming surgery.   • Wash your hands often with soap and water for at least 20 seconds especially after you have been in a public place, or after blowing your nose, coughing, or sneezing.  • If soap and water are not readily available, use a hand  that contains at least 60% alcohol. Cover all surfaces of your hands and rub them together until they feel dry.  • Avoid touching your eyes, nose, and mouth with unwashed hands.  • Take everyday precautions to keep space between yourself and others (stay 6 feet away, which is about two arm lengths).  Remember that some people without symptoms may be able to spread virus.  • You should stay in a specific room away  from others if possible.  Stay at least 6 feet away from others in the home if you cannot have a dedicated room to yourself. Do not have visitors.   • Wear a mask around others in your home if you are unable to stay in a separate room or 6 feet apart. If you are unable to wear a mask, have your family member wear a mask if they must be within 6 feet of you.   • Do not snuggle with your pet. While the CDC says there is no evidence that pets can spread COVID-19 or be infected from humans, it is probably best to avoid “petting, snuggling, being kissed or licked and sharing food (during self-quarantine)”, according to the CDC.   • Do not share anything - Have your own utensils, drinking glass, dishes, towels and bedding.   • Do not share a bathroom with others, if possible.   • Clean and disinfect frequently touched services daily. This includes tables, doorknobs, light switches, countertops, handles, desks, phones, keyboards, toilets, faucets, and sinks.  • Do not travel prior to surgery.    Call your surgeon immediately if you experience any of the following symptoms:  • Sore Throat      • Shortness of Breath or difficulty breathing  • Cough  • Chills  • Body soreness or muscle pain  • Headache  • Fever  • New loss of taste or smell  • Do not arrive for your surgery ill.  Your procedure will need to be rescheduled to another time.  You will need to call your physician before the day of surgery to avoid any unnecessary exposure to hospital staff as well as other patients.    CHLORHEXIDINE CLOTH INSTRUCTIONS  The morning of surgery follow these instructions using the Chlorhexidine cloths you've been given.  These steps reduce bacteria on the body.  Do not use the cloths near your eyes, ears mouth, genitalia or on open wounds.  Throw the cloths away after use but do not try to flush them down a toilet.      • Open and remove one cloth at a time from the package.    • Leave the cloth unfolded and begin the  bathing.  • Massage the skin with the cloths using gentle pressure to remove bacteria.  Do not scrub harshly.   • Follow the steps below with one 2% CHG cloth per area (6 total cloths).  • One cloth for neck, shoulders and chest.  • One cloth for both arms, hands, fingers and underarms (do underarms last).  • One cloth for the abdomen followed by groin.  • One cloth for right leg and foot including between the toes.  • One cloth for left leg and foot including between the toes.  • The last cloth is to be used for the back of the neck, back and buttocks.    Allow the CHG to air dry 3 minutes on the skin which will give it time to work and decrease the chance of irritation.  The skin may feel sticky until it is dry.  Do not rinse with water or any other liquid or you will lose the beneficial effects of the CHG.  If mild skin irritation occurs, do rinse the skin to remove the CHG.  Report this to the nurse at time of admission.  Do not apply lotions, creams, ointments, deodorants or perfumes after using the clothes. Dress in clean clothes before coming to the hospital.

## 2020-06-03 LAB
BACTERIA SPEC AEROBE CULT: ABNORMAL
REF LAB TEST METHOD: NORMAL
SARS-COV-2 RNA RESP QL NAA+PROBE: NOT DETECTED

## 2020-06-04 ENCOUNTER — ANESTHESIA EVENT (OUTPATIENT)
Dept: PERIOP | Facility: HOSPITAL | Age: 70
End: 2020-06-04

## 2020-06-04 ENCOUNTER — APPOINTMENT (OUTPATIENT)
Dept: GENERAL RADIOLOGY | Facility: HOSPITAL | Age: 70
End: 2020-06-04

## 2020-06-04 ENCOUNTER — ANESTHESIA (OUTPATIENT)
Dept: PERIOP | Facility: HOSPITAL | Age: 70
End: 2020-06-04

## 2020-06-04 ENCOUNTER — HOSPITAL ENCOUNTER (OUTPATIENT)
Facility: HOSPITAL | Age: 70
Discharge: HOME | End: 2020-06-06
Attending: NEUROLOGICAL SURGERY | Admitting: NEUROLOGICAL SURGERY

## 2020-06-04 PROBLEM — M96.1 POSTLAMINECTOMY SYNDROME OF LUMBAR REGION: Status: ACTIVE | Noted: 2020-06-04

## 2020-06-04 PROCEDURE — C1778 LEAD, NEUROSTIMULATOR: HCPCS | Performed by: NEUROLOGICAL SURGERY

## 2020-06-04 PROCEDURE — 25010000003 HYDROMORPHONE HCL PF 50 MG/5ML SOLUTION: Performed by: NEUROLOGICAL SURGERY

## 2020-06-04 PROCEDURE — C1883 ADAPT/EXT, PACING/NEURO LEAD: HCPCS | Performed by: NEUROLOGICAL SURGERY

## 2020-06-04 PROCEDURE — 25010000002 FENTANYL CITRATE (PF) 100 MCG/2ML SOLUTION: Performed by: NURSE ANESTHETIST, CERTIFIED REGISTERED

## 2020-06-04 PROCEDURE — 63710000001 PREGABALIN 75 MG CAPSULE: Performed by: NEUROLOGICAL SURGERY

## 2020-06-04 PROCEDURE — 25010000002 FENTANYL CITRATE (PF) 100 MCG/2ML SOLUTION: Performed by: ANESTHESIOLOGY

## 2020-06-04 PROCEDURE — 25010000002 ONDANSETRON PER 1 MG: Performed by: NURSE ANESTHETIST, CERTIFIED REGISTERED

## 2020-06-04 PROCEDURE — A9270 NON-COVERED ITEM OR SERVICE: HCPCS | Performed by: NEUROLOGICAL SURGERY

## 2020-06-04 PROCEDURE — 76000 FLUOROSCOPY <1 HR PHYS/QHP: CPT

## 2020-06-04 PROCEDURE — 25010000002 PROMETHAZINE PER 50 MG: Performed by: NURSE ANESTHETIST, CERTIFIED REGISTERED

## 2020-06-04 PROCEDURE — 25010000003 CEFAZOLIN PER 500 MG: Performed by: NEUROLOGICAL SURGERY

## 2020-06-04 PROCEDURE — 25010000002 NEOSTIGMINE PER 0.5 MG: Performed by: NURSE ANESTHETIST, CERTIFIED REGISTERED

## 2020-06-04 PROCEDURE — 25010000003 CEFAZOLIN IN DEXTROSE 2-4 GM/100ML-% SOLUTION: Performed by: NEUROLOGICAL SURGERY

## 2020-06-04 PROCEDURE — 25010000002 PROPOFOL 10 MG/ML EMULSION: Performed by: NURSE ANESTHETIST, CERTIFIED REGISTERED

## 2020-06-04 PROCEDURE — 25010000002 HYDROMORPHONE PER 4 MG: Performed by: NURSE ANESTHETIST, CERTIFIED REGISTERED

## 2020-06-04 PROCEDURE — 63661 REMOVE SPINE ELTRD PERQ ARAY: CPT | Performed by: NEUROLOGICAL SURGERY

## 2020-06-04 PROCEDURE — 63710000001 OXYCODONE-ACETAMINOPHEN 10-325 MG TABLET: Performed by: NEUROLOGICAL SURGERY

## 2020-06-04 PROCEDURE — 63710000001 ATORVASTATIN 10 MG TABLET: Performed by: NEUROLOGICAL SURGERY

## 2020-06-04 PROCEDURE — 25010000002 MIDAZOLAM PER 1 MG

## 2020-06-04 PROCEDURE — 72020 X-RAY EXAM OF SPINE 1 VIEW: CPT

## 2020-06-04 PROCEDURE — 25010000002 DEXAMETHASONE PER 1 MG: Performed by: ANESTHESIOLOGY

## 2020-06-04 PROCEDURE — 25010000002 METHOCARBAMOL 1000 MG/10ML SOLUTION: Performed by: NEUROLOGICAL SURGERY

## 2020-06-04 PROCEDURE — 63655 IMPLANT NEUROELECTRODES: CPT | Performed by: NEUROLOGICAL SURGERY

## 2020-06-04 PROCEDURE — 25010000002 MIDAZOLAM PER 1 MG: Performed by: ANESTHESIOLOGY

## 2020-06-04 DEVICE — EXT LD STIM SGL 30CM: Type: IMPLANTABLE DEVICE | Site: SPINE THORACIC | Status: FUNCTIONAL

## 2020-06-04 DEVICE — FLOSEAL HEMOSTATIC MATRIX, 10ML
Type: IMPLANTABLE DEVICE | Site: BACK | Status: FUNCTIONAL
Brand: FLOSEAL HEMOSTATIC MATRIX

## 2020-06-04 DEVICE — FLOSEAL HEMOSTATIC MATRIX, 5ML
Type: IMPLANTABLE DEVICE | Site: SPINE THORACIC | Status: FUNCTIONAL
Brand: FLOSEAL HEMOSTATIC MATRIX

## 2020-06-04 DEVICE — LD STIM PENTA PADL KT 16CH 3MM 60CM: Type: IMPLANTABLE DEVICE | Site: SPINE THORACIC | Status: FUNCTIONAL

## 2020-06-04 DEVICE — SSC BONE WAX
Type: IMPLANTABLE DEVICE | Site: BACK | Status: FUNCTIONAL
Brand: SSC BONE WAX

## 2020-06-04 RX ORDER — ATORVASTATIN CALCIUM 20 MG/1
10 TABLET, FILM COATED ORAL NIGHTLY
Status: DISCONTINUED | OUTPATIENT
Start: 2020-06-04 | End: 2020-06-06 | Stop reason: HOSPADM

## 2020-06-04 RX ORDER — NALOXONE HCL 0.4 MG/ML
0.2 VIAL (ML) INJECTION AS NEEDED
Status: DISCONTINUED | OUTPATIENT
Start: 2020-06-04 | End: 2020-06-04 | Stop reason: HOSPADM

## 2020-06-04 RX ORDER — NALOXONE HCL 0.4 MG/ML
0.1 VIAL (ML) INJECTION
Status: DISCONTINUED | OUTPATIENT
Start: 2020-06-04 | End: 2020-06-06 | Stop reason: HOSPADM

## 2020-06-04 RX ORDER — CEFAZOLIN SODIUM 2 G/100ML
2 INJECTION, SOLUTION INTRAVENOUS EVERY 12 HOURS
Status: DISCONTINUED | OUTPATIENT
Start: 2020-06-04 | End: 2020-06-05

## 2020-06-04 RX ORDER — BUPIVACAINE HYDROCHLORIDE AND EPINEPHRINE 2.5; 5 MG/ML; UG/ML
INJECTION, SOLUTION INFILTRATION; PERINEURAL AS NEEDED
Status: DISCONTINUED | OUTPATIENT
Start: 2020-06-04 | End: 2020-06-04 | Stop reason: HOSPADM

## 2020-06-04 RX ORDER — HYDROCODONE BITARTRATE AND ACETAMINOPHEN 7.5; 325 MG/1; MG/1
1 TABLET ORAL ONCE AS NEEDED
Status: DISCONTINUED | OUTPATIENT
Start: 2020-06-04 | End: 2020-06-04 | Stop reason: HOSPADM

## 2020-06-04 RX ORDER — EPHEDRINE SULFATE 50 MG/ML
5 INJECTION, SOLUTION INTRAVENOUS ONCE AS NEEDED
Status: DISCONTINUED | OUTPATIENT
Start: 2020-06-04 | End: 2020-06-04 | Stop reason: HOSPADM

## 2020-06-04 RX ORDER — HYDROMORPHONE HYDROCHLORIDE 1 MG/ML
0.5 INJECTION, SOLUTION INTRAMUSCULAR; INTRAVENOUS; SUBCUTANEOUS
Status: DISCONTINUED | OUTPATIENT
Start: 2020-06-04 | End: 2020-06-04 | Stop reason: HOSPADM

## 2020-06-04 RX ORDER — ROCURONIUM BROMIDE 10 MG/ML
INJECTION, SOLUTION INTRAVENOUS AS NEEDED
Status: DISCONTINUED | OUTPATIENT
Start: 2020-06-04 | End: 2020-06-04 | Stop reason: SURG

## 2020-06-04 RX ORDER — PROPOFOL 10 MG/ML
VIAL (ML) INTRAVENOUS AS NEEDED
Status: DISCONTINUED | OUTPATIENT
Start: 2020-06-04 | End: 2020-06-04 | Stop reason: SURG

## 2020-06-04 RX ORDER — MAGNESIUM HYDROXIDE 1200 MG/15ML
LIQUID ORAL AS NEEDED
Status: DISCONTINUED | OUTPATIENT
Start: 2020-06-04 | End: 2020-06-04 | Stop reason: HOSPADM

## 2020-06-04 RX ORDER — SODIUM CHLORIDE 0.9 % (FLUSH) 0.9 %
3 SYRINGE (ML) INJECTION EVERY 12 HOURS SCHEDULED
Status: DISCONTINUED | OUTPATIENT
Start: 2020-06-04 | End: 2020-06-06 | Stop reason: HOSPADM

## 2020-06-04 RX ORDER — SODIUM CHLORIDE, SODIUM LACTATE, POTASSIUM CHLORIDE, CALCIUM CHLORIDE 600; 310; 30; 20 MG/100ML; MG/100ML; MG/100ML; MG/100ML
75 INJECTION, SOLUTION INTRAVENOUS CONTINUOUS
Status: DISCONTINUED | OUTPATIENT
Start: 2020-06-04 | End: 2020-06-06 | Stop reason: HOSPADM

## 2020-06-04 RX ORDER — SODIUM CHLORIDE 0.9 % (FLUSH) 0.9 %
10 SYRINGE (ML) INJECTION AS NEEDED
Status: DISCONTINUED | OUTPATIENT
Start: 2020-06-04 | End: 2020-06-04 | Stop reason: HOSPADM

## 2020-06-04 RX ORDER — FENTANYL CITRATE 50 UG/ML
50 INJECTION, SOLUTION INTRAMUSCULAR; INTRAVENOUS
Status: DISCONTINUED | OUTPATIENT
Start: 2020-06-04 | End: 2020-06-04 | Stop reason: HOSPADM

## 2020-06-04 RX ORDER — MIDAZOLAM HYDROCHLORIDE 1 MG/ML
1 INJECTION INTRAMUSCULAR; INTRAVENOUS
Status: DISCONTINUED | OUTPATIENT
Start: 2020-06-04 | End: 2020-06-04 | Stop reason: HOSPADM

## 2020-06-04 RX ORDER — ONDANSETRON 2 MG/ML
4 INJECTION INTRAMUSCULAR; INTRAVENOUS ONCE AS NEEDED
Status: DISCONTINUED | OUTPATIENT
Start: 2020-06-04 | End: 2020-06-04 | Stop reason: HOSPADM

## 2020-06-04 RX ORDER — OXYCODONE AND ACETAMINOPHEN 7.5; 325 MG/1; MG/1
1 TABLET ORAL ONCE AS NEEDED
Status: DISCONTINUED | OUTPATIENT
Start: 2020-06-04 | End: 2020-06-04 | Stop reason: HOSPADM

## 2020-06-04 RX ORDER — HYDRALAZINE HYDROCHLORIDE 20 MG/ML
5 INJECTION INTRAMUSCULAR; INTRAVENOUS
Status: DISCONTINUED | OUTPATIENT
Start: 2020-06-04 | End: 2020-06-04 | Stop reason: HOSPADM

## 2020-06-04 RX ORDER — ONDANSETRON 2 MG/ML
4 INJECTION INTRAMUSCULAR; INTRAVENOUS EVERY 6 HOURS PRN
Status: DISCONTINUED | OUTPATIENT
Start: 2020-06-04 | End: 2020-06-06 | Stop reason: HOSPADM

## 2020-06-04 RX ORDER — PROMETHAZINE HYDROCHLORIDE 25 MG/1
25 SUPPOSITORY RECTAL ONCE AS NEEDED
Status: DISCONTINUED | OUTPATIENT
Start: 2020-06-04 | End: 2020-06-04 | Stop reason: HOSPADM

## 2020-06-04 RX ORDER — PROMETHAZINE HYDROCHLORIDE 25 MG/ML
12.5 INJECTION, SOLUTION INTRAMUSCULAR; INTRAVENOUS ONCE AS NEEDED
Status: DISCONTINUED | OUTPATIENT
Start: 2020-06-04 | End: 2020-06-04 | Stop reason: HOSPADM

## 2020-06-04 RX ORDER — ONDANSETRON 4 MG/1
4 TABLET, FILM COATED ORAL EVERY 6 HOURS PRN
Status: DISCONTINUED | OUTPATIENT
Start: 2020-06-04 | End: 2020-06-06 | Stop reason: HOSPADM

## 2020-06-04 RX ORDER — DIPHENHYDRAMINE HCL 25 MG
25 CAPSULE ORAL
Status: DISCONTINUED | OUTPATIENT
Start: 2020-06-04 | End: 2020-06-04 | Stop reason: HOSPADM

## 2020-06-04 RX ORDER — ACETAMINOPHEN 325 MG/1
650 TABLET ORAL ONCE AS NEEDED
Status: DISCONTINUED | OUTPATIENT
Start: 2020-06-04 | End: 2020-06-04 | Stop reason: HOSPADM

## 2020-06-04 RX ORDER — CEFAZOLIN SODIUM 2 G/100ML
INJECTION, SOLUTION INTRAVENOUS
Status: COMPLETED
Start: 2020-06-04 | End: 2020-06-04

## 2020-06-04 RX ORDER — SODIUM CHLORIDE 0.9 % (FLUSH) 0.9 %
10 SYRINGE (ML) INJECTION EVERY 12 HOURS SCHEDULED
Status: DISCONTINUED | OUTPATIENT
Start: 2020-06-04 | End: 2020-06-04 | Stop reason: HOSPADM

## 2020-06-04 RX ORDER — PROMETHAZINE HYDROCHLORIDE 25 MG/1
25 TABLET ORAL ONCE AS NEEDED
Status: DISCONTINUED | OUTPATIENT
Start: 2020-06-04 | End: 2020-06-04 | Stop reason: HOSPADM

## 2020-06-04 RX ORDER — CYCLOBENZAPRINE HCL 10 MG
10 TABLET ORAL 3 TIMES DAILY PRN
Status: DISCONTINUED | OUTPATIENT
Start: 2020-06-04 | End: 2020-06-06 | Stop reason: HOSPADM

## 2020-06-04 RX ORDER — MEPERIDINE HYDROCHLORIDE 25 MG/ML
12.5 INJECTION INTRAMUSCULAR; INTRAVENOUS; SUBCUTANEOUS
Status: DISCONTINUED | OUTPATIENT
Start: 2020-06-04 | End: 2020-06-04 | Stop reason: HOSPADM

## 2020-06-04 RX ORDER — PREGABALIN 75 MG/1
75 CAPSULE ORAL EVERY 12 HOURS SCHEDULED
Status: DISCONTINUED | OUTPATIENT
Start: 2020-06-04 | End: 2020-06-06 | Stop reason: HOSPADM

## 2020-06-04 RX ORDER — MIDAZOLAM HYDROCHLORIDE 1 MG/ML
2 INJECTION INTRAMUSCULAR; INTRAVENOUS
Status: DISCONTINUED | OUTPATIENT
Start: 2020-06-04 | End: 2020-06-04 | Stop reason: HOSPADM

## 2020-06-04 RX ORDER — ONDANSETRON 2 MG/ML
INJECTION INTRAMUSCULAR; INTRAVENOUS AS NEEDED
Status: DISCONTINUED | OUTPATIENT
Start: 2020-06-04 | End: 2020-06-04 | Stop reason: SURG

## 2020-06-04 RX ORDER — METHOCARBAMOL 100 MG/ML
500 INJECTION, SOLUTION INTRAMUSCULAR; INTRAVENOUS ONCE
Status: COMPLETED | OUTPATIENT
Start: 2020-06-04 | End: 2020-06-04

## 2020-06-04 RX ORDER — LIDOCAINE HYDROCHLORIDE 20 MG/ML
INJECTION, SOLUTION INFILTRATION; PERINEURAL AS NEEDED
Status: DISCONTINUED | OUTPATIENT
Start: 2020-06-04 | End: 2020-06-04 | Stop reason: SURG

## 2020-06-04 RX ORDER — FLUMAZENIL 0.1 MG/ML
0.2 INJECTION INTRAVENOUS AS NEEDED
Status: DISCONTINUED | OUTPATIENT
Start: 2020-06-04 | End: 2020-06-04 | Stop reason: HOSPADM

## 2020-06-04 RX ORDER — FENTANYL CITRATE 50 UG/ML
25 INJECTION, SOLUTION INTRAMUSCULAR; INTRAVENOUS
Status: DISCONTINUED | OUTPATIENT
Start: 2020-06-04 | End: 2020-06-04 | Stop reason: HOSPADM

## 2020-06-04 RX ORDER — GLYCOPYRROLATE 0.2 MG/ML
INJECTION INTRAMUSCULAR; INTRAVENOUS AS NEEDED
Status: DISCONTINUED | OUTPATIENT
Start: 2020-06-04 | End: 2020-06-04 | Stop reason: SURG

## 2020-06-04 RX ORDER — CEFAZOLIN SODIUM 2 G/100ML
2 INJECTION, SOLUTION INTRAVENOUS ONCE
Status: COMPLETED | OUTPATIENT
Start: 2020-06-04 | End: 2020-06-04

## 2020-06-04 RX ORDER — OXYCODONE AND ACETAMINOPHEN 10; 325 MG/1; MG/1
1 TABLET ORAL EVERY 4 HOURS PRN
Status: DISCONTINUED | OUTPATIENT
Start: 2020-06-04 | End: 2020-06-06 | Stop reason: HOSPADM

## 2020-06-04 RX ORDER — DEXAMETHASONE SODIUM PHOSPHATE 10 MG/ML
6 INJECTION INTRAMUSCULAR; INTRAVENOUS EVERY 6 HOURS
Status: DISCONTINUED | OUTPATIENT
Start: 2020-06-04 | End: 2020-06-04 | Stop reason: HOSPADM

## 2020-06-04 RX ORDER — FAMOTIDINE 10 MG/ML
20 INJECTION, SOLUTION INTRAVENOUS
Status: COMPLETED | OUTPATIENT
Start: 2020-06-04 | End: 2020-06-04

## 2020-06-04 RX ORDER — MIDAZOLAM HYDROCHLORIDE 1 MG/ML
INJECTION INTRAMUSCULAR; INTRAVENOUS
Status: COMPLETED
Start: 2020-06-04 | End: 2020-06-04

## 2020-06-04 RX ORDER — SODIUM CHLORIDE, SODIUM LACTATE, POTASSIUM CHLORIDE, CALCIUM CHLORIDE 600; 310; 30; 20 MG/100ML; MG/100ML; MG/100ML; MG/100ML
9 INJECTION, SOLUTION INTRAVENOUS CONTINUOUS PRN
Status: DISCONTINUED | OUTPATIENT
Start: 2020-06-04 | End: 2020-06-04 | Stop reason: HOSPADM

## 2020-06-04 RX ORDER — SODIUM CHLORIDE 0.9 % (FLUSH) 0.9 %
10 SYRINGE (ML) INJECTION AS NEEDED
Status: DISCONTINUED | OUTPATIENT
Start: 2020-06-04 | End: 2020-06-06 | Stop reason: HOSPADM

## 2020-06-04 RX ORDER — ACETAMINOPHEN 325 MG/1
650 TABLET ORAL EVERY 4 HOURS PRN
Status: DISCONTINUED | OUTPATIENT
Start: 2020-06-04 | End: 2020-06-06 | Stop reason: HOSPADM

## 2020-06-04 RX ORDER — PROMETHAZINE HYDROCHLORIDE 25 MG/ML
6.25 INJECTION, SOLUTION INTRAMUSCULAR; INTRAVENOUS
Status: DISCONTINUED | OUTPATIENT
Start: 2020-06-04 | End: 2020-06-04 | Stop reason: HOSPADM

## 2020-06-04 RX ORDER — DIPHENHYDRAMINE HYDROCHLORIDE 50 MG/ML
12.5 INJECTION INTRAMUSCULAR; INTRAVENOUS
Status: DISCONTINUED | OUTPATIENT
Start: 2020-06-04 | End: 2020-06-04 | Stop reason: HOSPADM

## 2020-06-04 RX ORDER — HYDROMORPHONE HCL IN 0.9% NACL 10 MG/50ML
PATIENT CONTROLLED ANALGESIA SYRINGE INTRAVENOUS CONTINUOUS
Status: DISCONTINUED | OUTPATIENT
Start: 2020-06-04 | End: 2020-06-06 | Stop reason: HOSPADM

## 2020-06-04 RX ORDER — EPHEDRINE SULFATE 50 MG/ML
INJECTION, SOLUTION INTRAVENOUS AS NEEDED
Status: DISCONTINUED | OUTPATIENT
Start: 2020-06-04 | End: 2020-06-04 | Stop reason: SURG

## 2020-06-04 RX ORDER — AMLODIPINE BESYLATE 5 MG/1
5 TABLET ORAL NIGHTLY
Status: DISCONTINUED | OUTPATIENT
Start: 2020-06-04 | End: 2020-06-06 | Stop reason: HOSPADM

## 2020-06-04 RX ADMIN — PROMETHAZINE HYDROCHLORIDE 12.5 MG: 25 INJECTION INTRAMUSCULAR; INTRAVENOUS at 10:49

## 2020-06-04 RX ADMIN — SODIUM CHLORIDE, PRESERVATIVE FREE 3 ML: 5 INJECTION INTRAVENOUS at 17:44

## 2020-06-04 RX ADMIN — HYDROMORPHONE HYDROCHLORIDE 0.5 MG: 1 INJECTION, SOLUTION INTRAMUSCULAR; INTRAVENOUS; SUBCUTANEOUS at 10:37

## 2020-06-04 RX ADMIN — MIDAZOLAM 1 MG: 1 INJECTION INTRAMUSCULAR; INTRAVENOUS at 07:01

## 2020-06-04 RX ADMIN — CEFAZOLIN SODIUM 2 G: 2 INJECTION, SOLUTION INTRAVENOUS at 20:56

## 2020-06-04 RX ADMIN — EPHEDRINE SULFATE 10 MG: 50 INJECTION INTRAVENOUS at 09:03

## 2020-06-04 RX ADMIN — FENTANYL CITRATE 50 MCG: 50 INJECTION, SOLUTION INTRAMUSCULAR; INTRAVENOUS at 10:28

## 2020-06-04 RX ADMIN — NEOSTIGMINE METHYLSULFATE 1 MG: 1 INJECTION INTRAMUSCULAR; INTRAVENOUS; SUBCUTANEOUS at 10:02

## 2020-06-04 RX ADMIN — HYDROMORPHONE HYDROCHLORIDE: 10 INJECTION INTRAMUSCULAR; INTRAVENOUS; SUBCUTANEOUS at 10:32

## 2020-06-04 RX ADMIN — METHOCARBAMOL 500 MG: 100 INJECTION INTRAMUSCULAR; INTRAVENOUS at 10:25

## 2020-06-04 RX ADMIN — GLYCOPYRROLATE 0.2 MG: 0.2 INJECTION INTRAMUSCULAR; INTRAVENOUS at 10:02

## 2020-06-04 RX ADMIN — ROCURONIUM BROMIDE 40 MG: 10 INJECTION, SOLUTION INTRAVENOUS at 08:04

## 2020-06-04 RX ADMIN — FAMOTIDINE 20 MG: 10 INJECTION INTRAVENOUS at 07:01

## 2020-06-04 RX ADMIN — SODIUM CHLORIDE, POTASSIUM CHLORIDE, SODIUM LACTATE AND CALCIUM CHLORIDE: 600; 310; 30; 20 INJECTION, SOLUTION INTRAVENOUS at 09:46

## 2020-06-04 RX ADMIN — EPHEDRINE SULFATE 5 MG: 50 INJECTION INTRAVENOUS at 09:22

## 2020-06-04 RX ADMIN — SODIUM CHLORIDE, POTASSIUM CHLORIDE, SODIUM LACTATE AND CALCIUM CHLORIDE: 600; 310; 30; 20 INJECTION, SOLUTION INTRAVENOUS at 07:59

## 2020-06-04 RX ADMIN — EPHEDRINE SULFATE 15 MG: 50 INJECTION INTRAVENOUS at 08:21

## 2020-06-04 RX ADMIN — ATORVASTATIN CALCIUM 10 MG: 20 TABLET, FILM COATED ORAL at 20:56

## 2020-06-04 RX ADMIN — ONDANSETRON HYDROCHLORIDE 4 MG: 2 SOLUTION INTRAMUSCULAR; INTRAVENOUS at 09:50

## 2020-06-04 RX ADMIN — MIDAZOLAM HYDROCHLORIDE 1 MG: 1 INJECTION INTRAMUSCULAR; INTRAVENOUS at 11:10

## 2020-06-04 RX ADMIN — MIDAZOLAM 1 MG: 1 INJECTION INTRAMUSCULAR; INTRAVENOUS at 11:10

## 2020-06-04 RX ADMIN — OXYCODONE HYDROCHLORIDE AND ACETAMINOPHEN 1 TABLET: 10; 325 TABLET ORAL at 22:41

## 2020-06-04 RX ADMIN — GLYCOPYRROLATE 0.4 MG: 0.2 INJECTION INTRAMUSCULAR; INTRAVENOUS at 09:48

## 2020-06-04 RX ADMIN — HYDROMORPHONE HYDROCHLORIDE 0.5 MG: 1 INJECTION, SOLUTION INTRAMUSCULAR; INTRAVENOUS; SUBCUTANEOUS at 10:24

## 2020-06-04 RX ADMIN — DEXAMETHASONE SODIUM PHOSPHATE 6 MG: 10 INJECTION INTRAMUSCULAR; INTRAVENOUS at 11:35

## 2020-06-04 RX ADMIN — PROPOFOL 150 MG: 10 INJECTION, EMULSION INTRAVENOUS at 08:04

## 2020-06-04 RX ADMIN — PREGABALIN 75 MG: 75 CAPSULE ORAL at 20:56

## 2020-06-04 RX ADMIN — OXYCODONE HYDROCHLORIDE AND ACETAMINOPHEN 1 TABLET: 10; 325 TABLET ORAL at 10:44

## 2020-06-04 RX ADMIN — SODIUM CHLORIDE, POTASSIUM CHLORIDE, SODIUM LACTATE AND CALCIUM CHLORIDE 75 ML/HR: 600; 310; 30; 20 INJECTION, SOLUTION INTRAVENOUS at 17:14

## 2020-06-04 RX ADMIN — FENTANYL CITRATE 50 MCG: 50 INJECTION, SOLUTION INTRAMUSCULAR; INTRAVENOUS at 10:40

## 2020-06-04 RX ADMIN — LIDOCAINE HYDROCHLORIDE 60 MG: 20 INJECTION, SOLUTION INFILTRATION; PERINEURAL at 08:04

## 2020-06-04 RX ADMIN — FENTANYL CITRATE 100 MCG: 50 INJECTION INTRAMUSCULAR; INTRAVENOUS at 08:02

## 2020-06-04 RX ADMIN — CEFAZOLIN SODIUM 2 G: 2 INJECTION, SOLUTION INTRAVENOUS at 08:08

## 2020-06-04 RX ADMIN — NEOSTIGMINE METHYLSULFATE 3 MG: 1 INJECTION INTRAMUSCULAR; INTRAVENOUS; SUBCUTANEOUS at 09:48

## 2020-06-04 RX ADMIN — EPHEDRINE SULFATE 10 MG: 50 INJECTION INTRAVENOUS at 08:52

## 2020-06-04 RX ADMIN — SODIUM CHLORIDE, PRESERVATIVE FREE 3 ML: 5 INJECTION INTRAVENOUS at 20:59

## 2020-06-04 RX ADMIN — FENTANYL CITRATE 50 MCG: 50 INJECTION, SOLUTION INTRAMUSCULAR; INTRAVENOUS at 10:20

## 2020-06-04 NOTE — ANESTHESIA POSTPROCEDURE EVALUATION
Patient: Marlena Navarrete    Procedure Summary     Date:  06/04/20 Room / Location:  [facility] OR [address] [place] [address] MAIN OR    Anesthesia Start:  0759 Anesthesia Stop:  1016    Procedure:  SPINAL CORD STIMULATOR INSERTION PHASE 1, Thoracic 10 laminotomy for revision of [employer] surgical lead at Thoracic 8 to Thoracic 9 (N/A Back) Diagnosis:       Postlaminectomy syndrome, lumbar region      (Postlaminectomy syndrome, lumbar region [M96.1])    Surgeon:  Cuate Kemp MD Provider:  Clark Ordaz MD    Anesthesia Type:  general ASA Status:  3          Anesthesia Type: general    Vitals  Vitals Value Taken Time   BP 98/63 6/4/2020 11:45 AM   Temp 36.4 °C (97.5 °F) 6/4/2020 10:12 AM   Pulse 69 6/4/2020 11:54 AM   Resp 16 6/4/2020 11:45 AM   SpO2 100 % 6/4/2020 11:54 AM   Vitals shown include unvalidated device data.        Post Anesthesia Care and Evaluation    Patient location during evaluation: PACU  Patient participation: complete - patient participated  Level of consciousness: awake  Pain management: adequate  Airway patency: patent  Anesthetic complications: No anesthetic complications    Cardiovascular status: acceptable  Respiratory status: acceptable  Hydration status: acceptable    Comments: BP 98/63   Pulse 63   Temp 36.4 °C (97.5 °F) (Oral)   Resp 16   SpO2 100%

## 2020-06-04 NOTE — ANESTHESIA PROCEDURE NOTES
Airway  Urgency: elective    Date/Time: 6/4/2020 8:06 AM  Airway not difficult    General Information and Staff    Patient location during procedure: OR  CRNA: Bernarda Maldonado CRNA    Indications and Patient Condition  Indications for airway management: airway protection    Preoxygenated: yes  Mask difficulty assessment: 1 - vent by mask    Final Airway Details  Final airway type: endotracheal airway      Successful airway: ETT  Cuffed: yes   Successful intubation technique: direct laryngoscopy  Endotracheal tube insertion site: oral  Blade: Annabelle  Blade size: 3  ETT size (mm): 7.0  Cormack-Lehane Classification: grade I - full view of glottis  Placement verified by: chest auscultation and capnometry   Cuff volume (mL): 5  Measured from: lips  ETT/EBT  to lips (cm): 20  Number of attempts at approach: 1  Assessment: lips, teeth, and gum same as pre-op and atraumatic intubation    Additional Comments  Smooth IV induction. Trachea intubated. Cuff up. Ett secured. BEBS. Dentition intact without injury.

## 2020-06-04 NOTE — ANESTHESIA PREPROCEDURE EVALUATION
Anesthesia Evaluation     Patient summary reviewed   history of anesthetic complications: PONV               Airway   Mallampati: II  No difficulty expected  Dental      Pulmonary    Cardiovascular     ECG reviewed  Rhythm: regular    (+) hypertension,       Neuro/Psych  GI/Hepatic/Renal/Endo    (+)   renal disease (renal tx),     Musculoskeletal     Abdominal    Substance History      OB/GYN          Other          Other Comment: Hb 11.8                  Anesthesia Plan    ASA 3     general       Anesthetic plan, all risks, benefits, and alternatives have been provided, discussed and informed consent has been obtained with: patient.  Use of blood products discussed with patient .

## 2020-06-05 ENCOUNTER — ANESTHESIA EVENT (OUTPATIENT)
Dept: PERIOP | Facility: HOSPITAL | Age: 70
End: 2020-06-05

## 2020-06-05 ENCOUNTER — ANESTHESIA (OUTPATIENT)
Dept: PERIOP | Facility: HOSPITAL | Age: 70
End: 2020-06-05

## 2020-06-05 PROCEDURE — A9270 NON-COVERED ITEM OR SERVICE: HCPCS | Performed by: NEUROLOGICAL SURGERY

## 2020-06-05 PROCEDURE — 97161 PT EVAL LOW COMPLEX 20 MIN: CPT

## 2020-06-05 PROCEDURE — 25010000002 FENTANYL CITRATE (PF) 100 MCG/2ML SOLUTION: Performed by: ANESTHESIOLOGY

## 2020-06-05 PROCEDURE — 63710000001 AMLODIPINE 5 MG TABLET: Performed by: NEUROLOGICAL SURGERY

## 2020-06-05 PROCEDURE — 25010000002 HYDROMORPHONE PER 4 MG: Performed by: NURSE ANESTHETIST, CERTIFIED REGISTERED

## 2020-06-05 PROCEDURE — 63710000001 ATORVASTATIN 20 MG TABLET: Performed by: NEUROLOGICAL SURGERY

## 2020-06-05 PROCEDURE — 25010000002 DEXAMETHASONE PER 1 MG: Performed by: NURSE ANESTHETIST, CERTIFIED REGISTERED

## 2020-06-05 PROCEDURE — 25010000002 FENTANYL CITRATE (PF) 100 MCG/2ML SOLUTION: Performed by: NURSE ANESTHETIST, CERTIFIED REGISTERED

## 2020-06-05 PROCEDURE — 25010000003 HYDROMORPHONE HCL PF 50 MG/5ML SOLUTION: Performed by: NEUROLOGICAL SURGERY

## 2020-06-05 PROCEDURE — 97116 GAIT TRAINING THERAPY: CPT

## 2020-06-05 PROCEDURE — 25010000002 CEFOXITIN PER 1 G: Performed by: NEUROLOGICAL SURGERY

## 2020-06-05 PROCEDURE — 63688 REV/RMV IMP SP NPG/R DTCH CN: CPT | Performed by: NEUROLOGICAL SURGERY

## 2020-06-05 PROCEDURE — 63710000001 OXYCODONE-ACETAMINOPHEN 10-325 MG TABLET: Performed by: NEUROLOGICAL SURGERY

## 2020-06-05 PROCEDURE — 25010000003 CEFAZOLIN IN DEXTROSE 2-4 GM/100ML-% SOLUTION: Performed by: NEUROLOGICAL SURGERY

## 2020-06-05 PROCEDURE — 25010000002 NEOSTIGMINE PER 0.5 MG: Performed by: NURSE ANESTHETIST, CERTIFIED REGISTERED

## 2020-06-05 PROCEDURE — 25010000002 PROPOFOL 10 MG/ML EMULSION: Performed by: NURSE ANESTHETIST, CERTIFIED REGISTERED

## 2020-06-05 PROCEDURE — 25010000002 ONDANSETRON PER 1 MG: Performed by: NURSE ANESTHETIST, CERTIFIED REGISTERED

## 2020-06-05 PROCEDURE — 63710000001 MASTISOL LIQUID: Performed by: NEUROLOGICAL SURGERY

## 2020-06-05 PROCEDURE — 63710000001 PREGABALIN 75 MG CAPSULE: Performed by: NEUROLOGICAL SURGERY

## 2020-06-05 RX ORDER — LIDOCAINE HYDROCHLORIDE 10 MG/ML
0.5 INJECTION, SOLUTION EPIDURAL; INFILTRATION; INTRACAUDAL; PERINEURAL ONCE AS NEEDED
Status: DISCONTINUED | OUTPATIENT
Start: 2020-06-05 | End: 2020-06-05 | Stop reason: HOSPADM

## 2020-06-05 RX ORDER — DIPHENHYDRAMINE HCL 25 MG
25 CAPSULE ORAL
Status: DISCONTINUED | OUTPATIENT
Start: 2020-06-05 | End: 2020-06-05 | Stop reason: HOSPADM

## 2020-06-05 RX ORDER — DEXAMETHASONE SODIUM PHOSPHATE 10 MG/ML
INJECTION INTRAMUSCULAR; INTRAVENOUS AS NEEDED
Status: DISCONTINUED | OUTPATIENT
Start: 2020-06-05 | End: 2020-06-05 | Stop reason: SURG

## 2020-06-05 RX ORDER — PROMETHAZINE HYDROCHLORIDE 25 MG/1
25 TABLET ORAL ONCE AS NEEDED
Status: DISCONTINUED | OUTPATIENT
Start: 2020-06-05 | End: 2020-06-05 | Stop reason: HOSPADM

## 2020-06-05 RX ORDER — ACETAMINOPHEN 325 MG/1
650 TABLET ORAL ONCE AS NEEDED
Status: DISCONTINUED | OUTPATIENT
Start: 2020-06-05 | End: 2020-06-05 | Stop reason: HOSPADM

## 2020-06-05 RX ORDER — EPHEDRINE SULFATE 50 MG/ML
5 INJECTION, SOLUTION INTRAVENOUS ONCE AS NEEDED
Status: DISCONTINUED | OUTPATIENT
Start: 2020-06-05 | End: 2020-06-05 | Stop reason: HOSPADM

## 2020-06-05 RX ORDER — MAGNESIUM HYDROXIDE 1200 MG/15ML
LIQUID ORAL AS NEEDED
Status: DISCONTINUED | OUTPATIENT
Start: 2020-06-05 | End: 2020-06-05 | Stop reason: HOSPADM

## 2020-06-05 RX ORDER — LABETALOL HYDROCHLORIDE 5 MG/ML
5 INJECTION, SOLUTION INTRAVENOUS
Status: DISCONTINUED | OUTPATIENT
Start: 2020-06-05 | End: 2020-06-05 | Stop reason: HOSPADM

## 2020-06-05 RX ORDER — PROPOFOL 10 MG/ML
VIAL (ML) INTRAVENOUS AS NEEDED
Status: DISCONTINUED | OUTPATIENT
Start: 2020-06-05 | End: 2020-06-05 | Stop reason: SURG

## 2020-06-05 RX ORDER — FENTANYL CITRATE 50 UG/ML
50 INJECTION, SOLUTION INTRAMUSCULAR; INTRAVENOUS
Status: DISCONTINUED | OUTPATIENT
Start: 2020-06-05 | End: 2020-06-05 | Stop reason: HOSPADM

## 2020-06-05 RX ORDER — SODIUM CHLORIDE 0.9 % (FLUSH) 0.9 %
3 SYRINGE (ML) INJECTION EVERY 12 HOURS SCHEDULED
Status: DISCONTINUED | OUTPATIENT
Start: 2020-06-05 | End: 2020-06-05 | Stop reason: HOSPADM

## 2020-06-05 RX ORDER — HYDROMORPHONE HCL 110MG/55ML
PATIENT CONTROLLED ANALGESIA SYRINGE INTRAVENOUS AS NEEDED
Status: DISCONTINUED | OUTPATIENT
Start: 2020-06-05 | End: 2020-06-05 | Stop reason: SURG

## 2020-06-05 RX ORDER — SODIUM CHLORIDE 0.9 % (FLUSH) 0.9 %
3-10 SYRINGE (ML) INJECTION AS NEEDED
Status: DISCONTINUED | OUTPATIENT
Start: 2020-06-05 | End: 2020-06-05 | Stop reason: HOSPADM

## 2020-06-05 RX ORDER — NALOXONE HCL 0.4 MG/ML
0.2 VIAL (ML) INJECTION AS NEEDED
Status: DISCONTINUED | OUTPATIENT
Start: 2020-06-05 | End: 2020-06-05 | Stop reason: HOSPADM

## 2020-06-05 RX ORDER — CEFAZOLIN SODIUM 2 G/100ML
2 INJECTION, SOLUTION INTRAVENOUS EVERY 12 HOURS
Status: DISCONTINUED | OUTPATIENT
Start: 2020-06-05 | End: 2020-06-06 | Stop reason: HOSPADM

## 2020-06-05 RX ORDER — GLYCOPYRROLATE 0.2 MG/ML
INJECTION INTRAMUSCULAR; INTRAVENOUS AS NEEDED
Status: DISCONTINUED | OUTPATIENT
Start: 2020-06-05 | End: 2020-06-05 | Stop reason: SURG

## 2020-06-05 RX ORDER — FENTANYL CITRATE 50 UG/ML
INJECTION, SOLUTION INTRAMUSCULAR; INTRAVENOUS AS NEEDED
Status: DISCONTINUED | OUTPATIENT
Start: 2020-06-05 | End: 2020-06-05 | Stop reason: SURG

## 2020-06-05 RX ORDER — WOUND DRESSING ADHESIVE - LIQUID
LIQUID MISCELLANEOUS AS NEEDED
Status: DISCONTINUED | OUTPATIENT
Start: 2020-06-05 | End: 2020-06-05 | Stop reason: HOSPADM

## 2020-06-05 RX ORDER — ONDANSETRON 2 MG/ML
4 INJECTION INTRAMUSCULAR; INTRAVENOUS ONCE AS NEEDED
Status: DISCONTINUED | OUTPATIENT
Start: 2020-06-05 | End: 2020-06-05 | Stop reason: HOSPADM

## 2020-06-05 RX ORDER — HYDROCODONE BITARTRATE AND ACETAMINOPHEN 7.5; 325 MG/1; MG/1
1 TABLET ORAL ONCE AS NEEDED
Status: DISCONTINUED | OUTPATIENT
Start: 2020-06-05 | End: 2020-06-05 | Stop reason: HOSPADM

## 2020-06-05 RX ORDER — ROCURONIUM BROMIDE 10 MG/ML
INJECTION, SOLUTION INTRAVENOUS AS NEEDED
Status: DISCONTINUED | OUTPATIENT
Start: 2020-06-05 | End: 2020-06-05 | Stop reason: SURG

## 2020-06-05 RX ORDER — ONDANSETRON 2 MG/ML
INJECTION INTRAMUSCULAR; INTRAVENOUS AS NEEDED
Status: DISCONTINUED | OUTPATIENT
Start: 2020-06-05 | End: 2020-06-05 | Stop reason: SURG

## 2020-06-05 RX ORDER — FLUMAZENIL 0.1 MG/ML
0.2 INJECTION INTRAVENOUS AS NEEDED
Status: DISCONTINUED | OUTPATIENT
Start: 2020-06-05 | End: 2020-06-05 | Stop reason: HOSPADM

## 2020-06-05 RX ORDER — LIDOCAINE HYDROCHLORIDE 20 MG/ML
INJECTION, SOLUTION INFILTRATION; PERINEURAL AS NEEDED
Status: DISCONTINUED | OUTPATIENT
Start: 2020-06-05 | End: 2020-06-05 | Stop reason: SURG

## 2020-06-05 RX ORDER — HYDROMORPHONE HYDROCHLORIDE 1 MG/ML
0.5 INJECTION, SOLUTION INTRAMUSCULAR; INTRAVENOUS; SUBCUTANEOUS
Status: DISCONTINUED | OUTPATIENT
Start: 2020-06-05 | End: 2020-06-05 | Stop reason: HOSPADM

## 2020-06-05 RX ORDER — HYDRALAZINE HYDROCHLORIDE 20 MG/ML
5 INJECTION INTRAMUSCULAR; INTRAVENOUS
Status: DISCONTINUED | OUTPATIENT
Start: 2020-06-05 | End: 2020-06-05 | Stop reason: HOSPADM

## 2020-06-05 RX ORDER — DIPHENHYDRAMINE HYDROCHLORIDE 50 MG/ML
12.5 INJECTION INTRAMUSCULAR; INTRAVENOUS
Status: DISCONTINUED | OUTPATIENT
Start: 2020-06-05 | End: 2020-06-05 | Stop reason: HOSPADM

## 2020-06-05 RX ORDER — SODIUM CHLORIDE, SODIUM LACTATE, POTASSIUM CHLORIDE, CALCIUM CHLORIDE 600; 310; 30; 20 MG/100ML; MG/100ML; MG/100ML; MG/100ML
9 INJECTION, SOLUTION INTRAVENOUS CONTINUOUS
Status: DISCONTINUED | OUTPATIENT
Start: 2020-06-05 | End: 2020-06-05

## 2020-06-05 RX ORDER — OXYCODONE AND ACETAMINOPHEN 7.5; 325 MG/1; MG/1
1 TABLET ORAL ONCE AS NEEDED
Status: DISCONTINUED | OUTPATIENT
Start: 2020-06-05 | End: 2020-06-05 | Stop reason: HOSPADM

## 2020-06-05 RX ORDER — PROMETHAZINE HYDROCHLORIDE 25 MG/1
25 SUPPOSITORY RECTAL ONCE AS NEEDED
Status: DISCONTINUED | OUTPATIENT
Start: 2020-06-05 | End: 2020-06-05 | Stop reason: HOSPADM

## 2020-06-05 RX ORDER — FAMOTIDINE 10 MG/ML
20 INJECTION, SOLUTION INTRAVENOUS ONCE
Status: COMPLETED | OUTPATIENT
Start: 2020-06-05 | End: 2020-06-05

## 2020-06-05 RX ORDER — PROMETHAZINE HYDROCHLORIDE 25 MG/ML
12.5 INJECTION, SOLUTION INTRAMUSCULAR; INTRAVENOUS ONCE AS NEEDED
Status: DISCONTINUED | OUTPATIENT
Start: 2020-06-05 | End: 2020-06-05 | Stop reason: HOSPADM

## 2020-06-05 RX ORDER — PROMETHAZINE HYDROCHLORIDE 25 MG/ML
6.25 INJECTION, SOLUTION INTRAMUSCULAR; INTRAVENOUS
Status: DISCONTINUED | OUTPATIENT
Start: 2020-06-05 | End: 2020-06-05 | Stop reason: HOSPADM

## 2020-06-05 RX ADMIN — FAMOTIDINE 20 MG: 10 INJECTION, SOLUTION INTRAVENOUS at 07:37

## 2020-06-05 RX ADMIN — ROCURONIUM BROMIDE 30 MG: 10 INJECTION, SOLUTION INTRAVENOUS at 08:13

## 2020-06-05 RX ADMIN — ATORVASTATIN CALCIUM 10 MG: 20 TABLET, FILM COATED ORAL at 21:51

## 2020-06-05 RX ADMIN — FENTANYL CITRATE 50 MCG: 50 INJECTION INTRAMUSCULAR; INTRAVENOUS at 08:13

## 2020-06-05 RX ADMIN — SODIUM CHLORIDE, PRESERVATIVE FREE 3 ML: 5 INJECTION INTRAVENOUS at 21:51

## 2020-06-05 RX ADMIN — NEOSTIGMINE METHYLSULFATE 3 MG: 1 INJECTION INTRAMUSCULAR; INTRAVENOUS; SUBCUTANEOUS at 08:55

## 2020-06-05 RX ADMIN — CEFAZOLIN SODIUM 2 G: 2 INJECTION, SOLUTION INTRAVENOUS at 21:51

## 2020-06-05 RX ADMIN — LIDOCAINE HYDROCHLORIDE 100 MG: 20 INJECTION, SOLUTION INFILTRATION; PERINEURAL at 08:13

## 2020-06-05 RX ADMIN — SODIUM CHLORIDE, POTASSIUM CHLORIDE, SODIUM LACTATE AND CALCIUM CHLORIDE 9 ML/HR: 600; 310; 30; 20 INJECTION, SOLUTION INTRAVENOUS at 07:37

## 2020-06-05 RX ADMIN — PROPOFOL 30 MG: 10 INJECTION, EMULSION INTRAVENOUS at 08:15

## 2020-06-05 RX ADMIN — GLYCOPYRROLATE 0.6 MG: 0.2 INJECTION INTRAMUSCULAR; INTRAVENOUS at 08:55

## 2020-06-05 RX ADMIN — AMLODIPINE BESYLATE 5 MG: 5 TABLET ORAL at 00:27

## 2020-06-05 RX ADMIN — HYDROMORPHONE HYDROCHLORIDE 0.5 MG: 1 INJECTION, SOLUTION INTRAMUSCULAR; INTRAVENOUS; SUBCUTANEOUS at 10:16

## 2020-06-05 RX ADMIN — HYDROMORPHONE HYDROCHLORIDE 0.5 MG: 1 INJECTION, SOLUTION INTRAMUSCULAR; INTRAVENOUS; SUBCUTANEOUS at 09:45

## 2020-06-05 RX ADMIN — AMLODIPINE BESYLATE 5 MG: 5 TABLET ORAL at 21:50

## 2020-06-05 RX ADMIN — DEXAMETHASONE SODIUM PHOSPHATE 4 MG: 10 INJECTION INTRAMUSCULAR; INTRAVENOUS at 08:24

## 2020-06-05 RX ADMIN — HYDROMORPHONE HYDROCHLORIDE 1 MG: 2 INJECTION, SOLUTION INTRAMUSCULAR; INTRAVENOUS; SUBCUTANEOUS at 08:58

## 2020-06-05 RX ADMIN — CEFAZOLIN SODIUM 2 G: 2 INJECTION, SOLUTION INTRAVENOUS at 08:19

## 2020-06-05 RX ADMIN — OXYCODONE HYDROCHLORIDE AND ACETAMINOPHEN 1 TABLET: 10; 325 TABLET ORAL at 10:02

## 2020-06-05 RX ADMIN — FENTANYL CITRATE 50 MCG: 50 INJECTION, SOLUTION INTRAMUSCULAR; INTRAVENOUS at 07:40

## 2020-06-05 RX ADMIN — PROPOFOL 100 MG: 10 INJECTION, EMULSION INTRAVENOUS at 08:13

## 2020-06-05 RX ADMIN — FENTANYL CITRATE 50 MCG: 50 INJECTION, SOLUTION INTRAMUSCULAR; INTRAVENOUS at 09:55

## 2020-06-05 RX ADMIN — FENTANYL CITRATE 50 MCG: 50 INJECTION, SOLUTION INTRAMUSCULAR; INTRAVENOUS at 10:15

## 2020-06-05 RX ADMIN — HYDROMORPHONE HYDROCHLORIDE 0.5 MG: 1 INJECTION, SOLUTION INTRAMUSCULAR; INTRAVENOUS; SUBCUTANEOUS at 10:04

## 2020-06-05 RX ADMIN — ONDANSETRON HYDROCHLORIDE 4 MG: 2 SOLUTION INTRAMUSCULAR; INTRAVENOUS at 08:11

## 2020-06-05 RX ADMIN — FENTANYL CITRATE 50 MCG: 50 INJECTION, SOLUTION INTRAMUSCULAR; INTRAVENOUS at 10:07

## 2020-06-05 RX ADMIN — HYDROMORPHONE HYDROCHLORIDE: 10 INJECTION INTRAMUSCULAR; INTRAVENOUS; SUBCUTANEOUS at 09:58

## 2020-06-05 RX ADMIN — FENTANYL CITRATE 50 MCG: 50 INJECTION, SOLUTION INTRAMUSCULAR; INTRAVENOUS at 09:36

## 2020-06-05 RX ADMIN — PREGABALIN 75 MG: 75 CAPSULE ORAL at 21:50

## 2020-06-05 NOTE — ANESTHESIA PROCEDURE NOTES
Airway  Urgency: elective    Date/Time: 6/5/2020 8:16 AM  Airway not difficult    General Information and Staff    Patient location during procedure: OR  Anesthesiologist: Timmy Mcknight MD  CRNA: Anel Davis CRNA    Indications and Patient Condition  Indications for airway management: airway protection    Preoxygenated: yes  MILS maintained throughout  Mask difficulty assessment: 2 - vent by mask + OA or adjuvant +/- NMBA    Final Airway Details  Final airway type: endotracheal airway      Successful airway: ETT and reinforced tube  Cuffed: yes   Successful intubation technique: direct laryngoscopy  Facilitating devices/methods: intubating stylet and anterior pressure/BURP  Endotracheal tube insertion site: oral  Blade: [place]  Blade size: 2  ETT size (mm): 7.0  Cormack-Lehane Classification: grade IIb - view of arytenoids or posterior of glottis only  Placement verified by: chest auscultation and capnometry   Measured from: lips  ETT/EBT  to lips (cm): 21  Number of attempts at approach: 1  Assessment: lips, teeth, and gum same as pre-op and atraumatic intubation

## 2020-06-05 NOTE — ANESTHESIA POSTPROCEDURE EVALUATION
Patient: Marlena Navarrete    Procedure Summary     Date:  06/05/20 Room / Location:  [facility] OR [address] [place] [address] MAIN OR    Anesthesia Start:  0806 Anesthesia Stop:  0921    Procedure:  SPINAL CORD STIMULATOR INSERTION PHASE 2, triall of right gluteal internal pulse generator (N/A Back) Diagnosis:       Postlaminectomy syndrome, lumbar region      (Postlaminectomy syndrome, lumbar region [M96.1])    Surgeon:  Cuate Kemp MD Provider:  Timmy Mcknight MD    Anesthesia Type:  general ASA Status:  3          Anesthesia Type: general    Vitals  Vitals Value Taken Time   /56 6/5/2020 10:39 AM   Temp 36.6 °C (97.9 °F) 6/5/2020 10:30 AM   Pulse 62 6/5/2020 10:42 AM   Resp 16 6/5/2020 10:30 AM   SpO2 99 % 6/5/2020 10:42 AM   Vitals shown include unvalidated device data.        Post Anesthesia Care and Evaluation    Patient location during evaluation: PACU  Patient participation: complete - patient participated  Level of consciousness: awake and alert  Pain management: adequate  Airway patency: patent  Anesthetic complications: No anesthetic complications    Cardiovascular status: acceptable  Respiratory status: acceptable  Hydration status: acceptable    Comments: /51   Pulse 61   Temp 36.6 °C (97.9 °F) (Oral)   Resp 16   SpO2 98%

## 2020-06-05 NOTE — ANESTHESIA PREPROCEDURE EVALUATION
Anesthesia Evaluation     Patient summary reviewed and Nursing notes reviewed   no history of anesthetic complications:  NPO Solid Status: > 8 hours  NPO Liquid Status: > 2 hours           Airway   Mallampati: II  TM distance: >3 FB  Neck ROM: full  Small opening and Possible difficult intubation  Dental - normal exam   (+) poor dentition    Pulmonary - normal exam   (+) a smoker Former,   (-) COPD, asthma, lung cancer  Cardiovascular - normal exam  Exercise tolerance: good (4-7 METS)    ECG reviewed  Rhythm: regular  Rate: normal    (+) hypertension well controlled 2 medications or greater, DVT resolved, hyperlipidemia,   (-) valvular problems/murmurs, past MI, CAD, dysrhythmias, angina, CHF, cardiac stents, CABG      Neuro/Psych  (+) headaches, numbness,     (-) seizures, TIA, CVA  GI/Hepatic/Renal/Endo    (+) obesity,   renal disease,   (-) hiatal hernia, GERD, PUD, hepatitis, liver disease, GI bleed    ROS Comment: Known remote hx/o ESRD, s/p renal tx 40yrs ago at [facility] [place].    Musculoskeletal     (+) back pain, chronic pain,   Abdominal   (+) obese,     Abdomen: soft.   Substance History - negative use     OB/GYN negative ob/gyn ROS         Other   arthritis,                      Anesthesia Plan    ASA 3     general     intravenous induction     Anesthetic plan, all risks, benefits, and alternatives have been provided, discussed and informed consent has been obtained with: patient.    Plan discussed with CRNA and attending.

## 2020-06-06 VITALS
TEMPERATURE: 97.2 F | WEIGHT: 152 LBS | BODY MASS INDEX: 31.91 KG/M2 | SYSTOLIC BLOOD PRESSURE: 148 MMHG | OXYGEN SATURATION: 97 % | DIASTOLIC BLOOD PRESSURE: 74 MMHG | RESPIRATION RATE: 16 BRPM | HEIGHT: 58 IN | HEART RATE: 68 BPM

## 2020-06-06 PROCEDURE — A9270 NON-COVERED ITEM OR SERVICE: HCPCS | Performed by: NEUROLOGICAL SURGERY

## 2020-06-06 PROCEDURE — 63710000001 OXYCODONE-ACETAMINOPHEN 10-325 MG TABLET: Performed by: NEUROLOGICAL SURGERY

## 2020-06-06 PROCEDURE — 25010000003 CEFAZOLIN IN DEXTROSE 2-4 GM/100ML-% SOLUTION: Performed by: NEUROLOGICAL SURGERY

## 2020-06-06 RX ORDER — CYCLOBENZAPRINE HCL 10 MG
10 TABLET ORAL 3 TIMES DAILY PRN
Qty: 30 TABLET | Refills: 0 | Status: SHIPPED | OUTPATIENT
Start: 2020-06-06 | End: 2020-08-13

## 2020-06-06 RX ADMIN — OXYCODONE HYDROCHLORIDE AND ACETAMINOPHEN 1 TABLET: 10; 325 TABLET ORAL at 14:39

## 2020-06-06 RX ADMIN — CEFAZOLIN SODIUM 2 G: 2 INJECTION, SOLUTION INTRAVENOUS at 09:21

## 2020-06-06 RX ADMIN — OXYCODONE HYDROCHLORIDE AND ACETAMINOPHEN 1 TABLET: 10; 325 TABLET ORAL at 05:19

## 2020-06-10 NOTE — PROGRESS NOTES
"Subjective   Patient ID: Marlena Navarrete is a 70 y.o. female is here today for 2 week follow up from spinal cord stimulator placement by Dr. Kemp.     History of Present Illness    Ms. Navarreet is here today for 2-week postoperative evaluation.  She underwent removal of percutaneous spinal cord stimulator leads and phase 1 T9, T10 laminotomy for placement of [employer] surgical lead at T8-T9 by Dr. Kemp.  She underwent phase 2 connection of newly implanted surgical lead to existing left gluteal internal pulse generator by Dr. Kemp.  She has a history of postlaminectomy syndrome with subsequent placement of percutaneous nervous system implant with malfunction.    Ms. Navarrete notices improvement in her leg pain.  She denies any issues with the incisions.  She is currently working with the spinal cord stimulator rep from [employer].     Review of Systems   Constitutional: Negative for fever.   Gastrointestinal:        No bowel incontinence   Genitourinary: Negative for difficulty urinating. Enuresis: at night.   Musculoskeletal: Positive for back pain (back/butt/legs/foot are improving) and gait problem (foot drop).   Skin: Negative for wound.   Neurological: Positive for weakness (R leg/foot) and numbness (R foot).   Psychiatric/Behavioral: Negative for sleep disturbance.       Objective      Vitals:    06/18/20 0953   BP: 145/69   Cuff Size: Adult   Pulse: 90   Resp: 16   Temp: 96.3 °F (35.7 °C)   Weight: 68.5 kg (151 lb)   Height: 147.3 cm (58\")       Physical Exam   Vitals reviewed.    Neurologic Exam     Thoracic incision and left gluteal incisions are all well approximated.  No redness, swelling, or drainage.  All Steri-Strips were removed.    Assessment/Plan     Medical Decision Making:      Ms. Navarrete is doing well with the spinal cord stimulator.  She is very pleased with her progress.  The incisions are healing well.  There is no sign of infection at the incision sites.    Ms. Navarrete was " encouraged to continue working with a spinal cord stimulator representative for titration and management of the stimulator.    There is no need for further follow-up with neurosurgery unless there is some sort of malfunction with the stimulator or she notices a problem with any of her incisions.  We will keep it open-ended from here.    Marlena was seen today for s/p scs insertion on 6/4/20.    Diagnoses and all orders for this visit:    Surgery follow-up examination      Return if symptoms worsen or fail to improve.

## 2020-06-18 ENCOUNTER — OFFICE VISIT (OUTPATIENT)
Dept: NEUROSURGERY | Facility: CLINIC | Age: 70
End: 2020-06-18

## 2020-06-18 VITALS
BODY MASS INDEX: 31.7 KG/M2 | WEIGHT: 151 LBS | SYSTOLIC BLOOD PRESSURE: 145 MMHG | DIASTOLIC BLOOD PRESSURE: 69 MMHG | HEART RATE: 90 BPM | TEMPERATURE: 96.3 F | HEIGHT: 58 IN | RESPIRATION RATE: 16 BRPM

## 2020-06-18 DIAGNOSIS — Z09 SURGERY FOLLOW-UP EXAMINATION: Primary | ICD-10-CM

## 2020-06-18 PROCEDURE — 99024 POSTOP FOLLOW-UP VISIT: CPT | Performed by: NURSE PRACTITIONER

## 2020-07-04 ENCOUNTER — HOSPITAL ENCOUNTER (EMERGENCY)
Facility: HOSPITAL | Age: 70
Discharge: HOME | End: 2020-07-04
Attending: EMERGENCY MEDICINE | Admitting: EMERGENCY MEDICINE

## 2020-07-04 ENCOUNTER — APPOINTMENT (OUTPATIENT)
Dept: CT IMAGING | Facility: HOSPITAL | Age: 70
End: 2020-07-04

## 2020-07-04 VITALS
OXYGEN SATURATION: 98 % | TEMPERATURE: 97.2 F | HEIGHT: 58 IN | HEART RATE: 82 BPM | WEIGHT: 155 LBS | SYSTOLIC BLOOD PRESSURE: 147 MMHG | BODY MASS INDEX: 32.54 KG/M2 | DIASTOLIC BLOOD PRESSURE: 59 MMHG | RESPIRATION RATE: 18 BRPM

## 2020-07-04 DIAGNOSIS — R10.13 EPIGASTRIC ABDOMINAL PAIN: Primary | ICD-10-CM

## 2020-07-04 DIAGNOSIS — K29.00 ACUTE GASTRITIS WITHOUT HEMORRHAGE, UNSPECIFIED GASTRITIS TYPE: ICD-10-CM

## 2020-07-04 LAB
ALBUMIN SERPL-MCNC: 4.3 G/DL (ref 3.5–5.2)
ALBUMIN/GLOB SERPL: 2 G/DL
ALP SERPL-CCNC: 120 U/L (ref 39–117)
ALT SERPL W P-5'-P-CCNC: 18 U/L (ref 1–33)
ANION GAP SERPL CALCULATED.3IONS-SCNC: 12.7 MMOL/L (ref 5–15)
AST SERPL-CCNC: 24 U/L (ref 1–32)
BACTERIA UR QL AUTO: NORMAL /HPF
BASOPHILS # BLD AUTO: 0.06 10*3/MM3 (ref 0–0.2)
BASOPHILS NFR BLD AUTO: 0.8 % (ref 0–1.5)
BILIRUB SERPL-MCNC: 0.5 MG/DL (ref 0.2–1.2)
BILIRUB UR QL STRIP: NEGATIVE
BUN SERPL-MCNC: 18 MG/DL (ref 8–23)
BUN/CREAT SERPL: 12.2 (ref 7–25)
CALCIUM SPEC-SCNC: 9.5 MG/DL (ref 8.6–10.5)
CHLORIDE SERPL-SCNC: 107 MMOL/L (ref 98–107)
CLARITY UR: CLEAR
CO2 SERPL-SCNC: 23.3 MMOL/L (ref 22–29)
COLOR UR: YELLOW
CREAT SERPL-MCNC: 1.47 MG/DL (ref 0.57–1)
DEPRECATED RDW RBC AUTO: 45 FL (ref 37–54)
EOSINOPHIL # BLD AUTO: 0.09 10*3/MM3 (ref 0–0.4)
EOSINOPHIL NFR BLD AUTO: 1.3 % (ref 0.3–6.2)
ERYTHROCYTE [DISTWIDTH] IN BLOOD BY AUTOMATED COUNT: 12.9 % (ref 12.3–15.4)
GFR SERPL CREATININE-BSD FRML MDRD: 35 ML/MIN/1.73
GLOBULIN UR ELPH-MCNC: 2.1 GM/DL
GLUCOSE SERPL-MCNC: 114 MG/DL (ref 65–99)
GLUCOSE UR STRIP-MCNC: ABNORMAL MG/DL
HCT VFR BLD AUTO: 33.5 % (ref 34–46.6)
HGB BLD-MCNC: 11.5 G/DL (ref 12–15.9)
HGB UR QL STRIP.AUTO: NEGATIVE
HYALINE CASTS UR QL AUTO: NORMAL /LPF
IMM GRANULOCYTES # BLD AUTO: 0.02 10*3/MM3 (ref 0–0.05)
IMM GRANULOCYTES NFR BLD AUTO: 0.3 % (ref 0–0.5)
KETONES UR QL STRIP: ABNORMAL
LEUKOCYTE ESTERASE UR QL STRIP.AUTO: NEGATIVE
LIPASE SERPL-CCNC: 33 U/L (ref 13–60)
LYMPHOCYTES # BLD AUTO: 1.97 10*3/MM3 (ref 0.7–3.1)
LYMPHOCYTES NFR BLD AUTO: 27.4 % (ref 19.6–45.3)
MCH RBC QN AUTO: 32.2 PG (ref 26.6–33)
MCHC RBC AUTO-ENTMCNC: 34.3 G/DL (ref 31.5–35.7)
MCV RBC AUTO: 93.8 FL (ref 79–97)
MONOCYTES # BLD AUTO: 0.59 10*3/MM3 (ref 0.1–0.9)
MONOCYTES NFR BLD AUTO: 8.2 % (ref 5–12)
NEUTROPHILS NFR BLD AUTO: 4.47 10*3/MM3 (ref 1.7–7)
NEUTROPHILS NFR BLD AUTO: 62 % (ref 42.7–76)
NITRITE UR QL STRIP: NEGATIVE
NRBC BLD AUTO-RTO: 0 /100 WBC (ref 0–0.2)
PH UR STRIP.AUTO: 8.5 [PH] (ref 5–8)
PLATELET # BLD AUTO: 319 10*3/MM3 (ref 140–450)
PMV BLD AUTO: 10.4 FL (ref 6–12)
POTASSIUM SERPL-SCNC: 3.7 MMOL/L (ref 3.5–5.2)
PROT SERPL-MCNC: 6.4 G/DL (ref 6–8.5)
PROT UR QL STRIP: ABNORMAL
RBC # BLD AUTO: 3.57 10*6/MM3 (ref 3.77–5.28)
RBC # UR: NORMAL /HPF
REF LAB TEST METHOD: NORMAL
SODIUM SERPL-SCNC: 143 MMOL/L (ref 136–145)
SP GR UR STRIP: 1.01 (ref 1–1.03)
SQUAMOUS #/AREA URNS HPF: NORMAL /HPF
TROPONIN T SERPL-MCNC: <0.01 NG/ML (ref 0–0.03)
UROBILINOGEN UR QL STRIP: ABNORMAL
WBC # BLD AUTO: 7.2 10*3/MM3 (ref 3.4–10.8)
WBC UR QL AUTO: NORMAL /HPF

## 2020-07-04 PROCEDURE — 85025 COMPLETE CBC W/AUTO DIFF WBC: CPT | Performed by: EMERGENCY MEDICINE

## 2020-07-04 PROCEDURE — 25010000002 IOPAMIDOL 61 % SOLUTION: Performed by: EMERGENCY MEDICINE

## 2020-07-04 PROCEDURE — 83690 ASSAY OF LIPASE: CPT | Performed by: EMERGENCY MEDICINE

## 2020-07-04 PROCEDURE — 81001 URINALYSIS AUTO W/SCOPE: CPT | Performed by: EMERGENCY MEDICINE

## 2020-07-04 PROCEDURE — 99284 EMERGENCY DEPT VISIT MOD MDM: CPT

## 2020-07-04 PROCEDURE — 80053 COMPREHEN METABOLIC PANEL: CPT | Performed by: EMERGENCY MEDICINE

## 2020-07-04 PROCEDURE — 93010 ELECTROCARDIOGRAM REPORT: CPT | Performed by: INTERNAL MEDICINE

## 2020-07-04 PROCEDURE — 74177 CT ABD & PELVIS W/CONTRAST: CPT

## 2020-07-04 PROCEDURE — 25010000002 ONDANSETRON PER 1 MG: Performed by: EMERGENCY MEDICINE

## 2020-07-04 PROCEDURE — 84484 ASSAY OF TROPONIN QUANT: CPT | Performed by: EMERGENCY MEDICINE

## 2020-07-04 PROCEDURE — 93005 ELECTROCARDIOGRAM TRACING: CPT | Performed by: EMERGENCY MEDICINE

## 2020-07-04 PROCEDURE — 96374 THER/PROPH/DIAG INJ IV PUSH: CPT

## 2020-07-04 RX ORDER — LIDOCAINE HYDROCHLORIDE 20 MG/ML
15 SOLUTION OROPHARYNGEAL ONCE
Status: COMPLETED | OUTPATIENT
Start: 2020-07-04 | End: 2020-07-04

## 2020-07-04 RX ORDER — OMEPRAZOLE 20 MG/1
20 CAPSULE, DELAYED RELEASE ORAL DAILY
Qty: 30 CAPSULE | Refills: 0 | Status: ON HOLD | OUTPATIENT
Start: 2020-07-04 | End: 2020-12-31 | Stop reason: SDUPTHER

## 2020-07-04 RX ORDER — ONDANSETRON 4 MG/1
4 TABLET, ORALLY DISINTEGRATING ORAL EVERY 6 HOURS PRN
Qty: 10 TABLET | Refills: 0 | Status: SHIPPED | OUTPATIENT
Start: 2020-07-04 | End: 2020-12-22

## 2020-07-04 RX ORDER — ALUMINA, MAGNESIA, AND SIMETHICONE 2400; 2400; 240 MG/30ML; MG/30ML; MG/30ML
15 SUSPENSION ORAL ONCE
Status: COMPLETED | OUTPATIENT
Start: 2020-07-04 | End: 2020-07-04

## 2020-07-04 RX ORDER — ONDANSETRON 2 MG/ML
4 INJECTION INTRAMUSCULAR; INTRAVENOUS ONCE
Status: COMPLETED | OUTPATIENT
Start: 2020-07-04 | End: 2020-07-04

## 2020-07-04 RX ORDER — SUCRALFATE 1 G/1
1 TABLET ORAL 4 TIMES DAILY
Qty: 40 TABLET | Refills: 0 | Status: SHIPPED | OUTPATIENT
Start: 2020-07-04 | End: 2020-08-13

## 2020-07-04 RX ORDER — SODIUM CHLORIDE 0.9 % (FLUSH) 0.9 %
10 SYRINGE (ML) INJECTION AS NEEDED
Status: DISCONTINUED | OUTPATIENT
Start: 2020-07-04 | End: 2020-07-04 | Stop reason: HOSPADM

## 2020-07-04 RX ADMIN — ALUMINUM HYDROXIDE, MAGNESIUM HYDROXIDE, AND DIMETHICONE 15 ML: 400; 400; 40 SUSPENSION ORAL at 13:11

## 2020-07-04 RX ADMIN — SODIUM CHLORIDE 1000 ML: 9 INJECTION, SOLUTION INTRAVENOUS at 10:37

## 2020-07-04 RX ADMIN — LIDOCAINE HYDROCHLORIDE 15 ML: 20 SOLUTION ORAL; TOPICAL at 13:11

## 2020-07-04 RX ADMIN — ONDANSETRON 4 MG: 2 INJECTION INTRAMUSCULAR; INTRAVENOUS at 13:10

## 2020-07-04 RX ADMIN — IOPAMIDOL 85 ML: 612 INJECTION, SOLUTION INTRAVENOUS at 12:01

## 2020-07-04 NOTE — DISCHARGE INSTRUCTIONS
Take medications as directed and follow-up with your family doctor.  Please return to the emergency department if symptoms worsen with fever, vomiting blood, black stool or worsening abdominal pain.

## 2020-07-04 NOTE — ED PROVIDER NOTES
EMERGENCY DEPARTMENT ENCOUNTER    CHIEF COMPLAINT  Chief Complaint: Abdominal Pain  History given by: Patient  History limited by:   Room Number: 40/40  PMD: Bradley Hernandez MD      HPI:  Pt is a 70 y.o. female who presents complaining of epigastric abd pain that began 4 days ago, described as a indigestion sensation. She does report some nausea. She also reports having some constipation since that time. Pt has been taking Norco for her chronic back pain for the past several months. She reports some distention that is worse with PO intake and has had decreased appetite due to sxs. Pt denies any SOA or CP. She denies any BLE edema or urinary sxs. She has a hx of diverticulitis. She attempted enema at home without success.     Patient was placed in face mask in first look. Patient was wearing facemask when I entered the room and throughout our encounter. I wore full protective equipment throughout this patient encounter including a face mask, and gloves. Hand hygiene was performed before donning protective equipment and after removal when leaving the room.      PAST MEDICAL HISTORY  Active Ambulatory Problems     Diagnosis Date Noted   • Lumbar radiculopathy 06/07/2018   • Hypertension 08/25/2018   • Right foot drop 08/25/2018   • Kidney transplant recipient 09/07/1979   • History of lumbar laminectomy for spinal cord decompression 08/25/2018   • Spinal enthesopathy of thoracic region (CMS/[facility]) 03/12/2019   • Postlaminectomy syndrome, lumbar region 02/17/2020   • Chronic bilateral low back pain with right-sided sciatica 02/17/2020   • Displacement of other nervous system device, implant or graft, initial encounter (CMS/[facility]) 03/09/2020   • Postlaminectomy syndrome of lumbar region 06/04/2020     Resolved Ambulatory Problems     Diagnosis Date Noted   • Lumbar radiculopathy, acute 07/28/2018     Past Medical History:   Diagnosis Date   • Arthritis    • Back pain    • Constipation    • DVT (deep venous thrombosis)  (CMS/HCC)    • Elevated cholesterol    • Foot drop, right    • History of transfusion    • Kidney failure 1973   • Spinal headache        PAST SURGICAL HISTORY  Past Surgical History:   Procedure Laterality Date   • APPENDECTOMY     • BACK SURGERY     • CATARACT EXTRACTION EXTRACAPSULAR W/ INTRAOCULAR LENS IMPLANTATION     •  SECTION     • COLONOSCOPY     • LUMBAR DISCECTOMY FUSION INSTRUMENTATION Right 2018    Procedure: Right L2-3 laminectomy with Metrix;  Surgeon: Oscar Paulino MD;  Location: [facility] MAIN OR;  Service: Neurosurgery   • SHOULDER ROTATOR CUFF REPAIR Left    • SPINAL CORD STIMULATOR IMPLANT N/A 2020    Procedure: SPINAL CORD STIMULATOR INSERTION PHASE 2, triall of right gluteal internal pulse generator;  Surgeon: Cuate Kemp MD;  Location: [facility] MAIN OR;  Service: Neurosurgery;  Laterality: N/A;   • SPINAL CORD STIMULATOR IMPLANT N/A 2020    Procedure: SPINAL CORD STIMULATOR INSERTION PHASE 1, Thoracic 10 laminotomy for revision of [place] surgical lead at Thoracic 8 to Thoracic 9;  Surgeon: Cuate Kemp MD;  Location: [facility] MAIN OR;  Service: Neurosurgery;  Laterality: N/A;   • TONSILLECTOMY     • TRANSPLANTATION RENAL Right     SISTER GAVE PT    • TUBAL ABDOMINAL LIGATION         FAMILY HISTORY  Family History   Problem Relation Age of Onset   • Aneurysm Mother         BRAIN ANEURYSM   • Heart failure Father    • Kidney failure Father    • Lung cancer Sister    • Lung cancer Sister    • Lung cancer Son    • Malig Hyperthermia Neg Hx        SOCIAL HISTORY  Social History     Socioeconomic History   • Marital status:      Spouse name: Not on file   • Number of children: 1   • Years of education: 12   • Highest education level: Not on file   Occupational History   • Occupation: HOUSEWIFE   Tobacco Use   • Smoking status: Former Smoker     Packs/day: 1.00     Years: 30.00     Pack years: 30.00     Types: Cigarettes     Last attempt to quit: 2012      Years since quittin.2   • Smokeless tobacco: Never Used   Substance and Sexual Activity   • Alcohol use: No   • Drug use: Never   • Sexual activity: Defer   Social History Narrative    LIVES WITH        ALLERGIES  Patient has no known allergies.    REVIEW OF SYSTEMS  Review of Systems   Constitutional: Positive for appetite change (decreased). Negative for fever.   HENT: Negative for sore throat.    Eyes: Negative.    Respiratory: Negative for cough and shortness of breath.    Cardiovascular: Negative for chest pain.   Gastrointestinal: Positive for abdominal distention, abdominal pain, constipation and nausea. Negative for diarrhea and vomiting.   Genitourinary: Negative for dysuria.   Musculoskeletal: Negative for neck pain.   Skin: Negative for rash.   Allergic/Immunologic: Negative.    Neurological: Negative for weakness, numbness and headaches.   Hematological: Negative.    Psychiatric/Behavioral: Negative.    All other systems reviewed and are negative.      PHYSICAL EXAM  ED Triage Vitals   Temp Heart Rate Resp BP SpO2   20 0915 20 0915 20 0915 20 0916 20 0915   97.2 °F (36.2 °C) 94 18 142/82 91 %      Temp src Heart Rate Source Patient Position BP Location FiO2 (%)   20 0915 20 0915 -- -- --   Tympanic Monitor          Physical Exam   GENERAL: mildly distressed  HENT: nares patent  NECK: supple without lymphadenopathy  EYES: no scleral icterus, EOMI  CV: regular rhythm, regular rate, no murmur  RESPIRATORY: normal effort, CTAB  ABDOMEN: soft, mild LLQ, LUQ, RUQ tenderness, non-distended, normal active bowel sounds  MUSCULOSKELETAL: no deformity  NEURO: alert, moves all extremities, follows commands  SKIN: warm, dry, midline scars to T and L spine region.        LAB RESULTS  Lab Results (last 24 hours)     Procedure Component Value Units Date/Time    CBC & Differential [[id number]] Collected:  20 1038    Specimen:  Blood Updated:  20 1056     Narrative:       The following orders were created for panel order CBC & Differential.  Procedure                               Abnormality         Status                     ---------                               -----------         ------                     CBC Auto Differential[[id number]]        Abnormal            Final result                 Please view results for these tests on the individual orders.    Comprehensive Metabolic Panel [[id number]]  (Abnormal) Collected:  07/04/20 1038    Specimen:  Blood Updated:  07/04/20 1118     Glucose 114 mg/dL      BUN 18 mg/dL      Creatinine 1.47 mg/dL      Sodium 143 mmol/L      Potassium 3.7 mmol/L      Chloride 107 mmol/L      CO2 23.3 mmol/L      Calcium 9.5 mg/dL      Total Protein 6.4 g/dL      Albumin 4.30 g/dL      ALT (SGPT) 18 U/L      AST (SGOT) 24 U/L      Alkaline Phosphatase 120 U/L      Total Bilirubin 0.5 mg/dL      eGFR Non African Amer 35 mL/min/1.73      Globulin 2.1 gm/dL      A/G Ratio 2.0 g/dL      BUN/Creatinine Ratio 12.2     Anion Gap 12.7 mmol/L     Narrative:       GFR Normal >60  Chronic Kidney Disease <60  Kidney Failure <15      Lipase [[id number]]  (Normal) Collected:  07/04/20 1038    Specimen:  Blood Updated:  07/04/20 1118     Lipase 33 U/L     Troponin [[id number]]  (Normal) Collected:  07/04/20 1038    Specimen:  Blood Updated:  07/04/20 1118     Troponin T <0.010 ng/mL     Narrative:       Troponin T Reference Range:  <= 0.03 ng/mL-   Negative for AMI  >0.03 ng/mL-     Abnormal for myocardial necrosis.  Clinicians would have to utilize clinical acumen, EKG, Troponin and serial changes to determine if it is an Acute Myocardial Infarction or myocardial injury due to an underlying chronic condition.       Results may be falsely decreased if patient taking Biotin.      CBC Auto Differential [[id number]]  (Abnormal) Collected:  07/04/20 1038    Specimen:  Blood Updated:  07/04/20 1053     WBC 7.20 10*3/mm3      RBC 3.57 10*6/mm3       Hemoglobin 11.5 g/dL      Hematocrit 33.5 %      MCV 93.8 fL      MCH 32.2 pg      MCHC 34.3 g/dL      RDW 12.9 %      RDW-SD 45.0 fl      MPV 10.4 fL      Platelets 319 10*3/mm3      Neutrophil % 62.0 %      Lymphocyte % 27.4 %      Monocyte % 8.2 %      Eosinophil % 1.3 %      Basophil % 0.8 %      Immature Grans % 0.3 %      Neutrophils, Absolute 4.47 10*3/mm3      Lymphocytes, Absolute 1.97 10*3/mm3      Monocytes, Absolute 0.59 10*3/mm3      Eosinophils, Absolute 0.09 10*3/mm3      Basophils, Absolute 0.06 10*3/mm3      Immature Grans, Absolute 0.02 10*3/mm3      nRBC 0.0 /100 WBC     Urinalysis With Microscopic If Indicated (No Culture) - Urine, Clean Catch [[id number]]  (Abnormal) Collected:  07/04/20 1039    Specimen:  Urine, Clean Catch Updated:  07/04/20 1059     Color, UA Yellow     Appearance, UA Clear     pH, UA 8.5     Specific Gravity, UA 1.015     Glucose,  mg/dL (Trace)     Ketones, UA Trace     Bilirubin, UA Negative     Blood, UA Negative     Protein, UA 30 mg/dL (1+)     Leuk Esterase, UA Negative     Nitrite, UA Negative     Urobilinogen, UA 0.2 E.U./dL    Urinalysis, Microscopic Only - Urine, Clean Catch [[id number]] Collected:  07/04/20 1039    Specimen:  Urine, Clean Catch Updated:  07/04/20 1059     RBC, UA 0-2 /HPF      WBC, UA 0-2 /HPF      Bacteria, UA None Seen /HPF      Squamous Epithelial Cells, UA 0-2 /HPF      Hyaline Casts, UA None Seen /LPF      Methodology Automated Microscopy          I ordered the above labs and reviewed the results    RADIOLOGY  CT Abdomen Pelvis With Contrast   Final Result   There is moderate sigmoid diverticulosis, but there is no   convincing evidence for acute diverticulitis. No acute abnormality seen.   Please followup as clinically indicated.       This report was finalized on 7/4/2020 1:17 PM by Dr. Britney Hernandez M.D.            I ordered the above noted radiological studies. Interpreted by radiologist. Discussed with radiologist (Dr Hernandez). Reviewed  by me in PACS.       PROCEDURES  Procedures  EKG          EKG time: 1150  Rhythm/Rate: NSR, 66BPM  P waves and ND: nml  QRS, axis: nml   ST and T waves: nonspecific T wave changes anteriorly     Interpreted Contemporaneously by me, independently viewed  unchanged compared to prior 6/2/20      PROGRESS AND CONSULTS     10:03 AM  Blood work, UA, EKG, CT abd/pel ordered. IVF ordered.    12:46 PM  Rechecked with pt. I discussed with pt about her imaging results showing diverticulosis and no constipation. I also discussed her unremarkable lab results.  Patient states she has a burning sensation in her upper abdomen with nausea.  I told patient I suspect that patient may have gastritis and offered a GI cocktail.  I will give a GI cocktail for pain control.  We will discharge patient on PPI, Carafate and Zofran.  We have ruled out constipation, colitis and small bowel obstruction.      MEDICAL DECISION MAKING  Results were reviewed/discussed with the patient and they were also made aware of online access. Pt also made aware that some labs, such as cultures, will not be resulted during ER visit and follow up with PMD is necessary.     MDM  Number of Diagnoses or Management Options     Amount and/or Complexity of Data Reviewed  Clinical lab tests: reviewed and ordered  Tests in the radiology section of CPT®: ordered and reviewed (CT abd/pel: negative acute)           DIAGNOSIS  Final diagnoses:   Epigastric abdominal pain   Acute gastritis without hemorrhage, unspecified gastritis type       DISPOSITION  DISCHARGE    Patient discharged in stable condition.    Reviewed implications of results, diagnosis, meds, responsibility to follow up, warning signs and symptoms of possible worsening, potential complications and reasons to return to ER.    Patient/Family voiced understanding of above instructions.    Discussed plan for discharge, as there is no emergent indication for admission. Patient referred to primary care provider  for BP management due to today's BP. Pt/family is agreeable and understands need for follow up and repeat testing.  Pt is aware that discharge does not mean that nothing is wrong but it indicates no emergency is present that requires admission and they must continue care with follow-up as given below or physician of their choice.     FOLLOW-UP  Bradley Hernandez MD  [address] [facility] [address]  [phone]    Schedule an appointment as soon as possible for a visit            Medication List      New Prescriptions    omeprazole 20 MG capsule  Commonly known as:  PrilOSEC  Take 1 capsule by mouth Daily.     ondansetron ODT 4 MG disintegrating tablet  Commonly known as:  ZOFRAN-ODT  Place 1 tablet on the tongue Every 6 (Six) Hours As Needed for Nausea.     sucralfate 1 g tablet  Commonly known as:  CARAFATE  Take 1 tablet by mouth 4 (Four) Times a Day.              Latest Documented Vital Signs:  As of 14:25  BP- 147/59 HR- 82 Temp- 97.2 °F (36.2 °C) (Tympanic) O2 sat- 98%    --  Documentation assistance provided by demetris Nolen for Dr Evans.  Information recorded by the scribe was done at my direction and has been verified and validated by me.        Nate Nolen  07/04/20 1420       Igor Evans MD  07/04/20 5173

## 2020-07-04 NOTE — ED TRIAGE NOTES
"\"I can't eat or drink anything\" since thurs am.  Has been nauseous.  Denies vomiting.  Reports constipation x 4 days. Has been on pain meds post back sx 4 weeks ago.  Mask on at triage  "

## 2020-08-13 ENCOUNTER — OFFICE VISIT (OUTPATIENT)
Dept: GASTROENTEROLOGY | Facility: CLINIC | Age: 70
End: 2020-08-13

## 2020-08-13 VITALS — TEMPERATURE: 97.5 F | BODY MASS INDEX: 30.69 KG/M2 | WEIGHT: 146.2 LBS | HEIGHT: 58 IN

## 2020-08-13 DIAGNOSIS — R10.13 EPIGASTRIC PAIN: Primary | ICD-10-CM

## 2020-08-13 DIAGNOSIS — R11.0 NAUSEA: ICD-10-CM

## 2020-08-13 DIAGNOSIS — K59.00 CONSTIPATION, UNSPECIFIED CONSTIPATION TYPE: ICD-10-CM

## 2020-08-13 DIAGNOSIS — K21.9 GASTROESOPHAGEAL REFLUX DISEASE, ESOPHAGITIS PRESENCE NOT SPECIFIED: ICD-10-CM

## 2020-08-13 PROCEDURE — 99203 OFFICE O/P NEW LOW 30 MIN: CPT | Performed by: INTERNAL MEDICINE

## 2020-08-13 RX ORDER — SODIUM CHLORIDE, SODIUM LACTATE, POTASSIUM CHLORIDE, CALCIUM CHLORIDE 600; 310; 30; 20 MG/100ML; MG/100ML; MG/100ML; MG/100ML
30 INJECTION, SOLUTION INTRAVENOUS CONTINUOUS
Status: CANCELLED | OUTPATIENT
Start: 2020-09-11

## 2020-08-13 NOTE — PROGRESS NOTES
Chief Complaint   Patient presents with   • Abdominal Pain   • Constipation        Marlena Navarrete is a  70 y.o. female here for an initial visit for abdominal pain, nausea, constipation.  This 70-year-old white female patient of Dr. Bradley Hernandez had presented to the emergency room on 4 July because of relatively acute onset of epigastric abdominal pain and nausea.  She was treated with a proton pump inhibitor, Carafate, and Zofran.  She did respond well to a GI cocktail in that setting.  A CT scan was essentially negative except for diverticulosis.  She states her nausea has improved since initiating this regimen but her reflux has persisted.  No complaints of dysphagia.  She has longstanding history of constipation and had undergone colonoscopic examination through this office per her recollection dating back 15 years or more.  He was noted to have diverticulosis at that time but otherwise negative findings.  She uses Dulcolax but this does not afford significant relief.  She has been managed with narcotic analgesics in the past and is currently followed by pain management.  She had a stimulator placed in her lumbar spine in the recent past and wonders if this is a contributing factor.  We talked about eventual colonoscopic examination but I will encourage her instead to use MiraLAX routinely.  She was told that this was contraindicated because she is a kidney transplant patient but I advised her to communicate with the transplant service as polyethylene glycol is usually acceptable.        HPI     Past Medical History:   Diagnosis Date   • Arthritis     OSTEO   • Back pain    • Constipation    • DVT (deep venous thrombosis) (CMS/[facility])    • Elevated cholesterol    • Foot drop, right     has a brace on right lower leg   • History of transfusion     no reaction   • Hypertension    • Kidney failure 1973    [facility]   • Kidney transplant recipient 09/07/1979   • Spinal headache        Current  Outpatient Medications   Medication Sig Dispense Refill   • acetaminophen (TYLENOL) 500 MG tablet Take 500 mg by mouth Every 6 (Six) Hours As Needed for Mild Pain .     • amLODIPine (NORVASC) 5 MG tablet Take 5 mg by mouth Every Night.  11   • atorvastatin (LIPITOR) 10 MG tablet Take 10 mg by mouth Every Night.     • diphenhydrAMINE-acetaminophen (TYLENOL PM)  MG tablet per tablet Take 1 tablet by mouth At Night As Needed for Sleep.     • omeprazole (PrilOSEC) 20 MG capsule Take 1 capsule by mouth Daily. 30 capsule 0   • oxyCODONE-acetaminophen (PERCOCET)  MG per tablet Take 1 tablet by mouth Every 6 (Six) Hours As Needed for Moderate Pain .     • pregabalin (LYRICA) 75 MG capsule Take 75 mg by mouth 2 (Two) Times a Day As Needed.     • ondansetron ODT (ZOFRAN-ODT) 4 MG disintegrating tablet Place 1 tablet on the tongue Every 6 (Six) Hours As Needed for Nausea. 10 tablet 0     No current facility-administered medications for this visit.        PRN Meds:.    No Known Allergies    Social History     Socioeconomic History   • Marital status:      Spouse name: Not on file   • Number of children: 1   • Years of education: 12   • Highest education level: Not on file   Occupational History   • Occupation: HOUSEWIFE   Tobacco Use   • Smoking status: Former Smoker     Packs/day: 1.00     Years: 30.00     Pack years: 30.00     Types: Cigarettes     Last attempt to quit: 2012     Years since quittin.3   • Smokeless tobacco: Never Used   Substance and Sexual Activity   • Alcohol use: No   • Drug use: Never   • Sexual activity: Defer   Social History Narrative    LIVES WITH        Family History   Problem Relation Age of Onset   • Aneurysm Mother         BRAIN ANEURYSM   • Heart failure Father    • Kidney failure Father    • Lung cancer Sister    • Lung cancer Sister    • Lung cancer Son    • Malig Hyperthermia Neg Hx        Review of Systems   Constitutional: Negative for activity change,  appetite change, fatigue and unexpected weight change.   HENT: Negative for congestion, facial swelling, sore throat, trouble swallowing and voice change.    Eyes: Negative for photophobia and visual disturbance.   Respiratory: Negative for cough and choking.    Cardiovascular: Negative for chest pain.   Gastrointestinal: Positive for abdominal pain, constipation and nausea. Negative for abdominal distention, anal bleeding, blood in stool, diarrhea, rectal pain and vomiting.   Endocrine: Negative for polyphagia.   Musculoskeletal: Negative for arthralgias, gait problem and joint swelling.   Skin: Negative for color change, pallor and rash.   Allergic/Immunologic: Negative for food allergies.   Neurological: Negative for speech difficulty and headaches.   Hematological: Does not bruise/bleed easily.   Psychiatric/Behavioral: Negative for agitation, confusion and sleep disturbance.       Vitals:    08/13/20 1334   Temp: 97.5 °F (36.4 °C)       Physical Exam   Constitutional: She is oriented to person, place, and time. She appears well-developed and well-nourished. No distress.   HENT:   Head: Normocephalic.   Mouth/Throat: Oropharynx is clear and moist. No oropharyngeal exudate.   Eyes: Conjunctivae and EOM are normal. No scleral icterus.   Neck: Normal range of motion. No thyromegaly present.   Cardiovascular: Normal rate and regular rhythm.   No murmur heard.  Pulmonary/Chest: Breath sounds normal. No respiratory distress. She has no wheezes. She has no rales.   Abdominal: Soft. Bowel sounds are normal. She exhibits no distension and no mass. There is no hepatosplenomegaly. There is no tenderness.   Musculoskeletal: Normal range of motion. She exhibits no edema or tenderness.   Lymphadenopathy:     She has no cervical adenopathy.   Neurological: She is alert and oriented to person, place, and time.   Skin: Skin is warm and dry. No rash noted. She is not diaphoretic. No erythema.   Psychiatric: She has a normal mood  and affect. Her behavior is normal.   Vitals reviewed.      ASSESSMENT  #1 epigastric pain: Probably reflux related  #2 nausea: Better with PPI and Carafate  #3 constipation: Chronic issue with exacerbation      PLAN  Schedule EGD  Trial of MiraLAX  Eventual colonoscopic examination      ICD-10-CM ICD-9-CM   1. Epigastric pain R10.13 789.06   2. Nausea R11.0 787.02   3. Constipation, unspecified constipation type K59.00 564.00

## 2020-08-17 ENCOUNTER — TRANSCRIBE ORDERS (OUTPATIENT)
Dept: SLEEP MEDICINE | Facility: HOSPITAL | Age: 70
End: 2020-08-17

## 2020-08-17 DIAGNOSIS — Z01.818 OTHER SPECIFIED PRE-OPERATIVE EXAMINATION: Primary | ICD-10-CM

## 2020-09-09 ENCOUNTER — LAB (OUTPATIENT)
Dept: LAB | Facility: HOSPITAL | Age: 70
End: 2020-09-09

## 2020-09-09 DIAGNOSIS — Z01.818 OTHER SPECIFIED PRE-OPERATIVE EXAMINATION: ICD-10-CM

## 2020-09-09 PROCEDURE — U0004 COV-19 TEST NON-CDC HGH THRU: HCPCS

## 2020-09-09 PROCEDURE — C9803 HOPD COVID-19 SPEC COLLECT: HCPCS

## 2020-09-10 LAB — SARS-COV-2 RNA RESP QL NAA+PROBE: NOT DETECTED

## 2020-09-11 ENCOUNTER — ANESTHESIA EVENT (OUTPATIENT)
Dept: GASTROENTEROLOGY | Facility: HOSPITAL | Age: 70
End: 2020-09-11

## 2020-09-11 ENCOUNTER — ANESTHESIA (OUTPATIENT)
Dept: GASTROENTEROLOGY | Facility: HOSPITAL | Age: 70
End: 2020-09-11

## 2020-09-11 ENCOUNTER — HOSPITAL ENCOUNTER (OUTPATIENT)
Facility: HOSPITAL | Age: 70
Setting detail: HOSPITAL OUTPATIENT SURGERY
Discharge: HOME | End: 2020-09-11
Attending: INTERNAL MEDICINE | Admitting: INTERNAL MEDICINE

## 2020-09-11 VITALS
RESPIRATION RATE: 16 BRPM | SYSTOLIC BLOOD PRESSURE: 148 MMHG | HEART RATE: 75 BPM | HEIGHT: 58 IN | DIASTOLIC BLOOD PRESSURE: 79 MMHG | WEIGHT: 145 LBS | OXYGEN SATURATION: 98 % | BODY MASS INDEX: 30.44 KG/M2 | TEMPERATURE: 98.1 F

## 2020-09-11 DIAGNOSIS — K21.9 GASTROESOPHAGEAL REFLUX DISEASE, ESOPHAGITIS PRESENCE NOT SPECIFIED: ICD-10-CM

## 2020-09-11 DIAGNOSIS — R10.13 EPIGASTRIC PAIN: ICD-10-CM

## 2020-09-11 DIAGNOSIS — R11.0 NAUSEA: ICD-10-CM

## 2020-09-11 PROCEDURE — 43239 EGD BIOPSY SINGLE/MULTIPLE: CPT | Performed by: INTERNAL MEDICINE

## 2020-09-11 PROCEDURE — 25010000002 PROPOFOL 10 MG/ML EMULSION: Performed by: NURSE ANESTHETIST, CERTIFIED REGISTERED

## 2020-09-11 PROCEDURE — 87081 CULTURE SCREEN ONLY: CPT | Performed by: INTERNAL MEDICINE

## 2020-09-11 PROCEDURE — S0260 H&P FOR SURGERY: HCPCS | Performed by: INTERNAL MEDICINE

## 2020-09-11 PROCEDURE — 88305 TISSUE EXAM BY PATHOLOGIST: CPT | Performed by: INTERNAL MEDICINE

## 2020-09-11 RX ORDER — LIDOCAINE HYDROCHLORIDE 20 MG/ML
INJECTION, SOLUTION INFILTRATION; PERINEURAL AS NEEDED
Status: DISCONTINUED | OUTPATIENT
Start: 2020-09-11 | End: 2020-09-11 | Stop reason: SURG

## 2020-09-11 RX ORDER — SODIUM CHLORIDE 9 MG/ML
30 INJECTION, SOLUTION INTRAVENOUS CONTINUOUS PRN
Status: DISCONTINUED | OUTPATIENT
Start: 2020-09-11 | End: 2020-09-11 | Stop reason: HOSPADM

## 2020-09-11 RX ORDER — SODIUM CHLORIDE, SODIUM LACTATE, POTASSIUM CHLORIDE, CALCIUM CHLORIDE 600; 310; 30; 20 MG/100ML; MG/100ML; MG/100ML; MG/100ML
30 INJECTION, SOLUTION INTRAVENOUS CONTINUOUS
Status: DISCONTINUED | OUTPATIENT
Start: 2020-09-11 | End: 2020-09-11 | Stop reason: HOSPADM

## 2020-09-11 RX ORDER — PROPOFOL 10 MG/ML
VIAL (ML) INTRAVENOUS CONTINUOUS PRN
Status: DISCONTINUED | OUTPATIENT
Start: 2020-09-11 | End: 2020-09-11 | Stop reason: SURG

## 2020-09-11 RX ORDER — PROPOFOL 10 MG/ML
VIAL (ML) INTRAVENOUS AS NEEDED
Status: DISCONTINUED | OUTPATIENT
Start: 2020-09-11 | End: 2020-09-11 | Stop reason: SURG

## 2020-09-11 RX ADMIN — PROPOFOL 80 MG: 10 INJECTION, EMULSION INTRAVENOUS at 11:26

## 2020-09-11 RX ADMIN — PROPOFOL 180 MCG/KG/MIN: 10 INJECTION, EMULSION INTRAVENOUS at 11:26

## 2020-09-11 RX ADMIN — LIDOCAINE HYDROCHLORIDE 100 MG: 20 INJECTION, SOLUTION INFILTRATION; PERINEURAL at 11:26

## 2020-09-11 RX ADMIN — SODIUM CHLORIDE 30 ML/HR: 9 INJECTION, SOLUTION INTRAVENOUS at 11:03

## 2020-09-11 NOTE — ANESTHESIA PREPROCEDURE EVALUATION
Anesthesia Evaluation     Patient summary reviewed and Nursing notes reviewed   history of anesthetic complications:               Airway   Mallampati: I  TM distance: >3 FB  Neck ROM: full  No difficulty expected  Dental - normal exam     Pulmonary - negative pulmonary ROS and normal exam   Cardiovascular - normal exam    ECG reviewed    (+) hypertension, DVT, hyperlipidemia,       Neuro/Psych  (+) headaches, numbness,     GI/Hepatic/Renal/Endo    (+)  GERD,  renal disease,     Musculoskeletal     (+) back pain,   Abdominal  - normal exam    Bowel sounds: normal.   Substance History - negative use     OB/GYN negative ob/gyn ROS         Other   arthritis,                      Anesthesia Plan    ASA 3     MAC       Anesthetic plan, all risks, benefits, and alternatives have been provided, discussed and informed consent has been obtained with: patient.

## 2020-09-11 NOTE — DISCHARGE INSTRUCTIONS
For the next 24 hours patient needs to be with a responsible adult.    For 24 hours DO NOT drive, operate machinery, appliances, drink alcohol, make important decisions or sign legal documents.    Start with a light or bland diet if you are feeling sick to your stomach otherwise advance to regular diet as tolerated.     You may have a sore, scratchy throat today.    Follow recommendations on procedure report if provided by your doctor.    Call Dr Chaves for problems [phone]    Problems may include but not limited to: large amounts of bleeding, trouble breathing, repeated vomiting, severe unrelieved pain, fever or chills.

## 2020-09-11 NOTE — ANESTHESIA POSTPROCEDURE EVALUATION
"Patient: Marlena Navarrete    Procedure Summary     Date:  09/11/20 Room / Location:  [facility] 10 / [facility]    Anesthesia Start:  1118 Anesthesia Stop:  1140    Procedure:  ESOPHAGOGASTRODUODENOSCOPY with biopsies (N/A Esophagus) Diagnosis:       Hiatal hernia      Duodenogastric bile reflux      Gastritis      Duodenitis      (Epigastric pain [R10.13])      (Nausea [R11.0])      (Gastroesophageal reflux disease, esophagitis presence not specified [K21.9])    Surgeon:  Wicho Chaves MD Provider:  Jonathan Luu MD    Anesthesia Type:  MAC ASA Status:  3          Anesthesia Type: MAC    Vitals  Vitals Value Taken Time   /79 9/11/2020 11:59 AM   Temp     Pulse 75 9/11/2020 11:59 AM   Resp 16 9/11/2020 11:59 AM   SpO2 98 % 9/11/2020 11:59 AM           Post Anesthesia Care and Evaluation    Patient location during evaluation: PACU  Patient participation: complete - patient participated  Level of consciousness: awake  Pain score: 0  Pain management: adequate  Airway patency: patent  Anesthetic complications: No anesthetic complications  PONV Status: none  Cardiovascular status: acceptable  Respiratory status: acceptable  Hydration status: acceptable    Comments: /79   Pulse 75   Temp 36.7 °C (98.1 °F) (Oral)   Resp 16   Ht 147.3 cm (58\")   Wt 65.8 kg (145 lb)   SpO2 98%   BMI 30.31 kg/m²       "

## 2020-09-11 NOTE — H&P
[facility]  Pre Procedure History & Physical    Chief Complaint:   Epigastric pain, nausea    Subjective     HPI:   This 70-year-old female presents the endoscopy suite for upper endoscopic evaluation.  She is had issues with epigastric pain and nausea despite use of PPI and Carafate.    Past Medical History:   Past Medical History:   Diagnosis Date   • Arthritis     OSTEO   • Back pain    • Constipation    • DVT (deep venous thrombosis) (CMS/HCC)    • Elevated cholesterol    • Foot drop, right     has a brace on right lower leg   • History of transfusion     no reaction   • Hypertension    • Kidney failure     [facility]   • Kidney transplant recipient 1979   • Spinal headache        Past Surgical History:  Past Surgical History:   Procedure Laterality Date   • APPENDECTOMY     • BACK SURGERY     • CATARACT EXTRACTION EXTRACAPSULAR W/ INTRAOCULAR LENS IMPLANTATION     •  SECTION     • COLONOSCOPY  approx     Anastacio WHARTON   • LUMBAR DISCECTOMY FUSION INSTRUMENTATION Right 2018    Procedure: Right L2-3 laminectomy with Metrix;  Surgeon: Oscar Paulino MD;  Location: [facility] OR;  Service: Neurosurgery   • SHOULDER ROTATOR CUFF REPAIR Left    • SPINAL CORD STIMULATOR IMPLANT N/A 2020    Procedure: SPINAL CORD STIMULATOR INSERTION PHASE 2, triall of right gluteal internal pulse generator;  Surgeon: Cuate Kemp MD;  Location: [facility] OR;  Service: Neurosurgery;  Laterality: N/A;   • SPINAL CORD STIMULATOR IMPLANT N/A 2020    Procedure: SPINAL CORD STIMULATOR INSERTION PHASE 1, Thoracic 10 laminotomy for revision of [place] surgical lead at Thoracic 8 to Thoracic 9;  Surgeon: Cuate Kemp MD;  Location: 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 Her smoking use included cigarettes. She has a 30.00 pack-year smoking history. She has never used smokeless tobacco. She reports that she does not drink alcohol or use drugs.    Medications:   No medications prior to admission.       Allergies:  Patient has no known allergies.    ROS:    Pertinent items are noted in HPI, all other systems reviewed and negative     Objective     not currently breastfeeding.    Physical Exam   Constitutional: Pt is oriented to person, place, and time and well-developed, well-nourished, and in no distress.   Mouth/Throat: Oropharynx is clear and moist.   Neck: Normal range of motion.   Cardiovascular: Normal rate, regular rhythm and normal heart sounds.    Pulmonary/Chest: Effort normal and breath sounds normal.   Abdominal: Soft. Nontender  Skin: Skin is warm and dry.   Psychiatric: Mood, memory, affect and judgment normal.     Assessment/Plan     Diagnosis:  Epigastric pain  Nausea    Anticipated Surgical Procedure:  EGD    The risks, benefits, and alternatives of this procedure have been discussed with the patient or the responsible party- the patient understands and agrees to proceed.

## 2020-09-12 LAB — UREASE TISS QL: NEGATIVE

## 2020-09-15 LAB
CYTO UR: NORMAL
LAB AP CASE REPORT: NORMAL
PATH REPORT.FINAL DX SPEC: NORMAL
PATH REPORT.GROSS SPEC: NORMAL

## 2020-09-24 ENCOUNTER — TELEPHONE (OUTPATIENT)
Dept: GASTROENTEROLOGY | Facility: CLINIC | Age: 70
End: 2020-09-24

## 2020-09-24 NOTE — TELEPHONE ENCOUNTER
----- Message from Wicho BENAVIDEZ MD sent at 9/19/2020  2:00 PM EDT -----  Regarding: Biopsy results  Okay to call results, recommend continue/switch PPI.  If nausea persists, can offer gastric emptying study.  ----- Message -----  From: Lab, Background User  Sent: 9/12/2020   1:00 PM EDT  To: Wicho BENAVIDEZ MD

## 2020-09-24 NOTE — TELEPHONE ENCOUNTER
Call to pt.  Advise per path report that duodenal benign.  Stomach body with mild, nonspecific inflammation.  GE junction with mild chronic inflammation    Advise per DR Chaves note.  Verb understanding.  State having good control reflux symptoms with omeprazole 20 mg daily.

## 2020-12-17 ENCOUNTER — TRANSCRIBE ORDERS (OUTPATIENT)
Dept: PREADMISSION TESTING | Facility: HOSPITAL | Age: 70
End: 2020-12-17

## 2020-12-17 DIAGNOSIS — Z01.818 OTHER SPECIFIED PRE-OPERATIVE EXAMINATION: Primary | ICD-10-CM

## 2020-12-22 ENCOUNTER — APPOINTMENT (OUTPATIENT)
Dept: PREADMISSION TESTING | Facility: HOSPITAL | Age: 70
End: 2020-12-22

## 2020-12-22 VITALS
BODY MASS INDEX: 30.75 KG/M2 | RESPIRATION RATE: 16 BRPM | HEART RATE: 78 BPM | HEIGHT: 58 IN | TEMPERATURE: 97.2 F | WEIGHT: 146.5 LBS | OXYGEN SATURATION: 97 % | DIASTOLIC BLOOD PRESSURE: 76 MMHG | SYSTOLIC BLOOD PRESSURE: 136 MMHG

## 2020-12-22 LAB
ALBUMIN SERPL-MCNC: 4.3 G/DL (ref 3.5–5.2)
ALBUMIN/GLOB SERPL: 1.7 G/DL
ALP SERPL-CCNC: 114 U/L (ref 39–117)
ALT SERPL W P-5'-P-CCNC: 13 U/L (ref 1–33)
ANION GAP SERPL CALCULATED.3IONS-SCNC: 8.7 MMOL/L (ref 5–15)
AST SERPL-CCNC: 19 U/L (ref 1–32)
BACTERIA UR QL AUTO: ABNORMAL /HPF
BASOPHILS # BLD AUTO: 0.05 10*3/MM3 (ref 0–0.2)
BASOPHILS NFR BLD AUTO: 0.6 % (ref 0–1.5)
BILIRUB SERPL-MCNC: 0.4 MG/DL (ref 0–1.2)
BILIRUB UR QL STRIP: NEGATIVE
BUN SERPL-MCNC: 22 MG/DL (ref 8–23)
BUN/CREAT SERPL: 13.9 (ref 7–25)
CALCIUM SPEC-SCNC: 9.1 MG/DL (ref 8.6–10.5)
CHLORIDE SERPL-SCNC: 110 MMOL/L (ref 98–107)
CLARITY UR: ABNORMAL
CO2 SERPL-SCNC: 20.3 MMOL/L (ref 22–29)
COLOR UR: YELLOW
CREAT SERPL-MCNC: 1.58 MG/DL (ref 0.57–1)
DEPRECATED RDW RBC AUTO: 46.1 FL (ref 37–54)
EOSINOPHIL # BLD AUTO: 0.15 10*3/MM3 (ref 0–0.4)
EOSINOPHIL NFR BLD AUTO: 1.9 % (ref 0.3–6.2)
ERYTHROCYTE [DISTWIDTH] IN BLOOD BY AUTOMATED COUNT: 13.3 % (ref 12.3–15.4)
GFR SERPL CREATININE-BSD FRML MDRD: 33 ML/MIN/1.73
GLOBULIN UR ELPH-MCNC: 2.6 GM/DL
GLUCOSE SERPL-MCNC: 111 MG/DL (ref 65–99)
GLUCOSE UR STRIP-MCNC: ABNORMAL MG/DL
HCT VFR BLD AUTO: 34.1 % (ref 34–46.6)
HGB BLD-MCNC: 11.2 G/DL (ref 12–15.9)
HGB UR QL STRIP.AUTO: NEGATIVE
HYALINE CASTS UR QL AUTO: ABNORMAL /LPF
IMM GRANULOCYTES # BLD AUTO: 0.02 10*3/MM3 (ref 0–0.05)
IMM GRANULOCYTES NFR BLD AUTO: 0.3 % (ref 0–0.5)
KETONES UR QL STRIP: NEGATIVE
LEUKOCYTE ESTERASE UR QL STRIP.AUTO: ABNORMAL
LYMPHOCYTES # BLD AUTO: 1.92 10*3/MM3 (ref 0.7–3.1)
LYMPHOCYTES NFR BLD AUTO: 24.5 % (ref 19.6–45.3)
MCH RBC QN AUTO: 31.1 PG (ref 26.6–33)
MCHC RBC AUTO-ENTMCNC: 32.8 G/DL (ref 31.5–35.7)
MCV RBC AUTO: 94.7 FL (ref 79–97)
MONOCYTES # BLD AUTO: 0.51 10*3/MM3 (ref 0.1–0.9)
MONOCYTES NFR BLD AUTO: 6.5 % (ref 5–12)
NEUTROPHILS NFR BLD AUTO: 5.19 10*3/MM3 (ref 1.7–7)
NEUTROPHILS NFR BLD AUTO: 66.2 % (ref 42.7–76)
NITRITE UR QL STRIP: POSITIVE
NRBC BLD AUTO-RTO: 0 /100 WBC (ref 0–0.2)
PH UR STRIP.AUTO: 6.5 [PH] (ref 5–8)
PLATELET # BLD AUTO: 281 10*3/MM3 (ref 140–450)
PMV BLD AUTO: 10.8 FL (ref 6–12)
POTASSIUM SERPL-SCNC: 4.1 MMOL/L (ref 3.5–5.2)
PROT SERPL-MCNC: 6.9 G/DL (ref 6–8.5)
PROT UR QL STRIP: ABNORMAL
RBC # BLD AUTO: 3.6 10*6/MM3 (ref 3.77–5.28)
RBC # UR: ABNORMAL /HPF
REF LAB TEST METHOD: ABNORMAL
SODIUM SERPL-SCNC: 139 MMOL/L (ref 136–145)
SP GR UR STRIP: 1.01 (ref 1–1.03)
SQUAMOUS #/AREA URNS HPF: ABNORMAL /HPF
UROBILINOGEN UR QL STRIP: ABNORMAL
WBC # BLD AUTO: 7.84 10*3/MM3 (ref 3.4–10.8)
WBC UR QL AUTO: ABNORMAL /HPF

## 2020-12-22 PROCEDURE — 81001 URINALYSIS AUTO W/SCOPE: CPT

## 2020-12-22 PROCEDURE — 80053 COMPREHEN METABOLIC PANEL: CPT

## 2020-12-22 PROCEDURE — 36415 COLL VENOUS BLD VENIPUNCTURE: CPT

## 2020-12-22 PROCEDURE — 85025 COMPLETE CBC W/AUTO DIFF WBC: CPT

## 2020-12-22 NOTE — DISCHARGE INSTRUCTIONS
Take the following medications the morning of surgery:  OMEPRAZOLE AND PAIN PILLS AS NEEDED    HOSPITAL TO CALL DAY BEFORE SURGERY WITH ARRIVAL TIME TO OUT SURGERY CENTER       If you are on prescription narcotic pain medication to control your pain you may also take that medication the morning of surgery.    General Instructions:  • Do not eat solid food after midnight the night before surgery.  • You may drink clear liquids day of surgery but must stop at least one hour before your hospital arrival time.  • It is beneficial for you to have a clear drink that contains carbohydrates the day of surgery.  We suggest a 12 to 20 ounce bottle of Gatorade or Powerade for non-diabetic patients or a 12 to 20 ounce bottle of G2 or Powerade Zero for diabetic patients. (Pediatric patients, are not advised to drink a 12 to 20 ounce carbohydrate drink)    Clear liquids are liquids you can see through.  Nothing red in color.     Plain water                               Sports drinks  Sodas                                   Gelatin (Jell-O)  Fruit juices without pulp such as white grape juice and apple juice  Popsicles that contain no fruit or yogurt  Tea or coffee (no cream or milk added)  Gatorade / Powerade  G2 / Powerade Zero    • Infants may have breast milk up to four hours before surgery.  • Infants drinking formula may drink formula up to six hours before surgery.   • Patients who avoid smoking, chewing tobacco and alcohol for 4 weeks prior to surgery have a reduced risk of post-operative complications.  Quit smoking as many days before surgery as you can.  • Do not smoke, use chewing tobacco or drink alcohol the day of surgery.   • If applicable bring your C-PAP/ BI-PAP machine.  • Bring any papers given to you in the doctor’s office.  • Wear clean comfortable clothes.  • Do not wear contact lenses, false eyelashes or make-up.  Bring a case for your glasses.   • Bring crutches or walker if applicable.  • Remove all  piercings.  Leave jewelry and any other valuables at home.  • Hair extensions with metal clips must be removed prior to surgery.  • The Pre-Admission Testing nurse will instruct you to bring medications if unable to obtain an accurate list in Pre-Admission Testing.            Preventing a Surgical Site Infection:  • For 2 to 3 days before surgery, avoid shaving with a razor because the razor can irritate skin and make it easier to develop an infection.    • Any areas of open skin can increase the risk of a post-operative wound infection by allowing bacteria to enter and travel throughout the body.  Notify your surgeon if you have any skin wounds / rashes even if it is not near the expected surgical site.  The area will need assessed to determine if surgery should be delayed until it is healed.  • The night prior to surgery shower using a fresh bar of anti-bacterial soap (such as Dial) and clean washcloth.  Sleep in a clean bed with clean clothing.  Do not allow pets to sleep with you.  • Shower on the morning of surgery using a fresh bar of anti-bacterial soap (such as Dial) and clean washcloth.  Dry with a clean towel and dress in clean clothing.  • Ask your surgeon if you will be receiving antibiotics prior to surgery.  • Make sure you, your family, and all healthcare providers clean their hands with soap and water or an alcohol based hand  before caring for you or your wound.    Day of surgery:  Your arrival time is approximately two hours before your scheduled surgery time.  Upon arrival, a Pre-op nurse and Anesthesiologist will review your health history, obtain vital signs, and answer questions you may have.  The only belongings needed at this time will be a list of your home medications and if applicable your C-PAP/BI-PAP machine.  A Pre-op nurse will start an IV and you may receive medication in preparation for surgery, including something to help you relax.     Please be aware that surgery does  come with discomfort.  We want to make every effort to control your discomfort so please discuss any uncontrolled symptoms with your nurse.   Your doctor will most likely have prescribed pain medications.      If you are going home after surgery you will receive individualized written care instructions before being discharged.  A responsible adult must drive you to and from the hospital on the day of your surgery and stay with you for 24 hours.  Discharge prescriptions can be filled by the hospital pharmacy during regular pharmacy hours.  If you are having surgery late in the day/evening your prescription may be e-prescribed to your pharmacy.  Please verify your pharmacy hours or chose a 24 hour pharmacy to avoid not having access to your prescription because your pharmacy has closed for the day.    If you are staying overnight following surgery, you will be transported to your hospital room following the recovery period.  [facility] has [facility] rooms.    If you have any questions please call Pre-Admission Testing at [phone].  Deductibles and co-payments are collected on the day of service. Please be prepared to pay the required co-pay, deductible or deposit on the day of service as defined by your plan.    Patient Education for Self-Quarantine Process    Following your COVID testing, we strongly recommend that you do not leave your home after you have been tested for COVID except to get medical care. This includes not going to work, school or to public areas.  If this is not possible for you to do please limit your activities to only required outings.  Be sure to wear a mask when you are with other people, practice social distancing and wash your hands frequently.      The following items provide additional details to keep you safe.  • Wash your hands with soap and water frequently for at least 20 seconds.   • Avoid touching your eyes, nose and mouth with unwashed hands.  • Do not share  anything - utensils, towels, food from the same bowl.   • Have your own utensils, drinking glass, dishes, towels and bedding.   • Do not have visitors.   • Do use FaceTime to stay in touch with family and friends.  • You should stay in a specific room away from others if possible.   • Stay at least 6 feet away from others in the home if you cannot have a dedicated room to yourself.   • Do not snuggle with your pet. While the CDC says there is no evidence that pets can spread COVID-19 or be infected from humans, it is probably best to avoid “petting, snuggling, being kissed or licked and sharing food (during self-quarantine)”, according to the CDC.   • Sanitize household surfaces daily. Include all high touch areas (door handles, light switches, phones, countertops, etc.)  • Do not share a bathroom with others, if possible.   • Wear a mask around others in your home if you are unable to stay in a separate room or 6 feet apart. If  you are unable to wear a mask, have your family member wear a mask if they must be within 6 feet of you.   Call your surgeon immediately if you experience any of the following symptoms:  • Sore Throat  • Shortness of Breath or difficulty breathing  • Cough  • Chills  • Body soreness or muscle pain  • Headache  • Fever  • New loss of taste or smell  • Do not arrive for your surgery ill.  Your procedure will need to be rescheduled to another time.  You will need to call your physician before the day of surgery to avoid any unnecessary exposure to hospital staff as well as other patients.

## 2020-12-26 ENCOUNTER — LAB (OUTPATIENT)
Dept: LAB | Facility: HOSPITAL | Age: 70
End: 2020-12-26

## 2020-12-26 DIAGNOSIS — Z01.818 OTHER SPECIFIED PRE-OPERATIVE EXAMINATION: ICD-10-CM

## 2020-12-26 PROCEDURE — C9803 HOPD COVID-19 SPEC COLLECT: HCPCS

## 2020-12-26 PROCEDURE — U0004 COV-19 TEST NON-CDC HGH THRU: HCPCS

## 2020-12-28 LAB — SARS-COV-2 RNA RESP QL NAA+PROBE: NOT DETECTED

## 2020-12-29 ENCOUNTER — ANESTHESIA (OUTPATIENT)
Dept: PERIOP | Facility: HOSPITAL | Age: 70
End: 2020-12-29

## 2020-12-29 ENCOUNTER — HOSPITAL ENCOUNTER (OUTPATIENT)
Facility: HOSPITAL | Age: 70
LOS: 1 days | Discharge: HOME | End: 2020-12-31
Attending: ORTHOPAEDIC SURGERY | Admitting: ORTHOPAEDIC SURGERY

## 2020-12-29 ENCOUNTER — APPOINTMENT (OUTPATIENT)
Dept: GENERAL RADIOLOGY | Facility: HOSPITAL | Age: 70
End: 2020-12-29

## 2020-12-29 ENCOUNTER — ANESTHESIA EVENT (OUTPATIENT)
Dept: PERIOP | Facility: HOSPITAL | Age: 70
End: 2020-12-29

## 2020-12-29 PROBLEM — J95.821 RESPIRATORY FAILURE, POST-OPERATIVE (HCC): Status: ACTIVE | Noted: 2020-12-29

## 2020-12-29 PROCEDURE — 25010000003 CEFAZOLIN IN DEXTROSE 2-4 GM/100ML-% SOLUTION: Performed by: ORTHOPAEDIC SURGERY

## 2020-12-29 PROCEDURE — A9270 NON-COVERED ITEM OR SERVICE: HCPCS | Performed by: INTERNAL MEDICINE

## 2020-12-29 PROCEDURE — 25010000002 PROPOFOL 10 MG/ML EMULSION: Performed by: NURSE ANESTHETIST, CERTIFIED REGISTERED

## 2020-12-29 PROCEDURE — C1713 ANCHOR/SCREW BN/BN,TIS/BN: HCPCS | Performed by: ORTHOPAEDIC SURGERY

## 2020-12-29 PROCEDURE — 63710000001 ATORVASTATIN 20 MG TABLET: Performed by: INTERNAL MEDICINE

## 2020-12-29 PROCEDURE — 63710000001 AMLODIPINE 5 MG TABLET: Performed by: INTERNAL MEDICINE

## 2020-12-29 PROCEDURE — 25010000002 DEXAMETHASONE PER 1 MG: Performed by: NURSE ANESTHETIST, CERTIFIED REGISTERED

## 2020-12-29 PROCEDURE — 25010000002 EPINEPHRINE PER 0.1 MG: Performed by: ORTHOPAEDIC SURGERY

## 2020-12-29 PROCEDURE — 25010000002 DROPERIDOL PER 5 MG: Performed by: ANESTHESIOLOGY

## 2020-12-29 PROCEDURE — 25010000002 FENTANYL CITRATE (PF) 100 MCG/2ML SOLUTION: Performed by: ANESTHESIOLOGY

## 2020-12-29 PROCEDURE — 63710000001 OXYCODONE-ACETAMINOPHEN 10-325 MG TABLET: Performed by: INTERNAL MEDICINE

## 2020-12-29 PROCEDURE — 25010000002 ONDANSETRON PER 1 MG: Performed by: NURSE ANESTHETIST, CERTIFIED REGISTERED

## 2020-12-29 PROCEDURE — 25010000002 MIDAZOLAM PER 1 MG: Performed by: ANESTHESIOLOGY

## 2020-12-29 PROCEDURE — 94799 UNLISTED PULMONARY SVC/PX: CPT

## 2020-12-29 PROCEDURE — 25010000002 NEOSTIGMINE PER 0.5 MG: Performed by: NURSE ANESTHETIST, CERTIFIED REGISTERED

## 2020-12-29 PROCEDURE — 63710000001 SULFAMETHOXAZOLE-TRIMETHOPRIM 800-160 MG TABLET: Performed by: INTERNAL MEDICINE

## 2020-12-29 PROCEDURE — 63710000001 PREGABALIN 75 MG CAPSULE: Performed by: INTERNAL MEDICINE

## 2020-12-29 PROCEDURE — 71045 X-RAY EXAM CHEST 1 VIEW: CPT

## 2020-12-29 DEVICE — IMPLANTABLE DEVICE
Type: IMPLANTABLE DEVICE | Site: SHOULDER | Status: FUNCTIONAL
Brand: BIOWICK® SURELOCK®

## 2020-12-29 DEVICE — QUATTRO® LINK KNOTLESS ANCHOR, 4.5MM SP
Type: IMPLANTABLE DEVICE | Site: SHOULDER | Status: FUNCTIONAL
Brand: QUATTRO®

## 2020-12-29 RX ORDER — BUPIVACAINE HYDROCHLORIDE AND EPINEPHRINE 5; 5 MG/ML; UG/ML
INJECTION, SOLUTION PERINEURAL AS NEEDED
Status: DISCONTINUED | OUTPATIENT
Start: 2020-12-29 | End: 2020-12-29 | Stop reason: HOSPADM

## 2020-12-29 RX ORDER — ACETAMINOPHEN 650 MG/1
650 SUPPOSITORY RECTAL ONCE AS NEEDED
Status: DISCONTINUED | OUTPATIENT
Start: 2020-12-29 | End: 2020-12-29 | Stop reason: HOSPADM

## 2020-12-29 RX ORDER — OXYCODONE AND ACETAMINOPHEN 10; 325 MG/1; MG/1
1 TABLET ORAL EVERY 6 HOURS PRN
Status: DISCONTINUED | OUTPATIENT
Start: 2020-12-29 | End: 2020-12-31 | Stop reason: HOSPADM

## 2020-12-29 RX ORDER — DEXAMETHASONE SODIUM PHOSPHATE 10 MG/ML
INJECTION INTRAMUSCULAR; INTRAVENOUS AS NEEDED
Status: DISCONTINUED | OUTPATIENT
Start: 2020-12-29 | End: 2020-12-29 | Stop reason: SURG

## 2020-12-29 RX ORDER — SULFAMETHOXAZOLE AND TRIMETHOPRIM 800; 160 MG/1; MG/1
1 TABLET ORAL EVERY 12 HOURS SCHEDULED
Status: DISCONTINUED | OUTPATIENT
Start: 2020-12-29 | End: 2020-12-30

## 2020-12-29 RX ORDER — NALBUPHINE HCL 10 MG/ML
10 AMPUL (ML) INJECTION EVERY 4 HOURS PRN
Status: DISCONTINUED | OUTPATIENT
Start: 2020-12-29 | End: 2020-12-29 | Stop reason: HOSPADM

## 2020-12-29 RX ORDER — NALBUPHINE HCL 10 MG/ML
2 AMPUL (ML) INJECTION EVERY 4 HOURS PRN
Status: DISCONTINUED | OUTPATIENT
Start: 2020-12-29 | End: 2020-12-29 | Stop reason: HOSPADM

## 2020-12-29 RX ORDER — SULFAMETHOXAZOLE AND TRIMETHOPRIM 800; 160 MG/1; MG/1
1 TABLET ORAL 3 TIMES DAILY
Status: DISCONTINUED | OUTPATIENT
Start: 2020-12-29 | End: 2020-12-29

## 2020-12-29 RX ORDER — ACETAMINOPHEN 650 MG/1
650 SUPPOSITORY RECTAL EVERY 4 HOURS PRN
Status: DISCONTINUED | OUTPATIENT
Start: 2020-12-29 | End: 2020-12-31 | Stop reason: HOSPADM

## 2020-12-29 RX ORDER — HYDROMORPHONE HYDROCHLORIDE 1 MG/ML
0.25 INJECTION, SOLUTION INTRAMUSCULAR; INTRAVENOUS; SUBCUTANEOUS
Status: DISCONTINUED | OUTPATIENT
Start: 2020-12-29 | End: 2020-12-29 | Stop reason: HOSPADM

## 2020-12-29 RX ORDER — FENTANYL CITRATE 50 UG/ML
25 INJECTION, SOLUTION INTRAMUSCULAR; INTRAVENOUS
Status: DISCONTINUED | OUTPATIENT
Start: 2020-12-29 | End: 2020-12-29 | Stop reason: HOSPADM

## 2020-12-29 RX ORDER — SULFAMETHOXAZOLE AND TRIMETHOPRIM 800; 160 MG/1; MG/1
1 TABLET ORAL 3 TIMES DAILY
COMMUNITY
End: 2020-12-31 | Stop reason: HOSPADM

## 2020-12-29 RX ORDER — DROPERIDOL 2.5 MG/ML
0.62 INJECTION, SOLUTION INTRAMUSCULAR; INTRAVENOUS ONCE
Status: COMPLETED | OUTPATIENT
Start: 2020-12-29 | End: 2020-12-29

## 2020-12-29 RX ORDER — ACETAMINOPHEN 325 MG/1
650 TABLET ORAL EVERY 4 HOURS PRN
Status: DISCONTINUED | OUTPATIENT
Start: 2020-12-29 | End: 2020-12-31 | Stop reason: HOSPADM

## 2020-12-29 RX ORDER — ATORVASTATIN CALCIUM 20 MG/1
10 TABLET, FILM COATED ORAL NIGHTLY
Status: DISCONTINUED | OUTPATIENT
Start: 2020-12-29 | End: 2020-12-31 | Stop reason: HOSPADM

## 2020-12-29 RX ORDER — AMLODIPINE BESYLATE 5 MG/1
5 TABLET ORAL NIGHTLY
Status: DISCONTINUED | OUTPATIENT
Start: 2020-12-29 | End: 2020-12-31 | Stop reason: HOSPADM

## 2020-12-29 RX ORDER — LIDOCAINE HYDROCHLORIDE 20 MG/ML
INJECTION, SOLUTION INFILTRATION; PERINEURAL AS NEEDED
Status: DISCONTINUED | OUTPATIENT
Start: 2020-12-29 | End: 2020-12-29 | Stop reason: SURG

## 2020-12-29 RX ORDER — PANTOPRAZOLE SODIUM 40 MG/1
40 TABLET, DELAYED RELEASE ORAL EVERY MORNING
Status: DISCONTINUED | OUTPATIENT
Start: 2020-12-30 | End: 2020-12-31 | Stop reason: HOSPADM

## 2020-12-29 RX ORDER — ACETAMINOPHEN 160 MG/5ML
650 SOLUTION ORAL EVERY 4 HOURS PRN
Status: DISCONTINUED | OUTPATIENT
Start: 2020-12-29 | End: 2020-12-31 | Stop reason: HOSPADM

## 2020-12-29 RX ORDER — SODIUM CHLORIDE 0.9 % (FLUSH) 0.9 %
10 SYRINGE (ML) INJECTION EVERY 12 HOURS SCHEDULED
Status: DISCONTINUED | OUTPATIENT
Start: 2020-12-29 | End: 2020-12-29 | Stop reason: HOSPADM

## 2020-12-29 RX ORDER — ACETAMINOPHEN 500 MG
500 TABLET ORAL ONCE
Status: COMPLETED | OUTPATIENT
Start: 2020-12-29 | End: 2020-12-29

## 2020-12-29 RX ORDER — HYDROCODONE BITARTRATE AND ACETAMINOPHEN 5; 325 MG/1; MG/1
1 TABLET ORAL ONCE AS NEEDED
Status: DISCONTINUED | OUTPATIENT
Start: 2020-12-29 | End: 2020-12-29 | Stop reason: HOSPADM

## 2020-12-29 RX ORDER — MIDAZOLAM HYDROCHLORIDE 1 MG/ML
1 INJECTION INTRAMUSCULAR; INTRAVENOUS
Status: DISCONTINUED | OUTPATIENT
Start: 2020-12-29 | End: 2020-12-29 | Stop reason: HOSPADM

## 2020-12-29 RX ORDER — ACETAMINOPHEN 325 MG/1
650 TABLET ORAL ONCE AS NEEDED
Status: DISCONTINUED | OUTPATIENT
Start: 2020-12-29 | End: 2020-12-29 | Stop reason: HOSPADM

## 2020-12-29 RX ORDER — FENTANYL CITRATE 50 UG/ML
50 INJECTION, SOLUTION INTRAMUSCULAR; INTRAVENOUS
Status: DISCONTINUED | OUTPATIENT
Start: 2020-12-29 | End: 2020-12-29 | Stop reason: HOSPADM

## 2020-12-29 RX ORDER — ONDANSETRON 2 MG/ML
4 INJECTION INTRAMUSCULAR; INTRAVENOUS EVERY 4 HOURS PRN
Status: DISCONTINUED | OUTPATIENT
Start: 2020-12-29 | End: 2020-12-29 | Stop reason: SDUPTHER

## 2020-12-29 RX ORDER — GLYCOPYRROLATE 0.2 MG/ML
INJECTION INTRAMUSCULAR; INTRAVENOUS AS NEEDED
Status: DISCONTINUED | OUTPATIENT
Start: 2020-12-29 | End: 2020-12-29 | Stop reason: SURG

## 2020-12-29 RX ORDER — NALOXONE HCL 0.4 MG/ML
0.4 VIAL (ML) INJECTION AS NEEDED
Status: DISCONTINUED | OUTPATIENT
Start: 2020-12-29 | End: 2020-12-29 | Stop reason: HOSPADM

## 2020-12-29 RX ORDER — PREGABALIN 75 MG/1
75 CAPSULE ORAL NIGHTLY
Status: DISCONTINUED | OUTPATIENT
Start: 2020-12-29 | End: 2020-12-31 | Stop reason: HOSPADM

## 2020-12-29 RX ORDER — ONDANSETRON 2 MG/ML
4 INJECTION INTRAMUSCULAR; INTRAVENOUS EVERY 6 HOURS PRN
Status: DISCONTINUED | OUTPATIENT
Start: 2020-12-29 | End: 2020-12-31 | Stop reason: HOSPADM

## 2020-12-29 RX ORDER — BUPIVACAINE HYDROCHLORIDE 2.5 MG/ML
INJECTION, SOLUTION EPIDURAL; INFILTRATION; INTRACAUDAL
Status: COMPLETED | OUTPATIENT
Start: 2020-12-29 | End: 2020-12-29

## 2020-12-29 RX ORDER — CEFAZOLIN SODIUM 2 G/100ML
2 INJECTION, SOLUTION INTRAVENOUS ONCE
Status: COMPLETED | OUTPATIENT
Start: 2020-12-29 | End: 2020-12-29

## 2020-12-29 RX ORDER — DIPHENHYDRAMINE HYDROCHLORIDE 50 MG/ML
12.5 INJECTION INTRAMUSCULAR; INTRAVENOUS
Status: DISCONTINUED | OUTPATIENT
Start: 2020-12-29 | End: 2020-12-29 | Stop reason: HOSPADM

## 2020-12-29 RX ORDER — PROPOFOL 10 MG/ML
VIAL (ML) INTRAVENOUS AS NEEDED
Status: DISCONTINUED | OUTPATIENT
Start: 2020-12-29 | End: 2020-12-29 | Stop reason: SURG

## 2020-12-29 RX ORDER — LIDOCAINE HYDROCHLORIDE 10 MG/ML
0.5 INJECTION, SOLUTION EPIDURAL; INFILTRATION; INTRACAUDAL; PERINEURAL ONCE AS NEEDED
Status: DISCONTINUED | OUTPATIENT
Start: 2020-12-29 | End: 2020-12-29 | Stop reason: HOSPADM

## 2020-12-29 RX ORDER — SODIUM CHLORIDE, SODIUM LACTATE, POTASSIUM CHLORIDE, AND CALCIUM CHLORIDE .6; .31; .03; .02 G/100ML; G/100ML; G/100ML; G/100ML
IRRIGANT IRRIGATION AS NEEDED
Status: DISCONTINUED | OUTPATIENT
Start: 2020-12-29 | End: 2020-12-29 | Stop reason: HOSPADM

## 2020-12-29 RX ORDER — FUROSEMIDE 40 MG/1
40 TABLET ORAL ONCE
Status: COMPLETED | OUTPATIENT
Start: 2020-12-30 | End: 2020-12-30

## 2020-12-29 RX ORDER — SODIUM CHLORIDE 0.9 % (FLUSH) 0.9 %
10 SYRINGE (ML) INJECTION AS NEEDED
Status: DISCONTINUED | OUTPATIENT
Start: 2020-12-29 | End: 2020-12-29 | Stop reason: HOSPADM

## 2020-12-29 RX ORDER — ONDANSETRON 2 MG/ML
INJECTION INTRAMUSCULAR; INTRAVENOUS AS NEEDED
Status: DISCONTINUED | OUTPATIENT
Start: 2020-12-29 | End: 2020-12-29 | Stop reason: SURG

## 2020-12-29 RX ORDER — EPHEDRINE SULFATE 50 MG/ML
INJECTION, SOLUTION INTRAVENOUS AS NEEDED
Status: DISCONTINUED | OUTPATIENT
Start: 2020-12-29 | End: 2020-12-29 | Stop reason: SURG

## 2020-12-29 RX ORDER — ROCURONIUM BROMIDE 10 MG/ML
INJECTION, SOLUTION INTRAVENOUS AS NEEDED
Status: DISCONTINUED | OUTPATIENT
Start: 2020-12-29 | End: 2020-12-29 | Stop reason: SURG

## 2020-12-29 RX ORDER — SODIUM CHLORIDE, SODIUM LACTATE, POTASSIUM CHLORIDE, CALCIUM CHLORIDE 600; 310; 30; 20 MG/100ML; MG/100ML; MG/100ML; MG/100ML
9 INJECTION, SOLUTION INTRAVENOUS CONTINUOUS
Status: DISCONTINUED | OUTPATIENT
Start: 2020-12-29 | End: 2020-12-31 | Stop reason: HOSPADM

## 2020-12-29 RX ADMIN — DROPERIDOL 0.62 MG: 2.5 INJECTION, SOLUTION INTRAMUSCULAR; INTRAVENOUS at 12:36

## 2020-12-29 RX ADMIN — DEXAMETHASONE SODIUM PHOSPHATE 8 MG: 10 INJECTION INTRAMUSCULAR; INTRAVENOUS at 09:53

## 2020-12-29 RX ADMIN — ATORVASTATIN CALCIUM 10 MG: 20 TABLET, FILM COATED ORAL at 21:09

## 2020-12-29 RX ADMIN — OXYCODONE HYDROCHLORIDE AND ACETAMINOPHEN 1 TABLET: 10; 325 TABLET ORAL at 18:34

## 2020-12-29 RX ADMIN — EPHEDRINE SULFATE 10 MG: 50 INJECTION INTRAVENOUS at 09:45

## 2020-12-29 RX ADMIN — EPHEDRINE SULFATE 5 MG: 50 INJECTION INTRAVENOUS at 10:10

## 2020-12-29 RX ADMIN — BUPIVACAINE HYDROCHLORIDE 20 ML: 2.5 INJECTION, SOLUTION EPIDURAL; INFILTRATION; INTRACAUDAL; PERINEURAL at 08:38

## 2020-12-29 RX ADMIN — FENTANYL CITRATE 50 MCG: 50 INJECTION INTRAMUSCULAR; INTRAVENOUS at 11:10

## 2020-12-29 RX ADMIN — SODIUM CHLORIDE, POTASSIUM CHLORIDE, SODIUM LACTATE AND CALCIUM CHLORIDE 9 ML/HR: 600; 310; 30; 20 INJECTION, SOLUTION INTRAVENOUS at 08:13

## 2020-12-29 RX ADMIN — GLYCOPYRROLATE 0.4 MG: 0.2 INJECTION INTRAMUSCULAR; INTRAVENOUS at 10:54

## 2020-12-29 RX ADMIN — FENTANYL CITRATE 50 MCG: 50 INJECTION INTRAMUSCULAR; INTRAVENOUS at 09:54

## 2020-12-29 RX ADMIN — PREGABALIN 75 MG: 75 CAPSULE ORAL at 21:09

## 2020-12-29 RX ADMIN — GLYCOPYRROLATE 0.2 MG: 0.2 INJECTION INTRAMUSCULAR; INTRAVENOUS at 09:40

## 2020-12-29 RX ADMIN — PROPOFOL 150 MG: 10 INJECTION, EMULSION INTRAVENOUS at 09:38

## 2020-12-29 RX ADMIN — CEFAZOLIN SODIUM 2 G: 2 INJECTION, SOLUTION INTRAVENOUS at 09:41

## 2020-12-29 RX ADMIN — SODIUM CHLORIDE, POTASSIUM CHLORIDE, SODIUM LACTATE AND CALCIUM CHLORIDE: 600; 310; 30; 20 INJECTION, SOLUTION INTRAVENOUS at 11:06

## 2020-12-29 RX ADMIN — ROCURONIUM BROMIDE 35 MG: 10 INJECTION, SOLUTION INTRAVENOUS at 09:38

## 2020-12-29 RX ADMIN — EPHEDRINE SULFATE 5 MG: 50 INJECTION INTRAVENOUS at 10:31

## 2020-12-29 RX ADMIN — FENTANYL CITRATE 25 MCG: 50 INJECTION INTRAMUSCULAR; INTRAVENOUS at 08:31

## 2020-12-29 RX ADMIN — AMLODIPINE BESYLATE 5 MG: 5 TABLET ORAL at 21:09

## 2020-12-29 RX ADMIN — SULFAMETHOXAZOLE AND TRIMETHOPRIM 160 MG: 800; 160 TABLET ORAL at 23:56

## 2020-12-29 RX ADMIN — Medication 2.5 MG: at 10:54

## 2020-12-29 RX ADMIN — MIDAZOLAM 1 MG: 1 INJECTION INTRAMUSCULAR; INTRAVENOUS at 08:31

## 2020-12-29 RX ADMIN — LIDOCAINE HYDROCHLORIDE 30 MG: 20 INJECTION, SOLUTION INFILTRATION; PERINEURAL at 09:38

## 2020-12-29 RX ADMIN — ACETAMINOPHEN 500 MG: 500 TABLET, FILM COATED ORAL at 08:28

## 2020-12-29 RX ADMIN — ONDANSETRON HYDROCHLORIDE 4 MG: 2 SOLUTION INTRAMUSCULAR; INTRAVENOUS at 09:40

## 2020-12-29 NOTE — ANESTHESIA PROCEDURE NOTES
Airway  Urgency: elective    Date/Time: 12/29/2020 9:42 AM  Difficult airway (stiff )    General Information and Staff    Patient location during procedure: OR  Anesthesiologist: Armando Bustamante MD  CRNA: Mary Karimi CRNA    Indications and Patient Condition  Indications for airway management: airway protection    Preoxygenated: yes  Mask difficulty assessment: 2 - vent by mask + OA or adjuvant +/- NMBA    Final Airway Details  Final airway type: endotracheal airway      Successful airway: ETT  Cuffed: yes   Successful intubation technique: direct laryngoscopy  Facilitating devices/methods: intubating stylet  Endotracheal tube insertion site: oral  Blade: [place]  ETT size (mm): 7.0  Cormack-Lehane Classification: grade IIa - partial view of glottis  Placement verified by: chest auscultation and capnometry   Cuff volume (mL): 8  Measured from: teeth  ETT/EBT  to teeth (cm): 19  Number of attempts at approach: 1  Assessment: lips, teeth, and gum same as pre-op and atraumatic intubation

## 2020-12-29 NOTE — ANESTHESIA POSTPROCEDURE EVALUATION
Patient: Marlena Navarrete    Procedure Summary     Date: 12/29/20 Room / [facility]:  [facility]    Anesthesia Start: 0931 Anesthesia Stop: 1110    Procedure: SHOULDER ARTHROSCOPY, ROTATOR CUFF REPAIR, SUBACROMIAL DECOMPRESSION, DISTAL CLAVICLE EXCISION, BICEPS TENOTOMY, AND LABRAL DEBRIDEMENT (Right Shoulder) Diagnosis:     Surgeon: Rishabh Florence MD Provider: Armando Bustamante MD    Anesthesia Type: general with block ASA Status: 3          Anesthesia Type: general with block    Vitals  Vitals Value Taken Time   /82 12/29/20 1445   Temp 36.3 °C (97.4 °F) 12/29/20 1107   Pulse 87 12/29/20 1452   Resp 22 12/29/20 1415   SpO2 89 % 12/29/20 1452   Vitals shown include unvalidated device data.        Post Anesthesia Care and Evaluation    Patient location during evaluation: bedside  Patient participation: complete - patient participated  Level of consciousness: awake and alert  Pain management: adequate  Airway patency: patent  Anesthetic complications: No anesthetic complications  PONV Status: none  Cardiovascular status: acceptable  Respiratory status: acceptable  Hydration status: acceptable    Comments: /53   Pulse 86   Temp 36.3 °C (97.4 °F) (Oral)   Resp 22   SpO2 98%

## 2020-12-29 NOTE — ANESTHESIA PREPROCEDURE EVALUATION
Anesthesia Evaluation     NPO Solid Status: > 8 hours             Airway   Mallampati: II  TM distance: >3 FB  No difficulty expected  Comment: 2b view last case  Dental - normal exam     Pulmonary - negative pulmonary ROS and normal exam   (-) not a smoker  Cardiovascular   Exercise tolerance: good (4-7 METS)    ECG reviewed  Rhythm: regular    (+) hypertension well controlled,       Neuro/Psych  GI/Hepatic/Renal/Endo    (+)  GERD,  renal disease CRI,     ROS Comment: GFR 33    Musculoskeletal     Abdominal    Substance History      OB/GYN          Other                        Anesthesia Plan    ASA 3     general with block   (  D/W R&B of GA including but not limited to: heart, lung, liver, kidney, neurologic problems, positioning injuries, dental damage, corneal abrasion and TMJ.  .Risks of peripheral nerve block were discussed with patient including but not limited to: inadequate block, bleeding, infection, persistent numbness or weakness, nerve damage, painful dysasthesia and need to protect limb while numb.)

## 2020-12-29 NOTE — ANESTHESIA PROCEDURE NOTES
Peripheral Block    Pre-sedation assessment completed: 12/29/2020 8:31 AM    Patient reassessed immediately prior to procedure    Patient location during procedure: pre-op  Start time: 12/29/2020 8:31 AM  Stop time: 12/29/2020 8:36 AM  Reason for block: at surgeon's request and post-op pain management  Performed by  Anesthesiologist: Bandar Moreno MD  Preanesthetic Checklist  Completed: patient identified, site marked, surgical consent, pre-op evaluation, timeout performed, IV checked, risks and benefits discussed and monitors and equipment checked  Prep:  Sterile barriers:gloves  Prep: ChloraPrep  Patient monitoring: blood pressure monitoring, continuous pulse oximetry and EKG  Procedure  Sedation:yes    Guidance:ultrasound guided  ULTRASOUND INTERPRETATION.  Using ultrasound guidance a 22 G gauge needle was placed in close proximity to the brachial plexus nerve, at which point, under ultrasound guidance anesthetic was injected in the area of the nerve and spread of the anesthesia was seen on ultrasound in close proximity thereto.  There were no abnormalities seen on ultrasound; a digital image was taken; and the patient tolerated the procedure with no complications. Images:still images obtained    Laterality:right  Block Type:interscalene  Injection Technique:single-shot  Needle Type:short-bevel  Needle Gauge:22 G      Medications Used: bupivacaine PF (MARCAINE) 0.25 % injection, 20 mL      Medications  Comment:.    Post Assessment  Injection Assessment: negative aspiration for heme, no paresthesia on injection and incremental injection  Patient Tolerance:comfortable throughout block  Complications:no

## 2020-12-30 PROBLEM — D62 ACUTE BLOOD LOSS ANEMIA: Status: ACTIVE | Noted: 2020-12-30

## 2020-12-30 PROBLEM — Z98.890 POSTOPERATIVE HYPOXIA: Status: ACTIVE | Noted: 2020-12-30

## 2020-12-30 PROBLEM — D50.9 IRON DEFICIENCY ANEMIA: Status: ACTIVE | Noted: 2020-12-30

## 2020-12-30 PROBLEM — G62.9 PERIPHERAL NEUROPATHY: Chronic | Status: ACTIVE | Noted: 2020-12-30

## 2020-12-30 PROBLEM — M19.019 SHOULDER ARTHRITIS: Status: ACTIVE | Noted: 2020-12-30

## 2020-12-30 PROBLEM — N18.30 CHRONIC KIDNEY DISEASE, STAGE 3: Chronic | Status: ACTIVE | Noted: 2020-12-30

## 2020-12-30 PROBLEM — Z98.890 STATUS POST ROTATOR CUFF SURGERY: Status: ACTIVE | Noted: 2020-12-30

## 2020-12-30 PROBLEM — M19.019 ARTHRITIS OF SHOULDER: Status: ACTIVE | Noted: 2020-12-30

## 2020-12-30 PROBLEM — N17.9 ACUTE RENAL FAILURE (ARF): Status: ACTIVE | Noted: 2020-12-30

## 2020-12-30 PROBLEM — Z86.718 HISTORY OF DVT (DEEP VEIN THROMBOSIS): Chronic | Status: ACTIVE | Noted: 2020-12-30

## 2020-12-30 PROBLEM — R09.02 POSTOPERATIVE HYPOXIA: Status: ACTIVE | Noted: 2020-12-30

## 2020-12-30 LAB
ANION GAP SERPL CALCULATED.3IONS-SCNC: 10.8 MMOL/L (ref 5–15)
ANION GAP SERPL CALCULATED.3IONS-SCNC: 8.9 MMOL/L (ref 5–15)
BUN SERPL-MCNC: 25 MG/DL (ref 8–23)
BUN SERPL-MCNC: 26 MG/DL (ref 8–23)
BUN/CREAT SERPL: 12 (ref 7–25)
BUN/CREAT SERPL: 13.4 (ref 7–25)
CALCIUM SPEC-SCNC: 8.8 MG/DL (ref 8.6–10.5)
CALCIUM SPEC-SCNC: 9.1 MG/DL (ref 8.6–10.5)
CHLORIDE SERPL-SCNC: 104 MMOL/L (ref 98–107)
CHLORIDE SERPL-SCNC: 107 MMOL/L (ref 98–107)
CO2 SERPL-SCNC: 21.1 MMOL/L (ref 22–29)
CO2 SERPL-SCNC: 22.2 MMOL/L (ref 22–29)
CREAT SERPL-MCNC: 1.94 MG/DL (ref 0.57–1)
CREAT SERPL-MCNC: 2.08 MG/DL (ref 0.57–1)
DEPRECATED RDW RBC AUTO: 43.3 FL (ref 37–54)
ERYTHROCYTE [DISTWIDTH] IN BLOOD BY AUTOMATED COUNT: 13 % (ref 12.3–15.4)
FERRITIN SERPL-MCNC: 107 NG/ML (ref 13–150)
GFR SERPL CREATININE-BSD FRML MDRD: 24 ML/MIN/1.73
GFR SERPL CREATININE-BSD FRML MDRD: 26 ML/MIN/1.73
GLUCOSE SERPL-MCNC: 89 MG/DL (ref 65–99)
GLUCOSE SERPL-MCNC: 97 MG/DL (ref 65–99)
HCT VFR BLD AUTO: 25.5 % (ref 34–46.6)
HCT VFR BLD AUTO: 31.1 % (ref 34–46.6)
HGB BLD-MCNC: 10.5 G/DL (ref 12–15.9)
HGB BLD-MCNC: 8.7 G/DL (ref 12–15.9)
IRON 24H UR-MRATE: 35 MCG/DL (ref 37–145)
IRON SATN MFR SERPL: 16 % (ref 20–50)
LYMPHOCYTES # BLD MANUAL: 0.72 10*3/MM3 (ref 0.7–3.1)
LYMPHOCYTES NFR BLD MANUAL: 6.1 % (ref 19.6–45.3)
LYMPHOCYTES NFR BLD MANUAL: 6.1 % (ref 5–12)
MCH RBC QN AUTO: 31.3 PG (ref 26.6–33)
MCHC RBC AUTO-ENTMCNC: 34.1 G/DL (ref 31.5–35.7)
MCV RBC AUTO: 91.7 FL (ref 79–97)
MONOCYTES # BLD AUTO: 0.72 10*3/MM3 (ref 0.1–0.9)
NEUTROPHILS # BLD AUTO: 10.34 10*3/MM3 (ref 1.7–7)
NEUTROPHILS NFR BLD MANUAL: 87.9 % (ref 42.7–76)
PLAT MORPH BLD: NORMAL
PLATELET # BLD AUTO: 235 10*3/MM3 (ref 140–450)
PMV BLD AUTO: 10.9 FL (ref 6–12)
POTASSIUM SERPL-SCNC: 4.7 MMOL/L (ref 3.5–5.2)
POTASSIUM SERPL-SCNC: 5.1 MMOL/L (ref 3.5–5.2)
RBC # BLD AUTO: 2.78 10*6/MM3 (ref 3.77–5.28)
RBC MORPH BLD: NORMAL
RETICS # AUTO: 0.04 10*6/MM3 (ref 0.02–0.13)
RETICS/RBC NFR AUTO: 1.3 % (ref 0.7–1.9)
SODIUM SERPL-SCNC: 137 MMOL/L (ref 136–145)
SODIUM SERPL-SCNC: 137 MMOL/L (ref 136–145)
TIBC SERPL-MCNC: 215 MCG/DL (ref 298–536)
TRANSFERRIN SERPL-MCNC: 144 MG/DL (ref 200–360)
WBC # BLD AUTO: 11.76 10*3/MM3 (ref 3.4–10.8)
WBC MORPH BLD: NORMAL

## 2020-12-30 PROCEDURE — 63710000001 MELATONIN 1 MG TABLET: Performed by: NURSE PRACTITIONER

## 2020-12-30 PROCEDURE — 63710000001 PREGABALIN 75 MG CAPSULE: Performed by: INTERNAL MEDICINE

## 2020-12-30 PROCEDURE — 82728 ASSAY OF FERRITIN: CPT | Performed by: STUDENT IN AN ORGANIZED HEALTH CARE EDUCATION/TRAINING PROGRAM

## 2020-12-30 PROCEDURE — A9270 NON-COVERED ITEM OR SERVICE: HCPCS | Performed by: INTERNAL MEDICINE

## 2020-12-30 PROCEDURE — 63710000001 CALCIUM CARBONATE 500 MG CHEWABLE TABLET: Performed by: STUDENT IN AN ORGANIZED HEALTH CARE EDUCATION/TRAINING PROGRAM

## 2020-12-30 PROCEDURE — 85025 COMPLETE CBC W/AUTO DIFF WBC: CPT | Performed by: INTERNAL MEDICINE

## 2020-12-30 PROCEDURE — 96365 THER/PROPH/DIAG IV INF INIT: CPT

## 2020-12-30 PROCEDURE — 63710000001 AMLODIPINE 5 MG TABLET: Performed by: INTERNAL MEDICINE

## 2020-12-30 PROCEDURE — 63710000001 FUROSEMIDE 40 MG TABLET: Performed by: INTERNAL MEDICINE

## 2020-12-30 PROCEDURE — A9270 NON-COVERED ITEM OR SERVICE: HCPCS | Performed by: NURSE PRACTITIONER

## 2020-12-30 PROCEDURE — 63710000001 OXYCODONE-ACETAMINOPHEN 10-325 MG TABLET: Performed by: INTERNAL MEDICINE

## 2020-12-30 PROCEDURE — G0378 HOSPITAL OBSERVATION PER HR: HCPCS

## 2020-12-30 PROCEDURE — 25010000002 IRON SUCROSE PER 1 MG: Performed by: STUDENT IN AN ORGANIZED HEALTH CARE EDUCATION/TRAINING PROGRAM

## 2020-12-30 PROCEDURE — 80048 BASIC METABOLIC PNL TOTAL CA: CPT | Performed by: INTERNAL MEDICINE

## 2020-12-30 PROCEDURE — 85018 HEMOGLOBIN: CPT | Performed by: STUDENT IN AN ORGANIZED HEALTH CARE EDUCATION/TRAINING PROGRAM

## 2020-12-30 PROCEDURE — 85007 BL SMEAR W/DIFF WBC COUNT: CPT | Performed by: INTERNAL MEDICINE

## 2020-12-30 PROCEDURE — 84466 ASSAY OF TRANSFERRIN: CPT | Performed by: STUDENT IN AN ORGANIZED HEALTH CARE EDUCATION/TRAINING PROGRAM

## 2020-12-30 PROCEDURE — 80048 BASIC METABOLIC PNL TOTAL CA: CPT | Performed by: STUDENT IN AN ORGANIZED HEALTH CARE EDUCATION/TRAINING PROGRAM

## 2020-12-30 PROCEDURE — A9270 NON-COVERED ITEM OR SERVICE: HCPCS | Performed by: STUDENT IN AN ORGANIZED HEALTH CARE EDUCATION/TRAINING PROGRAM

## 2020-12-30 PROCEDURE — 63710000001 PANTOPRAZOLE 40 MG TABLET DELAYED-RELEASE: Performed by: INTERNAL MEDICINE

## 2020-12-30 PROCEDURE — 63710000001 ATORVASTATIN 20 MG TABLET: Performed by: INTERNAL MEDICINE

## 2020-12-30 PROCEDURE — 85014 HEMATOCRIT: CPT | Performed by: STUDENT IN AN ORGANIZED HEALTH CARE EDUCATION/TRAINING PROGRAM

## 2020-12-30 PROCEDURE — 83540 ASSAY OF IRON: CPT | Performed by: STUDENT IN AN ORGANIZED HEALTH CARE EDUCATION/TRAINING PROGRAM

## 2020-12-30 PROCEDURE — 85045 AUTOMATED RETICULOCYTE COUNT: CPT | Performed by: STUDENT IN AN ORGANIZED HEALTH CARE EDUCATION/TRAINING PROGRAM

## 2020-12-30 RX ORDER — CALCIUM CARBONATE 200(500)MG
2 TABLET,CHEWABLE ORAL 3 TIMES DAILY PRN
Status: DISCONTINUED | OUTPATIENT
Start: 2020-12-30 | End: 2020-12-31 | Stop reason: HOSPADM

## 2020-12-30 RX ORDER — UREA 10 %
3 LOTION (ML) TOPICAL ONCE AS NEEDED
Status: COMPLETED | OUTPATIENT
Start: 2020-12-30 | End: 2020-12-30

## 2020-12-30 RX ADMIN — PANTOPRAZOLE SODIUM 40 MG: 40 TABLET, DELAYED RELEASE ORAL at 06:37

## 2020-12-30 RX ADMIN — OXYCODONE HYDROCHLORIDE AND ACETAMINOPHEN 1 TABLET: 10; 325 TABLET ORAL at 20:26

## 2020-12-30 RX ADMIN — PREGABALIN 75 MG: 75 CAPSULE ORAL at 22:41

## 2020-12-30 RX ADMIN — ATORVASTATIN CALCIUM 10 MG: 20 TABLET, FILM COATED ORAL at 22:41

## 2020-12-30 RX ADMIN — ANTACID TABLETS 2 TABLET: 500 TABLET, CHEWABLE ORAL at 18:31

## 2020-12-30 RX ADMIN — SODIUM CHLORIDE 500 ML: 9 INJECTION, SOLUTION INTRAVENOUS at 10:09

## 2020-12-30 RX ADMIN — FUROSEMIDE 40 MG: 40 TABLET ORAL at 08:00

## 2020-12-30 RX ADMIN — OXYCODONE HYDROCHLORIDE AND ACETAMINOPHEN 1 TABLET: 10; 325 TABLET ORAL at 13:40

## 2020-12-30 RX ADMIN — IRON SUCROSE 200 MG: 20 INJECTION, SOLUTION INTRAVENOUS at 13:36

## 2020-12-30 RX ADMIN — Medication 3 MG: at 23:26

## 2020-12-30 RX ADMIN — OXYCODONE HYDROCHLORIDE AND ACETAMINOPHEN 1 TABLET: 10; 325 TABLET ORAL at 06:37

## 2020-12-30 RX ADMIN — AMLODIPINE BESYLATE 5 MG: 5 TABLET ORAL at 22:41

## 2020-12-30 RX ADMIN — OXYCODONE HYDROCHLORIDE AND ACETAMINOPHEN 1 TABLET: 10; 325 TABLET ORAL at 00:39

## 2020-12-31 ENCOUNTER — READMISSION MANAGEMENT (OUTPATIENT)
Dept: CALL CENTER | Facility: HOSPITAL | Age: 70
End: 2020-12-31

## 2020-12-31 VITALS
HEART RATE: 69 BPM | RESPIRATION RATE: 18 BRPM | BODY MASS INDEX: 30.77 KG/M2 | OXYGEN SATURATION: 92 % | SYSTOLIC BLOOD PRESSURE: 118 MMHG | HEIGHT: 58 IN | TEMPERATURE: 97.9 F | WEIGHT: 146.61 LBS | DIASTOLIC BLOOD PRESSURE: 63 MMHG

## 2020-12-31 PROBLEM — R09.02 POSTOPERATIVE HYPOXIA: Status: RESOLVED | Noted: 2020-12-30 | Resolved: 2020-12-31

## 2020-12-31 PROBLEM — J95.821 RESPIRATORY FAILURE, POST-OPERATIVE (HCC): Status: RESOLVED | Noted: 2020-12-29 | Resolved: 2020-12-31

## 2020-12-31 PROBLEM — D62 ACUTE BLOOD LOSS ANEMIA: Status: RESOLVED | Noted: 2020-12-30 | Resolved: 2020-12-31

## 2020-12-31 PROBLEM — Z98.890 POSTOPERATIVE HYPOXIA: Status: RESOLVED | Noted: 2020-12-30 | Resolved: 2020-12-31

## 2020-12-31 PROBLEM — M19.019 ARTHRITIS OF SHOULDER: Status: RESOLVED | Noted: 2020-12-30 | Resolved: 2020-12-31

## 2020-12-31 LAB
ANION GAP SERPL CALCULATED.3IONS-SCNC: 12.4 MMOL/L (ref 5–15)
BUN SERPL-MCNC: 26 MG/DL (ref 8–23)
BUN/CREAT SERPL: 12.7 (ref 7–25)
CALCIUM SPEC-SCNC: 9.7 MG/DL (ref 8.6–10.5)
CHLORIDE SERPL-SCNC: 104 MMOL/L (ref 98–107)
CO2 SERPL-SCNC: 23.6 MMOL/L (ref 22–29)
CREAT SERPL-MCNC: 2.05 MG/DL (ref 0.57–1)
DEPRECATED RDW RBC AUTO: 44.8 FL (ref 37–54)
ERYTHROCYTE [DISTWIDTH] IN BLOOD BY AUTOMATED COUNT: 13.1 % (ref 12.3–15.4)
GFR SERPL CREATININE-BSD FRML MDRD: 24 ML/MIN/1.73
GLUCOSE SERPL-MCNC: 89 MG/DL (ref 65–99)
HCT VFR BLD AUTO: 31.7 % (ref 34–46.6)
HGB BLD-MCNC: 10.5 G/DL (ref 12–15.9)
MCH RBC QN AUTO: 31 PG (ref 26.6–33)
MCHC RBC AUTO-ENTMCNC: 33.1 G/DL (ref 31.5–35.7)
MCV RBC AUTO: 93.5 FL (ref 79–97)
PLATELET # BLD AUTO: 263 10*3/MM3 (ref 140–450)
PMV BLD AUTO: 10.5 FL (ref 6–12)
POTASSIUM SERPL-SCNC: 4.2 MMOL/L (ref 3.5–5.2)
RBC # BLD AUTO: 3.39 10*6/MM3 (ref 3.77–5.28)
SODIUM SERPL-SCNC: 140 MMOL/L (ref 136–145)
WBC # BLD AUTO: 9.78 10*3/MM3 (ref 3.4–10.8)

## 2020-12-31 PROCEDURE — G0378 HOSPITAL OBSERVATION PER HR: HCPCS

## 2020-12-31 PROCEDURE — 63710000001 CALCIUM CARBONATE 500 MG CHEWABLE TABLET: Performed by: STUDENT IN AN ORGANIZED HEALTH CARE EDUCATION/TRAINING PROGRAM

## 2020-12-31 PROCEDURE — 85027 COMPLETE CBC AUTOMATED: CPT | Performed by: STUDENT IN AN ORGANIZED HEALTH CARE EDUCATION/TRAINING PROGRAM

## 2020-12-31 PROCEDURE — A9270 NON-COVERED ITEM OR SERVICE: HCPCS | Performed by: STUDENT IN AN ORGANIZED HEALTH CARE EDUCATION/TRAINING PROGRAM

## 2020-12-31 PROCEDURE — 63710000001 PANTOPRAZOLE 40 MG TABLET DELAYED-RELEASE: Performed by: INTERNAL MEDICINE

## 2020-12-31 PROCEDURE — 63710000001 OXYCODONE-ACETAMINOPHEN 10-325 MG TABLET: Performed by: INTERNAL MEDICINE

## 2020-12-31 PROCEDURE — 63710000001 ONDANSETRON PER 8 MG: Performed by: STUDENT IN AN ORGANIZED HEALTH CARE EDUCATION/TRAINING PROGRAM

## 2020-12-31 PROCEDURE — 25010000002 ONDANSETRON PER 1 MG: Performed by: INTERNAL MEDICINE

## 2020-12-31 PROCEDURE — A9270 NON-COVERED ITEM OR SERVICE: HCPCS | Performed by: INTERNAL MEDICINE

## 2020-12-31 PROCEDURE — 80048 BASIC METABOLIC PNL TOTAL CA: CPT | Performed by: STUDENT IN AN ORGANIZED HEALTH CARE EDUCATION/TRAINING PROGRAM

## 2020-12-31 RX ORDER — OMEPRAZOLE 20 MG/1
40 CAPSULE, DELAYED RELEASE ORAL DAILY
Qty: 30 CAPSULE | Refills: 0 | Status: SHIPPED | OUTPATIENT
Start: 2020-12-31 | End: 2021-01-31

## 2020-12-31 RX ORDER — DOXYCYCLINE HYCLATE 50 MG/1
324 CAPSULE, GELATIN COATED ORAL
Qty: 30 TABLET | Refills: 0 | Status: SHIPPED | OUTPATIENT
Start: 2020-12-31 | End: 2021-01-31

## 2020-12-31 RX ORDER — ONDANSETRON 4 MG/1
4 TABLET, ORALLY DISINTEGRATING ORAL EVERY 8 HOURS PRN
Qty: 12 TABLET | Refills: 0 | Status: SHIPPED | OUTPATIENT
Start: 2020-12-31 | End: 2022-03-09

## 2020-12-31 RX ORDER — ONDANSETRON 4 MG/1
4 TABLET, FILM COATED ORAL EVERY 6 HOURS PRN
Status: DISCONTINUED | OUTPATIENT
Start: 2020-12-31 | End: 2020-12-31 | Stop reason: HOSPADM

## 2020-12-31 RX ADMIN — ANTACID TABLETS 1 TABLET: 500 TABLET, CHEWABLE ORAL at 09:52

## 2020-12-31 RX ADMIN — OXYCODONE HYDROCHLORIDE AND ACETAMINOPHEN 1 TABLET: 10; 325 TABLET ORAL at 08:13

## 2020-12-31 RX ADMIN — ONDANSETRON HYDROCHLORIDE 4 MG: 4 TABLET, FILM COATED ORAL at 08:32

## 2020-12-31 RX ADMIN — PANTOPRAZOLE SODIUM 40 MG: 40 TABLET, DELAYED RELEASE ORAL at 06:17

## 2020-12-31 NOTE — OUTREACH NOTE
Prep Survey      Responses   [facility] facility patient discharged from?  [place]   Is LACE score < 7 ?  Yes   Emergency Room discharge w/ pulse ox?  No   Eligibility  [facility]   Date of Admission  12/29/20   Date of Discharge  12/31/20   Discharge Disposition  Home or Self Care   Discharge diagnosis  SHOULDER ARTHROSCOPY Postoperative hypoxia Acute renal failure    Does the patient have one of the following disease processes/diagnoses(primary or secondary)?  General Surgery   Does the patient have Home health ordered?  No   Is there a DME ordered?  No   Prep survey completed?  Yes          Sol Meyer RN

## 2021-01-04 ENCOUNTER — TRANSITIONAL CARE MANAGEMENT TELEPHONE ENCOUNTER (OUTPATIENT)
Dept: CALL CENTER | Facility: HOSPITAL | Age: 71
End: 2021-01-04

## 2021-01-04 NOTE — OUTREACH NOTE
Call Center TCM Note      Responses   [facility] patient discharged from?  [place]   Does the patient have one of the following disease processes/diagnoses(primary or secondary)?  General Surgery   TCM attempt successful?  Yes   Call start time  1231   Call end time  1245   Discharge diagnosis  SHOULDER ARTHROSCOPY Postoperative hypoxia Acute renal failure    Is patient permission given to speak with other caregiver?  No   Meds reviewed with patient/caregiver?  Yes   Is the patient having any side effects they believe may be caused by any medication additions or changes?  No   Does the patient have all medications related to this admission filled (includes all antibiotics, pain medications, etc.)  Yes   Is the patient taking all medications as directed (includes completed medication regime)?  Yes   Does the patient have a follow up appointment scheduled with their surgeon?  Yes   Has the patient kept scheduled appointments due by today?  N/A   Comments  PCP Bradley Hernandez,  [facility] follow up scheduled for 1/6/21  215pm   Has home health visited the patient within 72 hours of discharge?  N/A   Psychosocial issues?  No   Did the patient receive a copy of their discharge instructions?  Yes   Nursing interventions  Reviewed instructions with patient   What is the patient's perception of their health status since discharge?  Improving   Nursing interventions  Nurse provided patient education   Is the patient /caregiver able to teach back basic post-op care?  Continue use of incentive spirometry at least 1 week post discharge, No tub bath, swimming, or hot tub until instructed by MD, Keep incision areas clean,dry and protected, Practice 'cough and deep breath'   Is the patient/caregiver able to teach back signs and symptoms of incisional infection?  Increased redness, swelling or pain at the incisonal site, Increased drainage or bleeding, Incisional warmth, Pus or odor from incision, Fever   Is the patient/caregiver  able to teach back steps to recovery at home?  Set small, achievable goals for return to baseline health, Rest and rebuild strength, gradually increase activity   Is the patient/caregiver able to teach back the hierarchy of who to call/visit for symptoms/problems? PCP, Specialist, Home health nurse, Urgent Care, ED, 911  Yes   TCM call completed?  Yes          Sol Rodrigez RN    1/4/2021, 12:45 EST

## 2021-01-06 ENCOUNTER — LAB (OUTPATIENT)
Dept: LAB | Facility: HOSPITAL | Age: 71
End: 2021-01-06

## 2021-01-06 ENCOUNTER — OFFICE VISIT (OUTPATIENT)
Dept: FAMILY MEDICINE CLINIC | Facility: CLINIC | Age: 71
End: 2021-01-06

## 2021-01-06 VITALS
HEART RATE: 78 BPM | DIASTOLIC BLOOD PRESSURE: 68 MMHG | SYSTOLIC BLOOD PRESSURE: 142 MMHG | WEIGHT: 145 LBS | RESPIRATION RATE: 18 BRPM | BODY MASS INDEX: 30.44 KG/M2 | OXYGEN SATURATION: 98 % | HEIGHT: 58 IN | TEMPERATURE: 96.9 F

## 2021-01-06 DIAGNOSIS — D64.9 ANEMIA, UNSPECIFIED TYPE: ICD-10-CM

## 2021-01-06 DIAGNOSIS — R09.02 HYPOXEMIA: ICD-10-CM

## 2021-01-06 DIAGNOSIS — N17.9 ACUTE RENAL FAILURE, UNSPECIFIED ACUTE RENAL FAILURE TYPE (HCC): Primary | ICD-10-CM

## 2021-01-06 LAB
ANION GAP SERPL CALCULATED.3IONS-SCNC: 10.3 MMOL/L (ref 5–15)
BASOPHILS # BLD AUTO: 0.09 10*3/MM3 (ref 0–0.2)
BASOPHILS NFR BLD AUTO: 0.9 % (ref 0–1.5)
BUN SERPL-MCNC: 26 MG/DL (ref 8–23)
BUN/CREAT SERPL: 16.5 (ref 7–25)
CALCIUM SPEC-SCNC: 9.1 MG/DL (ref 8.6–10.5)
CHLORIDE SERPL-SCNC: 107 MMOL/L (ref 98–107)
CO2 SERPL-SCNC: 20.7 MMOL/L (ref 22–29)
CREAT SERPL-MCNC: 1.58 MG/DL (ref 0.57–1)
DEPRECATED RDW RBC AUTO: 43.1 FL (ref 37–54)
EOSINOPHIL # BLD AUTO: 0.26 10*3/MM3 (ref 0–0.4)
EOSINOPHIL NFR BLD AUTO: 2.7 % (ref 0.3–6.2)
ERYTHROCYTE [DISTWIDTH] IN BLOOD BY AUTOMATED COUNT: 12.9 % (ref 12.3–15.4)
GFR SERPL CREATININE-BSD FRML MDRD: 33 ML/MIN/1.73
GLUCOSE SERPL-MCNC: 92 MG/DL (ref 65–99)
HCT VFR BLD AUTO: 32.5 % (ref 34–46.6)
HGB BLD-MCNC: 10.9 G/DL (ref 12–15.9)
IMM GRANULOCYTES # BLD AUTO: 0.04 10*3/MM3 (ref 0–0.05)
IMM GRANULOCYTES NFR BLD AUTO: 0.4 % (ref 0–0.5)
LYMPHOCYTES # BLD AUTO: 2.8 10*3/MM3 (ref 0.7–3.1)
LYMPHOCYTES NFR BLD AUTO: 29 % (ref 19.6–45.3)
MCH RBC QN AUTO: 31.1 PG (ref 26.6–33)
MCHC RBC AUTO-ENTMCNC: 33.5 G/DL (ref 31.5–35.7)
MCV RBC AUTO: 92.9 FL (ref 79–97)
MONOCYTES # BLD AUTO: 1 10*3/MM3 (ref 0.1–0.9)
MONOCYTES NFR BLD AUTO: 10.3 % (ref 5–12)
NEUTROPHILS NFR BLD AUTO: 5.48 10*3/MM3 (ref 1.7–7)
NEUTROPHILS NFR BLD AUTO: 56.7 % (ref 42.7–76)
NRBC BLD AUTO-RTO: 0 /100 WBC (ref 0–0.2)
PLATELET # BLD AUTO: 389 10*3/MM3 (ref 140–450)
PMV BLD AUTO: 10.2 FL (ref 6–12)
POTASSIUM SERPL-SCNC: 4.4 MMOL/L (ref 3.5–5.2)
RBC # BLD AUTO: 3.5 10*6/MM3 (ref 3.77–5.28)
SODIUM SERPL-SCNC: 138 MMOL/L (ref 136–145)
WBC # BLD AUTO: 9.67 10*3/MM3 (ref 3.4–10.8)

## 2021-01-06 PROCEDURE — 85025 COMPLETE CBC W/AUTO DIFF WBC: CPT | Performed by: FAMILY MEDICINE

## 2021-01-06 PROCEDURE — 80048 BASIC METABOLIC PNL TOTAL CA: CPT | Performed by: FAMILY MEDICINE

## 2021-01-06 PROCEDURE — 99496 TRANSJ CARE MGMT HIGH F2F 7D: CPT | Performed by: FAMILY MEDICINE

## 2021-01-06 PROCEDURE — 1111F DSCHRG MED/CURRENT MED MERGE: CPT | Performed by: FAMILY MEDICINE

## 2021-01-06 PROCEDURE — 36415 COLL VENOUS BLD VENIPUNCTURE: CPT | Performed by: FAMILY MEDICINE

## 2021-01-06 NOTE — PROGRESS NOTES
Transitional Care Follow Up Visit  Subjective     Marlena Navarrete is a 70 y.o. female who presents for a transitional care management visit.  She was discharged from the hospital on December 31.  She was in for rotator cuff repair and postoperatively developed hypoxemia and respiratory failure.  She also developed acute renal failure.  She also had anemia.  I reviewed the hospital admission history physical and discharge summary in detail.  She feels much better.  Right shoulder does not give her much pain at all.    Within 48 business hours after discharge our office contacted her via telephone to coordinate her care and needs.      I reviewed and discussed the details of that call along with the discharge summary, hospital problems, inpatient lab results, inpatient diagnostic studies, and consultation reports with Marlena.     Current outpatient and discharge medications have been reconciled for the patient.  Reviewed by: Bradley Hernandez MD      Date of TCM Phone Call 12/31/2020   [facility]   Date of Admission 12/29/2020   Date of Discharge 12/31/2020   Discharge Disposition Home or Self Care     Risk for Readmission (LACE) Score: 5 (12/31/2020  6:00 AM)      History of Present Illness   Course During Hospital Stay: This is reviewed and noted.         Review of Systems all systems are reviewed and are negative otherwise.    Objective   Physical Exam   HEENT: Not remarkable  Neck: Supple  Lungs: Clear.  Equal bilateral expansion  Cor: Regular rate and rhythm  Abdomen: Soft nontender bowel sounds present.  No organomegaly.  Extremities: Postoperative sling is in place    Assessment/Plan   Postoperative hypoxemia  Postoperative acute renal failure  Postoperative anemia    Plan CBC BMP ordered.  Follow-up on labs.  Call if any problems.  Keep appointment with nephrology and your surgeon.    Dictated utilizing Dragon dictation.

## 2022-02-18 ENCOUNTER — APPOINTMENT (OUTPATIENT)
Dept: GENERAL RADIOLOGY | Facility: HOSPITAL | Age: 72
End: 2022-02-18

## 2022-02-18 ENCOUNTER — HOSPITAL ENCOUNTER (EMERGENCY)
Facility: HOSPITAL | Age: 72
Discharge: HOME | End: 2022-02-18
Attending: EMERGENCY MEDICINE | Admitting: EMERGENCY MEDICINE

## 2022-02-18 VITALS
SYSTOLIC BLOOD PRESSURE: 132 MMHG | HEIGHT: 58 IN | TEMPERATURE: 97.3 F | RESPIRATION RATE: 18 BRPM | DIASTOLIC BLOOD PRESSURE: 70 MMHG | BODY MASS INDEX: 30.31 KG/M2 | OXYGEN SATURATION: 94 % | HEART RATE: 57 BPM

## 2022-02-18 DIAGNOSIS — M54.50 ACUTE EXACERBATION OF CHRONIC LOW BACK PAIN: Primary | ICD-10-CM

## 2022-02-18 DIAGNOSIS — G89.29 ACUTE EXACERBATION OF CHRONIC LOW BACK PAIN: Primary | ICD-10-CM

## 2022-02-18 DIAGNOSIS — M48.061 SPINAL STENOSIS OF LUMBAR REGION, UNSPECIFIED WHETHER NEUROGENIC CLAUDICATION PRESENT: ICD-10-CM

## 2022-02-18 PROCEDURE — 96372 THER/PROPH/DIAG INJ SC/IM: CPT

## 2022-02-18 PROCEDURE — 25010000002 HYDROMORPHONE 1 MG/ML SOLUTION: Performed by: EMERGENCY MEDICINE

## 2022-02-18 PROCEDURE — 99283 EMERGENCY DEPT VISIT LOW MDM: CPT

## 2022-02-18 PROCEDURE — 72110 X-RAY EXAM L-2 SPINE 4/>VWS: CPT

## 2022-02-18 PROCEDURE — 63710000001 ONDANSETRON ODT 4 MG TABLET DISPERSIBLE: Performed by: EMERGENCY MEDICINE

## 2022-02-18 RX ORDER — ONDANSETRON 4 MG/1
4 TABLET, ORALLY DISINTEGRATING ORAL ONCE
Status: COMPLETED | OUTPATIENT
Start: 2022-02-18 | End: 2022-02-18

## 2022-02-18 RX ORDER — CYCLOBENZAPRINE HCL 10 MG
10 TABLET ORAL ONCE
Status: COMPLETED | OUTPATIENT
Start: 2022-02-18 | End: 2022-02-18

## 2022-02-18 RX ADMIN — ONDANSETRON 4 MG: 4 TABLET, ORALLY DISINTEGRATING ORAL at 16:02

## 2022-02-18 RX ADMIN — CYCLOBENZAPRINE 10 MG: 10 TABLET, FILM COATED ORAL at 17:15

## 2022-02-18 RX ADMIN — HYDROMORPHONE HYDROCHLORIDE 1 MG: 1 INJECTION, SOLUTION INTRAMUSCULAR; INTRAVENOUS; SUBCUTANEOUS at 16:02

## 2022-02-18 NOTE — ED PROVIDER NOTES
" EMERGENCY DEPARTMENT ENCOUNTER    Room Number:  38/38  Date of encounter:  2/18/2022  PCP: Bradley Hernandez MD  Historian: Patient     I used full protective equipment while examining this patient.  This includes face mask, gloves and protective eyewear.  I washed my hands before entering the room and immediately upon leaving the room.  Patient was wearing a surgical mask.      HPI:  Chief Complaint: Worsening low back pain  A complete HPI/ROS/PMH/PSH/SH/FH are unobtainable due to: None    Context: Marlena Navarrete is a 72 y.o. female: With a history of chronic low back pain and spinal stenosis, who presents to the ED c/o worsening low back pain for the past 1 week.  Patient denies specific injury but states she was \"doing some work around the house\" the few days before the pain increased.  Pain is constant and is worse with movement.  Pain is located in the right lower back and radiates into the right buttock, right hip and down to the right knee.  Pain is described as sharp and burning.  It is severe.  Patient saw her pain management doctor 2 days ago and was given a Medrol Dosepak.  Her Lyrica dose was also increased.  She  takes hydrocodone 2-3 times daily.  None of this is helped her pain.  She has chronic right foot drop.  She has a spinal stimulator and has a history of back surgery.  Denies fever, chills, abdominal pain, chest pain, shortness of breath, loss of bowel/bladder control, saddle anesthesia, or new/worsening unilateral numbness/tingling/weakness.      PAST MEDICAL HISTORY  Active Ambulatory Problems     Diagnosis Date Noted   • Lumbar radiculopathy 06/07/2018   • Essential hypertension 08/25/2018   • Right foot drop 08/25/2018   • Kidney transplant recipient 09/07/1979   • History of lumbar laminectomy for spinal cord decompression 08/25/2018   • Spinal enthesopathy of thoracic region (HCC) 03/12/2019   • Postlaminectomy syndrome, lumbar region 02/17/2020   • Chronic bilateral low back pain with " right-sided sciatica 2020   • Displacement of other nervous system device, implant or graft, initial encounter (Formerly [facility]) 2020   • Postlaminectomy syndrome of lumbar region 2020   • Epigastric pain 2020   • Nausea 2020   • Gastroesophageal reflux disease 2020   • Shoulder arthritis 2020   • Acute renal failure (ARF) (Formerly [facility]) 2020   • Chronic kidney disease, stage 3 (Formerly [facility]) 2020   • Iron deficiency anemia 2020   • Status post rotator cuff surgery 2020   • History of DVT (deep vein thrombosis) 2020   • Peripheral neuropathy 2020     Resolved Ambulatory Problems     Diagnosis Date Noted   • Lumbar radiculopathy, acute 2018   • Respiratory failure, post-operative (Formerly [facility]) 2020   • Acute blood loss anemia 2020   • Postoperative hypoxia 2020   • Arthritis of shoulder 2020     Past Medical History:   Diagnosis Date   • Arthritis    • Chronic back pain    • CKD (chronic kidney disease)    • Diverticulosis    • Foot drop, right    • GERD (gastroesophageal reflux disease)    • History of transfusion    • Hyperlipidemia    • Hypertension    • Neuropathy    • Spinal headache    • Torn rotator cuff          PAST SURGICAL HISTORY  Past Surgical History:   Procedure Laterality Date   • APPENDECTOMY     • CATARACT EXTRACTION EXTRACAPSULAR W/ INTRAOCULAR LENS IMPLANTATION     •  SECTION     • COLONOSCOPY  approx     Anastacio WHARTON   • ENDOSCOPY N/A 2020    Procedure: ESOPHAGOGASTRODUODENOSCOPY with biopsies;  Surgeon: Wicho Chaves MD;  Location: [facility] ENDOSCOPY;  Service: Gastroenterology;  Laterality: N/A;  pre- epigastric pain, nausea  post-- gastritis, duodenitis, bile reflux   • LUMBAR DISCECTOMY FUSION INSTRUMENTATION Right 2018    Procedure: Right L2-3 laminectomy with Metrix;  Surgeon: Oscar Paulino MD;  Location: [facility] MAIN OR;  Service: Neurosurgery   • SHOULDER ARTHROSCOPY W/ ROTATOR CUFF  REPAIR Right 2020    Procedure: SHOULDER ARTHROSCOPY, ROTATOR CUFF REPAIR, SUBACROMIAL DECOMPRESSION, DISTAL CLAVICLE EXCISION, BICEPS TENOTOMY, AND LABRAL DEBRIDEMENT;  Surgeon: Rishabh Florence MD;  Location: [facility];  Service: Orthopedics;  Laterality: Right;   • SHOULDER ROTATOR CUFF REPAIR Left    • SPINAL CORD STIMULATOR IMPLANT N/A 2020    Procedure: SPINAL CORD STIMULATOR INSERTION PHASE 2, triall of right gluteal internal pulse generator;  Surgeon: Cuate Kemp MD;  Location: [facility];  Service: Neurosurgery;  Laterality: N/A;   • SPINAL CORD STIMULATOR IMPLANT N/A 2020    Procedure: SPINAL CORD STIMULATOR INSERTION PHASE 1, Thoracic 10 laminotomy for revision of [place] Tom surgical lead at Thoracic 8 to Thoracic 9;  Surgeon: Cuate Kemp MD;  Location: [facility];  Service: Neurosurgery;  Laterality: N/A;   • TONSILLECTOMY     • TRANSPLANTATION RENAL Right 1979    [facility]    • TUBAL ABDOMINAL LIGATION           FAMILY HISTORY  Family History   Problem Relation Age of Onset   • Aneurysm Mother         BRAIN ANEURYSM   • Heart failure Father    • Kidney failure Father    • Lung cancer Sister    • Lung cancer Sister    • Lung cancer Son    • Malig Hyperthermia Neg Hx          SOCIAL HISTORY  Social History     Socioeconomic History   • Marital status:    • Number of children: 1   • Years of education: 12   Tobacco Use   • Smoking status: Former Smoker     Packs/day: 1.00     Years: 30.00     Pack years: 30.00     Types: Cigarettes     Quit date: 2012     Years since quittin.8   • Smokeless tobacco: Never Used   Substance and Sexual Activity   • Alcohol use: No   • Drug use: Never   • Sexual activity: Defer         ALLERGIES  Patient has no known allergies.       REVIEW OF SYSTEMS  Review of Systems      All systems have been reviewed and are negative except as as discussed in the HPI    PHYSICAL EXAM    I have reviewed the triage vital signs  and nursing notes.    ED Triage Vitals   Temp Heart Rate Resp BP SpO2   02/18/22 1445 02/18/22 1444 02/18/22 1444 02/18/22 1446 02/18/22 1444   97.3 °F (36.3 °C) 85 16 154/76 94 %      Temp src Heart Rate Source Patient Position BP Location FiO2 (%)   -- 02/18/22 1444 -- -- --    Monitor          Physical Exam  GENERAL: Awake, alert, oriented x3.  Well-developed, well-nourished female.  Nontoxic-appearing  HENT: NCAT, nares patent, moist mucous membranes  NECK: supple  EYES: Extraocular muscles intact, no scleral icterus  CV: regular rhythm, regular rate  RESPIRATORY: normal effort, clear to auscultation bilaterally  ABDOMEN: soft, nontender  MUSCULOSKELETAL: There is tenderness over the right SI joint.  Extremities are nontender and without obvious deformity.  There is normal range of motion in all extremities.  There is no calf tenderness or pedal edema.  There is a brace on the lower right leg/foot.    NEURO: There is weakness with dorsiflexion of the right ankle (this is chronic and unchanged per patient).  There is normal bilateral flexion/extension of the hip and knee.  There is normal plantar flexion of both ankles.  There is diminished light touch sensation in the right leg (this is chronic and unchanged per patient).  Straight leg raise negative bilaterally.  SKIN: warm, dry, no rash  PSYCH: Normal mood and affect      LAB RESULTS  No results found for this or any previous visit (from the past 24 hour(s)).    Ordered the above labs and independently reviewed the results.      RADIOLOGY  XR Spine Lumbar Complete 4+VW    Result Date: 2/18/2022  FIVE-VIEW LUMBAR SPINE  HISTORY: Low back pain.  FINDINGS: There is moderate disc space narrowing and spurring at L2-3 similar to the study of 02/17/2020 and associated with very slight posterior subluxation of L2 measuring 5 mm that is unchanged. There is also some spurring of the posterior facets throughout the lumbar spine.  This report was finalized on 2/18/2022  5:04 PM by Dr. Mihai Dow M.D.        I ordered the above noted radiological studies. Reviewed by me and discussed with radiologist.  See dictation for official radiology interpretation.      PROCEDURES  Procedures      MEDICATIONS GIVEN IN ER    Medications   HYDROmorphone (DILAUDID) injection 1 mg (1 mg Intramuscular Given 2/18/22 1602)   ondansetron ODT (ZOFRAN-ODT) disintegrating tablet 4 mg (4 mg Oral Given 2/18/22 1602)   cyclobenzaprine (FLEXERIL) tablet 10 mg (10 mg Oral Given 2/18/22 1715)         PROGRESS, DATA ANALYSIS, CONSULTS, AND MEDICAL DECISION MAKING    All labs have been independently reviewed by me.  All radiology studies have been reviewed by me and discussed with radiologist dictating the report.   EKG's independently viewed and interpreted by me.  I have reviewed the nurse's notes, vital signs, past medical history, and medication list.  Discussion below represents my analysis of pertinent findings related to patient's condition, differential diagnosis, treatment plan and final disposition.      ED Course as of 02/18/22 2125 Fri Feb 18, 2022   1549 Patient presents with increasing lower back pain.  She states she has a history of chronic back pain since 2018.  She has known spinal stenosis and had arachnoiditis.  She developed increasing pain 6 days ago.  She went to her pain management doctor who placed her on a Medrol Dosepak along with increasing her Lyrica to 50 mg in the day and 150 mg at night.  States she has had no relief with this.  She denies fever, chills, saddle paresthesia, urinary or fecal incontinence.  She denies recent injury.  Her exam reveals lungs that are clear to auscultation bilaterally.  Heart is regular rate and rhythm without murmurs.  Her back reveals chronic changes from heating pad use to her lower back.  Her lumbar spine is tender to palpation.  She has a positive bilateral leg raise at 60 degrees.  I will give a dose of pain medications and obtain a plain  x-ray of her back. [DE]   1732 Lumbar spine x-rays interpreted by the radiologist.  Images independently viewed by me.  There is moderate disc space narrowing and spurring at L2/3 which is similar to previous study.  There is slight posterior subluxation of L2 that is unchanged.  No fracture. [WH]   1801 Patient reports that her pain has improved after Dilaudid and Flexeril.  I advised her to continue taking the Medrol Dosepak that was prescribed 2 days ago.  She says she has some muscle relaxers at home that she is not been taking.  I recommended she take these regular for the next few days.  I also advised her to avoid any lifting/bending.  Return precautions were discussed.  Patient does not have any new focal numbness/weakness, saddle anesthesia, or loss of bowel/bladder control.  She is afebrile and well-appearing. [WH]      ED Course User Index  [DE] Igor Evans MD  [WH] Ron Powell MD       AS OF 21:25 EST VITALS:    BP - 132/70  HR - 57  TEMP - 97.3 °F (36.3 °C)  O2 SATS - 94%      DIAGNOSIS  Final diagnoses:   Acute exacerbation of chronic low back pain   Spinal stenosis of lumbar region, unspecified whether neurogenic claudication present         DISPOSITION  Discharge    DISCHARGE    Patient discharged in stable condition.    Reviewed implications of results, diagnosis, meds, responsibility to follow up, warning signs and symptoms of possible worsening, potential complications and reasons to return to ER, including worsening pain, new numbness/tingling/weakness in your legs, loss of bowel/bladder control, saddle anesthesia, fever, or other concern..    Patient/Family voiced understanding of above instructions.    Discussed plan for discharge, as there is no emergent indication for admission. Patient referred to primary care provider for BP management due to today's BP. Pt/family is agreeable and understands need for follow up and repeat testing.  Pt is aware that discharge does not mean that  nothing is wrong but it indicates no emergency is present that requires admission and they must continue care with follow-up as given below or physician of their choice.     FOLLOW-UP  Bradley Hernandez MD  [address] [id number] [phone]    Schedule an appointment as soon as possible for a visit       Your pain management doctor    Schedule an appointment as soon as possible for a visit            Medication List      No changes were made to your prescriptions during this visit.           Dictated utilizing Dragon dictation:  Much of this encounter note is an electronic transcription/translation of spoken language to printed text. The electronic translation of spoken language may permit erroneous, or at times, nonsensical words or phrases to be inadvertently transcribed; Although I have reviewed the note for such errors, some may still exist.     Ron Powell MD  02/18/22 [id number]

## 2022-02-18 NOTE — ED NOTES
Patient wore surgical mask and I wore N95 mask for entire encounter, and I wore gloves, gown and eye protection as well. Hand hygiene was performed before and after encounter.        Alisa Amor RN  02/18/22 1552

## 2022-02-18 NOTE — ED NOTES
Back surgery for spinal stenosis In 2018, states she has had a lot of nerve damage since then. Has stimulator in back for 'pain control,' Chronic pain but has been worse since last Saturday. Pain is in lower back and goes down right hip, stabbing, tingling pain. Now pain down front of right thigh. Had pain management appointment this past Wednesday and was started on prednisone dose pack, takes lyrica 50 bid and 150 at hs. Has had no relief. Also on hydrocodone 10/325 tid prn, usually takes 2 a day.       Alisa Amor, RN  02/18/22 [id number]

## 2022-02-18 NOTE — DISCHARGE INSTRUCTIONS
Continue taking your regular pain medication and Medrol Dosepak.  Take muscle relaxers as previously prescribed.  Follow-up with your pain management doctor if symptoms not improving in the next few days.  Return to the emergency department for worsening pain, loss of bowel/bladder control, new numbness/tingling/weakness in your legs, numbness in her groin/pelvic area, or other concern.

## 2022-02-18 NOTE — ED TRIAGE NOTES
Pt reports right sided back pain that radiates down her right leg that started Saturday. Pt reports has chronic back pain and was seen by pain management on Wednesday, but nothing has helped.    Pt was wearing a mask during assessment.  This RN wore appropriate PPE

## 2022-02-22 ENCOUNTER — APPOINTMENT (OUTPATIENT)
Dept: CT IMAGING | Facility: HOSPITAL | Age: 72
End: 2022-02-22

## 2022-02-22 ENCOUNTER — HOSPITAL ENCOUNTER (EMERGENCY)
Facility: HOSPITAL | Age: 72
Discharge: HOME | End: 2022-02-22
Attending: EMERGENCY MEDICINE | Admitting: EMERGENCY MEDICINE

## 2022-02-22 VITALS — RESPIRATION RATE: 18 BRPM | OXYGEN SATURATION: 97 % | HEART RATE: 75 BPM | TEMPERATURE: 97.8 F

## 2022-02-22 DIAGNOSIS — M54.41 ACUTE RIGHT-SIDED LOW BACK PAIN WITH RIGHT-SIDED SCIATICA: Primary | ICD-10-CM

## 2022-02-22 PROCEDURE — 25010000002 TRIAMCINOLONE PER 10 MG: Performed by: NURSE PRACTITIONER

## 2022-02-22 PROCEDURE — 63710000001 ONDANSETRON ODT 4 MG TABLET DISPERSIBLE: Performed by: NURSE PRACTITIONER

## 2022-02-22 PROCEDURE — 72131 CT LUMBAR SPINE W/O DYE: CPT

## 2022-02-22 PROCEDURE — 99283 EMERGENCY DEPT VISIT LOW MDM: CPT

## 2022-02-22 PROCEDURE — 96372 THER/PROPH/DIAG INJ SC/IM: CPT

## 2022-02-22 PROCEDURE — 25010000002 HYDROMORPHONE 1 MG/ML SOLUTION: Performed by: NURSE PRACTITIONER

## 2022-02-22 RX ORDER — ONDANSETRON 4 MG/1
4 TABLET, ORALLY DISINTEGRATING ORAL ONCE
Status: COMPLETED | OUTPATIENT
Start: 2022-02-22 | End: 2022-02-22

## 2022-02-22 RX ORDER — PREDNISONE 20 MG/1
40 TABLET ORAL DAILY
Qty: 10 TABLET | Refills: 0 | Status: SHIPPED | OUTPATIENT
Start: 2022-02-22 | End: 2022-02-27

## 2022-02-22 RX ORDER — TRIAMCINOLONE ACETONIDE 40 MG/ML
40 INJECTION, SUSPENSION INTRA-ARTICULAR; INTRAMUSCULAR ONCE
Status: COMPLETED | OUTPATIENT
Start: 2022-02-22 | End: 2022-02-22

## 2022-02-22 RX ADMIN — ONDANSETRON 4 MG: 4 TABLET, ORALLY DISINTEGRATING ORAL at 12:56

## 2022-02-22 RX ADMIN — TRIAMCINOLONE ACETONIDE 40 MG: 40 INJECTION, SUSPENSION INTRA-ARTICULAR; INTRAMUSCULAR at 12:57

## 2022-02-22 RX ADMIN — HYDROMORPHONE HYDROCHLORIDE 1 MG: 1 INJECTION, SOLUTION INTRAMUSCULAR; INTRAVENOUS; SUBCUTANEOUS at 12:56

## 2022-02-22 RX ADMIN — HYDROMORPHONE HYDROCHLORIDE 1 MG: 1 INJECTION, SOLUTION INTRAMUSCULAR; INTRAVENOUS; SUBCUTANEOUS at 15:21

## 2022-02-22 NOTE — ED TRIAGE NOTES
Pt has chronic lower back pain that radiates down her right leg.  She has had increased pain x 2 weeks.  She has a stimulator.  She was here Friday and was given pain meds.  She takes oxycodone and lyrica for her back pain and nerve pain.      Patient was placed in face mask during first look triage.  Patient was wearing a face mask throughout encounter.  I wore personal protective equipment throughout the encounter.  Hand hygiene was performed before and after patient encounter.

## 2022-02-22 NOTE — ED PROVIDER NOTES
EMERGENCY DEPARTMENT ENCOUNTER    Room Number:  A02/02  Date seen:  2/22/2022  Time seen: 12:17 EST  PCP: Bradley Hernandez MD  Historian: Patient    HPI:  Chief complaint: Severe back pain  A complete HPI/ROS/PMH/PSH/SH/FH are unobtainable due to: Not applicable   context:Marlena Navarrete is a 72 y.o. female past medical history peripheral neuropathy, lumbar radiculopathy, right foot drop, postlaminectomy syndrome, chronic bilateral low back pain described as stabbing electrical pain with right-sided sciatica who presents to the ED with c/o 10 to 12 days of severe right-sided lower back pain that is worse than she has ever had.  She denies any loss of bowel or bladder control.  Pain is not related by a recent Medrol Dosepak, increased dose of Lyrica or her 2-3 times a day hydrocodone 10/325.  She has been using ice on her leg and heat on her back and buttocks.  She describes the pain she has preexisting known right foot drop and uses a cane for ambulation.      Patient was placed in face mask in first look. Patient was wearing facemask when I entered the room and throughout our encounter. I wore full protective equipment throughout this patient encounter including a N95 face mask, eye shield and gloves. Hand hygiene/washing of hands was performed before donning protective equipment and after removal when leaving the room.    MEDICAL RECORD REVIEW    ALLERGIES  Patient has no known allergies.    PAST MEDICAL HISTORY  Active Ambulatory Problems     Diagnosis Date Noted   • Lumbar radiculopathy 06/07/2018   • Essential hypertension 08/25/2018   • Right foot drop 08/25/2018   • Kidney transplant recipient 09/07/1979   • History of lumbar laminectomy for spinal cord decompression 08/25/2018   • Spinal enthesopathy of thoracic region (HCC) 03/12/2019   • Postlaminectomy syndrome, lumbar region 02/17/2020   • Chronic bilateral low back pain with right-sided sciatica 02/17/2020   • Displacement of other nervous system device,  implant or graft, initial encounter ([facility]) 2020   • Postlaminectomy syndrome of lumbar region 2020   • Epigastric pain 2020   • Nausea 2020   • Gastroesophageal reflux disease 2020   • Shoulder arthritis 2020   • Acute renal failure (ARF) ([facility]) 2020   • Chronic kidney disease, stage 3 ([facility]) 2020   • Iron deficiency anemia 2020   • Status post rotator cuff surgery 2020   • History of DVT (deep vein thrombosis) 2020   • Peripheral neuropathy 2020     Resolved Ambulatory Problems     Diagnosis Date Noted   • Lumbar radiculopathy, acute 2018   • Respiratory failure, post-operative ([facility]) 2020   • Acute blood loss anemia 2020   • Postoperative hypoxia 2020   • Arthritis of shoulder 2020     Past Medical History:   Diagnosis Date   • Arthritis    • Chronic back pain    • CKD (chronic kidney disease)    • Diverticulosis    • Foot drop, right    • GERD (gastroesophageal reflux disease)    • History of transfusion    • Hyperlipidemia    • Hypertension    • Neuropathy    • Spinal headache    • Torn rotator cuff        PAST SURGICAL HISTORY  Past Surgical History:   Procedure Laterality Date   • APPENDECTOMY     • CATARACT EXTRACTION EXTRACAPSULAR W/ INTRAOCULAR LENS IMPLANTATION     •  SECTION     • COLONOSCOPY  approx     Anastacio WHARTON   • ENDOSCOPY N/A 2020    Procedure: ESOPHAGOGASTRODUODENOSCOPY with biopsies;  Surgeon: Wicho Chaves MD;  Location: [facility] ENDOSCOPY;  Service: Gastroenterology;  Laterality: N/A;  pre- epigastric pain, nausea  post-- gastritis, duodenitis, bile reflux   • LUMBAR DISCECTOMY FUSION INSTRUMENTATION Right 2018    Procedure: Right L2-3 laminectomy with Metrix;  Surgeon: Oscar Paulino MD;  Location: [facility] MAIN OR;  Service: Neurosurgery   • SHOULDER ARTHROSCOPY W/ ROTATOR CUFF REPAIR Right 2020    Procedure: SHOULDER ARTHROSCOPY, ROTATOR CUFF REPAIR,  SUBACROMIAL DECOMPRESSION, DISTAL CLAVICLE EXCISION, BICEPS TENOTOMY, AND LABRAL DEBRIDEMENT;  Surgeon: Rishabh Florence MD;  Location: [facility];  Service: Orthopedics;  Laterality: Right;   • SHOULDER ROTATOR CUFF REPAIR Left    • SPINAL CORD STIMULATOR IMPLANT N/A 2020    Procedure: SPINAL CORD STIMULATOR INSERTION PHASE 2, triall of right gluteal internal pulse generator;  Surgeon: Cuate Kemp MD;  Location: [facility] OR;  Service: Neurosurgery;  Laterality: N/A;   • SPINAL CORD STIMULATOR IMPLANT N/A 2020    Procedure: SPINAL CORD STIMULATOR INSERTION PHASE 1, Thoracic 10 laminotomy for revision of [place] Tom surgical lead at Thoracic 8 to Thoracic 9;  Surgeon: Cuate Kemp MD;  Location: [facility] OR;  Service: Neurosurgery;  Laterality: N/A;   • TONSILLECTOMY     • TRANSPLANTATION RENAL Right 1979    [facility]    • TUBAL ABDOMINAL LIGATION         FAMILY HISTORY  Family History   Problem Relation Age of Onset   • Aneurysm Mother         BRAIN ANEURYSM   • Heart failure Father    • Kidney failure Father    • Lung cancer Sister    • Lung cancer Sister    • Lung cancer Son    • Malig Hyperthermia Neg Hx        SOCIAL HISTORY  Social History     Socioeconomic History   • Marital status:    • Number of children: 1   • Years of education: 12   Tobacco Use   • Smoking status: Former Smoker     Packs/day: 1.00     Years: 30.00     Pack years: 30.00     Types: Cigarettes     Quit date: 2012     Years since quittin.8   • Smokeless tobacco: Never Used   Substance and Sexual Activity   • Alcohol use: No   • Drug use: Never   • Sexual activity: Defer       REVIEW OF SYSTEMS  Review of Systems    All systems reviewed and negative except for those discussed in HPI.     PHYSICAL EXAM    ED Triage Vitals [22 1204]   Temp Heart Rate Resp BP SpO2   97.8 °F (36.6 °C) 75 18 -- 97 %      Temp src Heart Rate Source Patient Position BP Location FiO2 (%)   Tympanic Monitor --  -- --     Physical Exam    I have reviewed the triage vital signs and nursing notes.      GENERAL: distressed, tearful, appears uncomfortable  HENT: nares patent  EYES: no scleral icterus  NECK: no ROM limitations  CV: regular rhythm, regular rate, no murmur, no rubs, no gallups  RESPIRATORY: normal effort, CTAB  ABDOMEN: soft  : deferred  MUSCULOSKELETAL: no deformity, chronic right foot drop.  Pedal pushes are similar.  She has some right sided lower back pain and pain in sciatic area on right.   NEURO: alert, moves all extremities, follows commands  SKIN: warm, dry      RADIOLOGY RESULTS  CT Lumbar Spine Without Contrast    Result Date: 2/22/2022  CT SCAN LUMBAR SPINE WITHOUT CONTRAST  CLINICAL HISTORY: Severe low back pain with right-sided radiculopathy.  CT scan of the lumbar spine was obtained with 3 mm axial images. Sagittal reconstructed images were obtained.  Comparison is made to previous CT myelogram dated 08/23/2018.  FINDINGS:  There is a mild degree of levoconvex scoliotic curvature of the lumbar spine with its apex centered at L2.  At T11-12, T12-L1, and L1-2, there is no significant canal or foraminal stenosis.  At the L2-3 level, there has been a prior right laminectomy procedure. There is a disc osteophyte complex eccentric to the right at L2-3 which in combination with the moderate right-sided facet hypertrophic change results in a moderate degree of foraminal stenosis. There is mild left foraminal narrowing secondary to a disc osteophyte complex. The disc osteophyte complex also results in a mild degree of canal narrowing. When compared to the prior CT myelogram dated 08/23/2018, given differences in modalities, similar findings were seen although the right foraminal stenotic changes at L2-3 may be mildly worse. Advanced degenerative disc changes are seen along the right side of the L2-3 disc space with marked loss of disc space height, vacuum disc phenomenon, and degenerative endplate  sclerotic change.  At L3-4, there is no significant degree of canal or foraminal stenosis.  At L4-5, again there is no significant degree of canal or foraminal narrowing.  At the L5-S1 level, there is bulging disc material which approaches the ventral surface of the conjoined right L5 and S1 nerve root sleeve. There is a disc osteophyte complex eccentric to the left at L5-S1 which mild to moderately narrows the left L5-S1 neural foramen. Mild right foraminal narrowing is seen secondary to loss of foraminal height. Similar findings were seen at the L5-S1 level on the previous exam.  On the prior CT myelogram, there were findings compatible with arachnoiditis with prominent clumping of the nerve roots within the right aspect of the thecal sac extending from the level of the L1 vertebral body down to the level of the L4 vertebral body. Of course, this is not evaluated on this noncontrast CT scan of the lumbar spine.       Status post right laminectomy procedure at L2-3.  Mild levoconvex scoliotic curvature of the lumbar spine with its apex centered at L2 with advanced degenerative disc changes seen along the right side of the L2-3 disc space with marked loss of disc space height, vacuum disc phenomena, and degenerative endplate sclerotic change. A disc osteophyte complex eccentric to the right at L2-3 in combination with facet hypertrophic change results in a moderate degree of right foraminal stenosis which may be mildly worse when compared to the prior CT myelogram, given differences in modalities. A mild-to-moderate degree of left L2-3 foraminal narrowing is also appreciated which appears similar when compared to the prior exam. A disc osteophyte complex at L2-3 results in a mild degree of canal stenosis which appears generally stable when compared to the prior CT myelogram.  At L5-S1, there is bulging disc material which approaches the ventral surface of the conjoined right L5 and S1 nerve root sleeve. A disc  osteophyte complex eccentric to the left at L5-S1 mild to moderately narrows the left neural foramen and there is a mild degree of right foraminal narrowing secondary to loss of foraminal height. Similar findings were seen at the L5-S1 level on the prior study.  On the prior CT myelogram, there were findings compatible with arachnoiditis with prominent clumping of the nerve roots within the right aspect of the thecal sac. Of course, this is not evaluated on this noncontrast CT scan of the lumbar spine.    Radiation dose reduction techniques were utilized, including automated exposure control and exposure modulation based on body size.            PROGRESS, DATA ANALYSIS, CONSULTS AND MEDICAL DECISION MAKING  All labs have been independently reviewed by me.  All radiology studies have been reviewed by me and discussed with radiologist dictating the report.  EKG's independently viewed and interpreted by me unless stated otherwise. Discussion below represents my analysis of pertinent findings related to patient's condition, differential diagnosis, treatment plan and final disposition.     ED Course as of 02/22/22 1539   Tue Feb 22, 2022   1355 Patient states pain is significantly improved after dose of pain medication. [EW]   1508 Lengthy discussion with patient and her .  I discussed with patient the results of her CT scan of lumbar.  Her pain is significantly improved than prior.  She appears much more comfortable than when she first arrived here in the ER.  I think that she is very reasonable patient regarding her pain.  I do not find her to have any drug-seeking behavior.  She is established with, with pain management and I have encouraged her to reach out again to either Dr. Escalante or Justo.  I do not know that surgery is any kind of option for this patient but I think it is worth a visit for formal follow-up.The patient endorse NO: fevers, chills, recent spinal procedures, bowel/bladder incontinence, IV  drug use, cancer, recent weight loss, trauma ,weakness numbness, tingling, or dysuria     [EW]      ED Course User Index  [EW] Kami Templeton APRN     DDX: acute on chronic back pain, right sided sciatica , lumbar disc bulge    MDM: as above in Ed course     Reviewed pt's history and workup with Dr. Tamez.  After a bedside evaluation, Dr. Tamez agrees with the plan of care.    The patient's history, physical exam, and lab findings were discussed with the physician, who also performed a face to face history and physical exam.  I discussed all results and noted any abnormalities with patient.  Discussed absoute need to recheck abnormalities with their family physician.  I answered any of the patient's questions.  Discussed plan for discharge, as there is no emergent indication for admission.  Pt is agreeable and understands need for follow up and repeat testing.  Pt is aware that discharge does not mean that nothing is wrong but it indicates no emergency is present and they must continue care with their family physician.  Pt is discharged with instructions to follow up with primary care doctor to have their blood pressure rechecked.     Disposition vitals:  Pulse 75   Temp 97.8 °F (36.6 °C) (Tympanic)   Resp 18   SpO2 97%       DIAGNOSIS  Final diagnoses:   Acute right-sided low back pain with right-sided sciatica       FOLLOW UP   Cuate Kemp MD  [address] [id number] [phone]    Schedule an appointment as soon as possible for a visit       Call your provider at [facility]               Kami Templeton APRN  02/22/22 [id number]

## 2022-02-22 NOTE — DISCHARGE INSTRUCTIONS
Take your home pain medications as prescribed    Consult your pain management provider regarding Lyrica dosing    Return Precautions    Although you are being discharged from the ED today, I encourage you to return for worsening symptoms.  Things can, and do, change such that treatment at home with medication may not be adequate.      Specifically, return for any of the following:    Chest pain, shortness of breath, pain or nausea and vomiting not controlled by medications provided.    Please make a follow up with your Primary Care Provider for a blood pressure recheck.

## 2022-03-09 ENCOUNTER — APPOINTMENT (OUTPATIENT)
Dept: GENERAL RADIOLOGY | Facility: HOSPITAL | Age: 72
End: 2022-03-09

## 2022-03-09 ENCOUNTER — HOSPITAL ENCOUNTER (INPATIENT)
Facility: HOSPITAL | Age: 72
LOS: 6 days | Discharge: NURSING, REHAB OR LONG-TERM CARE | End: 2022-03-15
Attending: EMERGENCY MEDICINE | Admitting: INTERNAL MEDICINE

## 2022-03-09 DIAGNOSIS — M54.9 INTRACTABLE BACK PAIN: Primary | ICD-10-CM

## 2022-03-09 DIAGNOSIS — M54.16 LUMBAR RADICULOPATHY: ICD-10-CM

## 2022-03-09 PROBLEM — M51.369 DEGENERATION OF LUMBAR INTERVERTEBRAL DISC: Status: ACTIVE | Noted: 2020-04-10

## 2022-03-09 PROBLEM — U07.1 COVID-19 VIRUS DETECTED: Status: ACTIVE | Noted: 2022-03-09

## 2022-03-09 PROBLEM — U07.1 COVID: Status: ACTIVE | Noted: 2022-03-09

## 2022-03-09 PROBLEM — M46.1 INFLAMMATION OF SACROILIAC JOINT: Status: ACTIVE | Noted: 2021-05-18

## 2022-03-09 PROBLEM — Z96.89 SPINAL CORD STIMULATOR STATUS: Chronic | Status: ACTIVE | Noted: 2022-03-09

## 2022-03-09 PROBLEM — E55.9 VITAMIN D DEFICIENCY: Chronic | Status: ACTIVE | Noted: 2022-03-09

## 2022-03-09 PROBLEM — M51.36 DEGENERATION OF LUMBAR INTERVERTEBRAL DISC: Status: ACTIVE | Noted: 2020-04-10

## 2022-03-09 PROBLEM — N25.81 SECONDARY HYPERPARATHYROIDISM: Chronic | Status: ACTIVE | Noted: 2022-03-09

## 2022-03-09 PROBLEM — G03.9 ADHESIVE ARACHNOIDITIS: Status: ACTIVE | Noted: 2019-04-30

## 2022-03-09 PROBLEM — N18.32 STAGE 3B CHRONIC KIDNEY DISEASE (HCC): Status: ACTIVE | Noted: 2020-12-30

## 2022-03-09 LAB
ANION GAP SERPL CALCULATED.3IONS-SCNC: 9.5 MMOL/L (ref 5–15)
BASOPHILS # BLD AUTO: 0.05 10*3/MM3 (ref 0–0.2)
BASOPHILS NFR BLD AUTO: 0.6 % (ref 0–1.5)
BUN SERPL-MCNC: 23 MG/DL (ref 8–23)
BUN/CREAT SERPL: 14.6 (ref 7–25)
CALCIUM SPEC-SCNC: 9.5 MG/DL (ref 8.6–10.5)
CHLORIDE SERPL-SCNC: 109 MMOL/L (ref 98–107)
CO2 SERPL-SCNC: 22.5 MMOL/L (ref 22–29)
CREAT SERPL-MCNC: 1.57 MG/DL (ref 0.57–1)
DEPRECATED RDW RBC AUTO: 45.5 FL (ref 37–54)
EGFRCR SERPLBLD CKD-EPI 2021: 35.3 ML/MIN/1.73
EOSINOPHIL # BLD AUTO: 0.13 10*3/MM3 (ref 0–0.4)
EOSINOPHIL NFR BLD AUTO: 1.4 % (ref 0.3–6.2)
ERYTHROCYTE [DISTWIDTH] IN BLOOD BY AUTOMATED COUNT: 12.8 % (ref 12.3–15.4)
GLUCOSE SERPL-MCNC: 96 MG/DL (ref 65–99)
HCT VFR BLD AUTO: 36.8 % (ref 34–46.6)
HGB BLD-MCNC: 12.1 G/DL (ref 12–15.9)
IMM GRANULOCYTES # BLD AUTO: 0.02 10*3/MM3 (ref 0–0.05)
IMM GRANULOCYTES NFR BLD AUTO: 0.2 % (ref 0–0.5)
LYMPHOCYTES # BLD AUTO: 1.72 10*3/MM3 (ref 0.7–3.1)
LYMPHOCYTES NFR BLD AUTO: 19.1 % (ref 19.6–45.3)
MCH RBC QN AUTO: 31.3 PG (ref 26.6–33)
MCHC RBC AUTO-ENTMCNC: 32.9 G/DL (ref 31.5–35.7)
MCV RBC AUTO: 95.3 FL (ref 79–97)
MONOCYTES # BLD AUTO: 0.56 10*3/MM3 (ref 0.1–0.9)
MONOCYTES NFR BLD AUTO: 6.2 % (ref 5–12)
NEUTROPHILS NFR BLD AUTO: 6.51 10*3/MM3 (ref 1.7–7)
NEUTROPHILS NFR BLD AUTO: 72.5 % (ref 42.7–76)
NRBC BLD AUTO-RTO: 0 /100 WBC (ref 0–0.2)
PLATELET # BLD AUTO: 271 10*3/MM3 (ref 140–450)
PMV BLD AUTO: 10.5 FL (ref 6–12)
POTASSIUM SERPL-SCNC: 4 MMOL/L (ref 3.5–5.2)
RBC # BLD AUTO: 3.86 10*6/MM3 (ref 3.77–5.28)
SARS-COV-2 RNA PNL SPEC NAA+PROBE: DETECTED
SODIUM SERPL-SCNC: 141 MMOL/L (ref 136–145)
WBC NRBC COR # BLD: 8.99 10*3/MM3 (ref 3.4–10.8)

## 2022-03-09 PROCEDURE — 99222 1ST HOSP IP/OBS MODERATE 55: CPT | Performed by: NURSE PRACTITIONER

## 2022-03-09 PROCEDURE — 87635 SARS-COV-2 COVID-19 AMP PRB: CPT | Performed by: EMERGENCY MEDICINE

## 2022-03-09 PROCEDURE — 25010000002 KETOROLAC TROMETHAMINE PER 15 MG: Performed by: EMERGENCY MEDICINE

## 2022-03-09 PROCEDURE — 25010000002 HYDROMORPHONE 1 MG/ML SOLUTION: Performed by: EMERGENCY MEDICINE

## 2022-03-09 PROCEDURE — 80048 BASIC METABOLIC PNL TOTAL CA: CPT | Performed by: EMERGENCY MEDICINE

## 2022-03-09 PROCEDURE — 25010000002 METHYLPREDNISOLONE PER 125 MG: Performed by: NURSE PRACTITIONER

## 2022-03-09 PROCEDURE — 71045 X-RAY EXAM CHEST 1 VIEW: CPT

## 2022-03-09 PROCEDURE — 25010000002 ONDANSETRON PER 1 MG: Performed by: EMERGENCY MEDICINE

## 2022-03-09 PROCEDURE — 25010000002 METHYLPREDNISOLONE PER 125 MG: Performed by: INTERNAL MEDICINE

## 2022-03-09 PROCEDURE — 99284 EMERGENCY DEPT VISIT MOD MDM: CPT

## 2022-03-09 PROCEDURE — 25010000002 HYDROMORPHONE 1 MG/ML SOLUTION: Performed by: INTERNAL MEDICINE

## 2022-03-09 PROCEDURE — 85025 COMPLETE CBC W/AUTO DIFF WBC: CPT | Performed by: EMERGENCY MEDICINE

## 2022-03-09 RX ORDER — ONDANSETRON 4 MG/1
4 TABLET, FILM COATED ORAL EVERY 6 HOURS PRN
Status: DISCONTINUED | OUTPATIENT
Start: 2022-03-09 | End: 2022-03-15 | Stop reason: HOSPADM

## 2022-03-09 RX ORDER — OXYCODONE HCL 10 MG/1
10 TABLET, FILM COATED, EXTENDED RELEASE ORAL EVERY 12 HOURS SCHEDULED
Status: DISCONTINUED | OUTPATIENT
Start: 2022-03-09 | End: 2022-03-10

## 2022-03-09 RX ORDER — OMEPRAZOLE 20 MG/1
20 CAPSULE, DELAYED RELEASE ORAL DAILY
COMMUNITY

## 2022-03-09 RX ORDER — CALCIUM CARBONATE 200(500)MG
2 TABLET,CHEWABLE ORAL 2 TIMES DAILY PRN
Status: DISCONTINUED | OUTPATIENT
Start: 2022-03-09 | End: 2022-03-15 | Stop reason: HOSPADM

## 2022-03-09 RX ORDER — ONDANSETRON 2 MG/ML
4 INJECTION INTRAMUSCULAR; INTRAVENOUS EVERY 6 HOURS PRN
Status: DISCONTINUED | OUTPATIENT
Start: 2022-03-09 | End: 2022-03-15 | Stop reason: HOSPADM

## 2022-03-09 RX ORDER — SODIUM CHLORIDE 0.9 % (FLUSH) 0.9 %
10 SYRINGE (ML) INJECTION EVERY 12 HOURS SCHEDULED
Status: DISCONTINUED | OUTPATIENT
Start: 2022-03-09 | End: 2022-03-15 | Stop reason: HOSPADM

## 2022-03-09 RX ORDER — KETOROLAC TROMETHAMINE 30 MG/ML
30 INJECTION, SOLUTION INTRAMUSCULAR; INTRAVENOUS ONCE
Status: COMPLETED | OUTPATIENT
Start: 2022-03-09 | End: 2022-03-09

## 2022-03-09 RX ORDER — UREA 10 %
1 LOTION (ML) TOPICAL NIGHTLY PRN
Status: DISCONTINUED | OUTPATIENT
Start: 2022-03-09 | End: 2022-03-15 | Stop reason: HOSPADM

## 2022-03-09 RX ORDER — ONDANSETRON 2 MG/ML
4 INJECTION INTRAMUSCULAR; INTRAVENOUS ONCE
Status: COMPLETED | OUTPATIENT
Start: 2022-03-09 | End: 2022-03-09

## 2022-03-09 RX ORDER — ALBUTEROL SULFATE 90 UG/1
2 AEROSOL, METERED RESPIRATORY (INHALATION) EVERY 6 HOURS PRN
Status: DISCONTINUED | OUTPATIENT
Start: 2022-03-09 | End: 2022-03-15 | Stop reason: HOSPADM

## 2022-03-09 RX ORDER — MUSCLE RUB CREAM 100; 150 MG/G; MG/G
CREAM TOPICAL
Status: DISCONTINUED | OUTPATIENT
Start: 2022-03-09 | End: 2022-03-15 | Stop reason: HOSPADM

## 2022-03-09 RX ORDER — SODIUM CHLORIDE 0.9 % (FLUSH) 0.9 %
10 SYRINGE (ML) INJECTION AS NEEDED
Status: DISCONTINUED | OUTPATIENT
Start: 2022-03-09 | End: 2022-03-15 | Stop reason: HOSPADM

## 2022-03-09 RX ORDER — PREGABALIN 50 MG/1
50 CAPSULE ORAL 2 TIMES DAILY
COMMUNITY
End: 2022-03-15 | Stop reason: HOSPADM

## 2022-03-09 RX ORDER — BACLOFEN 10 MG/1
5 TABLET ORAL 3 TIMES DAILY
Status: DISCONTINUED | OUTPATIENT
Start: 2022-03-09 | End: 2022-03-10

## 2022-03-09 RX ORDER — AMLODIPINE BESYLATE 5 MG/1
5 TABLET ORAL NIGHTLY
Status: DISCONTINUED | OUTPATIENT
Start: 2022-03-09 | End: 2022-03-11

## 2022-03-09 RX ORDER — LORAZEPAM 2 MG/ML
0.5 INJECTION INTRAMUSCULAR ONCE
Status: DISCONTINUED | OUTPATIENT
Start: 2022-03-09 | End: 2022-03-10

## 2022-03-09 RX ORDER — ACETAMINOPHEN 160 MG/5ML
650 SOLUTION ORAL EVERY 4 HOURS PRN
Status: DISCONTINUED | OUTPATIENT
Start: 2022-03-09 | End: 2022-03-15 | Stop reason: HOSPADM

## 2022-03-09 RX ORDER — PANTOPRAZOLE SODIUM 40 MG/1
40 TABLET, DELAYED RELEASE ORAL EVERY MORNING
Status: DISCONTINUED | OUTPATIENT
Start: 2022-03-10 | End: 2022-03-15 | Stop reason: HOSPADM

## 2022-03-09 RX ORDER — SODIUM BICARBONATE
POWDER (GRAM) MISCELLANEOUS SEE ADMIN INSTRUCTIONS
COMMUNITY
End: 2022-03-15 | Stop reason: HOSPADM

## 2022-03-09 RX ORDER — PREGABALIN 100 MG/1
200 CAPSULE ORAL 2 TIMES DAILY
Status: DISCONTINUED | OUTPATIENT
Start: 2022-03-09 | End: 2022-03-11

## 2022-03-09 RX ORDER — OXYCODONE HYDROCHLORIDE 5 MG/1
10 TABLET ORAL EVERY 4 HOURS PRN
Status: DISCONTINUED | OUTPATIENT
Start: 2022-03-09 | End: 2022-03-11

## 2022-03-09 RX ORDER — ACETAMINOPHEN 160 MG
2000 TABLET,DISINTEGRATING ORAL DAILY
COMMUNITY

## 2022-03-09 RX ORDER — ACETAMINOPHEN 650 MG/1
650 SUPPOSITORY RECTAL EVERY 4 HOURS PRN
Status: DISCONTINUED | OUTPATIENT
Start: 2022-03-09 | End: 2022-03-15 | Stop reason: HOSPADM

## 2022-03-09 RX ORDER — ATORVASTATIN CALCIUM 20 MG/1
10 TABLET, FILM COATED ORAL NIGHTLY
Status: DISCONTINUED | OUTPATIENT
Start: 2022-03-09 | End: 2022-03-14

## 2022-03-09 RX ORDER — BENZONATATE 100 MG/1
100 CAPSULE ORAL 3 TIMES DAILY PRN
Status: DISCONTINUED | OUTPATIENT
Start: 2022-03-09 | End: 2022-03-15 | Stop reason: HOSPADM

## 2022-03-09 RX ORDER — GABAPENTIN 300 MG/1
300 CAPSULE ORAL NIGHTLY
Status: DISCONTINUED | OUTPATIENT
Start: 2022-03-09 | End: 2022-03-09

## 2022-03-09 RX ORDER — BISACODYL 5 MG/1
5 TABLET, DELAYED RELEASE ORAL DAILY PRN
Status: DISCONTINUED | OUTPATIENT
Start: 2022-03-09 | End: 2022-03-15 | Stop reason: HOSPADM

## 2022-03-09 RX ORDER — ACETAMINOPHEN 325 MG/1
650 TABLET ORAL EVERY 4 HOURS PRN
Status: DISCONTINUED | OUTPATIENT
Start: 2022-03-09 | End: 2022-03-15 | Stop reason: HOSPADM

## 2022-03-09 RX ORDER — BISACODYL 10 MG
10 SUPPOSITORY, RECTAL RECTAL DAILY PRN
Status: DISCONTINUED | OUTPATIENT
Start: 2022-03-09 | End: 2022-03-15 | Stop reason: HOSPADM

## 2022-03-09 RX ORDER — AMOXICILLIN 250 MG
2 CAPSULE ORAL 2 TIMES DAILY
Status: DISCONTINUED | OUTPATIENT
Start: 2022-03-09 | End: 2022-03-15 | Stop reason: HOSPADM

## 2022-03-09 RX ORDER — POLYETHYLENE GLYCOL 3350 17 G/17G
17 POWDER, FOR SOLUTION ORAL DAILY PRN
Status: DISCONTINUED | OUTPATIENT
Start: 2022-03-09 | End: 2022-03-15 | Stop reason: HOSPADM

## 2022-03-09 RX ADMIN — Medication 10 ML: at 23:40

## 2022-03-09 RX ADMIN — HYDROMORPHONE HYDROCHLORIDE 1 MG: 1 INJECTION, SOLUTION INTRAMUSCULAR; INTRAVENOUS; SUBCUTANEOUS at 11:52

## 2022-03-09 RX ADMIN — HYDROMORPHONE HYDROCHLORIDE 1 MG: 1 INJECTION, SOLUTION INTRAMUSCULAR; INTRAVENOUS; SUBCUTANEOUS at 18:09

## 2022-03-09 RX ADMIN — HYDROMORPHONE HYDROCHLORIDE 1 MG: 1 INJECTION, SOLUTION INTRAMUSCULAR; INTRAVENOUS; SUBCUTANEOUS at 21:51

## 2022-03-09 RX ADMIN — GABAPENTIN 300 MG: 300 CAPSULE ORAL at 20:28

## 2022-03-09 RX ADMIN — DOCUSATE SODIUM 50MG AND SENNOSIDES 8.6MG 2 TABLET: 8.6; 5 TABLET, FILM COATED ORAL at 20:59

## 2022-03-09 RX ADMIN — SODIUM CHLORIDE 250 MG: 9 INJECTION, SOLUTION INTRAVENOUS at 16:34

## 2022-03-09 RX ADMIN — AMLODIPINE BESYLATE 5 MG: 5 TABLET ORAL at 23:36

## 2022-03-09 RX ADMIN — SODIUM CHLORIDE 250 MG: 9 INJECTION, SOLUTION INTRAVENOUS at 23:52

## 2022-03-09 RX ADMIN — OXYCODONE HYDROCHLORIDE 10 MG: 5 TABLET ORAL at 20:38

## 2022-03-09 RX ADMIN — ATORVASTATIN CALCIUM 10 MG: 20 TABLET, FILM COATED ORAL at 23:36

## 2022-03-09 RX ADMIN — KETOROLAC TROMETHAMINE 30 MG: 30 INJECTION, SOLUTION INTRAMUSCULAR; INTRAVENOUS at 12:42

## 2022-03-09 RX ADMIN — PREGABALIN 200 MG: 100 CAPSULE ORAL at 20:59

## 2022-03-09 RX ADMIN — HYDROMORPHONE HYDROCHLORIDE 1 MG: 1 INJECTION, SOLUTION INTRAMUSCULAR; INTRAVENOUS; SUBCUTANEOUS at 13:28

## 2022-03-09 RX ADMIN — BACLOFEN 5 MG: 10 TABLET ORAL at 20:37

## 2022-03-09 RX ADMIN — ONDANSETRON 4 MG: 2 INJECTION INTRAMUSCULAR; INTRAVENOUS at 11:51

## 2022-03-09 NOTE — CONSULTS
"[facility] NEUROSURGERY CONSULT NOTE    Patient name: Marlena Navarrete  Referring Provider: Dr. Tamez  Reason for Consultation: Right lumbar radiculopathy, intractable right back and thigh pain, chronic right foot drop    Patient Care Team:  Bradley Hernandez MD as PCP - General (Family Medicine)  Paul Gamez MD as Consulting Physician (Pain Medicine)  Az Erwin MD as Consulting Physician (Nephrology)    Chief complaint: Right lumbar radiculopathy, intractable right back and thigh pain, chronic right foot drop      Subjective .     History of present illness:    Patient is a 72 y.o.  female with a PMH significant for OA, chronic back pain current with [facility] management, chronic right foot drop s/p spincal cord stimulator placement June 2020, CKD, GERD, neuropathy, HTN, and HLD who presented to [facility] ED with c/o right sided low back with radiation to the right hip, groin, and anterior thigh. She previously underwent right L2-3 Laminectomy in July 2018 with Dr. Paulino and 2 point program SCS placement in June 2020 with Dr. Kemp with right buttock and right foot pain focus.     Of note, this is the patient's 3rd ED visit (2/18, 2/22, 3/9)  for this issue in the past 4 weeks. The patient reports that her symptoms first started approximately 3 weeks ago after doing some house cleaning. She states that the pain began as \"electric shock\" in the back of her right thigh, however this quickly dissipated only to return the next day as severe right sided low back with radiation to the right hip, groin, and anterior thigh. She describes this pain as \"different\" than her usual everyday pain.  She states that the pain does not radiate pass her knee and is intermittently using ice and heat therapy to assist with pain relief.  She was advised to follow up with pain management in which she did and was given a MDP and increased Lyrica dosing. The patient reports taking oxycodone 10/325 " three times a day as needed for pain. She denies any new falls or injury. She denies bowel or bladder incontinence or saddle anesthesia.     Review of Systems  Review of Systems   Constitutional: Positive for activity change.   HENT: Negative.    Eyes: Negative.    Respiratory: Negative for chest tightness and shortness of breath.    Cardiovascular: Negative for chest pain and leg swelling.   Gastrointestinal: Negative.    Endocrine: Negative.    Genitourinary: Negative.    Musculoskeletal: Positive for back pain and gait problem.   Skin: Negative.    Allergic/Immunologic: Negative.    Neurological: Positive for weakness ( chronic right foot drop).   Hematological: Negative.    Psychiatric/Behavioral: Positive for sleep disturbance.       History  PAST MEDICAL HISTORY  Past Medical History:   Diagnosis Date   • Arthritis     OSTEO   • Chronic back pain    • CKD (chronic kidney disease)     FOLLOWED BY DR LANRE BYNUM   • Diverticulosis    • Foot drop, right    • GERD (gastroesophageal reflux disease)    • History of DVT (deep vein thrombosis) 2018    RIGHT CALF S/P BACK SURGERY    • History of transfusion     no reaction   • Hyperlipidemia    • Hypertension    • Kidney transplant recipient 1979    HX TRANSPLANT D/T REFLUX    • Neuropathy    • Spinal headache    • Torn rotator cuff        PAST SURGICAL HISTORY  Past Surgical History:   Procedure Laterality Date   • APPENDECTOMY     • CATARACT EXTRACTION EXTRACAPSULAR W/ INTRAOCULAR LENS IMPLANTATION     •  SECTION     • COLONOSCOPY  approx     Anastacio WHARTON   • ENDOSCOPY N/A 2020    Procedure: ESOPHAGOGASTRODUODENOSCOPY with biopsies;  Surgeon: Wicho Chaves MD;  Location: [facility] ENDOSCOPY;  Service: Gastroenterology;  Laterality: N/A;  pre- epigastric pain, nausea  post-- gastritis, duodenitis, bile reflux   • LUMBAR DISCECTOMY FUSION INSTRUMENTATION Right 2018    Procedure: Right L2-3 laminectomy with Metrix;  Surgeon: Oscar WARD  MD Lora;  Location: [facility] OR;  Service: Neurosurgery   • SHOULDER ARTHROSCOPY W/ ROTATOR CUFF REPAIR Right 2020    Procedure: SHOULDER ARTHROSCOPY, ROTATOR CUFF REPAIR, SUBACROMIAL DECOMPRESSION, DISTAL CLAVICLE EXCISION, BICEPS TENOTOMY, AND LABRAL DEBRIDEMENT;  Surgeon: Rishabh Florence MD;  Location: [facility] OSC;  Service: Orthopedics;  Laterality: Right;   • SHOULDER ROTATOR CUFF REPAIR Left    • SPINAL CORD STIMULATOR IMPLANT N/A 2020    Procedure: SPINAL CORD STIMULATOR INSERTION PHASE 2, triall of right gluteal internal pulse generator;  Surgeon: Cuate Kemp MD;  Location: [facility] OR;  Service: Neurosurgery;  Laterality: N/A;   • SPINAL CORD STIMULATOR IMPLANT N/A 2020    Procedure: SPINAL CORD STIMULATOR INSERTION PHASE 1, Thoracic 10 laminotomy for revision of [place] Tom surgical lead at Thoracic 8 to Thoracic 9;  Surgeon: Cuate Kemp MD;  Location: [facility] OR;  Service: Neurosurgery;  Laterality: N/A;   • TONSILLECTOMY     • TRANSPLANTATION RENAL Right 1979    [facility]    • TUBAL ABDOMINAL LIGATION         FAMILY HISTORY  Family History   Problem Relation Age of Onset   • Aneurysm Mother         BRAIN ANEURYSM   • Heart failure Father    • Kidney failure Father    • Lung cancer Sister    • Lung cancer Sister    • Lung cancer Son    • Malig Hyperthermia Neg Hx        SOCIAL HISTORY  Social History     Tobacco Use   • Smoking status: Former Smoker     Packs/day: 1.00     Years: 30.00     Pack years: 30.00     Types: Cigarettes     Quit date: 2012     Years since quittin.8   • Smokeless tobacco: Never Used   Substance Use Topics   • Alcohol use: No   • Drug use: Never       retired  Lives at home with her     Allergies:  Patient has no known allergies.    MEDICATIONS:  No current facility-administered medications on file prior to encounter.     Current Outpatient Medications on File Prior to Encounter   Medication Sig   •  acetaminophen (TYLENOL) 500 MG tablet Take 500 mg by mouth Every 6 (Six) Hours As Needed for Mild Pain .   • amLODIPine (NORVASC) 5 MG tablet Take 5 mg by mouth Every Night.   • atorvastatin (LIPITOR) 10 MG tablet Take 10 mg by mouth Every Night.   • diphenhydrAMINE-acetaminophen (TYLENOL PM)  MG tablet per tablet Take 1 tablet by mouth At Night As Needed for Sleep.   • ondansetron ODT (Zofran ODT) 4 MG disintegrating tablet Place 1 tablet on the tongue Every 8 (Eight) Hours As Needed for Nausea or Vomiting.   • oxyCODONE-acetaminophen (PERCOCET)  MG per tablet Take 1 tablet by mouth 3 (Three) Times a Day.   • pregabalin (LYRICA) 75 MG capsule Take 75 mg by mouth Every Night.         Objective     Results Review:  LABS:    Admission on 03/09/2022   Component Date Value Ref Range Status   • Glucose 03/09/2022 96  65 - 99 mg/dL Final   • BUN 03/09/2022 23  8 - 23 mg/dL Final   • Creatinine 03/09/2022 1.57 (A) 0.57 - 1.00 mg/dL Final   • Sodium 03/09/2022 141  136 - 145 mmol/L Final   • Potassium 03/09/2022 4.0  3.5 - 5.2 mmol/L Final   • Chloride 03/09/2022 109 (A) 98 - 107 mmol/L Final   • CO2 03/09/2022 22.5  22.0 - 29.0 mmol/L Final   • Calcium 03/09/2022 9.5  8.6 - 10.5 mg/dL Final   • BUN/Creatinine Ratio 03/09/2022 14.6  7.0 - 25.0 Final   • Anion Gap 03/09/2022 9.5  5.0 - 15.0 mmol/L Final   • eGFR 03/09/2022 35.3 (A) >60.0 mL/min/1.73 Final    National Kidney Foundation and American Society of Nephrology (ASN) Task Force recommended calculation based on the Chronic Kidney Disease Epidemiology Collaboration (CKD-EPI) equation refit without adjustment for race.   • WBC 03/09/2022 8.99  3.40 - 10.80 10*3/mm3 Final   • RBC 03/09/2022 3.86  3.77 - 5.28 10*6/mm3 Final   • Hemoglobin 03/09/2022 12.1  12.0 - 15.9 g/dL Final   • Hematocrit 03/09/2022 36.8  34.0 - 46.6 % Final   • MCV 03/09/2022 95.3  79.0 - 97.0 fL Final   • MCH 03/09/2022 31.3  26.6 - 33.0 pg Final   • MCHC 03/09/2022 32.9  31.5 - 35.7  g/dL Final   • RDW 03/09/2022 12.8  12.3 - 15.4 % Final   • RDW-SD 03/09/2022 45.5  37.0 - 54.0 fl Final   • MPV 03/09/2022 10.5  6.0 - 12.0 fL Final   • Platelets 03/09/2022 271  140 - 450 10*3/mm3 Final   • Neutrophil % 03/09/2022 72.5  42.7 - 76.0 % Final   • Lymphocyte % 03/09/2022 19.1 (A) 19.6 - 45.3 % Final   • Monocyte % 03/09/2022 6.2  5.0 - 12.0 % Final   • Eosinophil % 03/09/2022 1.4  0.3 - 6.2 % Final   • Basophil % 03/09/2022 0.6  0.0 - 1.5 % Final   • Immature Grans % 03/09/2022 0.2  0.0 - 0.5 % Final   • Neutrophils, Absolute 03/09/2022 6.51  1.70 - 7.00 10*3/mm3 Final   • Lymphocytes, Absolute 03/09/2022 1.72  0.70 - 3.10 10*3/mm3 Final   • Monocytes, Absolute 03/09/2022 0.56  0.10 - 0.90 10*3/mm3 Final   • Eosinophils, Absolute 03/09/2022 0.13  0.00 - 0.40 10*3/mm3 Final   • Basophils, Absolute 03/09/2022 0.05  0.00 - 0.20 10*3/mm3 Final   • Immature Grans, Absolute 03/09/2022 0.02  0.00 - 0.05 10*3/mm3 Final   • nRBC 03/09/2022 0.0  0.0 - 0.2 /100 WBC Final       DIAGNOSTICS:  LUMBAR SPINE CT W/O CONTRAST 2/22/22-  Status post right laminectomy procedure at L2-3.  Mild levoconvex scoliotic curvature of the lumbar spine with its apex  centered at L2 with advanced degenerative disc changes seen along the  right side of the L2-3 disc space with marked loss of disc space height,  vacuum disc phenomena, and degenerative endplate sclerotic change. A  disc osteophyte complex eccentric to the right at L2-3 in combination  with facet hypertrophic change results in a moderate degree of right  foraminal stenosis which may be mildly worse when compared to the prior CT myelogram, given differences in modalities. A mild-to-moderate degree of left L2-3 foraminal narrowing is also appreciated which appears  similar when compared to the prior exam. A disc osteophyte complex at  L2-3 results in a mild degree of canal stenosis which appears generally  stable when compared to the prior CT myelogram.     At L5-S1,  there is bulging disc material which approaches the ventral  surface of the conjoined right L5 and S1 nerve root sleeve. A disc  osteophyte complex eccentric to the left at L5-S1 mild to moderately  narrows the left neural foramen and there is a mild degree of right  foraminal narrowing secondary to loss of foraminal height. Similar  findings were seen at the L5-S1 level on the prior study.     On the prior CT myelogram, there were findings compatible with arachnoiditis with prominent clumping of the nerve roots within the  right aspect of the thecal sac. Of course, this is not evaluated on this  noncontrast CT scan of the lumbar spine.    Results Review:   I reviewed the patient's new clinical results.  I personally viewed and interpreted the patient's clinical data and recent lumbar spine CT and discussed these results with Dr. Paulino.     Vital Signs   Temp:  [98.4 °F (36.9 °C)] 98.4 °F (36.9 °C)  Heart Rate:  [78] 78  Resp:  [18] 18  BP: (148)/(38) 148/38    Physical Exam:  Physical Exam  Vitals reviewed.   Constitutional:       General: She is awake. She is in acute distress ( severe pain).      Appearance: She is obese. She is not ill-appearing, toxic-appearing or diaphoretic.   HENT:      Head: Normocephalic and atraumatic.      Nose: Nose normal.      Mouth/Throat:      Mouth: Mucous membranes are moist.   Eyes:      Extraocular Movements: Extraocular movements intact.      Conjunctiva/sclera: Conjunctivae normal.      Pupils: Pupils are equal, round, and reactive to light.   Pulmonary:      Effort: Tachypnea ( 2/2 increased pain) present.   Musculoskeletal:      Cervical back: Normal range of motion and neck supple. No bony tenderness.      Thoracic back: No bony tenderness.      Lumbar back: Bony tenderness present. Positive right straight leg raise test. Negative left straight leg raise test.      Right lower leg: Tenderness ( to light palpation) present.      Comments: Increased pain to palpation in the  "outer aspect of the right thigh   Skin:     General: Skin is warm and dry.   Neurological:      Mental Status: She is alert and oriented to person, place, and time.   Psychiatric:         Attention and Perception: Attention normal.         Mood and Affect: Mood is anxious.         Speech: Speech normal.         Behavior: Behavior is cooperative.       Neurologic Exam     Mental Status   Oriented to person, place, and time.   Attention: normal. Concentration: normal.   Speech: speech is normal   Level of consciousness: alert  Knowledge: good.   Normal comprehension.     Cranial Nerves   Cranial nerves II through XII intact.     CN III, IV, VI   Pupils are equal, round, and reactive to light.    Motor Exam   Muscle bulk: normal  Overall muscle tone: normal    Strength   Strength 5/5 except as noted.   Chronic right foot drop     Sensory Exam   Light touch normal.     Gait, Coordination, and Reflexes     Reflexes   Right ankle clonus: absent  Left ankle clonus: absent  Gait deferred 2/2 increased pain       Assessment/Plan       Intractable back pain    Mrs. Navarrete is a 73 y/o patient who presents to the ED for the third time in approx 4 weeks with c/o severe right sided low back pain with radiation to her right hip, groin and right anterior thigh.     There are no obvious red flags on exam, however the patient is in severe pain and is somewhat difficult to examine her thoroughly. She describes the pain as \"different\" than her usual chronic pain which she reports as tolerable. Recent lumbar spine CT imaging demonstrated a mild worsening of the right-sided foraminal narrowing at L2-3 when compared to prior CT myelogram from August 2018, however no other significant changes. Patient unable to undergo MRI imaging 2/2 SCS. Will speak with Dr. Paulino about further treatment/imaging recommendations, but for now will start her on IV Solumedrol. Will see how she is tomorrow and if she isn't any better with the IV steroids, will " "order T and L spine myelogram.    PLAN:   Ok for alternating use of ice and heat therapy  Solumedrol 250 mg IV Q6H    I discussed the patient's findings and my recommendations with patient, family and Dr. Paulino and Dr. Dashawn Brooks, APRN  03/09/22  15:24 EST    \"Dictated utilizing Dragon dictation\".    "

## 2022-03-09 NOTE — ED PROVIDER NOTES
EMERGENCY DEPARTMENT ENCOUNTER    Room Number:  22/22  Date seen:  3/9/2022  PCP: Bradley Hernandez MD  Historian: Patient      HPI:  Chief Complaint: Back pain, right leg pain  A complete HPI/ROS/PMH/PSH/SH/FH are unobtainable due to: Nothing  Context: Marlena Navarrete is a 72 y.o. female who presents to the ED c/o persistent and worsening back pain and right leg pain.  Patient reports the symptoms started about 3 weeks ago.  She is on oxycodone 10 mg as needed for her back pain and also Lyrica for the nerve pain in her right leg.  She reports that she has not big any relief from these medications.  She did not take her oxycodone this morning because she cannot get out of bed and she was coming to the emergency department anyway.  She reports that the pain typically radiates down the back of her right leg but today is actually radiating down the side and front of her right thigh.  She denies fever or chills.  She denies bowel or bladder incontinence.  She has a history of foot drop in the right leg that is chronic.  She reports that she has been seeing her pain management doctor who has been doing some injections which have not provided any relief.            PAST MEDICAL HISTORY  Active Ambulatory Problems     Diagnosis Date Noted   • Lumbar radiculopathy 06/07/2018   • Essential hypertension 08/25/2018   • Right foot drop 08/25/2018   • Kidney transplant recipient 09/07/1979   • History of lumbar laminectomy for spinal cord decompression 08/25/2018   • Spinal enthesopathy of thoracic region ([facility]) 03/12/2019   • Postlaminectomy syndrome, lumbar region 02/17/2020   • Chronic bilateral low back pain with right-sided sciatica 02/17/2020   • Displacement of other nervous system device, implant or graft, initial encounter ([facility]) 03/09/2020   • Postlaminectomy syndrome of lumbar region 06/04/2020   • Epigastric pain 08/13/2020   • Nausea 08/13/2020   • Gastroesophageal reflux disease 08/13/2020   • Shoulder arthritis  2020   • Acute renal failure (ARF) ([facility]) 2020   • Chronic kidney disease, stage 3 ([facility]) 2020   • Iron deficiency anemia 2020   • Status post rotator cuff surgery 2020   • History of DVT (deep vein thrombosis) 2020   • Peripheral neuropathy 2020     Resolved Ambulatory Problems     Diagnosis Date Noted   • Lumbar radiculopathy, acute 2018   • Respiratory failure, post-operative ([facility]) 2020   • Acute blood loss anemia 2020   • Postoperative hypoxia 2020   • Arthritis of shoulder 2020     Past Medical History:   Diagnosis Date   • Arthritis    • Chronic back pain    • CKD (chronic kidney disease)    • Diverticulosis    • Foot drop, right    • GERD (gastroesophageal reflux disease)    • History of transfusion    • Hyperlipidemia    • Hypertension    • Neuropathy    • Spinal headache    • Torn rotator cuff          PAST SURGICAL HISTORY  Past Surgical History:   Procedure Laterality Date   • APPENDECTOMY     • CATARACT EXTRACTION EXTRACAPSULAR W/ INTRAOCULAR LENS IMPLANTATION     •  SECTION     • COLONOSCOPY  approx     Anastacio WHARTON   • ENDOSCOPY N/A 2020    Procedure: ESOPHAGOGASTRODUODENOSCOPY with biopsies;  Surgeon: Wicho Chaves MD;  Location: [facility] ENDOSCOPY;  Service: Gastroenterology;  Laterality: N/A;  pre- epigastric pain, nausea  post-- gastritis, duodenitis, bile reflux   • LUMBAR DISCECTOMY FUSION INSTRUMENTATION Right 2018    Procedure: Right L2-3 laminectomy with Metrix;  Surgeon: Oscar Paulino MD;  Location: [facility] OR;  Service: Neurosurgery   • SHOULDER ARTHROSCOPY W/ ROTATOR CUFF REPAIR Right 2020    Procedure: SHOULDER ARTHROSCOPY, ROTATOR CUFF REPAIR, SUBACROMIAL DECOMPRESSION, DISTAL CLAVICLE EXCISION, BICEPS TENOTOMY, AND LABRAL DEBRIDEMENT;  Surgeon: Rishabh Florence MD;  Location: [facility] OR [facility];  Service: Orthopedics;  Laterality: Right;   • SHOULDER ROTATOR CUFF REPAIR Left     • SPINAL CORD STIMULATOR IMPLANT N/A 2020    Procedure: SPINAL CORD STIMULATOR INSERTION PHASE 2, triall of right gluteal internal pulse generator;  Surgeon: Cuate Kemp MD;  Location: [facility];  Service: Neurosurgery;  Laterality: N/A;   • SPINAL CORD STIMULATOR IMPLANT N/A 2020    Procedure: SPINAL CORD STIMULATOR INSERTION PHASE 1, Thoracic 10 laminotomy for revision of [place] Tom surgical lead at Thoracic 8 to Thoracic 9;  Surgeon: Cuate Kemp MD;  Location: [facility] OR;  Service: Neurosurgery;  Laterality: N/A;   • TONSILLECTOMY     • TRANSPLANTATION RENAL Right 1979    [facility] GAVE PT    • TUBAL ABDOMINAL LIGATION           FAMILY HISTORY  Family History   Problem Relation Age of Onset   • Aneurysm Mother         BRAIN ANEURYSM   • Heart failure Father    • Kidney failure Father    • Lung cancer Sister    • Lung cancer Sister    • Lung cancer Son    • Malig Hyperthermia Neg Hx          SOCIAL HISTORY  Social History     Socioeconomic History   • Marital status:    • Number of children: 1   • Years of education: 12   Tobacco Use   • Smoking status: Former Smoker     Packs/day: 1.00     Years: 30.00     Pack years: 30.00     Types: Cigarettes     Quit date: 2012     Years since quittin.8   • Smokeless tobacco: Never Used   Substance and Sexual Activity   • Alcohol use: No   • Drug use: Never   • Sexual activity: Defer         ALLERGIES  Patient has no known allergies.        REVIEW OF SYSTEMS  Review of Systems   Review of all 14 systems is negative other than stated in the HPI above.      PHYSICAL EXAM  ED Triage Vitals [22 1040]   Temp Heart Rate Resp BP SpO2   98.4 °F (36.9 °C) 78 18 (!) 148/38 98 %      Temp src Heart Rate Source Patient Position BP Location FiO2 (%)   Tympanic Monitor Lying -- --         GENERAL: Awake and alert, patient is rolling around the stretcher screaming in pain, appears to be in mild distress  HENT: nares patent  EYES: no scleral  icterus, EOMI  CV: regular rhythm, normal rate  RESPIRATORY: normal effort  ABDOMEN: soft, nondistended, nontender throughout  MUSCULOSKELETAL: no deformity, mild lower L-spine tenderness and point tenderness over the right lower buttock.  2+ DP pulses bilateral lower extremities.  There is mild pain with passive ranging of the right leg when raised greater than 60 degrees.  NEURO: alert, moves all extremities, follows commands, speech fluent and clear.  Patient has chronic weakness with dorsiflexion of the right foot.  She has preserved sensation throughout bilateral lower extremities.  PSYCH:  calm, cooperative  SKIN: warm, dry, normal to inspection, no rash    Vital signs and nursing notes reviewed.          LAB RESULTS  Recent Results (from the past 24 hour(s))   Basic Metabolic Panel    Collection Time: 03/09/22 12:44 PM    Specimen: Blood   Result Value Ref Range    Glucose 96 65 - 99 mg/dL    BUN 23 8 - 23 mg/dL    Creatinine 1.57 (H) 0.57 - 1.00 mg/dL    Sodium 141 136 - 145 mmol/L    Potassium 4.0 3.5 - 5.2 mmol/L    Chloride 109 (H) 98 - 107 mmol/L    CO2 22.5 22.0 - 29.0 mmol/L    Calcium 9.5 8.6 - 10.5 mg/dL    BUN/Creatinine Ratio 14.6 7.0 - 25.0    Anion Gap 9.5 5.0 - 15.0 mmol/L    eGFR 35.3 (L) >60.0 mL/min/1.73   CBC Auto Differential    Collection Time: 03/09/22 12:44 PM    Specimen: Blood   Result Value Ref Range    WBC 8.99 3.40 - 10.80 10*3/mm3    RBC 3.86 3.77 - 5.28 10*6/mm3    Hemoglobin 12.1 12.0 - 15.9 g/dL    Hematocrit 36.8 34.0 - 46.6 %    MCV 95.3 79.0 - 97.0 fL    MCH 31.3 26.6 - 33.0 pg    MCHC 32.9 31.5 - 35.7 g/dL    RDW 12.8 12.3 - 15.4 %    RDW-SD 45.5 37.0 - 54.0 fl    MPV 10.5 6.0 - 12.0 fL    Platelets 271 140 - 450 10*3/mm3    Neutrophil % 72.5 42.7 - 76.0 %    Lymphocyte % 19.1 (L) 19.6 - 45.3 %    Monocyte % 6.2 5.0 - 12.0 %    Eosinophil % 1.4 0.3 - 6.2 %    Basophil % 0.6 0.0 - 1.5 %    Immature Grans % 0.2 0.0 - 0.5 %    Neutrophils, Absolute 6.51 1.70 - 7.00 10*3/mm3     Lymphocytes, Absolute 1.72 0.70 - 3.10 10*3/mm3    Monocytes, Absolute 0.56 0.10 - 0.90 10*3/mm3    Eosinophils, Absolute 0.13 0.00 - 0.40 10*3/mm3    Basophils, Absolute 0.05 0.00 - 0.20 10*3/mm3    Immature Grans, Absolute 0.02 0.00 - 0.05 10*3/mm3    nRBC 0.0 0.0 - 0.2 /100 WBC   COVID-19,[facility] IN-HOUSE CEPHEID/LUC NP SWAB IN TRANSPORT MEDIA 8-12 HR TAT - Swab, Nasopharynx    Collection Time: 03/09/22  3:35 PM    Specimen: Nasopharynx; Swab   Result Value Ref Range    COVID19 Detected (C) Not Detected - Ref. Range       Ordered the above labs and reviewed the results.        RADIOLOGY  No Radiology Exams Resulted Within Past 24 Hours    Ordered the above noted radiological studies. Reviewed by me in PACS.            PROCEDURES  Procedures            MEDICATIONS GIVEN IN ER  Medications   sodium chloride 0.9 % flush 10 mL (has no administration in time range)   methylPREDNISolone sodium succinate (SOLU-Medrol) 250 mg in sodium chloride 0.9 % 100 mL IVPB (250 mg Intravenous New Bag 3/9/22 1634)   baclofen (LIORESAL) tablet 5 mg (has no administration in time range)   oxyCODONE (oxyCONTIN) 12 hr tablet 10 mg (has no administration in time range)   oxyCODONE (ROXICODONE) immediate release tablet 10 mg (has no administration in time range)   HYDROmorphone (DILAUDID) injection 1 mg (has no administration in time range)   gabapentin (NEURONTIN) capsule 300 mg (has no administration in time range)   LORazepam (ATIVAN) injection 0.5 mg (has no administration in time range)   HYDROmorphone (DILAUDID) injection 1 mg (1 mg Intravenous Given 3/9/22 1152)   ondansetron (ZOFRAN) injection 4 mg (4 mg Intravenous Given 3/9/22 1151)   ketorolac (TORADOL) injection 30 mg (30 mg Intravenous Given 3/9/22 1242)   HYDROmorphone (DILAUDID) injection 1 mg (1 mg Intravenous Given 3/9/22 1328)                   MEDICAL DECISION MAKING, PROGRESS, and CONSULTS    All labs have been independently reviewed by me.  All radiology studies have  been reviewed by me and discussed with radiologist dictating the report.   EKG's independently viewed and interpreted by me.  Discussion below represents my analysis of pertinent findings related to patient's condition, differential diagnosis, treatment plan and final disposition.      Differential diagnosis includes but is not limited to:  Lumbar radiculopathy  Lumbar disc herniation  Neuroforaminal stenosis  Herpes zoster  Cauda equina syndrome      ED Course as of 03/09/22 1759   Wed Mar 09, 2022   1232 Patient had very temporary relief with the Dilaudid.  I have ordered Toradol for pain control.  I placed a consult to neurosurgery to see if they have any recommendations for better control of her pain.  I reviewed her CT lumbar spine that was obtained recently in the emergency department.  She has multilevel neuroforaminal stenosis within the lumbar spine.  Currently her symptoms are most likely related to right neuroforaminal stenosis at L2-3 given the distribution of her pain. [JR]   1256 Discussed with VINNIE Aldana with NSGY, who will consult with Dr. Paulino.  [JR]   1503 Discussed with VINNIE Aldana for neurosurgery, who requested patient be admitted to hospitalist and they may proceed with CT myelogram. [JR]   1654 COVID19(!!): Detected  Covid positive but no symptoms currently.  [JR]      ED Course User Index  [JR] Fortunato Tamez MD              I wore an N95 mask, face shield, and gloves during this patient encounter.  Patient also wearing a surgical mask.  Hand hygeine performed before and after seeing the patient.    DIAGNOSIS  Final diagnoses:   Intractable back pain   Lumbar radiculopathy         DISPOSITION  ADMIT            Latest Documented Vital Signs:  As of 17:59 EST  BP- 126/74 HR- 75 Temp- 98.4 °F (36.9 °C) (Tympanic) O2 sat- 98%        --    Please note that portions of this were completed with a voice recognition program.          Fortunato Tamez MD  03/09/22 1800

## 2022-03-09 NOTE — PROGRESS NOTES
Clinical Pharmacy Services: Medication History    Marlena Navarrete is a 72 y.o. female presenting to [facility] for   Chief Complaint   Patient presents with   • Back Pain       She  has a past medical history of Arthritis, Chronic back pain, CKD (chronic kidney disease), Diverticulosis, Foot drop, right, GERD (gastroesophageal reflux disease), History of DVT (deep vein thrombosis) (2018), History of transfusion, Hyperlipidemia, Hypertension, Kidney transplant recipient (09/07/1979), Neuropathy, Spinal headache, and Torn rotator cuff.    Allergies as of 03/09/2022   • (No Known Allergies)       Medication information was obtained from: Patient   Pharmacy and Phone Number:     Prior to Admission Medications     Prescriptions Last Dose Informant Patient Reported? Taking?    amLODIPine (NORVASC) 5 MG tablet  Self Yes Yes    Take 5 mg by mouth Every Night.    atorvastatin (LIPITOR) 10 MG tablet  Self Yes Yes    Take 10 mg by mouth Every Night.    Cholecalciferol (Vitamin D3) 50 MCG (2000 UT) capsule  Self Yes Yes    Take 2,000 Units by mouth Daily.    diphenhydrAMINE-acetaminophen (TYLENOL PM)  MG tablet per tablet  Self Yes Yes    Take 1 tablet by mouth At Night As Needed for Sleep.    omeprazole (priLOSEC) 20 MG capsule  Self Yes Yes    Take 20 mg by mouth Daily.    oxyCODONE-acetaminophen (PERCOCET)  MG per tablet  Self Yes Yes    Take 1 tablet by mouth 2 (Two) Times a Day.    pregabalin (LYRICA) 150 MG capsule  Self Yes Yes    Take 150 mg by mouth Daily.    pregabalin (LYRICA) 50 MG capsule  Self Yes Yes    Take 50 mg by mouth 2 (Two) Times a Day.    Sodium Bicarbonate powder powder  Self Yes Yes    Take  by mouth See Admin Instructions. 1 Teaspoon by mouth daily            Medication notes:     This medication list is complete to the best of my knowledge as of 3/9/2022    Please call if questions.    Tigre Joiner  Medication History Technician  [phone]    3/9/2022 16:18 EST

## 2022-03-09 NOTE — ED TRIAGE NOTES
Pt arrives via EMS from home. Complains of right sided low back pain that radiates into her right leg. Pt has been seen several times here for the same issue in the last few weeks. Patient masked at arrival and triage staff wore all appropriate PPE during entire encounter with patient.

## 2022-03-09 NOTE — H&P
PCP: Bradley Hernandez MD    Chief complaint   Chief Complaint   Patient presents with   • Back Pain       HPI  Patient is a 72 y.o. female presents with a history of a kidney transplant not on immunosuppression since 1985, chronic back pain with history of foot drop who presents to the ER due to increasing acute on chronic back pain.  Patient is now on her third ER visit in the last few weeks.  She has had a history of lumbar discectomy and fusion by Dr. Escalante, a spinal cord stimulator by Dr. Kemp and she has seen pain management and usually uses oxycodone as well as muscle spasm medicine.  She says that she has had recent steroids and epidural injections.  She said about 3 weeks ago the pain started getting worse and there was at first and electric shooting down the backside of her right leg.  But now it is in her right hip and as well as wrapping around into her right thigh and now she has some numbness as well as the pain.  She is having increasing back spasms over the last 1 to 2 weeks.  It is gotten progressively worse the last couple days to the point where now she cannot get out of bed.  She normally walks around with a cane.      PAST MEDICAL HISTORY  Past Medical History:   Diagnosis Date   • Arthritis     OSTEO   • Chronic back pain    • Chronic bilateral low back pain with right-sided sciatica 2/17/2020   • CKD (chronic kidney disease)     FOLLOWED BY DR LANRE BYNUM   • Diverticulosis    • Foot drop, right    • GERD (gastroesophageal reflux disease)    • History of DVT (deep vein thrombosis) 2018    RIGHT CALF S/P BACK SURGERY    • History of transfusion     no reaction   • Hyperlipidemia    • Hypertension    • Kidney transplant recipient 09/07/1979    HX TRANSPLANT D/T REFLUX    • Neuropathy    • Secondary hyperparathyroidism  3/9/2022   • Spinal cord stimulator status 3/9/2022   • Spinal headache    • Stage 3b chronic kidney disease (HCC) 12/30/2020    FOLLOWED BY DR LANRE BYNUM   • Torn rotator cuff     • Vitamin D deficiency 3/9/2022       PAST SURGICAL HISTORY  Past Surgical History:   Procedure Laterality Date   • APPENDECTOMY     • CATARACT EXTRACTION EXTRACAPSULAR W/ INTRAOCULAR LENS IMPLANTATION     •  SECTION     • COLONOSCOPY  approx     Anastacio WHARTON   • ENDOSCOPY N/A 2020    Procedure: ESOPHAGOGASTRODUODENOSCOPY with biopsies;  Surgeon: Wicho Chaves MD;  Location: [facility] ENDOSCOPY;  Service: Gastroenterology;  Laterality: N/A;  pre- epigastric pain, nausea  post-- gastritis, duodenitis, bile reflux   • LUMBAR DISCECTOMY FUSION INSTRUMENTATION Right 2018    Procedure: Right L2-3 laminectomy with Metrix;  Surgeon: Oscar Paulino MD;  Location: [facility] MAIN OR;  Service: Neurosurgery   • SHOULDER ARTHROSCOPY W/ ROTATOR CUFF REPAIR Right 2020    Procedure: SHOULDER ARTHROSCOPY, ROTATOR CUFF REPAIR, SUBACROMIAL DECOMPRESSION, DISTAL CLAVICLE EXCISION, BICEPS TENOTOMY, AND LABRAL DEBRIDEMENT;  Surgeon: Rishabh Florence MD;  Location: [facility] OR OSC;  Service: Orthopedics;  Laterality: Right;   • SHOULDER ROTATOR CUFF REPAIR Left    • SPINAL CORD STIMULATOR IMPLANT N/A 2020    Procedure: SPINAL CORD STIMULATOR INSERTION PHASE 2, triall of right gluteal internal pulse generator;  Surgeon: Cuate Kemp MD;  Location: [facility] OR;  Service: Neurosurgery;  Laterality: N/A;   • SPINAL CORD STIMULATOR IMPLANT N/A 2020    Procedure: SPINAL CORD STIMULATOR INSERTION PHASE 1, Thoracic 10 laminotomy for revision of St. Tom surgical lead at Thoracic 8 to Thoracic 9;  Surgeon: Cuate Kemp MD;  Location: [facility] MAIN OR;  Service: Neurosurgery;  Laterality: N/A;   • TONSILLECTOMY     • TRANSPLANTATION RENAL Right 1979    [facility]    • TUBAL ABDOMINAL LIGATION         FAMILY HISTORY  Family History   Problem Relation Age of Onset   • Aneurysm Mother         BRAIN ANEURYSM   • Heart failure Father    • Kidney failure Father    • Lung cancer Sister    •  Lung cancer Sister    • Lung cancer Son    • Elier Monzon Neg Hx        SOCIAL HISTORY  Social History     Tobacco Use   • Smoking status: Former Smoker     Packs/day: 1.00     Years: 30.00     Pack years: 30.00     Types: Cigarettes     Quit date: 2012     Years since quittin.8   • Smokeless tobacco: Never Used   Substance Use Topics   • Alcohol use: No   • Drug use: Never       MEDICATIONS:  Medications Prior to Admission   Medication Sig Dispense Refill Last Dose   • amLODIPine (NORVASC) 5 MG tablet Take 5 mg by mouth Every Night.  11    • atorvastatin (LIPITOR) 10 MG tablet Take 10 mg by mouth Every Night.      • Cholecalciferol (Vitamin D3) 50 MCG (2000 UT) capsule Take 2,000 Units by mouth Daily.      • diphenhydrAMINE-acetaminophen (TYLENOL PM)  MG tablet per tablet Take 1 tablet by mouth At Night As Needed for Sleep.      • omeprazole (priLOSEC) 20 MG capsule Take 20 mg by mouth Daily.      • oxyCODONE-acetaminophen (PERCOCET)  MG per tablet Take 1 tablet by mouth 2 (Two) Times a Day.      • pregabalin (LYRICA) 150 MG capsule Take 150 mg by mouth Daily.      • pregabalin (LYRICA) 50 MG capsule Take 50 mg by mouth 2 (Two) Times a Day.      • Sodium Bicarbonate powder powder Take  by mouth See Admin Instructions. 1 Teaspoon by mouth daily          Allergies:  Patient has no known allergies.    Review of Systems:  fatigue, back pain, immobility, uses a cane, back spasms, chronic pain, arthritis pain  Negative for following (except as per HPI):  Constitution: chills, fevers,   Eyes: change of vision, loss of vision and discharge  ENT: ear drainage, ear ringing and facial trauma  Respiratory: cough, pleuritic pain, shortness of air  Cardiovascular: chest pressure, pain, lower extremity edema, palpitations  Gastrointestinal: constipation, diarrhea, nausea, vomiting, pain    Integument: rash and wound  Hematologic / Lymphatic: excessive bleeding and easy bruising  Musculoskeletal: joint  stiffness, joint swelling   Neurological: headaches, , seizures and tremors  Behavioral / Psych: anxiety, depression and hallucinations         Vital Signs  Temp:  [98.4 °F (36.9 °C)] 98.4 °F (36.9 °C)  Heart Rate:  [75-78] 75  Resp:  [18] 18  BP: (126-148)/(38-74) 126/74     Body mass index is 30.18 kg/m².    Physical Exam:  General Appearance:    Alert, cooperative, in moderate acute distress due to pain and spasms   Head:    Normocephalic, without obvious abnormality, atraumatic   Eyes:         conjunctivae and sclerae normal, no icterus, PERRLA   ENT:    Ears grossly intact, oral mucosa moist,   Neck:   No adenopathy, supple, trachea midline,    Back:     Normal to inspection, range of motion normal   Lungs:     Clear to auscultation,respirations regular, even and                   unlabored    Heart:    Regular rhythm and normal rate,  no murmur, normal S1 and S2,   Abdomen:     Normal bowel sounds, no masses,  soft non-tender, non-distended,    Extremities:   Moves all extremities well, no cyanosis, no edema,             Pulses:   Pulses palpable and equal bilaterally   Skin:   No bleeding, rash, bruising    Neurologic:    Psych:   Cranial nerves 2 - 12 grossly intact,      Moves all extremities , right foot drop    Alert and Oriented x 3, Normal Affect     I used full protective equipment while examining this patient.  This includes N95 face mask /protective eyewear and protective gown when appropriate.  I washed/sanitized my hands before entering the room and immediately upon leaving the room.    LABS:  Admission on 03/09/2022   Component Date Value Ref Range Status   • Glucose 03/09/2022 96  65 - 99 mg/dL Final   • BUN 03/09/2022 23  8 - 23 mg/dL Final   • Creatinine 03/09/2022 1.57 (A) 0.57 - 1.00 mg/dL Final   • Sodium 03/09/2022 141  136 - 145 mmol/L Final   • Potassium 03/09/2022 4.0  3.5 - 5.2 mmol/L Final   • Chloride 03/09/2022 109 (A) 98 - 107 mmol/L Final   • CO2 03/09/2022 22.5  22.0 - 29.0 mmol/L  Final   • Calcium 03/09/2022 9.5  8.6 - 10.5 mg/dL Final   • BUN/Creatinine Ratio 03/09/2022 14.6  7.0 - 25.0 Final   • Anion Gap 03/09/2022 9.5  5.0 - 15.0 mmol/L Final   • eGFR 03/09/2022 35.3 (A) >60.0 mL/min/1.73 Final    National Kidney Foundation and American Society of Nephrology (ASN) Task Force recommended calculation based on the Chronic Kidney Disease Epidemiology Collaboration (CKD-EPI) equation refit without adjustment for race.   • WBC 03/09/2022 8.99  3.40 - 10.80 10*3/mm3 Final   • RBC 03/09/2022 3.86  3.77 - 5.28 10*6/mm3 Final   • Hemoglobin 03/09/2022 12.1  12.0 - 15.9 g/dL Final   • Hematocrit 03/09/2022 36.8  34.0 - 46.6 % Final   • MCV 03/09/2022 95.3  79.0 - 97.0 fL Final   • MCH 03/09/2022 31.3  26.6 - 33.0 pg Final   • MCHC 03/09/2022 32.9  31.5 - 35.7 g/dL Final   • RDW 03/09/2022 12.8  12.3 - 15.4 % Final   • RDW-SD 03/09/2022 45.5  37.0 - 54.0 fl Final   • MPV 03/09/2022 10.5  6.0 - 12.0 fL Final   • Platelets 03/09/2022 271  140 - 450 10*3/mm3 Final   • Neutrophil % 03/09/2022 72.5  42.7 - 76.0 % Final   • Lymphocyte % 03/09/2022 19.1 (A) 19.6 - 45.3 % Final   • Monocyte % 03/09/2022 6.2  5.0 - 12.0 % Final   • Eosinophil % 03/09/2022 1.4  0.3 - 6.2 % Final   • Basophil % 03/09/2022 0.6  0.0 - 1.5 % Final   • Immature Grans % 03/09/2022 0.2  0.0 - 0.5 % Final   • Neutrophils, Absolute 03/09/2022 6.51  1.70 - 7.00 10*3/mm3 Final   • Lymphocytes, Absolute 03/09/2022 1.72  0.70 - 3.10 10*3/mm3 Final   • Monocytes, Absolute 03/09/2022 0.56  0.10 - 0.90 10*3/mm3 Final   • Eosinophils, Absolute 03/09/2022 0.13  0.00 - 0.40 10*3/mm3 Final   • Basophils, Absolute 03/09/2022 0.05  0.00 - 0.20 10*3/mm3 Final   • Immature Grans, Absolute 03/09/2022 0.02  0.00 - 0.05 10*3/mm3 Final   • nRBC 03/09/2022 0.0  0.0 - 0.2 /100 WBC Final   • COVID19 03/09/2022 Detected (A) Not Detected - Ref. Range Final       DIAGNOSTICS:  cxr pending         Results Review:   I reviewed the patient's new clinical  results.  Discussed with ER physician  Old records reviewed / Medical Decision Making High Complexity  I personally viewed and interpreted the patient's EKG/Telemetry data- sinus rhythm      ASSESSMENT AND PLAN          · Acute on chronic back pain/  Intractable back pain/muscle spasm.  History Spinal cord stimulator status/ Chronic bilateral low back pain with right-sided sciatica/   History of lumbar laminectomy for spinal cord decompression/ chronic Right foot drop  -Patient was already taking oxycodone 10 3 times a day therefore we will go ahead and schedule some long-acting extended release oxycodone with some as needed short acting Torsten.  With some breakthrough IV pain medication.  -Neurosurgery consulted and started on  Scheduled baclofen for now and can change to as needed once patient improves  -Plans are for a myelogram but unclear if that will continue since patient came back as Covid positive.    · After patient admitted and already worked up Covid came back as positive.  -Ordering initial labs  -getting Chest x-ray  -May be contributing to some of her muscles pain  -Monitor patient closely,  Monitor continuous O2 saturation  - symptom day    ????   -Hydrate- IVF  -Encourage hydration, and nutrition- protein supplement drinks  -Antipyretics and analgesics ordered, PRN cough medication, radha-norton muscle aches, ice and tylenol for HA  -Daily labs to monitor progression  -Inhalers if needed;   -Oxygen supplementation to keep sats >94   -oxygen saturations were <94% so started oral steroids,  -lovenox Sub Q for microemboli prophylaxis    ·   Kidney transplant recipient/   Stage 3b chronic kidney disease   -Patient got a transplant from her sister.  [facility]. Erwin [facility].  Has been off of immunosuppression since 1985.  -    ·  Essential hypertension  -Blood pressure was 86/69 at 1 point.  She received some IV fluids.  May be due to the pain medication she was given.      ·  Chronic Medical  conditions being monitored- stable, continue medical managment  - History of DVT (deep vein thrombosis)  -     Secondary hyperparathyroidism     Vitamin D deficiency      +DVT proph: lovenox 30  + Full code    I discussed the patients findings and my recommendations with the patient and/or family.  Please reference all orders placed.    Ruby Lindsey MD  03/09/22  18:28 EST      This document is intended for medical expert use only. Reading of this document by patients and/or patient's family without participating in medical staff guidance may result in misinterpretation and unintended morbidity. Any interpretation of such data is the responsibility of the patient and/or family member responsible for the patient in concert with their primary or specialist providers, and NOT to be left for sources of online searches such as Dimdim, T-Quad 22 or similar queries. Relying on these approaches to knowledge may result in misinterpretation, misguided goals of care and even death should patients or family members try recommendations outside of the realm of professional medical care in a supervised way.    Dictated utilizing Dragon dictation:  Much of this encounter note is an electronic transcription/translation of spoken language to printed text. The electronic translation of spoken language may permit erroneous, or at times, nonsensical words or phrases to be inadvertently transcribed; Although I have reviewed the note for such errors, some may still exist.

## 2022-03-10 PROBLEM — M25.78 DEGENERATION OF INTERVERTEBRAL DISC OF LUMBAR REGION WITH OSTEOPHYTE OF LUMBAR VERTEBRA: Status: ACTIVE | Noted: 2020-04-10

## 2022-03-10 LAB
ALBUMIN SERPL-MCNC: 3.9 G/DL (ref 3.5–5.2)
ALBUMIN SERPL-MCNC: 3.9 G/DL (ref 3.5–5.2)
ALBUMIN SERPL-MCNC: 4.2 G/DL (ref 3.5–5.2)
ALBUMIN/GLOB SERPL: 1.6 G/DL
ALBUMIN/GLOB SERPL: 1.8 G/DL
ALP SERPL-CCNC: 77 U/L (ref 39–117)
ALP SERPL-CCNC: 83 U/L (ref 39–117)
ALP SERPL-CCNC: 84 U/L (ref 39–117)
ALT SERPL W P-5'-P-CCNC: 12 U/L (ref 1–33)
ALT SERPL W P-5'-P-CCNC: 9 U/L (ref 1–33)
ALT SERPL W P-5'-P-CCNC: 9 U/L (ref 1–33)
AMMONIA BLD-SCNC: 16 UMOL/L (ref 11–51)
ANION GAP SERPL CALCULATED.3IONS-SCNC: 11 MMOL/L (ref 5–15)
ANION GAP SERPL CALCULATED.3IONS-SCNC: 14 MMOL/L (ref 5–15)
ARTERIAL PATENCY WRIST A: POSITIVE
AST SERPL-CCNC: 13 U/L (ref 1–32)
AST SERPL-CCNC: 15 U/L (ref 1–32)
AST SERPL-CCNC: 15 U/L (ref 1–32)
ATMOSPHERIC PRESS: 748 MMHG
BASE EXCESS BLDA CALC-SCNC: -6.3 MMOL/L (ref 0–2)
BDY SITE: ABNORMAL
BILIRUB CONJ SERPL-MCNC: <0.2 MG/DL (ref 0–0.3)
BILIRUB INDIRECT SERPL-MCNC: NORMAL MG/DL
BILIRUB SERPL-MCNC: 0.3 MG/DL (ref 0–1.2)
BILIRUB SERPL-MCNC: 0.4 MG/DL (ref 0–1.2)
BILIRUB SERPL-MCNC: 0.4 MG/DL (ref 0–1.2)
BUN SERPL-MCNC: 32 MG/DL (ref 8–23)
BUN SERPL-MCNC: 48 MG/DL (ref 8–23)
BUN/CREAT SERPL: 17.9 (ref 7–25)
BUN/CREAT SERPL: 23.2 (ref 7–25)
CALCIUM SPEC-SCNC: 9.1 MG/DL (ref 8.6–10.5)
CALCIUM SPEC-SCNC: 9.3 MG/DL (ref 8.6–10.5)
CHLORIDE SERPL-SCNC: 106 MMOL/L (ref 98–107)
CHLORIDE SERPL-SCNC: 107 MMOL/L (ref 98–107)
CO2 SERPL-SCNC: 19 MMOL/L (ref 22–29)
CO2 SERPL-SCNC: 20 MMOL/L (ref 22–29)
CREAT SERPL-MCNC: 1.79 MG/DL (ref 0.57–1)
CREAT SERPL-MCNC: 1.96 MG/DL (ref 0.57–1)
CREAT SERPL-MCNC: 2.07 MG/DL (ref 0.57–1)
CRP SERPL-MCNC: 1.23 MG/DL (ref 0–0.5)
D DIMER PPP FEU-MCNC: 0.48 MCGFEU/ML (ref 0–0.49)
DEPRECATED RDW RBC AUTO: 42.7 FL (ref 37–54)
EGFRCR SERPLBLD CKD-EPI 2021: 25.4 ML/MIN/1.73
EGFRCR SERPLBLD CKD-EPI 2021: 27.1 ML/MIN/1.73
EGFRCR SERPLBLD CKD-EPI 2021: 30.2 ML/MIN/1.73
ERYTHROCYTE [DISTWIDTH] IN BLOOD BY AUTOMATED COUNT: 12.4 % (ref 12.3–15.4)
FERRITIN SERPL-MCNC: 310 NG/ML (ref 13–150)
GAS FLOW AIRWAY: 1 LPM
GLOBULIN UR ELPH-MCNC: 2.4 GM/DL
GLOBULIN UR ELPH-MCNC: 2.5 GM/DL
GLUCOSE BLDC GLUCOMTR-MCNC: 109 MG/DL (ref 70–130)
GLUCOSE SERPL-MCNC: 122 MG/DL (ref 65–99)
GLUCOSE SERPL-MCNC: 135 MG/DL (ref 65–99)
HCO3 BLDA-SCNC: 19.1 MMOL/L (ref 22–28)
HCT VFR BLD AUTO: 34.1 % (ref 34–46.6)
HGB BLD-MCNC: 11.4 G/DL (ref 12–15.9)
INR PPP: 0.99 (ref 0.9–1.1)
LDH SERPL-CCNC: 259 U/L (ref 135–214)
LYMPHOCYTES # BLD MANUAL: 0.52 10*3/MM3 (ref 0.7–3.1)
LYMPHOCYTES NFR BLD MANUAL: 1 % (ref 5–12)
MAGNESIUM SERPL-MCNC: 2.3 MG/DL (ref 1.6–2.4)
MCH RBC QN AUTO: 31.6 PG (ref 26.6–33)
MCHC RBC AUTO-ENTMCNC: 33.4 G/DL (ref 31.5–35.7)
MCV RBC AUTO: 94.5 FL (ref 79–97)
MODALITY: ABNORMAL
MONOCYTES # BLD: 0.07 10*3/MM3 (ref 0.1–0.9)
NEUTROPHILS # BLD AUTO: 6.77 10*3/MM3 (ref 1.7–7)
NEUTROPHILS NFR BLD MANUAL: 92 % (ref 42.7–76)
PCO2 BLDA: 36.4 MM HG (ref 35–45)
PH BLDA: 7.33 PH UNITS (ref 7.35–7.45)
PHOSPHATE SERPL-MCNC: 5 MG/DL (ref 2.5–4.5)
PLAT MORPH BLD: NORMAL
PLATELET # BLD AUTO: 258 10*3/MM3 (ref 140–450)
PMV BLD AUTO: 10.3 FL (ref 6–12)
PO2 BLDA: 73.8 MM HG (ref 80–100)
POTASSIUM SERPL-SCNC: 4.6 MMOL/L (ref 3.5–5.2)
POTASSIUM SERPL-SCNC: 4.9 MMOL/L (ref 3.5–5.2)
PROCALCITONIN SERPL-MCNC: 0.07 NG/ML (ref 0–0.25)
PROT SERPL-MCNC: 6.3 G/DL (ref 6–8.5)
PROT SERPL-MCNC: 6.4 G/DL (ref 6–8.5)
PROT SERPL-MCNC: 6.6 G/DL (ref 6–8.5)
PROTHROMBIN TIME: 13 SECONDS (ref 11.7–14.2)
QT INTERVAL: 402 MS
RBC # BLD AUTO: 3.61 10*6/MM3 (ref 3.77–5.28)
RBC MORPH BLD: NORMAL
SAO2 % BLDCOA: 93.6 % (ref 92–99)
SODIUM SERPL-SCNC: 138 MMOL/L (ref 136–145)
SODIUM SERPL-SCNC: 139 MMOL/L (ref 136–145)
TOTAL RATE: 14 BREATHS/MINUTE
VARIANT LYMPHS NFR BLD MANUAL: 7 % (ref 19.6–45.3)
WBC MORPH BLD: NORMAL
WBC NRBC COR # BLD: 7.36 10*3/MM3 (ref 3.4–10.8)

## 2022-03-10 PROCEDURE — 85610 PROTHROMBIN TIME: CPT | Performed by: INTERNAL MEDICINE

## 2022-03-10 PROCEDURE — 25010000002 REMDESIVIR 100 MG/20ML SOLUTION 1 EACH VIAL: Performed by: INTERNAL MEDICINE

## 2022-03-10 PROCEDURE — 82140 ASSAY OF AMMONIA: CPT | Performed by: NURSE PRACTITIONER

## 2022-03-10 PROCEDURE — 85007 BL SMEAR W/DIFF WBC COUNT: CPT | Performed by: INTERNAL MEDICINE

## 2022-03-10 PROCEDURE — 93005 ELECTROCARDIOGRAM TRACING: CPT | Performed by: INTERNAL MEDICINE

## 2022-03-10 PROCEDURE — 97162 PT EVAL MOD COMPLEX 30 MIN: CPT

## 2022-03-10 PROCEDURE — 83735 ASSAY OF MAGNESIUM: CPT | Performed by: INTERNAL MEDICINE

## 2022-03-10 PROCEDURE — 36600 WITHDRAWAL OF ARTERIAL BLOOD: CPT

## 2022-03-10 PROCEDURE — 86140 C-REACTIVE PROTEIN: CPT | Performed by: INTERNAL MEDICINE

## 2022-03-10 PROCEDURE — 84145 PROCALCITONIN (PCT): CPT | Performed by: INTERNAL MEDICINE

## 2022-03-10 PROCEDURE — 94799 UNLISTED PULMONARY SVC/PX: CPT

## 2022-03-10 PROCEDURE — 84100 ASSAY OF PHOSPHORUS: CPT | Performed by: INTERNAL MEDICINE

## 2022-03-10 PROCEDURE — 93010 ELECTROCARDIOGRAM REPORT: CPT | Performed by: INTERNAL MEDICINE

## 2022-03-10 PROCEDURE — 83615 LACTATE (LD) (LDH) ENZYME: CPT | Performed by: INTERNAL MEDICINE

## 2022-03-10 PROCEDURE — 82565 ASSAY OF CREATININE: CPT | Performed by: INTERNAL MEDICINE

## 2022-03-10 PROCEDURE — XW033E5 INTRODUCTION OF REMDESIVIR ANTI-INFECTIVE INTO PERIPHERAL VEIN, PERCUTANEOUS APPROACH, NEW TECHNOLOGY GROUP 5: ICD-10-PCS | Performed by: INTERNAL MEDICINE

## 2022-03-10 PROCEDURE — 85379 FIBRIN DEGRADATION QUANT: CPT | Performed by: INTERNAL MEDICINE

## 2022-03-10 PROCEDURE — 82803 BLOOD GASES ANY COMBINATION: CPT

## 2022-03-10 PROCEDURE — 82962 GLUCOSE BLOOD TEST: CPT

## 2022-03-10 PROCEDURE — 25010000002 ENOXAPARIN PER 10 MG: Performed by: INTERNAL MEDICINE

## 2022-03-10 PROCEDURE — 82728 ASSAY OF FERRITIN: CPT | Performed by: INTERNAL MEDICINE

## 2022-03-10 PROCEDURE — 80053 COMPREHEN METABOLIC PANEL: CPT | Performed by: INTERNAL MEDICINE

## 2022-03-10 PROCEDURE — 82248 BILIRUBIN DIRECT: CPT | Performed by: INTERNAL MEDICINE

## 2022-03-10 PROCEDURE — 94761 N-INVAS EAR/PLS OXIMETRY MLT: CPT

## 2022-03-10 PROCEDURE — 97166 OT EVAL MOD COMPLEX 45 MIN: CPT

## 2022-03-10 PROCEDURE — 25010000002 HYDROMORPHONE 1 MG/ML SOLUTION: Performed by: INTERNAL MEDICINE

## 2022-03-10 PROCEDURE — 25010000002 METHYLPREDNISOLONE PER 125 MG: Performed by: INTERNAL MEDICINE

## 2022-03-10 PROCEDURE — 85025 COMPLETE CBC W/AUTO DIFF WBC: CPT | Performed by: INTERNAL MEDICINE

## 2022-03-10 RX ORDER — METAXALONE 800 MG/1
400 TABLET ORAL 3 TIMES DAILY PRN
Status: DISCONTINUED | OUTPATIENT
Start: 2022-03-10 | End: 2022-03-15 | Stop reason: HOSPADM

## 2022-03-10 RX ORDER — OXYCODONE AND ACETAMINOPHEN 10; 325 MG/1; MG/1
1 TABLET ORAL EVERY 4 HOURS PRN
Status: DISCONTINUED | OUTPATIENT
Start: 2022-03-10 | End: 2022-03-11

## 2022-03-10 RX ORDER — HYDROMORPHONE HYDROCHLORIDE 1 MG/ML
0.5 INJECTION, SOLUTION INTRAMUSCULAR; INTRAVENOUS; SUBCUTANEOUS
Status: DISCONTINUED | OUTPATIENT
Start: 2022-03-10 | End: 2022-03-12

## 2022-03-10 RX ADMIN — SODIUM CHLORIDE 250 MG: 9 INJECTION, SOLUTION INTRAVENOUS at 22:18

## 2022-03-10 RX ADMIN — SODIUM CHLORIDE 250 MG: 9 INJECTION, SOLUTION INTRAVENOUS at 09:53

## 2022-03-10 RX ADMIN — Medication 10 ML: at 09:55

## 2022-03-10 RX ADMIN — OXYCODONE HYDROCHLORIDE 10 MG: 10 TABLET, FILM COATED, EXTENDED RELEASE ORAL at 02:04

## 2022-03-10 RX ADMIN — DOCUSATE SODIUM 50MG AND SENNOSIDES 8.6MG 2 TABLET: 8.6; 5 TABLET, FILM COATED ORAL at 09:53

## 2022-03-10 RX ADMIN — PREGABALIN 200 MG: 100 CAPSULE ORAL at 09:53

## 2022-03-10 RX ADMIN — PANTOPRAZOLE SODIUM 40 MG: 40 TABLET, DELAYED RELEASE ORAL at 09:53

## 2022-03-10 RX ADMIN — BACLOFEN 5 MG: 10 TABLET ORAL at 09:53

## 2022-03-10 RX ADMIN — SODIUM CHLORIDE 250 MG: 9 INJECTION, SOLUTION INTRAVENOUS at 16:02

## 2022-03-10 RX ADMIN — HYDROMORPHONE HYDROCHLORIDE 1 MG: 1 INJECTION, SOLUTION INTRAMUSCULAR; INTRAVENOUS; SUBCUTANEOUS at 00:52

## 2022-03-10 RX ADMIN — REMDESIVIR 200 MG: 100 INJECTION, POWDER, LYOPHILIZED, FOR SOLUTION INTRAVENOUS at 13:43

## 2022-03-10 RX ADMIN — SODIUM CHLORIDE 250 MG: 9 INJECTION, SOLUTION INTRAVENOUS at 05:26

## 2022-03-10 RX ADMIN — Medication 10 ML: at 22:19

## 2022-03-10 RX ADMIN — ENOXAPARIN SODIUM 30 MG: 30 INJECTION SUBCUTANEOUS at 22:18

## 2022-03-10 RX ADMIN — HYDROMORPHONE HYDROCHLORIDE 1 MG: 1 INJECTION, SOLUTION INTRAMUSCULAR; INTRAVENOUS; SUBCUTANEOUS at 05:26

## 2022-03-10 NOTE — CASE MANAGEMENT/SOCIAL WORK
Discharge Planning Assessment  [facility]     Patient Name: Marlena Navarrete  MRN: [id number]  Today's Date: 3/10/2022    Admit Date: 3/9/2022     Discharge Needs Assessment     Row Name 03/10/22 1321       Living Environment    People in Home spouse    Name(s) of People in Home Verified with her  only the patient and spouse live in the home    Current Living Arrangements home    Primary Care Provided by spouse/significant other    Provides Primary Care For no one, unable/limited ability to care for self    Family Caregiver if Needed spouse    Family Caregiver Names  Rolf has been providing care    Quality of Family Relationships helpful;involved;supportive    Able to Return to Prior Arrangements yes       Resource/Environmental Concerns    Resource/Environmental Concerns none    Transportation Concerns no car       Transition Planning    Patient/Family Anticipates Transition to inpatient rehabilitation facility    Patient/Family Anticipated Services at Transition     Transportation Anticipated family or friend will provide       Discharge Needs Assessment    Readmission Within the Last 30 Days no previous admission in last 30 days    Equipment Currently Used at Home cane, straight;walker, rolling;wheelchair;commode    Concerns to be Addressed discharge planning    Anticipated Changes Related to Illness inability to care for self    Equipment Needed After Discharge none    Outpatient/Agency/Support Group Needs skilled nursing facility    Discharge Facility/Level of Care Needs nursing facility, skilled;rehabilitation facility               Discharge Plan     Row Name 03/10/22 1322       Plan    Plan SNF, referral pending for [facility]    Patient/Family in Agreement with Plan yes    Plan Comments Spoke with  via phone due to patient having decreased LOC. Explained CCP role and verified face sheet. Patient lives at home with her  in a multi level home. Everything she  needs is on the main level. She has a cane, walker, wc, and BSC. Per  patient is normally IADLs but the last month he has been completing her ADLs as she is not able to. He stated in the morning he helps her the living room and she stays there most of the day until he helps her back to bed. She has used HH in the past but he is unsure who they used. No prior SNF use. Discussed OT recommendations for SNF at [place]. He was agreeable. Explained she would only be able to go to [facility] due to her covid positive status. He verbalized understanding. Referral sent in Epic. Partial packet in CCP office. CCP will continue to follow and assist with [place] planning needs. Oxana JIMENEZ RN CCP              Continued Care and Services - Admitted Since 3/9/2022     Destination     Service Provider Request Status Selected Services Address Phone Fax Patient Preferred    [facility]  [facility] - [facility] N[facility]A [id number] [facility] [id number] [phone] --                 Demographic Summary     Row Name 03/10/22 1319       General Information    Admission Type inpatient    Arrived From emergency department    Required Notices Provided Important Message from Medicare    Referral Source admission list    Reason for Consult discharge planning    Preferred Language English       Contact Information    Permission Granted to Share Info With family/designee               Functional Status     Row Name 03/10/22 1320       Functional Status    Usual Activity Tolerance moderate    Current Activity Tolerance poor       Functional Status, IADL    Medications assistive person    Meal Preparation assistive person    Housekeeping assistive person    Laundry assistive person    Shopping assistive person               Psychosocial     Row Name 03/10/22 1320       Coping/Stress    Sources of Support spouse       Developmental Stage (Mariya's)    Developmental Stage Stage 8 (67 years-death/Late  Adulthood) Integrity vs. Despair               Abuse/Neglect    No documentation.                Legal    No documentation.                Substance Abuse    No documentation.                Patient Forms    No documentation.                   Oxana Prakash RN

## 2022-03-10 NOTE — DISCHARGE PLACEMENT REQUEST
"Marlena Navarrete (72 y.o. Female)             Date of Birth   1950    Social Security Number       Address   [address] [place] [address]    Home Phone   [phone]    MRN   [id number]       Oriental orthodox   Mu-ism    Marital Status                               Admission Date   3/9/22    Admission Type   Emergency    Admitting Provider   Ruby Lindsey MD    Attending Provider   Paul Mcmahan MD    Department, Room/Bed   [address], [address]       Discharge Date       Discharge Disposition       Discharge Destination                               Attending Provider: Paul Mcmahan MD    Allergies: No Known Allergies    Isolation: Enh Drop/Con   Infection: COVID (confirmed) (03/09/22)   Code Status: CPR   Advance Care Planning Activity    Ht: 147.3 cm (58\")   Wt: 67.5 kg (148 lb 13 oz)    Admission Cmt: None   Principal Problem: None                Active Insurance as of 3/9/2022     Primary Coverage     Payor Plan Insurance Group Employer/Plan Group    MEDICARE MEDICARE A & B      Payor Plan Address Payor Plan Phone Number Payor Plan Fax Number Effective Dates    [address] [phone]  2/1/2017 - None Entered    [facility] [id number]       Subscriber Name Subscriber Birth Date Member ID       MARLENA NAVARRETE 1950 [id number]           Secondary Coverage     Payor Plan Insurance Group Employer/Plan Group    AARP MC SUP AARP HEALTH CARE OPTIONS      Payor Plan Address Payor Plan Phone Number Payor Plan Fax Number Effective Dates    [facility] [phone]  1/1/2018 - None Entered    [address]       [facility] [address]       Subscriber Name Subscriber Birth Date Member ID       MARLENA NAVARRETE 1950 [id number]                 Emergency Contacts      (Rel.) Home Phone Work Phone Mobile Phone    Rolf Navarrete (Spouse) [phone] -- [phone]            {Outbreak/Travel/Exposure " Documentation......;  Question Available Choices Patient Response   COVID-19 Outbreak Screen:  Do you currently have a new onset of the following symptoms?        Fever/Chills, Cough, Shortness of air, Loss of taste or smell, No, Unknown  No (03/09/22 1040)   COVID-19 Outbreak Screen: In the last 14 days, have you had contact with anyone who is ill, has show any of the symptoms listed above and/or has been diagnosis with the 2019 Novel Coronavirus? This includes any immediate household members but excludes any patients with whom you have been in contact within your normal work duties wearing proper PPE, if you are a healthcare worker.  Yes, No, Unknown              No (03/09/22 1040)   COVID-19 Outbreak Screen: Who was notified? Free text (not recorded)   Ebola Screening Outbreak Screen: Have you traveled to the [place] the [place] or [place] within the past 21 days?  Yes, No, Unknown (not recorded)   Ebola Screening Outbreak Screen: Do you have ANY of the following symptoms: Fever/Chills, Vomiting, Diarrhea, Fatigue, Headache, Muscle pain, Unexplained bleeding, Abdominal (stomach) pain, No, Unknown (not recorded)   Ebola Screening Outbreak Screen: Name of Person notified Free text (not recorded)   Travel Screen: Have you traveled in the last month? If so, to what country have you traveled? If [place] what state? Yes, No, Unknown  List of all countries  List of all States No (03/09/22 1040)  (not recorded)  (not recorded)   Infection Risk: Do you currently have the following symptoms?  (If cough is selected, the Tuberculosis Screen is performed.) Cough, Fever, Rash, No No (03/09/22 1040)   Tuberculosis Screen: Do you have any of the following Tuberculosis Risks?  · Have you lived or spent time with anyone who had or may have TB?  · Have you lived in or visited any of the following areas for more than one month: [place], [place], [place], [place] or [place], the [place] or [place]?  · Do you have  HIV/AIDS?  · Have you lived in or worked in a nursing home, homeless shelter, correctional facility, or substance abuse treatment facility?   · No    If Yes do you have any of the following symptoms? Yes responses display to the right    If Yes, symptoms listed are:  Cough greater than or equal to 3 weeks, Loss of appetite, Unexplained weight loss, Night sweats, Bloody sputum or hemoptysis, Hoarseness, Fever, Fatigue, Chest pain, No (not recorded)  (not recorded)   Exposure Screen: Have you been exposed to any of these contagious diseases in the last month? Measles, Chickenpox, Meningitis, Pertussis, Whooping Cough, No No (03/09/22 7759)

## 2022-03-10 NOTE — PLAN OF CARE
Goal Outcome Evaluation:  Plan of Care Reviewed With: patient        Progress: no change  Outcome Evaluation: Pt is a 73 yo female admitted to [facility] for intractable back pain. Pt with multiple admissions, roughly 3 in the last 4 weeks. Pt also found to be COVID (+).  Neurosurgery with no emergent findings per chart review. Pt unable to provide any information regarding PLOF this date, dis x4, RN present for OT eval. Pt lives with her daughter, unsure of level of assist for ADLs being provided at baseline. Today, deep sternal rubs required to awaken pt, however brief state of alertness with no purposeful gaze or activity. RN reports pt given too much of pain medications overnight. Pt able to  bilaterally upon command, however unable to consistently follow commands as session continued. At this time, pt requiring total A for all ADLs. Total A x2 for long sitting trial ~15 seconds with no increased alertness and total A x2 for scooting in bed. Pt on 3L currently with stats above 90%.  Pt will likely benefit from skilled OT to address noted functional deficits, including strength, impaired ADLs and functional transfers, and activity tolerance. D/c rec SNF currently, pending progress with therapy.    Therapist in all approp PPE for COVID isolation. Hand hygiene performed.

## 2022-03-10 NOTE — PLAN OF CARE
Goal Outcome Evaluation:    Patient is a pleasant 72 y.o. female admitted to [facility] for intractable back pain and COVID-19 viral infection on 3/9/2022. Patient unable to provide any history but per chart, patient is independent at baseline and lives at home with spouse. Per chart, patient normally walks with a cane. Today, patient minimally following commands and very lethargic. Patient only able to squeeze hands on command, significant gaze with dilated pupils. Attempted long sitting in bed to increase stimuli and alertness- dependent x 2. Deferred further mobility due to difficulty arousing. RN at bedside and aware of lethargy. Pending medical status, patient may benefit from skilled PT services acutely to address functional deficits as well as improve level of independence prior to discharge.

## 2022-03-10 NOTE — THERAPY EVALUATION
Patient Name: Marlena Navarrete  : 1950    MRN: [id number]                              Today's Date: 3/10/2022       Admit Date: 3/9/2022    Visit Dx:     ICD-10-CM ICD-9-CM   1. Intractable back pain  M54.9 724.5   2. Lumbar radiculopathy  M54.16 724.4     Patient Active Problem List   Diagnosis   • Lumbar radiculopathy   • Essential hypertension   • Right foot drop   • Kidney transplant recipient   • History of lumbar laminectomy for spinal cord decompression   • Spinal enthesopathy of thoracic region ([facility])   • Postlaminectomy syndrome, lumbar region   • Chronic bilateral low back pain with right-sided sciatica   • Displacement of other nervous system device, implant or graft, initial encounter ([facility])   • Postlaminectomy syndrome of lumbar region   • Epigastric pain   • Nausea   • Gastroesophageal reflux disease   • Shoulder arthritis   • Acute renal failure (ARF) ([facility])   • Stage 3b chronic kidney disease ([facility])   • Iron deficiency anemia   • Status post rotator cuff surgery   • History of DVT (deep vein thrombosis)   • Peripheral neuropathy   • Intractable back pain   • Spinal cord stimulator status   • Adhesive arachnoiditis   • 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   • Inflammation of sacroiliac joint ([facility])   • Secondary hyperparathyroidism    • Vitamin D deficiency   • COVID-19 virus infection     Past Medical History:   Diagnosis Date   • Arthritis     OSTEO   • Chronic back pain    • Chronic bilateral low back pain with right-sided sciatica 2020   • CKD (chronic kidney disease)     FOLLOWED BY DR LANRE BYNUM   • Diverticulosis    • Foot drop, right    • GERD (gastroesophageal reflux disease)    • History of DVT (deep vein thrombosis) 2018    RIGHT CALF S/P BACK SURGERY    • History of transfusion     no reaction   • Hyperlipidemia    • Hypertension    • Kidney transplant recipient 1979    HX TRANSPLANT D/T REFLUX    • Neuropathy    • Secondary  hyperparathyroidism  3/9/2022   • Spinal cord stimulator status 3/9/2022   • Spinal headache    • Stage 3b chronic kidney disease (HCC) 2020    FOLLOWED BY DR LANRE BYNUM   • Torn rotator cuff    • Vitamin D deficiency 3/9/2022     Past Surgical History:   Procedure Laterality Date   • APPENDECTOMY     • CATARACT EXTRACTION EXTRACAPSULAR W/ INTRAOCULAR LENS IMPLANTATION     •  SECTION     • COLONOSCOPY  approx     Anasatcio WHARTON   • ENDOSCOPY N/A 2020    Procedure: ESOPHAGOGASTRODUODENOSCOPY with biopsies;  Surgeon: Wicho Chaves MD;  Location: [facility] ENDOSCOPY;  Service: Gastroenterology;  Laterality: N/A;  pre- epigastric pain, nausea  post-- gastritis, duodenitis, bile reflux   • LUMBAR DISCECTOMY FUSION INSTRUMENTATION Right 2018    Procedure: Right L2-3 laminectomy with Metrix;  Surgeon: Oscar Paulino MD;  Location: [facility] OR;  Service: Neurosurgery   • SHOULDER ARTHROSCOPY W/ ROTATOR CUFF REPAIR Right 2020    Procedure: SHOULDER ARTHROSCOPY, ROTATOR CUFF REPAIR, SUBACROMIAL DECOMPRESSION, DISTAL CLAVICLE EXCISION, BICEPS TENOTOMY, AND LABRAL DEBRIDEMENT;  Surgeon: Rishabh Florence MD;  Location: [facility] OR OSC;  Service: Orthopedics;  Laterality: Right;   • SHOULDER ROTATOR CUFF REPAIR Left    • SPINAL CORD STIMULATOR IMPLANT N/A 2020    Procedure: SPINAL CORD STIMULATOR INSERTION PHASE 2, triall of right gluteal internal pulse generator;  Surgeon: Cuate Kemp MD;  Location: [facility] OR;  Service: Neurosurgery;  Laterality: N/A;   • SPINAL CORD STIMULATOR IMPLANT N/A 2020    Procedure: SPINAL CORD STIMULATOR INSERTION PHASE 1, Thoracic 10 laminotomy for revision of [place] surgical lead at Thoracic 8 to Thoracic 9;  Surgeon: Cuate Kemp MD;  Location: [facility] OR;  Service: Neurosurgery;  Laterality: N/A;   • TONSILLECTOMY     • TRANSPLANTATION RENAL Right 1979    [facility] PT    • TUBAL ABDOMINAL LIGATION        General  Information     Row Name 03/10/22 1241          OT Time and Intention    Document Type evaluation  -MW     Mode of Treatment co-treatment;occupational therapy;physical therapy  -     Row Name 03/10/22 1241          General Information    Patient Profile Reviewed yes  -MW     Prior Level of Function --  pt lives with daughter, pt unable to verbalize PLOF information, inconsistent command following  -MW     Existing Precautions/Restrictions fall;oxygen therapy device and L/min  isolation precautions  -MW     Barriers to Rehab medically complex  -MW     Row Name 03/10/22 1241          Living Environment    People in Home child(negin), adult  -MW     Row Name 03/10/22 1241          Cognition    Orientation Status (Cognition) disoriented to;person;situation;time;place;verbal cues/prompts needed for orientation;unable/difficult to assess  -MW     Row Name 03/10/22 1241          Safety Issues, Functional Mobility    Safety Issues Affecting Function (Mobility) ability to follow commands  -MW     Impairments Affecting Function (Mobility) cognition;balance;endurance/activity tolerance;strength;postural/trunk control  -           User Key  (r) = Recorded By, (t) = Taken By, (c) = Cosigned By    Initials Name Provider Type    MW Brit Hayes, HETAL Occupational Therapist                 Mobility/ADL's     Row Name 03/10/22 1244          Bed Mobility    Bed Mobility scooting/bridging  -     Scooting/Bridging [place] (Bed Mobility) dependent (less than 25% patient effort);2 person assist  -     Assistive Device (Bed Mobility) draw sheet  -     Comment, (Bed Mobility) total Ax2 to scoot to HOB with use of draw sheet, pt with decreased alertness due to pain medications given overnight, deep sternal rubs required for brief stimulation  -     Row Name 03/10/22 1244          Transfers    Comment, (Transfers) not approp to assess  -           User Key  (r) = Recorded By, (t) = Taken By, (c) = Cosigned By    Initials  Name Provider Type     Brit Hayes OT Occupational Therapist               Obj/Interventions     [facility] Name 03/10/22 1245          Sensory Assessment (Somatosensory)    Sensory Assessment (Somatosensory) unable/difficult to assess  -MW     Row Name 03/10/22 1245          Vision Assessment/Intervention    Visual Impairment/Limitations unable/difficult to assess  -MW     Row Name 03/10/22 1245          Range of Motion Comprehensive    Comment, General Range of Motion difficult to assess, pt not following commands and difficult to arouse  -MW     Row Name 03/10/22 1245          Strength Comprehensive (MMT)    General Manual Muscle Testing (MMT) Assessment upper extremity strength deficits identified  -     Comment, General Manual Muscle Testing (MMT) Assessment pt demo ability to  bilat hands on command, 3/5  strength bilaterally  -[facility] Name 03/10/22 1245          Balance    Comment, Balance unable to assess; completed x1 trial of long sitting with use of total A x2 and draw sheet for roughly ~15-30 seconds, no increased alertness with task  -           User Key  (r) = Recorded By, (t) = Taken By, (c) = Cosigned By    Initials Name Provider Type     Brit Hayes OT Occupational Therapist               Goals/Plan     Row Name 03/10/22 1254          Transfer Goal 1 (OT)    Activity/Assistive Device (Transfer Goal 1, OT) sit-to-stand/stand-to-sit;bed-to-chair/chair-to-bed;commode, bedside without drop arms  -     [place] Level/Cues Needed (Transfer Goal 1, OT) moderate assist (50-74% patient effort)  -     Time Frame (Transfer Goal 1, OT) short term goal (STG);2 weeks  -     Progress/Outcome (Transfer Goal 1, OT) goal ongoing  -MW     Row Name 03/10/22 1254          Bathing Goal 1 (OT)    Activity/Device (Bathing Goal 1, OT) bathing skills, all;upper body bathing;lower body bathing  -     [place] Level/Cues Needed (Bathing Goal 1, OT) set-up required;moderate assist  (50-74% patient effort)  -MW     Time Frame (Bathing Goal 1, OT) short term goal (STG);2 weeks  -MW     Progress/Outcomes (Bathing Goal 1, OT) goal ongoing  -[facility] Name 03/10/22 1254          Grooming Goal 1 (OT)    Activity/Device (Grooming Goal 1, OT) grooming skills, all;wash face, hands;hair care;oral care  -MW     [place] (Grooming Goal 1, OT) set-up required;moderate assist (50-74% patient effort)  -MW     Time Frame (Grooming Goal 1, OT) short term goal (STG);2 weeks  -MW     Progress/Outcome (Grooming Goal 1, OT) goal ongoing  -[facility] Name 03/10/22 1254          Problem Specific Goal 1 (OT)    Problem Specific Goal 1 (OT) improve sitting balance at EOB to Fair (-) to improve upper trunk strength required for increased (I) with ADLs.  -MW     Time Frame (Problem Specific Goal 1, OT) short term goal (STG);2 weeks  -MW     Progress/Outcome (Problem Specific Goal 1, OT) goal ongoing  -[facility] Name 03/10/22 1251          Therapy Assessment/Plan (OT)    Planned Therapy Interventions (OT) activity tolerance training;BADL retraining;neuromuscular control/coordination retraining;functional balance retraining;passive ROM/stretching;occupation/activity based interventions;patient/caregiver education/training;ROM/therapeutic exercise;strengthening exercise;transfer/mobility retraining  -           User Key  (r) = Recorded By, (t) = Taken By, (c) = Cosigned By    Initials Name Provider Type    MW Brit Hayes, HETAL Occupational Therapist               Clinical Impression     [facility] Name 03/10/22 1242          Pain Assessment    Pre/Posttreatment Pain Comment unable to verbalize or respond approp to questions  -MW     Row Name 03/10/22 124          Plan of Care Review    Plan of Care Reviewed With patient  -     Progress no change  -     Outcome Evaluation Pt is a 71 yo female admitted to [facility] for intractable back pain. Pt with multiple admissions, roughly 3 in the last 4 weeks. Pt also found to be  COVID (+).  Neurosurgery with no emergent findings per chart review. Pt unable to provide any information regarding PLOF this date, dis x4, RN present for OT eval. Pt lives with her daughter, unsure of level of assist for ADLs being provided at baseline. Today, deep sternal rubs required to awaken pt, however brief state of alertness with no purposeful gaze or activity. RN reports pt given too much of pain medications overnight. Pt able to  bilaterally upon command, however unable to consistently follow commands as session continued. At this time, pt requiring total A for all ADLs. Total A x2 for long sitting trial ~15 seconds with no increased alertness and total A x2 for scooting in bed. Pt on 3L currently with stats above 90%.  Pt will likely benefit from skilled OT to address noted functional deficits, including strength, impaired ADLs and functional transfers, and activity tolerance. D/c rec SNF currently, pending progress with therapy.  -     Row Name 03/10/22 1247          Therapy Assessment/Plan (OT)    Rehab Potential (OT) good, to achieve stated therapy goals  -     Criteria for Skilled Therapeutic Interventions Met (OT) yes;skilled treatment is necessary  -MW     Therapy Frequency (OT) 3 times/wk  -MW     Row Name 03/10/22 1247          Therapy Plan Review/Discharge Plan (OT)    Anticipated Discharge Disposition (OT) skilled nursing facility  -     Row Name 03/10/22 1247          Vital Signs    Pre SpO2 (%) 94  -MW     O2 Delivery Pre Treatment nasal cannula  -MW     O2 Delivery Intra Treatment nasal cannula  -MW     O2 Delivery Post Treatment nasal cannula  -MW     Pre Patient Position Supine  -MW     Intra Patient Position Supine  -MW     Post Patient Position Supine  -MW     Row Name 03/10/22 1247          Positioning and Restraints    Pre-Treatment Position in bed  -MW     Post Treatment Position bed  -MW     In Bed supine;notified nsg;call light within reach;encouraged to call for  assist;exit alarm on  -           User Key  (r) = Recorded By, (t) = Taken By, (c) = Cosigned By    Initials Name Provider Type    Brit Castellano OT Occupational Therapist               Outcome Measures     Row Name 03/10/22 125          How much help from another is currently needed...    Putting on and taking off regular lower body clothing? 1  -MW     Bathing (including washing, rinsing, and drying) 1  -MW     Toileting (which includes using toilet bed pan or urinal) 1  -MW     Putting on and taking off regular upper body clothing 1  -MW     Taking care of personal grooming (such as brushing teeth) 1  -MW     Eating meals 2  -MW     AM-PAC 6 Clicks Score (OT) 7  -     Row Name 03/10/22 1255          Modified [place] Scale    Modified [place] Scale 5 - Severe disability.  Bedridden, incontinent, and requiring constant nursing care and attention.  -     Row Name 03/10/22 1255          Functional Assessment    Outcome Measure Options AM-PAC 6 Clicks Daily Activity (OT);Modified [place]  -           User Key  (r) = Recorded By, (t) = Taken By, (c) = Cosigned By    Initials Name Provider Type    Brit Castellano OT Occupational Therapist                Occupational Therapy Education                 Title: PT OT SLP Therapies (In Progress)     Topic: Occupational Therapy (In Progress)     Point: ADL training (In Progress)     Description:   Instruct learner(s) on proper safety adaptation and remediation techniques during self care or transfers.   Instruct in proper use of assistive devices.              Learning Progress Summary           Patient Acceptance, E, NL by  at 3/10/2022 1255    Comment: role of OT                   Point: Precautions (In Progress)     Description:   Instruct learner(s) on prescribed precautions during self-care and functional transfers.              Learning Progress Summary           Patient Acceptance, E, NL by  at 3/10/2022 1255    Comment: role of OT                    Point: Body mechanics (In Progress)     Description:   Instruct learner(s) on proper positioning and spine alignment during self-care, functional mobility activities and/or exercises.              Learning Progress Summary           Patient Acceptance, E, NL by DEANGELO at 3/10/2022 8695    Comment: role of OT                               User Key     Initials Effective Dates Name Provider Type Discipline    DEANGELO 08/20/21 -  Brit Hayes, OT Occupational Therapist OT              OT Recommendation and Plan  Planned Therapy Interventions (OT): activity tolerance training, BADL retraining, neuromuscular control/coordination retraining, functional balance retraining, passive ROM/stretching, occupation/activity based interventions, patient/caregiver education/training, ROM/therapeutic exercise, strengthening exercise, transfer/mobility retraining  Therapy Frequency (OT): 3 times/wk  Plan of Care Review  Plan of Care Reviewed With: patient  Progress: no change  Outcome Evaluation: Pt is a 73 yo female admitted to [facility] for intractable back pain. Pt with multiple admissions, roughly 3 in the last 4 weeks. Pt also found to be COVID (+).  Neurosurgery with no emergent findings per chart review. Pt unable to provide any information regarding PLOF this date, dis x4, RN present for OT eval. Pt lives with her daughter, unsure of level of assist for ADLs being provided at baseline. Today, deep sternal rubs required to awaken pt, however brief state of alertness with no purposeful gaze or activity. RN reports pt given too much of pain medications overnight. Pt able to  bilaterally upon command, however unable to consistently follow commands as session continued. At this time, pt requiring total A for all ADLs. Total A x2 for long sitting trial ~15 seconds with no increased alertness and total A x2 for scooting in bed. Pt on 3L currently with stats above 90%.  Pt will likely benefit from skilled OT to address noted functional  deficits, including strength, impaired ADLs and functional transfers, and activity tolerance. D/c rec SNF currently, pending progress with therapy.     Time Calculation:    Time Calculation- OT     Row Name 03/10/22 1255             Time Calculation- OT    OT Start Time 0950  -MW      OT Stop Time 1011  -MW      OT Time Calculation (min) 21 min  -MW      OT Received On 03/10/22  -MW      OT - Next Appointment 03/14/22  -MW      OT Goal Re-Cert Due Date 03/24/22  -MW              Untimed Charges    OT Eval/Re-eval Minutes 21  -MW              Total Minutes    Untimed Charges Total Minutes 21  -MW       Total Minutes 21  -MW            User Key  (r) = Recorded By, (t) = Taken By, (c) = Cosigned By    Initials Name Provider Type    Brit Castellano OT Occupational Therapist              Therapy Charges for Today     Code Description Service Date Service Provider Modifiers Qty    [id number] HC OT EVAL MOD COMPLEXITY 3 3/10/2022 Brit Hayes OT GO 1               Brit Hayes OT  3/10/2022

## 2022-03-10 NOTE — PROGRESS NOTES
[facility]  Clinical Pharmacy Department     Remdesivir Review Note    Marlena Navarrete is a 72 y.o. female with confirmed COVID-19 infection on day 2 of hospitalization.     Consulting Provider:  Marj  Date of Confirmed SARS-CoV-2: 3/9/22  Date of Symptom Onset: Covid positive but no symptoms currently  Other Antimicrobials: none  Hydroxychloroquine or chloroquine prior to arrival: no    Allergies  Allergies as of 03/09/2022    (No Known Allergies)       Microbiology:  Microbiology Results (last 10 days)       Procedure Component Value - Date/Time    COVID PRE-OP / PRE-PROCEDURE SCREENING ORDER (NO ISOLATION) - Swab, Nasopharynx [[id number]]  (Abnormal) Collected: 03/09/22 1535    Lab Status: Final result Specimen: Swab from Nasopharynx Updated: 03/09/22 1636    Narrative:      The following orders were created for panel order COVID PRE-OP / PRE-PROCEDURE SCREENING ORDER (NO ISOLATION) - Swab, Nasopharynx.  Procedure                               Abnormality         Status                     ---------                               -----------         ------                     COVID-19,[facility]-HOUSE...[[id number]]  Abnormal            Final result                 Please view results for these tests on the individual orders.    COVID-19,[facility]-HOU[facility] CEPHEID/LUC NP SWAB IN TRANSPORT MEDIA 8-12 HR TAT - Swab, Nasopharynx [[id number]]  (Abnormal) Collected: 03/09/22 1535    Lab Status: Final result Specimen: Swab from Nasopharynx Updated: 03/09/22 1636     COVID19 Detected    Narrative:      Fact sheet for providers: https://www.fda.gov/media/321101/download    Fact sheet for patients: https://www.fda.gov/media/833551/download    Test performed by PCR.            Results from last 7 days   Lab Units 03/10/22  0457 03/09/22  1244   WBC 10*3/mm3 7.36 8.99     Results from last 7 days   Lab Units 03/10/22  0457   PROCALCITONIN ng/mL 0.07     Results from last 7 days   Lab Units 03/10/22  0457   AST (SGOT) U/L 15       Results from last 7 days   Lab Units 03/10/22  0457   ALT (SGPT) U/L 9       Estimated Creatinine Clearance: 25.5 mL/min (A) (by C-G formula based on SCr of 1.79 mg/dL (H)).  Results from last 7 days   Lab Units 03/10/22  0457 03/09/22  1244   BUN mg/dL 32* 23   CREATININE mg/dL 1.79* 1.57*     Intake & Output (last 3 days)         03/07 0701 03/08 0700 03/08 0701 03/09 0700 03/09 0701  03/10 0700 03/10 0701  03/11 0700    Urine (mL/kg/hr)   300     Total Output   300     Net   -300             Urine Unmeasured Occurrence   3 x             Assessment/Plan:    Patient meets the following inclusion/exclusion criteria:  Patient is hospitalized with confirmed COVID-19 infection (and accompanying symptoms)  Patient meets one of the two below criteria:  Patient is requiring an increase in baseline supplemental oxygen requirements OR SpO2 </= 94% on room air secondary to COVID-19 infection OR  Patient is symptomatic but not requiring supplemental oxygen or an increase in baseline oxygen AND has at least one of the below high risk criteria for progression to severe illness. High risk criteria:  Age >/= 65  Cancer  Cardiovascular diseases (HF, CAD, or cardiomyopathies)  CKD  Chronic lung disease (COPD, CF, interstitial lung disease, PE, pulmonary hypertension, bronchopulmonary dysplasia, bronchiectasis)  Diabetes mellitis (insulin dependent or poorly controlled)  Immunocompromising conditions or receipt of immunosuppressive medications  Obesity (BMI ./= 35 kg/m2)  Pregnancy and recent pregnancy (within 7 days of delivery)  Sickle cell disease  Baseline and daily LFTs and Scr have been ordered prior to remdesivir initiation  ALT is not ? 10 times the upper limit of normal  Patient is not on concomitant hydroxychloroquine or chloroquine  Patient does not require invasive mechanical ventilation (IMV) or ECMO  Patient is within 10 days from symptom onset (for criteria 2a) or within 7 days of symptom onset (for criteria  2b)    Remdesivir 200 mg IV x 1 dose, followed by 100 mg IV q24h for 2 days or until hospital discharge (whichever comes first) has been ordered.    Thank you for involving pharmacy in this patient's care. Please contact pharmacy with any questions or concerns.                           Rishabh Romero [facility]  Clinical Pharmacist  03/10/22 11:44 EST

## 2022-03-10 NOTE — THERAPY EVALUATION
Patient Name: Marlena Navarrete  : 1950    MRN: [id number]                              Today's Date: 3/10/2022       Admit Date: 3/9/2022    Visit Dx:     ICD-10-CM ICD-9-CM   1. Intractable back pain  M54.9 724.5   2. Lumbar radiculopathy  M54.16 724.4     Patient Active Problem List   Diagnosis   • Lumbar radiculopathy   • Essential hypertension   • Right foot drop   • Kidney transplant recipient   • History of lumbar laminectomy for spinal cord decompression   • Spinal enthesopathy of thoracic region ([facility])   • Postlaminectomy syndrome, lumbar region   • Chronic bilateral low back pain with right-sided sciatica   • Displacement of other nervous system device, implant or graft, initial encounter ([facility])   • Postlaminectomy syndrome of lumbar region   • Epigastric pain   • Nausea   • Gastroesophageal reflux disease   • Shoulder arthritis   • Acute renal failure (ARF) ([facility])   • Stage 3b chronic kidney disease ([facility])   • Iron deficiency anemia   • Status post rotator cuff surgery   • History of DVT (deep vein thrombosis)   • Peripheral neuropathy   • Intractable back pain   • Spinal cord stimulator status   • Adhesive arachnoiditis   • 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   • Inflammation of sacroiliac joint ([facility])   • Secondary hyperparathyroidism    • Vitamin D deficiency   • COVID-19 virus infection     Past Medical History:   Diagnosis Date   • Arthritis     OSTEO   • Chronic back pain    • Chronic bilateral low back pain with right-sided sciatica 2020   • CKD (chronic kidney disease)     FOLLOWED BY DR LANRE BYNUM   • Diverticulosis    • Foot drop, right    • GERD (gastroesophageal reflux disease)    • History of DVT (deep vein thrombosis) 2018    RIGHT CALF S/P BACK SURGERY    • History of transfusion     no reaction   • Hyperlipidemia    • Hypertension    • Kidney transplant recipient 1979    HX TRANSPLANT D/T REFLUX    • Neuropathy    • Secondary  hyperparathyroidism  3/9/2022   • Spinal cord stimulator status 3/9/2022   • Spinal headache    • Stage 3b chronic kidney disease (HCC) 2020    FOLLOWED BY DR LANRE BYNUM   • Torn rotator cuff    • Vitamin D deficiency 3/9/2022     Past Surgical History:   Procedure Laterality Date   • APPENDECTOMY     • CATARACT EXTRACTION EXTRACAPSULAR W/ INTRAOCULAR LENS IMPLANTATION     •  SECTION     • COLONOSCOPY  approx     Anastacio WHARTON   • ENDOSCOPY N/A 2020    Procedure: ESOPHAGOGASTRODUODENOSCOPY with biopsies;  Surgeon: Wicho Chaves MD;  Location: [facility] ENDOSCOPY;  Service: Gastroenterology;  Laterality: N/A;  pre- epigastric pain, nausea  post-- gastritis, duodenitis, bile reflux   • LUMBAR DISCECTOMY FUSION INSTRUMENTATION Right 2018    Procedure: Right L2-3 laminectomy with Metrix;  Surgeon: Oscar Paulino MD;  Location: [facility] OR;  Service: Neurosurgery   • SHOULDER ARTHROSCOPY W/ ROTATOR CUFF REPAIR Right 2020    Procedure: SHOULDER ARTHROSCOPY, ROTATOR CUFF REPAIR, SUBACROMIAL DECOMPRESSION, DISTAL CLAVICLE EXCISION, BICEPS TENOTOMY, AND LABRAL DEBRIDEMENT;  Surgeon: Rishabh Florence MD;  Location: [facility] OR O[facility];  Service: Orthopedics;  Laterality: Right;   • SHOULDER ROTATOR CUFF REPAIR Left    • SPINAL CORD STIMULATOR IMPLANT N/A 2020    Procedure: SPINAL CORD STIMULATOR INSERTION PHASE 2, triall of right gluteal internal pulse generator;  Surgeon: Cuate Kemp MD;  Location: [facility] OR;  Service: Neurosurgery;  Laterality: N/A;   • SPINAL CORD STIMULATOR IMPLANT N/A 2020    Procedure: SPINAL CORD STIMULATOR INSERTION PHASE 1, Thoracic 10 laminotomy for revision of [place] surgical lead at Thoracic 8 to Thoracic 9;  Surgeon: Cuate Kemp MD;  Location: [facility] OR;  Service: Neurosurgery;  Laterality: N/A;   • TONSILLECTOMY     • TRANSPLANTATION RENAL Right 1979    SISTER GAVE PT    • TUBAL ABDOMINAL LIGATION        General  Information     Row Name 03/10/22 1412          Physical Therapy Time and Intention    Document Type evaluation  -MS     Mode of Treatment co-treatment;occupational therapy;physical therapy  -MS     Row Name 03/10/22 1412          General Information    Patient Profile Reviewed yes  -MS     Prior Level of Function independent:;all household mobility  inconsistently following commands, normally walks with cane, lives with family, patient unable to provide any hx  -MS     Existing Precautions/Restrictions fall;oxygen therapy device and L/min  -MS     Barriers to Rehab medically complex;cognitive status  -MS     Row Name 03/10/22 1412          Living Environment    People in Home spouse  -MS     Row Name 03/10/22 1412          Cognition    Orientation Status (Cognition) disoriented to;person;situation;time;place;verbal cues/prompts needed for orientation;unable/difficult to assess  -MS     Row Name 03/10/22 1412          Safety Issues, Functional Mobility    Safety Issues Affecting Function (Mobility) ability to follow commands;safety precautions follow-through/compliance  -MS     Impairments Affecting Function (Mobility) cognition;balance;endurance/activity tolerance;strength;postural/trunk control;shortness of breath;range of motion (ROM)  -MS           User Key  (r) = Recorded By, (t) = Taken By, (c) = Cosigned By    Initials Name Provider Type    MS BoothYumiko PT Physical Therapist               Mobility     Row Name 03/10/22 1413          Bed Mobility    Bed Mobility supine-sit  -MS     Scooting/Bridging [place] (Bed Mobility) dependent (less than 25% patient effort);2 person assist  -MS     Comment, (Bed Mobility) Total assist to scoot towards HOB, able to squeeze hands on command but all other commands unable to follow, attempted long sitting to increase alertness/stimulus but patient's head knodding back and forth. RN at bedside and aware decreased alertness and level of lethargy. Deferred further  mobility.  -MS           User Key  (r) = Recorded By, (t) = Taken By, (c) = Cosigned By    Initials Name Provider Type    MS BoothYumiko PT Physical Therapist               Obj/Interventions     Row Name 03/10/22 1414          Range of Motion Comprehensive    Comment, General Range of Motion Difficult to assess, patient mininally following commands with difficulty arousing.  -MS     Row Name 03/10/22 1414          Strength Comprehensive (MMT)    Comment, General Manual Muscle Testing (MMT) Assessment Difficult to assess, patient mininally following commands with difficulty arousing.  -MS     Row Name 03/10/22 1414          Balance    Balance Assessment sitting static balance  -MS     Static Sitting Balance dependent  -MS     Position, Sitting Balance long sitting  -MS     Row Name 03/10/22 1414          Sensory Assessment (Somatosensory)    Sensory Assessment (Somatosensory) unable/difficult to assess  -MS           User Key  (r) = Recorded By, (t) = Taken By, (c) = Cosigned By    Initials Name Provider Type    MS Booth Yumiko VAZQUEZ PT Physical Therapist               Goals/Plan     Row Name 03/10/22 1418          Bed Mobility Goal 1 (PT)    Activity/Assistive Device (Bed Mobility Goal 1, PT) bed mobility activities, all  -MS     [place] Level/Cues Needed (Bed Mobility Goal 1, PT) moderate assist (50-74% patient effort)  -MS     Time Frame (Bed Mobility Goal 1, PT) 1 week  -MS     [facility] Name 03/10/22 1418          Transfer Goal 1 (PT)    Activity/Assistive Device (Transfer Goal 1, PT) transfers, all  -MS     [place] Level/Cues Needed (Transfer Goal 1, PT) minimum assist (75% or more patient effort)  -MS     Time Frame (Transfer Goal 1, PT) 1 week  -MS     [facility] Name 03/10/22 1418          Gait Training Goal 1 (PT)    Activity/Assistive Device (Gait Training Goal 1, PT) gait (walking locomotion);walker, rolling  -MS     [place] Level (Gait Training Goal 1, PT) minimum assist (75% or more patient  effort)  -MS     Distance (Gait Training Goal 1, PT) 50  -MS     Time Frame (Gait Training Goal 1, PT) 1 week  -MS           User Key  (r) = Recorded By, (t) = Taken By, (c) = Cosigned By    Initials Name Provider Type    Yumiko Aparicio, PT Physical Therapist               Clinical Impression     Row Name 03/10/22 1415          Pain    Pre/Posttreatment Pain Comment difficulty arousing and difficult to assess.  -MS     Row Name 03/10/22 1415          Therapy Assessment/Plan (PT)    Rehab Potential (PT) fair, will monitor progress closely  -MS     Criteria for Skilled Interventions Met (PT) yes;meets criteria  -MS     Row Name 03/10/22 1415          Vital Signs    O2 Delivery Pre Treatment supplemental O2  -MS     O2 Delivery Intra Treatment supplemental O2  -MS     O2 Delivery Post Treatment supplemental O2  -MS     Row Name 03/10/22 1415          Positioning and Restraints    Pre-Treatment Position in bed  -MS     Post Treatment Position bed  -MS     In Bed fowlers;with nsg  -MS           User Key  (r) = Recorded By, (t) = Taken By, (c) = Cosigned By    Initials Name Provider Type    Yumiko Aparicio, PT Physical Therapist               Outcome Measures     Row Name 03/10/22 1419          How much help from another person do you currently need...    Turning from your back to your side while in flat bed without using bedrails? 1  -MS     Moving from lying on back to sitting on the side of a flat bed without bedrails? 1  -MS     Moving to and from a bed to a chair (including a wheelchair)? 1  -MS     Standing up from a chair using your arms (e.g., wheelchair, bedside chair)? 1  -MS     Climbing 3-5 steps with a railing? 1  -MS     To walk in hospital room? 1  -MS     AM-PAC 6 Clicks Score (PT) 6  -MS     Row Name 03/10/22 1255          Modified Compton Scale    Modified [place]airo Scale 5 - Severe disability.  Bedridden, incontinent, and requiring constant nursing care and attention.  -MW     Row Name 03/10/22  1419 03/10/22 1255       Functional Assessment    Outcome Measure Options AM-PAC 6 Clicks Basic Mobility (PT)  -MS AM-PAC 6 Clicks Daily Activity (OT);Modified [place]  -MW          User Key  (r) = Recorded By, (t) = Taken By, (c) = Cosigned By    Initials Name Provider Type    MS BoothYumiko, PT Physical Therapist    Brit Castellano, OT Occupational Therapist                             Physical Therapy Education                 Title: PT OT SLP Therapies (In Progress)     Topic: Physical Therapy (In Progress)     Point: Mobility training (In Progress)     Learning Progress Summary           Patient Acceptance, E,TB, NL by MS at 3/10/2022 1420                   Point: Home exercise program (In Progress)     Learning Progress Summary           Patient Acceptance, E,TB, NL by MS at 3/10/2022 1420                   Point: Body mechanics (In Progress)     Learning Progress Summary           Patient Acceptance, E,TB, NL by MS at 3/10/2022 1420                   Point: Precautions (In Progress)     Learning Progress Summary           Patient Acceptance, E,TB, NL by MS at 3/10/2022 1420                               User Key     Initials Effective Dates Name Provider Type Discipline    MS 06/16/21 -  EmmyYumiko, PT Physical Therapist PT              PT Recommendation and Plan  Planned Therapy Interventions (PT): balance training, bed mobility training, gait training, home exercise program, postural re-education, patient/family education, ROM (range of motion), stair training, strengthening, transfer training        Time Calculation:    PT Charges     Row Name 03/10/22 1411             Time Calculation    Start Time 0948  -MS      Stop Time 1010  -MS      Time Calculation (min) 22 min  -MS      PT Received On 03/10/22  -MS      PT - Next Appointment 03/12/22  -MS      PT Goal Re-Cert Due Date 03/17/22  -MS            User Key  (r) = Recorded By, (t) = Taken By, (c) = Cosigned By    Initials Name Provider  Type    MS BoothYumiko, PT Physical Therapist              Therapy Charges for Today     Code Description Service Date Service Provider Modifiers Qty    67655566419 HC PT EVAL MOD COMPLEXITY 3 3/10/2022 Yumiko Booth, PT GP 1    92346774132 HC PT THER SUPP EA 15 MIN 3/10/2022 Yumiko Booth, PT GP 1          PT G-Codes  Outcome Measure Options: AM-PAC 6 Clicks Basic Mobility (PT)  AM-PAC 6 Clicks Score (PT): 6  AM-PAC 6 Clicks Score (OT): 7  Modified Casey Scale: 5 - Severe disability.  Bedridden, incontinent, and requiring constant nursing care and attention.    Yumiko Booth, CARLOS ENRIQUE  3/10/2022

## 2022-03-10 NOTE — PROGRESS NOTES
[facility] Medicine Services  PROGRESS NOTE    Patient Name: Marlena Navarrete  : 1950  MRN: [id number]    Date of Admission: 3/9/2022  Primary Care Physician: Bradley Hernandez MD    Subjective   Subjective     CC:  Follow-up COVID-19 and back pain    HPI:  Back pain improved however nursing report patient has been intermittently somnolent this morning.  Patient was sleeping when I entered the room however did awaken with touch.  She had a little bit of slow mentation but was able to answer questions including her name and age and situation.  She was a little forgetful initially but improved the longer she was awakened.  She was moving all extremities and had no slurred speech or word finding troubles.  Nursing has been holding her home pain medicine.  Reportedly she required multiple doses of IV pain medicine last night.  Patient notes only occasional cough and some myalgias and viral type symptoms for the last day or so but no shortness of breath or fevers.  She reports she is unvaccinated but says she is not certain why she is not vaccinated.    Review of Systems  No current fevers or chills  No current nausea, vomiting, or diarrhea  No current chest pain or palpitations      Objective   Objective     Vital Signs:   Temp:  [96.8 °F (36 °C)-97.8 °F (36.6 °C)] 97.5 °F (36.4 °C)  Heart Rate:  [61-83] 61  Resp:  [18-20] 20  BP: (103-136)/(64-90) 127/64        Physical Exam:  Constitutional:Awake, alert  HENT: NCAT, mucous membranes moist, neck supple  Respiratory: Rare cough, clear to auscultation bilaterally, respiratory effort normal, nonlabored breathing   Cardiovascular: RRR, normal radial pulses  Gastrointestinal: Positive bowel sounds, soft, nontender, nondistended  Musculoskeletal: Elderly frail and chronically debilitated appearance, obese, BMI is 31, mild lower extremity edema  Psychiatric: Calm affect, cooperative, conversational  Neurologic: Patient has some mildly slow mentation but is otherwise  oriented, moving extremities, no slurred speech or facial droop, follows commands  Skin: No rashes or jaundice, warm      Results Reviewed:  Results from last 7 days   Lab Units 03/10/22  0457 03/09/22  1244   WBC 10*3/mm3 7.36 8.99   HEMOGLOBIN g/dL 11.4* 12.1   HEMATOCRIT % 34.1 36.8   PLATELETS 10*3/mm3 258 271   INR  0.99  --    PROCALCITONIN ng/mL 0.07  --      Results from last 7 days   Lab Units 03/10/22  0457 03/09/22  1244   SODIUM mmol/L 138 141   POTASSIUM mmol/L 4.9 4.0   CHLORIDE mmol/L 107 109*   CO2 mmol/L 20.0* 22.5   BUN mg/dL 32* 23   CREATININE mg/dL 1.79* 1.57*   GLUCOSE mg/dL 122* 96   CALCIUM mg/dL 9.1 9.5   ALT (SGPT) U/L 9  --    AST (SGOT) U/L 15  --      Estimated Creatinine Clearance: 25.5 mL/min (A) (by C-G formula based on SCr of 1.79 mg/dL (H)).    Microbiology Results Abnormal     None          Imaging Results (Last 24 Hours)     Procedure Component Value Units Date/Time    XR Chest 1 View [[id number]] Collected: 03/09/22 2327     Updated: 03/09/22 2331    Narrative:      SINGLE VIEW OF THE CHEST     HISTORY: COVID     COMPARISON: 12/29/2020     FINDINGS:  There is cardiomegaly. There is no vascular congestion. No pneumothorax  or pleural effusion is seen. No acute infiltrates are identified. Spinal  nerve stimulator is noted.       Impression:      No acute findings.     This report was finalized on 3/9/2022 11:28 PM by Dr. Gloria Sol M.D.                 I have reviewed the medications:  Scheduled Meds:amLODIPine, 5 mg, Oral, Nightly  atorvastatin, 10 mg, Oral, Nightly  enoxaparin, 30 mg, Subcutaneous, Nightly  methylPREDNISolone sodium succinate, 250 mg, Intravenous, Q6H  pantoprazole, 40 mg, Oral, QAM  pregabalin, 200 mg, Oral, BID  remdesivir, 200 mg, Intravenous, Q24H   Followed by  [START ON 3/11/2022] remdesivir, 100 mg, Intravenous, Q24H  senna-docusate sodium, 2 tablet, Oral, BID  sodium chloride, 10 mL, Intravenous, Q12H      Continuous Infusions:Pharmacy Consult -  Remdesivir,       PRN Meds:.•  acetaminophen **OR** acetaminophen **OR** acetaminophen  •  albuterol sulfate HFA  •  benzonatate  •  senna-docusate sodium **AND** polyethylene glycol **AND** bisacodyl **AND** bisacodyl  •  calcium carbonate  •  HYDROmorphone  •  melatonin  •  metaxalone  •  muscle rub  •  ondansetron **OR** ondansetron  •  oxyCODONE  •  oxyCODONE-acetaminophen  •  Pharmacy Consult - Remdesivir  •  [COMPLETED] Insert peripheral IV **AND** sodium chloride    Assessment/Plan   Assessment & Plan     Active Hospital Problems    Diagnosis  POA   • Intractable back pain [M54.9]  Yes   • Spinal cord stimulator status [Z96.89]  Not Applicable   • Secondary hyperparathyroidism  [N25.81]  Yes   • Vitamin D deficiency [E55.9]  Yes   • COVID-19 virus infection [U07.1]  Yes   • Stage 3b chronic kidney disease (HCC) [N18.32]  Yes   • History of DVT (deep vein thrombosis) [Z86.718]  Not Applicable   • 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        Brief Hospital Course to date:  Marlena Navarrete is a 72 y.o. female presents the hospital with intractable back pain and COVID-19 viral infection.    Discussion/plan:  Etiology of intractable back pain is difficult to assess.  Patient does note some occasional cough and viral myalgias.  It could be that viral infection is exacerbating some chronic pain issues due to viral myalgias.  Worsening lumbar sprain or other lumbar issues is possible however we are unable MRI of the lumbar spine due to spinal cord stimulator.  CT images reviewed and  noted that patient does have some disc bulging and lumbar osteophyte noted.    Case discussed with neurosurgery today.  There is no neurosurgical emergent finding on patient at this point.  For now while acutely ill with Covid we need to work on supportive care and symptomatic treatment and monitor clinical progress.  Patient was treated with one-time dose of steroids.    In the setting of renal transplant.  Nephrology has been consulted.  Plan to trend renal function and avoid nephrotoxic medications.    Case discussed with patient and her  regarding COVID-19 and patient having multiple risk factors for poor outcome including renal transplant, chronic kidney disease, and advanced age.  Patient would be a candidate for prophylactic antiviral therapy with remdesivir.  Patient and  would like to proceed after discussing the risk versus benefits.  Remdesivir has been ordered.    Continue PPI for GERD, continue Lyrica for neuropathic pain and hold if sedation, discontinue OxyContin, transition back to oral oxycodone, transition muscle relaxant from Flexeril to Skelaxin.    Greater than 40 minutes spent in chart review, coronation care, discussion with consultants, discussion with family, and counseling.  Greater than 20 minutes spent in direct counseling regarding treatment plan and issues.    DVT Prophylaxis: Lovenox      Disposition: Pending clinical course    CODE STATUS:   Code Status and Medical Interventions:   Ordered at: 03/09/22 2229     Code Status (Patient has no pulse and is not breathing):    CPR (Attempt to Resuscitate)     Medical Interventions (Patient has pulse or is breathing):    Full Support       Paul Mcmahan MD  03/10/22

## 2022-03-10 NOTE — PROGRESS NOTES
NEUROSURGERY PROGRESS NOTE     LOS: 1 day   Patient Care Team:  Bradley Hernandez MD as PCP - General (Family Medicine)  Paul Gamez MD as Consulting Physician (Pain Medicine)  Az Erwin MD as Consulting Physician (Nephrology)      Interval History: Spoke with Dr. Rowan. Patient more comfortable today. Dr. Rowan feels that pain more related to COVID myalgias.    Vital Signs  Temp:  [96.8 °F (36 °C)-97.8 °F (36.6 °C)] 97.7 °F (36.5 °C)  Heart Rate:  [46-83] 46  Resp:  [18-20] 20  BP: (103-134)/(52-90) 114/52       Radiology Results:     CT Lumbar spine again shows multilevel degenerative changes with disc osteophyte complexes and multilevel foraminal narrowing also seen on previous lumbar CT myelogram.       Assessment/Plan       Essential hypertension    Right foot drop    Kidney transplant recipient    History of lumbar laminectomy for spinal cord decompression    Chronic bilateral low back pain with right-sided sciatica    Stage 3b chronic kidney disease (HCC)    History of DVT (deep vein thrombosis)    Intractable back pain    Spinal cord stimulator status    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    Secondary hyperparathyroidism     Vitamin D deficiency    COVID-19 virus infection    Informed Dr. Rowan of below:    1) If leg pain remains intractable, the plan would be for thoracic and lumbar CT myelogram. She cannot have MRI secondary to the dorsal column stimulator.     2) [facility] will follow the chart while here and be available if needed.     Lesly Adorno, APRN  03/10/22  16:31 EST

## 2022-03-11 ENCOUNTER — APPOINTMENT (OUTPATIENT)
Dept: CARDIOLOGY | Facility: HOSPITAL | Age: 72
End: 2022-03-11

## 2022-03-11 ENCOUNTER — APPOINTMENT (OUTPATIENT)
Dept: CT IMAGING | Facility: HOSPITAL | Age: 72
End: 2022-03-11

## 2022-03-11 PROBLEM — I63.9 STROKE ASSOCIATED WITH COVID-19: Status: ACTIVE | Noted: 2022-03-09

## 2022-03-11 LAB
BASOPHILS # BLD AUTO: 0.01 10*3/MM3 (ref 0–0.2)
BASOPHILS NFR BLD AUTO: 0.1 % (ref 0–1.5)
BH CV XLRA MEAS LEFT DIST CCA EDV: 13.7 CM/SEC
BH CV XLRA MEAS LEFT DIST CCA PSV: 76.3 CM/SEC
BH CV XLRA MEAS LEFT DIST ICA EDV: -20.4 CM/SEC
BH CV XLRA MEAS LEFT DIST ICA PSV: -74.6 CM/SEC
BH CV XLRA MEAS LEFT ICA/CCA RATIO: 0.59
BH CV XLRA MEAS LEFT MID ICA EDV: -15.3 CM/SEC
BH CV XLRA MEAS LEFT MID ICA PSV: -50.2 CM/SEC
BH CV XLRA MEAS LEFT PROX CCA EDV: 11.8 CM/SEC
BH CV XLRA MEAS LEFT PROX CCA PSV: 84.9 CM/SEC
BH CV XLRA MEAS LEFT PROX ECA EDV: -10.5 CM/SEC
BH CV XLRA MEAS LEFT PROX ECA PSV: -111.4 CM/SEC
BH CV XLRA MEAS LEFT PROX ICA EDV: -12.7 CM/SEC
BH CV XLRA MEAS LEFT PROX ICA PSV: -72.4 CM/SEC
BH CV XLRA MEAS LEFT PROX SCLA PSV: 351.8 CM/SEC
BH CV XLRA MEAS LEFT VERTEBRAL A EDV: -11.5 CM/SEC
BH CV XLRA MEAS LEFT VERTEBRAL A PSV: -48.3 CM/SEC
BH CV XLRA MEAS RIGHT DIST CCA EDV: 10.8 CM/SEC
BH CV XLRA MEAS RIGHT DIST CCA PSV: 69.3 CM/SEC
BH CV XLRA MEAS RIGHT DIST ICA EDV: -13.6 CM/SEC
BH CV XLRA MEAS RIGHT DIST ICA PSV: -48.7 CM/SEC
BH CV XLRA MEAS RIGHT ICA/CCA RATIO: 0.48
BH CV XLRA MEAS RIGHT MID ICA EDV: -15.6 CM/SEC
BH CV XLRA MEAS RIGHT MID ICA PSV: -63.3 CM/SEC
BH CV XLRA MEAS RIGHT PROX CCA EDV: 13.6 CM/SEC
BH CV XLRA MEAS RIGHT PROX CCA PSV: 132.7 CM/SEC
BH CV XLRA MEAS RIGHT PROX ECA EDV: -9.3 CM/SEC
BH CV XLRA MEAS RIGHT PROX ECA PSV: -99.4 CM/SEC
BH CV XLRA MEAS RIGHT PROX ICA EDV: -10.2 CM/SEC
BH CV XLRA MEAS RIGHT PROX ICA PSV: -52.2 CM/SEC
BH CV XLRA MEAS RIGHT PROX SCLA PSV: 126.6 CM/SEC
BH CV XLRA MEAS RIGHT VERTEBRAL A EDV: -10.1 CM/SEC
BH CV XLRA MEAS RIGHT VERTEBRAL A PSV: -51.8 CM/SEC
BILIRUB CONJ SERPL-MCNC: <0.2 MG/DL (ref 0–0.3)
CK SERPL-CCNC: 47 U/L (ref 20–180)
CRP SERPL-MCNC: 1.04 MG/DL (ref 0–0.5)
DEPRECATED RDW RBC AUTO: 41.9 FL (ref 37–54)
EOSINOPHIL # BLD AUTO: 0 10*3/MM3 (ref 0–0.4)
EOSINOPHIL NFR BLD AUTO: 0 % (ref 0.3–6.2)
ERYTHROCYTE [DISTWIDTH] IN BLOOD BY AUTOMATED COUNT: 12.4 % (ref 12.3–15.4)
HCT VFR BLD AUTO: 34.1 % (ref 34–46.6)
HGB BLD-MCNC: 11.7 G/DL (ref 12–15.9)
IMM GRANULOCYTES # BLD AUTO: 0.02 10*3/MM3 (ref 0–0.05)
IMM GRANULOCYTES NFR BLD AUTO: 0.2 % (ref 0–0.5)
LEFT ARM BP: NORMAL MMHG
LYMPHOCYTES # BLD AUTO: 0.65 10*3/MM3 (ref 0.7–3.1)
LYMPHOCYTES NFR BLD AUTO: 7.6 % (ref 19.6–45.3)
MAXIMAL PREDICTED HEART RATE: 150 BPM
MCH RBC QN AUTO: 32 PG (ref 26.6–33)
MCHC RBC AUTO-ENTMCNC: 34.3 G/DL (ref 31.5–35.7)
MCV RBC AUTO: 93.2 FL (ref 79–97)
MONOCYTES # BLD AUTO: 0.1 10*3/MM3 (ref 0.1–0.9)
MONOCYTES NFR BLD AUTO: 1.2 % (ref 5–12)
NEUTROPHILS NFR BLD AUTO: 7.77 10*3/MM3 (ref 1.7–7)
NEUTROPHILS NFR BLD AUTO: 90.9 % (ref 42.7–76)
NRBC BLD AUTO-RTO: 0 /100 WBC (ref 0–0.2)
PLATELET # BLD AUTO: 263 10*3/MM3 (ref 140–450)
PMV BLD AUTO: 10.5 FL (ref 6–12)
RBC # BLD AUTO: 3.66 10*6/MM3 (ref 3.77–5.28)
RIGHT ARM BP: NORMAL MMHG
STRESS TARGET HR: 128 BPM
WBC NRBC COR # BLD: 8.55 10*3/MM3 (ref 3.4–10.8)

## 2022-03-11 PROCEDURE — 25010000002 ENOXAPARIN PER 10 MG: Performed by: INTERNAL MEDICINE

## 2022-03-11 PROCEDURE — 25010000002 REMDESIVIR 100 MG/20ML SOLUTION 1 EACH VIAL: Performed by: INTERNAL MEDICINE

## 2022-03-11 PROCEDURE — 86140 C-REACTIVE PROTEIN: CPT | Performed by: INTERNAL MEDICINE

## 2022-03-11 PROCEDURE — 70450 CT HEAD/BRAIN W/O DYE: CPT

## 2022-03-11 PROCEDURE — 82550 ASSAY OF CK (CPK): CPT | Performed by: INTERNAL MEDICINE

## 2022-03-11 PROCEDURE — 93880 EXTRACRANIAL BILAT STUDY: CPT

## 2022-03-11 PROCEDURE — 25010000002 METHYLPREDNISOLONE PER 125 MG: Performed by: INTERNAL MEDICINE

## 2022-03-11 PROCEDURE — 99222 1ST HOSP IP/OBS MODERATE 55: CPT | Performed by: PSYCHIATRY & NEUROLOGY

## 2022-03-11 PROCEDURE — 85025 COMPLETE CBC W/AUTO DIFF WBC: CPT | Performed by: INTERNAL MEDICINE

## 2022-03-11 PROCEDURE — 82248 BILIRUBIN DIRECT: CPT | Performed by: INTERNAL MEDICINE

## 2022-03-11 PROCEDURE — 92610 EVALUATE SWALLOWING FUNCTION: CPT

## 2022-03-11 RX ORDER — CLOPIDOGREL BISULFATE 75 MG/1
75 TABLET ORAL DAILY
Status: DISCONTINUED | OUTPATIENT
Start: 2022-03-11 | End: 2022-03-15 | Stop reason: HOSPADM

## 2022-03-11 RX ORDER — NYSTATIN 100000 [USP'U]/G
POWDER TOPICAL EVERY 12 HOURS SCHEDULED
Status: DISCONTINUED | OUTPATIENT
Start: 2022-03-11 | End: 2022-03-15 | Stop reason: HOSPADM

## 2022-03-11 RX ORDER — OXYCODONE HYDROCHLORIDE AND ACETAMINOPHEN 5; 325 MG/1; MG/1
1 TABLET ORAL EVERY 4 HOURS PRN
Status: DISCONTINUED | OUTPATIENT
Start: 2022-03-11 | End: 2022-03-15 | Stop reason: HOSPADM

## 2022-03-11 RX ORDER — SODIUM CHLORIDE 9 MG/ML
100 INJECTION, SOLUTION INTRAVENOUS CONTINUOUS
Status: ACTIVE | OUTPATIENT
Start: 2022-03-11 | End: 2022-03-11

## 2022-03-11 RX ADMIN — DOCUSATE SODIUM 50MG AND SENNOSIDES 8.6MG 2 TABLET: 8.6; 5 TABLET, FILM COATED ORAL at 21:34

## 2022-03-11 RX ADMIN — ENOXAPARIN SODIUM 30 MG: 30 INJECTION SUBCUTANEOUS at 21:35

## 2022-03-11 RX ADMIN — REMDESIVIR 100 MG: 100 INJECTION, POWDER, LYOPHILIZED, FOR SOLUTION INTRAVENOUS at 11:55

## 2022-03-11 RX ADMIN — SODIUM CHLORIDE 100 ML/HR: 9 INJECTION, SOLUTION INTRAVENOUS at 11:56

## 2022-03-11 RX ADMIN — OXYCODONE AND ACETAMINOPHEN 1 TABLET: 5; 325 TABLET ORAL at 21:34

## 2022-03-11 RX ADMIN — CLOPIDOGREL 75 MG: 75 TABLET, FILM COATED ORAL at 12:49

## 2022-03-11 RX ADMIN — ACETAMINOPHEN 650 MG: 325 TABLET ORAL at 15:45

## 2022-03-11 RX ADMIN — NYSTATIN: 100000 POWDER TOPICAL at 21:37

## 2022-03-11 RX ADMIN — DOCUSATE SODIUM 50MG AND SENNOSIDES 8.6MG 2 TABLET: 8.6; 5 TABLET, FILM COATED ORAL at 08:58

## 2022-03-11 RX ADMIN — ATORVASTATIN CALCIUM 10 MG: 20 TABLET, FILM COATED ORAL at 21:34

## 2022-03-11 RX ADMIN — Medication 10 ML: at 08:58

## 2022-03-11 RX ADMIN — SODIUM CHLORIDE 250 MG: 9 INJECTION, SOLUTION INTRAVENOUS at 05:35

## 2022-03-11 NOTE — PLAN OF CARE
Goal Outcome Evaluation:  Plan of Care Reviewed With: patient             Problem: Adult Inpatient Plan of Care  Goal: Plan of Care Review  Flowsheets (Taken 3/11/2022 1250)  Plan of Care Reviewed With: patient  Outcome Evaluation: Clinical swallow eval completed. Pt very confused, perseverative, and paraphasic at times. Slight jerking motions of the face and limbs were noted. No s/s of aspiration were noted with any PO, though risk is present due to level of confusion.   Recommend soft diet/thin liquids.   Meds crushed with puree.   Upright for PO, small bites and sips, 1:1 assist with any PO intake.   Will follow for need for speech/cognitive evaluation if it does not improve/if stroke suspected.

## 2022-03-11 NOTE — PROGRESS NOTES
[facility] Medicine Services  PROGRESS NOTE    Patient Name: Marlena Navarrete  : 1950  MRN: [id number]    Date of Admission: 3/9/2022  Primary Care Physician: Bradley Hernandez MD    Subjective   Subjective     CC:  Follow-up COVID-19 and back pain    HPI:  Patient became severely confused overnight.  She did not have any unilateral weakness.  There was a question of some slurred speech or word finding issues.  She is repeating words frequently this morning.  She is able to follow commands such as move both her legs or lift up your hands, she has no facial droop.  She can provide a little history and is oriented to self but forgetful about the situation.      Review of Systems  No current fevers or chills  No current nausea, vomiting, or diarrhea  No current chest pain or palpitations      Objective   Objective     Vital Signs:   Temp:  [97.4 °F (36.3 °C)-97.7 °F (36.5 °C)] 97.4 °F (36.3 °C)  Heart Rate:  [46-87] 61  Resp:  [16-20] 16  BP: (108-148)/(41-73) 140/67  Total (NIH Stroke Scale): 20     Physical Exam:  Constitutional:Awake, alert  HENT: NCAT, mucous membranes moist, neck supple  Respiratory: Rare cough, clear to auscultation bilaterally, respiratory effort normal, nonlabored breathing   Cardiovascular: RRR, normal radial pulses  Gastrointestinal: Positive bowel sounds, soft, nontender, nondistended  Musculoskeletal: Elderly frail and chronically debilitated appearance, obese, BMI is 31, mild lower extremity edema  Psychiatric: Calm affect, cooperative, conversational  Neurologic: Patient has some mildly slow mentation but is otherwise oriented, moving extremities, no slurred speech or facial droop, follows commands  Skin: No rashes or jaundice, warm      Results Reviewed:  Results from last 7 days   Lab Units 22  0239 03/10/22  0457 22  1244   WBC 10*3/mm3 8.55 7.36 8.99   HEMOGLOBIN g/dL 11.7* 11.4* 12.1   HEMATOCRIT % 34.1 34.1 36.8   PLATELETS 10*3/mm3 263 258 271   INR   --  0.99   --    PROCALCITONIN ng/mL  --  0.07  --      Results from last 7 days   Lab Units 03/10/22  2311 03/10/22  1516 03/10/22  0457 03/09/22  1244   SODIUM mmol/L 139  --  138 141   POTASSIUM mmol/L 4.6  --  4.9 4.0   CHLORIDE mmol/L 106  --  107 109*   CO2 mmol/L 19.0*  --  20.0* 22.5   BUN mg/dL 48*  --  32* 23   CREATININE mg/dL 2.07* 1.96* 1.79* 1.57*   GLUCOSE mg/dL 135*  --  122* 96   CALCIUM mg/dL 9.3  --  9.1 9.5   ALT (SGPT) U/L 9 12 9  --    AST (SGOT) U/L 13 15 15  --      Estimated Creatinine Clearance: 22.1 mL/min (A) (by C-G formula based on SCr of 2.07 mg/dL (H)).    Microbiology Results Abnormal     None          Imaging Results (Last 24 Hours)     Procedure Component Value Units Date/Time    CT Head Without Contrast [[id number]] Collected: 03/11/22 0141     Updated: 03/11/22 0146    Narrative:      CT HEAD WITHOUT CONTRAST     HISTORY: Decreased alertness     COMPARISON: None available.     TECHNIQUE: Axial CT imaging was obtained through the brain. No IV  contrast was administered.     FINDINGS:  No acute intracranial hemorrhage is identified. There is atrophy. There  is periventricular and deep white matter microangiopathic change. There  is no midline shift or mass effect. The paranasal sinuses appear clear.  There is mild partial opacification of the left mastoid air cells, as  well as relatively underpneumatized right mastoid air cells, which may  reflect changes of chronic mastoiditis/otitis media.       Impression:      No acute intracranial findings.     Radiation dose reduction techniques were utilized, including automated  exposure control and exposure modulation based on body size.     This report was finalized on 3/11/2022 1:43 AM by Dr. Gloria Sol M.D.                 I have reviewed the medications:  Scheduled Meds:atorvastatin, 10 mg, Oral, Nightly  clopidogrel, 75 mg, Oral, Daily  enoxaparin, 30 mg, Subcutaneous, Nightly  pantoprazole, 40 mg, Oral, QAM  remdesivir, 100 mg,  Intravenous, Q24H  senna-docusate sodium, 2 tablet, Oral, BID  sodium chloride, 10 mL, Intravenous, Q12H      Continuous Infusions:sodium chloride, 100 mL/hr      PRN Meds:.•  acetaminophen **OR** acetaminophen **OR** acetaminophen  •  albuterol sulfate HFA  •  benzonatate  •  senna-docusate sodium **AND** polyethylene glycol **AND** bisacodyl **AND** bisacodyl  •  calcium carbonate  •  HYDROmorphone  •  melatonin  •  metaxalone  •  muscle rub  •  ondansetron **OR** ondansetron  •  oxyCODONE-acetaminophen  •  [COMPLETED] Insert peripheral IV **AND** sodium chloride    Assessment/Plan   Assessment & Plan     Active Hospital Problems    Diagnosis  POA   • Intractable back pain [M54.9]  Yes   • Spinal cord stimulator status [Z96.89]  Not Applicable   • Secondary hyperparathyroidism  [N25.81]  Yes   • Vitamin D deficiency [E55.9]  Yes   • Stroke associated with COVID-19 (HCC) [U07.1, I63.9]  No   • Stage 3b chronic kidney disease (HCC) [N18.32]  Yes   • History of DVT (deep vein thrombosis) [Z86.718]  Not Applicable   • 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        Brief Hospital Course to date:  Marlena Navarrete is a 72 y.o. female presents the hospital with intractable back pain and COVID-19 viral infection.    Discussion/plan:  CT head images reviewed and no acute findings.  Case discussed with neurology.  Worsening confusion last night.  Most likely felt to be toxic metabolic encephalopathy possibly related to chronic medications in the setting of her acute illness.  I have discontinued  Lyrica and discontinued any scheduled sedating medications.  Mental status has reportedly improved over the last few hours.  Plan to give IV hydration.  Discussed with nephrology who approved giving her 1 L today and monitor response particularly since she has poor oral intake currently.      Remdesivir has been started for high risk patient with COVID-19 infection.  Continue to monitor respiratory status.  Discussed with pharmacist.    Etiology of intractable back pain is concerning for viral myositis which I have seen in Covid patients.   also within the differential diagnosis includes lumbar sprain or other lumbar issues is possible however we are unable MRI of the lumbar spine due to spinal cord stimulator.  CT images reviewed and noted that patient does have some disc bulging and lumbar osteophyte noted.  Case discussed with neurosurgery.  On a risk versus benefit assessment I discussed with them and they are agreeable to allow me to discontinue steroids today order and will continue to monitor.    In the setting of renal transplant.  Nephrology has been consulted.  Plan to trend renal function and avoid nephrotoxic medications.  Trending renal function.    Continue PPI for GERD, discontinued Lyrica.  Careful low-dose pain medication as needed.  Skelaxin as needed for muscle spasms.  We will hold this medication until mentation further improves and keep them low-dose.      DVT Prophylaxis: Lovenox      Disposition: Pending clinical course    CODE STATUS:   Code Status and Medical Interventions:   Ordered at: 03/09/22 2229     Code Status (Patient has no pulse and is not breathing):    CPR (Attempt to Resuscitate)     Medical Interventions (Patient has pulse or is breathing):    Full Support       Paul Mcmahan MD  03/11/22

## 2022-03-11 NOTE — THERAPY EVALUATION
Acute Care - Speech Language Pathology   Swallow Initial Evaluation [facility]     Patient Name: Marlena Navarrete  : 1950  MRN: [id number]  Today's Date: 3/11/2022               Admit Date: 3/9/2022    Visit Dx:     ICD-10-CM ICD-9-CM   1. Intractable back pain  M54.9 724.5   2. Lumbar radiculopathy  M54.16 724.4     Patient Active Problem List   Diagnosis   • Lumbar radiculopathy   • Essential hypertension   • Right foot drop   • Kidney transplant recipient   • History of lumbar laminectomy for spinal cord decompression   • Spinal enthesopathy of thoracic region ([facility])   • Postlaminectomy syndrome, lumbar region   • Chronic bilateral low back pain with right-sided sciatica   • Displacement of other nervous system device, implant or graft, initial encounter ([facility])   • Postlaminectomy syndrome of lumbar region   • Epigastric pain   • Nausea   • Gastroesophageal reflux disease   • Shoulder arthritis   • Acute renal failure (ARF) ([facility])   • Stage 3b chronic kidney disease ([facility])   • Iron deficiency anemia   • Status post rotator cuff surgery   • History of DVT (deep vein thrombosis)   • Peripheral neuropathy   • Intractable back pain   • Spinal cord stimulator status   • Adhesive arachnoiditis   • 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   • Inflammation of sacroiliac joint ([facility])   • Secondary hyperparathyroidism    • Vitamin D deficiency   • Stroke associated with COVID-19 ([facility])     Past Medical History:   Diagnosis Date   • Arthritis     OSTEO   • Chronic back pain    • Chronic bilateral low back pain with right-sided sciatica 2020   • CKD (chronic kidney disease)     FOLLOWED BY DR LANRE BYNUM   • Diverticulosis    • Foot drop, right    • GERD (gastroesophageal reflux disease)    • History of DVT (deep vein thrombosis) 2018    RIGHT CALF S/P BACK SURGERY    • History of transfusion     no reaction   • Hyperlipidemia    • Hypertension    • Kidney transplant  recipient 1979    HX TRANSPLANT D/T REFLUX    • Neuropathy    • Secondary hyperparathyroidism  3/9/2022   • Spinal cord stimulator status 3/9/2022   • Spinal headache    • Stage 3b chronic kidney disease (HCC) 2020    FOLLOWED BY DR LANRE BYNUM   • Torn rotator cuff    • Vitamin D deficiency 3/9/2022     Past Surgical History:   Procedure Laterality Date   • APPENDECTOMY     • CATARACT EXTRACTION EXTRACAPSULAR W/ INTRAOCULAR LENS IMPLANTATION     •  SECTION     • COLONOSCOPY  approx     Anastacio WHARTON   • ENDOSCOPY N/A 2020    Procedure: ESOPHAGOGASTRODUODENOSCOPY with biopsies;  Surgeon: Wicho Chaves MD;  Location: [facility] ENDOSCOPY;  Service: Gastroenterology;  Laterality: N/A;  pre- epigastric pain, nausea  post-- gastritis, duodenitis, bile reflux   • LUMBAR DISCECTOMY FUSION INSTRUMENTATION Right 2018    Procedure: Right L2-3 laminectomy with Metrix;  Surgeon: Oscar Paulino MD;  Location: [facility] MAIN OR;  Service: Neurosurgery   • SHOULDER ARTHROSCOPY W/ ROTATOR CUFF REPAIR Right 2020    Procedure: SHOULDER ARTHROSCOPY, ROTATOR CUFF REPAIR, SUBACROMIAL DECOMPRESSION, DISTAL CLAVICLE EXCISION, BICEPS TENOTOMY, AND LABRAL DEBRIDEMENT;  Surgeon: Rishabh Florence MD;  Location: [facility] OR OSC;  Service: Orthopedics;  Laterality: Right;   • SHOULDER ROTATOR CUFF REPAIR Left    • SPINAL CORD STIMULATOR IMPLANT N/A 2020    Procedure: SPINAL CORD STIMULATOR INSERTION PHASE 2, triall of right gluteal internal pulse generator;  Surgeon: Cuate Kemp MD;  Location: [facility] OR;  Service: Neurosurgery;  Laterality: N/A;   • SPINAL CORD STIMULATOR IMPLANT N/A 2020    Procedure: SPINAL CORD STIMULATOR INSERTION PHASE 1, Thoracic 10 laminotomy for revision of [place] surgical lead at Thoracic 8 to Thoracic 9;  Surgeon: Cuate Kemp MD;  Location: [facility] OR;  Service: Neurosurgery;  Laterality: N/A;   • TONSILLECTOMY     • TRANSPLANTATION RENAL  Right 1979    [facility]    • TUBAL ABDOMINAL LIGATION         SLP Recommendation and Plan     SLP Diet Recommendation: soft textures, ground, thin liquids (03/11/22 1200)  Recommended Precautions and Strategies: upright posture during/after eating, small bites of food and sips of liquid (03/11/22 1200)  SLP Rec. for Method of Medication Administration: meds crushed, with pudding or applesauce (03/11/22 1200)     Monitor for Signs of Aspiration: yes, notify SLP if any concerns (03/11/22 1200)  Recommended Diagnostics: SLE/Cog/Motor Speech Evaluation (03/11/22 1200)     Anticipated Discharge Disposition (SLP): unknown (03/11/22 1200)     Therapy Frequency (Swallow): PRN (03/11/22 1200)  Predicted Duration Therapy Intervention (Days): until discharge (03/11/22 1200)                                  Plan of Care Reviewed With: patient  Outcome Evaluation: Clinical swallow eval completed. Pt very confused, perseverative, and paraphasic at times. Slight jerking motions of the face and limbs were noted. No s/s of aspiration were noted with any PO, though risk is present due to level of confusion. Recommend soft diet/thin liquids. Meds crushed with puree. Upright for PO, small bites and sips, 1:1 assist with any PO intake. Will follow for need for speech/cognitive evaluation if it does not improve/if stroke suspected.      SWALLOW EVALUATION (last 72 hours)     SLP Adult Swallow Evaluation     Row Name 03/11/22 1200                   Rehab Evaluation    Document Type evaluation  -CP        Subjective Information no complaints  -CP        Patient Observations alert;cooperative  -CP        Patient/Family/Caregiver Comments/Observations very confused  -CP        Patient Effort adequate  -CP        Symptoms Noted During/After Treatment none  -CP                  General Information    Patient Profile Reviewed yes  -CP        Pertinent History Of Current Problem Admitted with COVID, AMS of unknown etiology. CT negative, and  pt unable to have MRI. Could be medication-related per RN. Pt has hx of kidney transplant.  -CP        Current Method of Nutrition regular textures;thin liquids  -CP        Precautions/Limitations, Vision WFL with corrective lenses  -CP        Precautions/Limitations, Hearing WFL;for purposes of eval  -CP        Prior Level of Function-Communication unknown  -CP        Prior Level of Function-Swallowing no diet consistency restrictions  -CP        Plans/Goals Discussed with patient  -CP        Barriers to Rehab cognitive status;medically complex  -CP        Patient's Goals for Discharge patient did not state  -CP                  Pain    Additional Documentation Pain Scale: Numbers Pre/Post-Treatment (Group)  -CP                  Pain Scale: Numbers Pre/Post-Treatment    Pretreatment Pain Rating 0/10 - no pain  -CP        Posttreatment Pain Rating 0/10 - no pain  -CP                  Oral Motor Structure and Function    Dentition Assessment natural, present and adequate  -CP        Secretion Management WNL/WFL  -CP        Mucosal Quality moist, healthy  -CP                  Oral Musculature and Cranial Nerve Assessment    Oral Motor General Assessment WFL  -CP                  General Eating/Swallowing Observations    Respiratory Support Currently in Use nasal cannula  -CP        Eating/Swallowing Skills self-fed  -CP        Positioning During Eating upright in bed  -CP        Utensils Used spoon;straw  -CP        Consistencies Trialed thin liquids;pureed;regular textures  -CP        Pre SpO2 (%) 95  -CP        Post SpO2 (%) 95  -CP                  Clinical Swallow Eval    Clinical Swallow Evaluation Summary Clinical swallow eval completed. Pt very confused, perseverative, and paraphasic at times. Slight jerking motions of the face and limbs were noted. No s/s of aspiration were noted with any PO, though risk is present due to level of confusion. Recommend soft diet/thin liquids. Meds crushed with puree. Upright  for PO, small bites and sips, 1:1 assist with any PO intake. Will follow for need for speech/cognitive evaluation if it does not improve.  -CP                  Recommendations    Therapy Frequency (Swallow) PRN  -CP        Predicted Duration Therapy Intervention (Days) until discharge  -CP        SLP Diet Recommendation soft textures;ground;thin liquids  -CP        Recommended Diagnostics SLE/Cog/Motor Speech Evaluation  -CP        Recommended Precautions and Strategies upright posture during/after eating;small bites of food and sips of liquid  -CP        Oral Care Recommendations Oral Care BID/PRN  -CP        SLP Rec. for Method of Medication Administration meds crushed;with pudding or applesauce  -CP        Monitor for Signs of Aspiration yes;notify SLP if any concerns  -CP        Anticipated Discharge Disposition (SLP) unknown  -CP              User Key  (r) = Recorded By, (t) = Taken By, (c) = Cosigned By    Initials Name Effective Dates    Mary Pond MS CCC-SLP 06/16/21 -                 EDUCATION  The patient has been educated in the following areas:   Dysphagia (Swallowing Impairment) Oral Care/Hydration Modified Diet Instruction.         SLP Outcome Measures (last 72 hours)     SLP Outcome Measures     Row Name 03/11/22 1200             SLP Outcome Measures    Outcome Measure Used? Adult NOMS  -CP              Adult FCM Scores    FCM Chosen Swallowing  -CP      Swallowing FCM Score 5  -CP            User Key  (r) = Recorded By, (t) = Taken By, (c) = Cosigned By    Initials Name Effective Dates    Mary Pond MS CCC-SLP 06/16/21 -                  Time Calculation:    Time Calculation- SLP     Row Name 03/11/22 1251             Time Calculation- SLP    SLP Start Time 1100  -CP      SLP Received On 03/11/22  -CP              Untimed Charges    SLP Eval/Re-eval  ST Eval Oral Pharyng Swallow - [id number]  -CP      [id number]-[id number] Eval Oral Pharyng Swallow Minutes 60  -CP              Total Minutes     Untimed Charges Total Minutes 60  -CP       Total Minutes 60  -CP            User Key  (r) = Recorded By, (t) = Taken By, (c) = Cosigned By    Initials Name Provider Type    CP Mary Juarez MS CCC-SLP Speech and Language Pathologist                Therapy Charges for Today     Code Description Service Date Service Provider Modifiers Qty    [id number] HC ST EVAL ORAL PHARYNG SWALLOW 4 3/11/2022 Mary Juarez MS CCC-SLP GN 1        Patient was not wearing a face mask during this therapy encounter. Therapist used appropriate personal protective equipment including gown, eye protection, mask and gloves.  Mask used was N95/duckbill. Appropriate PPE was worn during the entire therapy session. Hand hygiene was completed before and after therapy session. Patient is in enhanced droplet precautions.            Mary Juarez MS CCC-SLP  3/11/2022

## 2022-03-11 NOTE — PLAN OF CARE
Goal Outcome Evaluation:              Outcome Evaluation: RRT called tonight to assess patient, very difficult to arouse when she does talk she babbles and does not answer questions appropriately when asked, states her name at times but does not know where she RN from ICU called  to inform him of the patient status orders recieved and noted, vss will continue to monitor closely.  Problem: Adult Inpatient Plan of Care  Goal: Absence of Hospital-Acquired Illness or Injury  Intervention: Identify and Manage Fall Risk  Recent Flowsheet Documentation  Taken 3/11/2022 0010 by Brenda Romeo RN  Safety Promotion/Fall Prevention: safety round/check completed  Taken 3/10/2022 2200 by Brenda Romeo RN  Safety Promotion/Fall Prevention:   activity supervised   safety round/check completed  Taken 3/10/2022 2002 by Brenda Romeo RN  Safety Promotion/Fall Prevention: safety round/check completed     Problem: Adult Inpatient Plan of Care  Goal: Absence of Hospital-Acquired Illness or Injury  Intervention: Prevent Infection  Recent Flowsheet Documentation  Taken 3/11/2022 0010 by Brenda Romeo RN  Infection Prevention:   rest/sleep promoted   single patient room provided  Taken 3/10/2022 2002 by Brenda Romeo RN  Infection Prevention:   rest/sleep promoted   single patient room provided

## 2022-03-11 NOTE — CONSULTS
Patient Identification:  NAME:  Marlena Navarrete  Age:  72 y.o.   Sex:  female   :  1950   MRN:  [id number]       Chief complaint: she does not have one, reason for consult change in mental status    History of present illness: This patient is 72-year-old white female who has come to the hospital with a history of kidney transplant on immunosuppression chronic back pain right foot drop who pain to the hospital basically complained of severe back spasms she was noted to be acutely Covid positive is context modifying factors remdesivir location with her low back that was hurting associated symptoms she has had some change in which she became more confused at least since yesterday having more trouble with her words and not making sense.  Stat CT was performed and it showed no acute abnormality by my independent eyeball review she cannot get an MRI scan because she has a stimulator in place she cannot get IV dye because she has a kidney transplant.  Again the CT did not show any acute stroke or hemorrhage      Past medical history:  Past Medical History:   Diagnosis Date   • Arthritis     OSTEO   • Chronic back pain    • Chronic bilateral low back pain with right-sided sciatica 2020   • CKD (chronic kidney disease)     FOLLOWED BY DR LANRE BYNUM   • Diverticulosis    • Foot drop, right    • GERD (gastroesophageal reflux disease)    • History of DVT (deep vein thrombosis) 2018    RIGHT CALF S/P BACK SURGERY    • History of transfusion     no reaction   • Hyperlipidemia    • Hypertension    • Kidney transplant recipient 1979    HX TRANSPLANT D/T REFLUX    • Neuropathy    • Secondary hyperparathyroidism  3/9/2022   • Spinal cord stimulator status 3/9/2022   • Spinal headache    • Stage 3b chronic kidney disease (HCC) 2020    FOLLOWED BY DR LANRE BYNUM   • Torn rotator cuff    • Vitamin D deficiency 3/9/2022       Past surgical history:  Past Surgical History:   Procedure Laterality Date   •  APPENDECTOMY     • CATARACT EXTRACTION EXTRACAPSULAR W/ INTRAOCULAR LENS IMPLANTATION     •  SECTION     • COLONOSCOPY  approx     Anastacio WHARTON   • ENDOSCOPY N/A 2020    Procedure: ESOPHAGOGASTRODUODENOSCOPY with biopsies;  Surgeon: Wicho Chaves MD;  Location: [facility] ENDOSCOPY;  Service: Gastroenterology;  Laterality: N/A;  pre- epigastric pain, nausea  post-- gastritis, duodenitis, bile reflux   • LUMBAR DISCECTOMY FUSION INSTRUMENTATION Right 2018    Procedure: Right L2-3 laminectomy with Metrix;  Surgeon: Oscar Paulino MD;  Location: [facility] MAIN OR;  Service: Neurosurgery   • SHOULDER ARTHROSCOPY W/ ROTATOR CUFF REPAIR Right 2020    Procedure: SHOULDER ARTHROSCOPY, ROTATOR CUFF REPAIR, SUBACROMIAL DECOMPRESSION, DISTAL CLAVICLE EXCISION, BICEPS TENOTOMY, AND LABRAL DEBRIDEMENT;  Surgeon: Rishabh Florence MD;  Location: [facility] OR O[facility];  Service: Orthopedics;  Laterality: Right;   • SHOULDER ROTATOR CUFF REPAIR Left    • SPINAL CORD STIMULATOR IMPLANT N/A 2020    Procedure: SPINAL CORD STIMULATOR INSERTION PHASE 2, triall of right gluteal internal pulse generator;  Surgeon: Cuate Kemp MD;  Location: [facility] OR;  Service: Neurosurgery;  Laterality: N/A;   • SPINAL CORD STIMULATOR IMPLANT N/A 2020    Procedure: SPINAL CORD STIMULATOR INSERTION PHASE 1, Thoracic 10 laminotomy for revision of St. Tom surgical lead at Thoracic 8 to Thoracic 9;  Surgeon: Cuate Kemp MD;  Location: [facility] OR;  Service: Neurosurgery;  Laterality: N/A;   • TONSILLECTOMY     • TRANSPLANTATION RENAL Right 1979    SISTER GAVE PT    • TUBAL ABDOMINAL LIGATION         Allergies:  Patient has no known allergies.    Home medications:  Medications Prior to Admission   Medication Sig Dispense Refill Last Dose   • amLODIPine (NORVASC) 5 MG tablet Take 5 mg by mouth Every Night.  11    • atorvastatin (LIPITOR) 10 MG tablet Take 10 mg by mouth Every Night.      •  Cholecalciferol (Vitamin D3) 50 MCG ( UT) capsule Take 2,000 Units by mouth Daily.      • diphenhydrAMINE-acetaminophen (TYLENOL PM)  MG tablet per tablet Take 1 tablet by mouth At Night As Needed for Sleep.      • omeprazole (priLOSEC) 20 MG capsule Take 20 mg by mouth Daily.      • oxyCODONE-acetaminophen (PERCOCET)  MG per tablet Take 1 tablet by mouth 2 (Two) Times a Day.      • pregabalin (LYRICA) 150 MG capsule Take 150 mg by mouth Daily.      • pregabalin (LYRICA) 50 MG capsule Take 50 mg by mouth 2 (Two) Times a Day.      • Sodium Bicarbonate powder powder Take  by mouth See Admin Instructions. 1 Teaspoon by mouth daily           Hospital medications:  atorvastatin, 10 mg, Oral, Nightly  enoxaparin, 30 mg, Subcutaneous, Nightly  pantoprazole, 40 mg, Oral, QAM  remdesivir, 100 mg, Intravenous, Q24H  senna-docusate sodium, 2 tablet, Oral, BID  sodium chloride, 10 mL, Intravenous, Q12H         •  acetaminophen **OR** acetaminophen **OR** acetaminophen  •  albuterol sulfate HFA  •  benzonatate  •  senna-docusate sodium **AND** polyethylene glycol **AND** bisacodyl **AND** bisacodyl  •  calcium carbonate  •  HYDROmorphone  •  melatonin  •  metaxalone  •  muscle rub  •  ondansetron **OR** ondansetron  •  oxyCODONE-acetaminophen  •  [COMPLETED] Insert peripheral IV **AND** sodium chloride    Family history:  Family History   Problem Relation Age of Onset   • Aneurysm Mother         BRAIN ANEURYSM   • Heart failure Father    • Kidney failure Father    • Lung cancer Sister    • Lung cancer Sister    • Lung cancer Son    • Malig Hyperthermia Neg Hx        Social history:  Social History     Tobacco Use   • Smoking status: Former Smoker     Packs/day: 1.00     Years: 30.00     Pack years: 30.00     Types: Cigarettes     Quit date: 2012     Years since quittin.8   • Smokeless tobacco: Never Used   Substance Use Topics   • Alcohol use: No   • Drug use: Never       Review of systems:    He cannot  tell me anything no family is present I reviewed the chart fully.    Objective:  Vitals Ranges:   Temp:  [97.4 °F (36.3 °C)-97.7 °F (36.5 °C)] 97.4 °F (36.3 °C)  Heart Rate:  [46-87] 61  Resp:  [16-20] 16  BP: (108-148)/(41-73) 140/67      Physical Exam:  Patient is awake moderately alert she is having some speech difficulty sometimes the wrong words come out when she eventually gets it she could not tell me where we are she is oriented really x1 when I ask how she is she says nothing then I what year she is pointing she says 29 then I asked how old she is and she says 29.  Fund of knowledge poor attention span concentration poor recent remote memory could not be tested language function as noted no real dysarthria but she has confused speech.  Pupils 3 constricting to 2 unable to visualize disc retinas she blinks to threat extraocular movements appear to be full horizontally face appears symmetrical she would not follow any other cranial nerve testing.  Motor she does  on both sides maybe a little less on the left compared to the right but she is very apraxic she does wiggle the toes reflexes trace throughout symmetrical toes appear to be downgoing.'s sensation coordination station and gait cannot be tested hearts regular without murmur neck supple without bruits extremities no clubbing cyanosis or edema visual acuity she counted incorrectly the number of fingers.    Results review:   I reviewed the patient's new clinical results.    Data review:  Lab Results (last 24 hours)     Procedure Component Value Units Date/Time    Bilirubin, Direct [[id number]]  (Normal) Collected: 03/11/22 0239    Specimen: Blood Updated: 03/11/22 0306     Bilirubin, Direct <0.2 mg/dL     C-reactive Protein [[id number]]  (Abnormal) Collected: 03/11/22 0239    Specimen: Blood Updated: 03/11/22 0306     C-Reactive Protein 1.04 mg/dL     CBC & Differential [[id number]]  (Abnormal) Collected: 03/11/22 0239    Specimen: Blood Updated:  03/11/22 0248    Narrative:      The following orders were created for panel order CBC & Differential.  Procedure                               Abnormality         Status                     ---------                               -----------         ------                     CBC Auto Differential[[id number]]        Abnormal            Final result                 Please view results for these tests on the individual orders.    CBC Auto Differential [[id number]]  (Abnormal) Collected: 03/11/22 0239    Specimen: Blood Updated: 03/11/22 0248     WBC 8.55 10*3/mm3      RBC 3.66 10*6/mm3      Hemoglobin 11.7 g/dL      Hematocrit 34.1 %      MCV 93.2 fL      MCH 32.0 pg      MCHC 34.3 g/dL      RDW 12.4 %      RDW-SD 41.9 fl      MPV 10.5 fL      Platelets 263 10*3/mm3      Neutrophil % 90.9 %      Lymphocyte % 7.6 %      Monocyte % 1.2 %      Eosinophil % 0.0 %      Basophil % 0.1 %      Immature Grans % 0.2 %      Neutrophils, Absolute 7.77 10*3/mm3      Lymphocytes, Absolute 0.65 10*3/mm3      Monocytes, Absolute 0.10 10*3/mm3      Eosinophils, Absolute 0.00 10*3/mm3      Basophils, Absolute 0.01 10*3/mm3      Immature Grans, Absolute 0.02 10*3/mm3      nRBC 0.0 /100 WBC     Comprehensive Metabolic Panel [[id number]]  (Abnormal) Collected: 03/10/22 2311    Specimen: Blood Updated: 03/10/22 9101     Glucose 135 mg/dL      BUN 48 mg/dL      Creatinine 2.07 mg/dL      Sodium 139 mmol/L      Potassium 4.6 mmol/L      Chloride 106 mmol/L      CO2 19.0 mmol/L      Calcium 9.3 mg/dL      Total Protein 6.6 g/dL      Albumin 4.20 g/dL      ALT (SGPT) 9 U/L      AST (SGOT) 13 U/L      Alkaline Phosphatase 84 U/L      Total Bilirubin 0.3 mg/dL      Globulin 2.4 gm/dL      A/G Ratio 1.8 g/dL      BUN/Creatinine Ratio 23.2     Anion Gap 14.0 mmol/L      eGFR 25.4 mL/min/1.73      Comment: National Kidney Foundation and American Society of Nephrology (ASN) Task Force recommended calculation based on the Chronic Kidney Disease  Epidemiology Collaboration (CKD-EPI) equation refit without adjustment for race.       Narrative:      GFR Normal >60  Chronic Kidney Disease <60  Kidney Failure <15      Ammonia [[id number]]  (Normal) Collected: 03/10/22 2311    Specimen: Blood Updated: 03/10/22 2349     Ammonia 16 umol/L     Blood Gas, Arterial - [[id number]]  (Abnormal) Collected: 03/10/22 2259    Specimen: Arterial Blood Updated: 03/10/22 2303     Site Arterial: left radial     Austin's Test Positive     pH, Arterial 7.328 pH units      pCO2, Arterial 36.4 mm Hg      pO2, Arterial 73.8 mm Hg      HCO3, Arterial 19.1 mmol/L      Base Excess, Arterial -6.3 mmol/L      O2 Saturation Calculated 93.6 %      Barometric Pressure for Blood Gas 748.0 mmHg      Comment: Meter: [id number] : [id number] Tea New        Modality Cannula     Flow Rate 1 lpm      Rate 14 Breaths/minute     POC Glucose Once [[id number]]  (Normal) Collected: 03/10/22 2257    Specimen: Blood Updated: 03/10/22 2258     Glucose 109 mg/dL      Comment: Meter: [id number] : [id number] Merrick VAZQUEZ RN       Hepatic Function Panel [[id number]] Collected: 03/10/22 1516    Specimen: Blood Updated: 03/10/22 1607     Total Protein 6.3 g/dL      Albumin 3.90 g/dL      ALT (SGPT) 12 U/L      AST (SGOT) 15 U/L      Alkaline Phosphatase 83 U/L      Total Bilirubin 0.4 mg/dL      Bilirubin, Direct <0.2 mg/dL      Bilirubin, Indirect --     Comment: Unable to calculate       Creatinine, Serum [[id number]]  (Abnormal) Collected: 03/10/22 1516    Specimen: Blood Updated: 03/10/22 1606     Creatinine 1.96 mg/dL      eGFR 27.1 mL/min/1.73      Comment: National Kidney Foundation and American Society of Nephrology (ASN) Task Force recommended calculation based on the Chronic Kidney Disease Epidemiology Collaboration (CKD-EPI) equation refit without adjustment for race.       Narrative:      GFR Normal >60  Chronic Kidney Disease <60  Kidney Failure <15             Imaging:  Imaging  Results (Last 24 Hours)     Procedure Component Value Units Date/Time    CT Head Without Contrast [[id number]] Collected: 03/11/22 0141     Updated: 03/11/22 0146    Narrative:      CT HEAD WITHOUT CONTRAST     HISTORY: Decreased alertness     COMPARISON: None available.     TECHNIQUE: Axial CT imaging was obtained through the brain. No IV  contrast was administered.     FINDINGS:  No acute intracranial hemorrhage is identified. There is atrophy. There  is periventricular and deep white matter microangiopathic change. There  is no midline shift or mass effect. The paranasal sinuses appear clear.  There is mild partial opacification of the left mastoid air cells, as  well as relatively underpneumatized right mastoid air cells, which may  reflect changes of chronic mastoiditis/otitis media.       Impression:      No acute intracranial findings.     Radiation dose reduction techniques were utilized, including automated  exposure control and exposure modulation based on body size.     This report was finalized on 3/11/2022 1:43 AM by Dr. Gloria Sol M.D.         PPE worn at all times washed before washed afterwards disposed of everything properly is now within 6 fever for more than few minutes during my exam no aerosols used at any point      Assessment and Plan:     This patient has had a change in her mental status associated with a change in her language.  This patient has either got a severe COVID encephalopathy with psychosis or she may have had a small stroke affecting her speech.  We are in a difficult position because she cannot get an MRI scan and I definitely would not recommend a CTA because of the contrast dye and she is a kidney transplant patient.  By my independent eyeball review the CAT scan ordered stat last night does not show any acute bleed or evidence of an acute stroke, but of course of stroke is not absolutely ruled out.  I will order duplex carotid ultrasound as well as an echocardiogram.   I am going to add Plavix 75 mg p.o. daily as a precautionary measure in case she did have an acute stroke.  Note that Plavix is very renal safe  So, continue the low-dose Lovenox and Plavix get a carotid Doppler and echocardiogram and will have speech therapy see her as well today to see if they think this is more confused speech versus aphasic speech. She has been on Lyrica and that will be held.      Armando Payne MD  03/11/22  10:15 EST

## 2022-03-11 NOTE — NURSING NOTE
RRT called after speaking to Luis SAENZ concerning patient's neuro status, patient has responded only to sternal rub at times cannot tell me where she is are her name at times, she is following some simple commands, she is unable to hold any extremity up for 5secs , RRT remains here assessing patient status, labs,abgs and NIH scale performed will continue to monitor patient closely.

## 2022-03-11 NOTE — CONSULTS
[facility] Consult Note      Patient Name: Marlena Navarrete  : 1950  MRN: [id number]  Primary Care Physician:  Bradley Hernandez MD  Referring Physician: Ruby Lindsey, *  Date of admission: 3/9/2022    Subjective     Reason for Consult: Kidney transplant    HPI:   Marlena Navarrete is a 72 y.o. female was admitted on 3/9/2022 with severe back pain was found to have incidentally COVID-19 positive, currently not on any oxygen the patient has been having waxing waning mental status over the past 24 hours.  The consult was requested on admission but never been called to us and discovered the patient incidentally when reviewing the computer list this morning.  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  Past medical history includes hypertension vitamin D deficiency and secondary hyperparathyroidism and CKD stage III-IV  At present the patient is more awake but could not remember when her kidney transplant was done she stated that it was done in  seemed somewhat disoriented according to the nursing staff yesterday she was very obtunded during the night she was very obtunded and they called rapid response team on her.    She denies chest pain or shortness of air, no orthopnea or PND, no nausea or vomiting, no abdominal pain, no dysuria or gross hematuria, she stated she is not having any back pain.  Review of Systems:   14 point review of systems is otherwise negative except for mentioned above on HPI    Personal History     Past Medical History:   Diagnosis Date   • Arthritis      OSTEO   • Chronic back pain    • Chronic bilateral low back pain with right-sided sciatica 2020   • CKD (chronic kidney disease)     FOLLOWED BY DR LANRE BYNUM   • Diverticulosis    • Foot drop, right    • GERD (gastroesophageal reflux disease)    • History of DVT (deep vein thrombosis) 2018    RIGHT CALF S/P BACK SURGERY    • History of transfusion     no reaction   • Hyperlipidemia    • Hypertension    • Kidney transplant recipient 1979    HX TRANSPLANT D/T REFLUX    • Neuropathy    • Secondary hyperparathyroidism  3/9/2022   • Spinal cord stimulator status 3/9/2022   • Spinal headache    • Stage 3b chronic kidney disease (HCC) 2020    FOLLOWED BY DR LANRE BYNUM   • Torn rotator cuff    • Vitamin D deficiency 3/9/2022       Past Surgical History:   Procedure Laterality Date   • APPENDECTOMY     • CATARACT EXTRACTION EXTRACAPSULAR W/ INTRAOCULAR LENS IMPLANTATION     •  SECTION     • COLONOSCOPY  approx     Anastacio WHARTON   • ENDOSCOPY N/A 2020    Procedure: ESOPHAGOGASTRODUODENOSCOPY with biopsies;  Surgeon: Wicho Chaves MD;  Location: [facility] ENDOSCOPY;  Service: Gastroenterology;  Laterality: N/A;  pre- epigastric pain, nausea  post-- gastritis, duodenitis, bile reflux   • LUMBAR DISCECTOMY FUSION INSTRUMENTATION Right 2018    Procedure: Right L2-3 laminectomy with Metrix;  Surgeon: Oscar Paulino MD;  Location: [facility];  Service: Neurosurgery   • SHOULDER ARTHROSCOPY W/ ROTATOR CUFF REPAIR Right 2020    Procedure: SHOULDER ARTHROSCOPY, ROTATOR CUFF REPAIR, SUBACROMIAL DECOMPRESSION, DISTAL CLAVICLE EXCISION, BICEPS TENOTOMY, AND LABRAL DEBRIDEMENT;  Surgeon: Rishabh Florence MD;  [facility];  Service: Orthopedics;  Laterality: Right;   • SHOULDER ROTATOR CUFF REPAIR Left    • SPINAL CORD STIMULATOR IMPLANT N/A 2020    Procedure: SPINAL CORD STIMULATOR INSERTION PHASE 2, triall of right gluteal internal pulse generator;  Surgeon:  Cuate Kemp MD;  Location: [facility] MAIN OR;  Service: Neurosurgery;  Laterality: N/A;   • SPINAL CORD STIMULATOR IMPLANT N/A 6/4/2020    Procedure: SPINAL CORD STIMULATOR INSERTION PHASE 1, Thoracic 10 laminotomy for revision of St. Tom surgical lead at Thoracic 8 to Thoracic 9;  Surgeon: Cuate Kemp MD;  Location: [facility] MAIN OR;  Service: Neurosurgery;  Laterality: N/A;   • TONSILLECTOMY     • TRANSPLANTATION RENAL Right 1979    SISTER GAVE PT    • TUBAL ABDOMINAL LIGATION         Family History: family history includes Aneurysm in her mother; Heart failure in her father; Kidney failure in her father; Lung cancer in her sister, sister, and son.    Social History:  reports that she quit smoking about 9 years ago. Her smoking use included cigarettes. She has a 30.00 pack-year smoking history. She has never used smokeless tobacco. She reports that she does not drink alcohol and does not use drugs.    Home Medications:  Prior to Admission medications    Medication Sig Start Date End Date Taking? Authorizing Provider   amLODIPine (NORVASC) 5 MG tablet Take 5 mg by mouth Every Night. 6/3/18  Yes Ita Woodall MD   atorvastatin (LIPITOR) 10 MG tablet Take 10 mg by mouth Every Night.   Yes Ita Woodall MD   Cholecalciferol (Vitamin D3) 50 MCG (2000 UT) capsule Take 2,000 Units by mouth Daily.   Yes Ita Woodall MD   diphenhydrAMINE-acetaminophen (TYLENOL PM)  MG tablet per tablet Take 1 tablet by mouth At Night As Needed for Sleep.   Yes Ita Woodall MD   omeprazole (priLOSEC) 20 MG capsule Take 20 mg by mouth Daily.   Yes Ita Woodall MD   oxyCODONE-acetaminophen (PERCOCET)  MG per tablet Take 1 tablet by mouth 2 (Two) Times a Day.   Yes Ita Woodall MD   pregabalin (LYRICA) 150 MG capsule Take 150 mg by mouth Daily.   Yes Ita Woodall MD   pregabalin (LYRICA) 50 MG capsule Take 50 mg by mouth 2 (Two) Times a Day.   Yes Jose C  MD Ita   Sodium Bicarbonate powder powder Take  by mouth See Admin Instructions. 1 Teaspoon by mouth daily   Yes Provider, MD Ita       Allergies:  No Known Allergies    Objective     Vitals:   Temp:  [97.4 °F (36.3 °C)-97.7 °F (36.5 °C)] 97.4 °F (36.3 °C)  Heart Rate:  [46-87] 61  Resp:  [16-20] 16  BP: (108-148)/(41-73) 140/67  Flow (L/min):  [1-2] 2  No intake or output data in the 24 hours ending 03/11/22 0857    Physical Exam:   Constitutional: Awake, alert, no acute distress.  Appears to be disoriented, chronically ill and frail  HEENT: Sclera anicteric, no conjunctival injection, oral mucosa is normal  Neck: Supple, no thyromegaly, no lymphadenopathy, trachea at midline, no JVD  Respiratory: Clear to auscultation bilaterally, nonlabored respiration  Cardiovascular: RRR, no murmurs, no rubs or gallops, no carotid bruit  Gastrointestinal: Positive bowel sounds, abdomen is soft, nontender and nondistended  : No palpable bladder, renal allograft in the right lower quadrant is nontender with no bruit over the allograft  Musculoskeletal: No edema, no clubbing or cyanosis  Psychiatric: Flat affect, cooperative  Neurologic: Disoriented, moving all extremities, normal speech .  Skin: Warm and dry       Scheduled Meds:     atorvastatin, 10 mg, Oral, Nightly  enoxaparin, 30 mg, Subcutaneous, Nightly  pantoprazole, 40 mg, Oral, QAM  remdesivir, 100 mg, Intravenous, Q24H  senna-docusate sodium, 2 tablet, Oral, BID  sodium chloride, 10 mL, Intravenous, Q12H      IV Meds:        Results Reviewed:   I have personally reviewed the results from the time of this admission to 3/11/2022 08:57 EST     Lab Results   Component Value Date    GLUCOSE 135 (H) 03/10/2022    CALCIUM 9.3 03/10/2022     03/10/2022    K 4.6 03/10/2022    CO2 19.0 (L) 03/10/2022     03/10/2022    BUN 48 (H) 03/10/2022    CREATININE 2.07 (H) 03/10/2022    EGFRIFNONA 33 (L) 01/06/2021    BCR 23.2 03/10/2022    ANIONGAP 14.0  03/10/2022      Lab Results   Component Value Date    MG 2.3 03/10/2022    PHOS 5.0 (H) 03/10/2022    ALBUMIN 4.20 03/10/2022           Assessment / Plan     ASSESSMENT:  1.  Living related kidney transplant in 1979 from her sister with withdrawal of immunosuppressive drugs in 1985 renal function has been stable.  2.  Chronic kidney disease stage III-IV has been very stable  3.  Reflux nephropathy as the etiology of the end-stage renal disease of the native kidneys, patient had bilateral nephrectomies  4.  COVID-19 positive currently on no oxygen  5.  Anemia of CKD there is no indication for ALBERTO  6.  Secondary hyperparathyroidism related to renal disease and vitamin D deficiency  7.  Severe back pain.  8.  Altered mental status  9.  Dyslipidemia, treated    PLAN:  1.  I agree with the present treatment  2.  Surveillance labs    I discussed the case with the patient's nurse    Thank you for involving us in the care of Marlena Navarrete.  Please feel free to call with any questions.    Franco Clemens MD  03/11/22  08:57 [facility]    [facility]  [phone]      Much of this encounter note is an electronic transcription/translation of spoken language to printed text. The electronic translation of spoken language may permit erroneous, or at times, nonsensical words or phrases to be inadvertently transcribed; Although I have reviewed the note for such errors, some may still exist.

## 2022-03-11 NOTE — CODE DOCUMENTATION
ICU RN spoke with Team D and after review of current condition and history there are no new orders received.  Reviewed NIH and only deficits that patient repeats her words sometimes and unclear if patient doesn't understand all commands or just not following.  Possibly MRI tomorrow if primary MD wants Neuro team to follow for possible encephalology.  Primary MD paged and labs sent.

## 2022-03-12 LAB
ALBUMIN SERPL-MCNC: 3.7 G/DL (ref 3.5–5.2)
ALBUMIN/GLOB SERPL: 1.4 G/DL
ALP SERPL-CCNC: 75 U/L (ref 39–117)
ALT SERPL W P-5'-P-CCNC: 12 U/L (ref 1–33)
ANION GAP SERPL CALCULATED.3IONS-SCNC: 12 MMOL/L (ref 5–15)
AST SERPL-CCNC: 12 U/L (ref 1–32)
BASOPHILS # BLD AUTO: 0.01 10*3/MM3 (ref 0–0.2)
BASOPHILS NFR BLD AUTO: 0.1 % (ref 0–1.5)
BILIRUB CONJ SERPL-MCNC: <0.2 MG/DL (ref 0–0.3)
BILIRUB SERPL-MCNC: 0.3 MG/DL (ref 0–1.2)
BUN SERPL-MCNC: 52 MG/DL (ref 8–23)
BUN/CREAT SERPL: 28.7 (ref 7–25)
CALCIUM SPEC-SCNC: 9.1 MG/DL (ref 8.6–10.5)
CHLORIDE SERPL-SCNC: 112 MMOL/L (ref 98–107)
CK SERPL-CCNC: 38 U/L (ref 20–180)
CO2 SERPL-SCNC: 17 MMOL/L (ref 22–29)
CREAT SERPL-MCNC: 1.81 MG/DL (ref 0.57–1)
CRP SERPL-MCNC: 0.79 MG/DL (ref 0–0.5)
DEPRECATED RDW RBC AUTO: 42.5 FL (ref 37–54)
EGFRCR SERPLBLD CKD-EPI 2021: 29.8 ML/MIN/1.73
EOSINOPHIL # BLD AUTO: 0 10*3/MM3 (ref 0–0.4)
EOSINOPHIL NFR BLD AUTO: 0 % (ref 0.3–6.2)
ERYTHROCYTE [DISTWIDTH] IN BLOOD BY AUTOMATED COUNT: 12.9 % (ref 12.3–15.4)
GLOBULIN UR ELPH-MCNC: 2.6 GM/DL
GLUCOSE SERPL-MCNC: 111 MG/DL (ref 65–99)
HCT VFR BLD AUTO: 38.9 % (ref 34–46.6)
HGB BLD-MCNC: 13.4 G/DL (ref 12–15.9)
IMM GRANULOCYTES # BLD AUTO: 0.04 10*3/MM3 (ref 0–0.05)
IMM GRANULOCYTES NFR BLD AUTO: 0.3 % (ref 0–0.5)
LYMPHOCYTES # BLD AUTO: 0.71 10*3/MM3 (ref 0.7–3.1)
LYMPHOCYTES NFR BLD AUTO: 5.4 % (ref 19.6–45.3)
MAGNESIUM SERPL-MCNC: 2.6 MG/DL (ref 1.6–2.4)
MCH RBC QN AUTO: 31.6 PG (ref 26.6–33)
MCHC RBC AUTO-ENTMCNC: 34.4 G/DL (ref 31.5–35.7)
MCV RBC AUTO: 91.7 FL (ref 79–97)
MONOCYTES # BLD AUTO: 0.66 10*3/MM3 (ref 0.1–0.9)
MONOCYTES NFR BLD AUTO: 5 % (ref 5–12)
NEUTROPHILS NFR BLD AUTO: 11.83 10*3/MM3 (ref 1.7–7)
NEUTROPHILS NFR BLD AUTO: 89.2 % (ref 42.7–76)
NRBC BLD AUTO-RTO: 0 /100 WBC (ref 0–0.2)
PHOSPHATE SERPL-MCNC: 4 MG/DL (ref 2.5–4.5)
PLATELET # BLD AUTO: 293 10*3/MM3 (ref 140–450)
PMV BLD AUTO: 10.6 FL (ref 6–12)
POTASSIUM SERPL-SCNC: 3.8 MMOL/L (ref 3.5–5.2)
PROT SERPL-MCNC: 6.3 G/DL (ref 6–8.5)
RBC # BLD AUTO: 4.24 10*6/MM3 (ref 3.77–5.28)
SODIUM SERPL-SCNC: 141 MMOL/L (ref 136–145)
URATE SERPL-MCNC: 6.8 MG/DL (ref 2.4–5.7)
WBC NRBC COR # BLD: 13.25 10*3/MM3 (ref 3.4–10.8)

## 2022-03-12 PROCEDURE — 86140 C-REACTIVE PROTEIN: CPT | Performed by: INTERNAL MEDICINE

## 2022-03-12 PROCEDURE — 84550 ASSAY OF BLOOD/URIC ACID: CPT | Performed by: INTERNAL MEDICINE

## 2022-03-12 PROCEDURE — 25010000002 ENOXAPARIN PER 10 MG: Performed by: INTERNAL MEDICINE

## 2022-03-12 PROCEDURE — 84100 ASSAY OF PHOSPHORUS: CPT | Performed by: INTERNAL MEDICINE

## 2022-03-12 PROCEDURE — 99231 SBSQ HOSP IP/OBS SF/LOW 25: CPT | Performed by: PSYCHIATRY & NEUROLOGY

## 2022-03-12 PROCEDURE — 25010000002 REMDESIVIR 100 MG/20ML SOLUTION 1 EACH VIAL: Performed by: INTERNAL MEDICINE

## 2022-03-12 PROCEDURE — 85025 COMPLETE CBC W/AUTO DIFF WBC: CPT | Performed by: INTERNAL MEDICINE

## 2022-03-12 PROCEDURE — 82248 BILIRUBIN DIRECT: CPT | Performed by: INTERNAL MEDICINE

## 2022-03-12 PROCEDURE — 82550 ASSAY OF CK (CPK): CPT | Performed by: INTERNAL MEDICINE

## 2022-03-12 PROCEDURE — 80053 COMPREHEN METABOLIC PANEL: CPT | Performed by: INTERNAL MEDICINE

## 2022-03-12 PROCEDURE — 83735 ASSAY OF MAGNESIUM: CPT | Performed by: INTERNAL MEDICINE

## 2022-03-12 RX ORDER — OLANZAPINE 10 MG/1
10 TABLET, ORALLY DISINTEGRATING ORAL NIGHTLY
Status: DISCONTINUED | OUTPATIENT
Start: 2022-03-12 | End: 2022-03-13

## 2022-03-12 RX ORDER — OLANZAPINE 10 MG/1
2.5 INJECTION, POWDER, LYOPHILIZED, FOR SOLUTION INTRAMUSCULAR EVERY 12 HOURS
Status: DISCONTINUED | OUTPATIENT
Start: 2022-03-12 | End: 2022-03-12

## 2022-03-12 RX ORDER — ACETAMINOPHEN 325 MG/1
650 TABLET ORAL 2 TIMES DAILY
Status: DISCONTINUED | OUTPATIENT
Start: 2022-03-12 | End: 2022-03-15 | Stop reason: HOSPADM

## 2022-03-12 RX ORDER — PREGABALIN 25 MG/1
25 CAPSULE ORAL EVERY 12 HOURS SCHEDULED
Status: DISCONTINUED | OUTPATIENT
Start: 2022-03-12 | End: 2022-03-15 | Stop reason: HOSPADM

## 2022-03-12 RX ORDER — OLANZAPINE 10 MG/1
5 INJECTION, POWDER, LYOPHILIZED, FOR SOLUTION INTRAMUSCULAR ONCE
Status: COMPLETED | OUTPATIENT
Start: 2022-03-12 | End: 2022-03-12

## 2022-03-12 RX ADMIN — OXYCODONE AND ACETAMINOPHEN 1 TABLET: 5; 325 TABLET ORAL at 02:21

## 2022-03-12 RX ADMIN — ENOXAPARIN SODIUM 30 MG: 30 INJECTION SUBCUTANEOUS at 20:23

## 2022-03-12 RX ADMIN — Medication 10 ML: at 20:24

## 2022-03-12 RX ADMIN — METAXALONE 400 MG: 800 TABLET ORAL at 00:46

## 2022-03-12 RX ADMIN — REMDESIVIR 100 MG: 100 INJECTION, POWDER, LYOPHILIZED, FOR SOLUTION INTRAVENOUS at 11:54

## 2022-03-12 RX ADMIN — NYSTATIN: 100000 POWDER TOPICAL at 20:24

## 2022-03-12 RX ADMIN — OLANZAPINE 5 MG: 10 INJECTION, POWDER, FOR SOLUTION INTRAMUSCULAR at 13:57

## 2022-03-12 RX ADMIN — Medication 10 ML: at 08:31

## 2022-03-12 NOTE — PROGRESS NOTES
[facility] Progress Note      Patient Name: Marlena Navarrete  : 1950  MRN: [id number]  Primary Care Physician:  Bradley Hernandez MD  Date of admission: 3/9/2022    Subjective     Interval History:   Follow-up kidney transplant    Patient is feeling much better today, denies any chest pain or shortness of air, no orthopnea PND, no nausea or vomiting, no abdominal pain, no dysuria or gross hematuria, no renal allograft pain.    Review of Systems:   As noted above    Objective     Vitals:   Temp:  [96.3 °F (35.7 °C)-97.9 °F (36.6 °C)] 97.4 °F (36.3 °C)  Heart Rate:  [55-88] 55  Resp:  [16-18] 18  BP: (132-179)/(56-91) 156/82  Flow (L/min):  [1.5] 1.5    Intake/Output Summary (Last 24 hours) at 3/12/2022 1010  Last data filed at 3/12/2022 0828  Gross per 24 hour   Intake 210 ml   Output 2375 ml   Net -2165 ml       Physical Exam:    Constitutional: Awake, alert, no acute distress.    Obese, chronically ill and frail  HEENT: Sclera anicteric, no conjunctival injection, oral mucosa is normal  Neck: Supple, no thyromegaly, no lymphadenopathy, trachea at midline, no JVD  Respiratory: Clear to auscultation bilaterally, nonlabored respiration  Cardiovascular: RRR, no murmurs, no rubs or gallops, no carotid bruit  Gastrointestinal: Positive bowel sounds, abdomen is soft, nontender and nondistended  : No palpable bladder, renal allograft in the right lower quadrant is nontender with no bruit over the allograft  Musculoskeletal: No edema, no clubbing or cyanosis  Psychiatric: Flat affect, cooperative  Neurologic:  More oriented today, moving all extremities, normal speech .  Skin: Warm and dry        Scheduled Meds:     atorvastatin, 10 mg, Oral, Nightly  clopidogrel, 75 mg, Oral, Daily  enoxaparin, 30 mg, Subcutaneous, Nightly  nystatin, , Topical, Q12H  pantoprazole, 40 mg, Oral, QAM  remdesivir, 100 mg, Intravenous, Q24H  senna-docusate sodium, 2 tablet, Oral, BID  sodium chloride, 10 mL,  Intravenous, Q12H      IV Meds:        Results Reviewed:   I have personally reviewed the results from the time of this admission to 3/12/2022 10:10 EST     Results from last 7 days   Lab Units 03/10/22  2311 03/10/22  1516 03/10/22  0457 03/09/22  1244   SODIUM mmol/L 139  --  138 141   POTASSIUM mmol/L 4.6  --  4.9 4.0   CHLORIDE mmol/L 106  --  107 109*   CO2 mmol/L 19.0*  --  20.0* 22.5   BUN mg/dL 48*  --  32* 23   CREATININE mg/dL 2.07* 1.96* 1.79* 1.57*   CALCIUM mg/dL 9.3  --  9.1 9.5   BILIRUBIN mg/dL 0.3 0.4 0.4  --    ALK PHOS U/L 84 83 77  --    ALT (SGPT) U/L 9 12 9  --    AST (SGOT) U/L 13 15 15  --    GLUCOSE mg/dL 135*  --  122* 96       Estimated Creatinine Clearance: 22.1 mL/min (A) (by C-G formula based on SCr of 2.07 mg/dL (H)).    Results from last 7 days   Lab Units 03/10/22  0457   MAGNESIUM mg/dL 2.3   PHOSPHORUS mg/dL 5.0*             Results from last 7 days   Lab Units 03/11/22  0239 03/10/22  0457 03/09/22  1244   WBC 10*3/mm3 8.55 7.36 8.99   HEMOGLOBIN g/dL 11.7* 11.4* 12.1   PLATELETS 10*3/mm3 263 258 271       Results from last 7 days   Lab Units 03/10/22  0457   INR  0.99       Assessment / Plan     ASSESSMENT:  1.  Living related kidney transplant in 1979 from her sister with withdrawal of immunosuppressive drugs in 1985 renal function has been stable.  No labs today  2.  Chronic kidney disease stage III-IV has been very stable  3.  Reflux nephropathy as the etiology of the end-stage renal disease of the native kidneys, patient had bilateral nephrectomies  4.  COVID-19 positive currently on no oxygen  5.  Anemia of CKD there is no indication for ALBERTO  6.  Secondary hyperparathyroidism related to renal disease and vitamin D deficiency  7.  Severe back pain.  8.  Altered mental status  9.  Dyslipidemia, treated    PLAN:  1.  Continue the same treatment  2.  Surveillance labs      Thank you for involving us in the care of Marlena Navarrete.  Please feel free to call with any  questions.    Franco Clemens MD  03/12/22  10:10 [facility]    [facility]  [phone]      Much of this encounter note is an electronic transcription/translation of spoken language to printed text. The electronic translation of spoken language may permit erroneous, or at times, nonsensical words or phrases to be inadvertently transcribed; Although I have reviewed the note for such errors, some may still exist.

## 2022-03-12 NOTE — PLAN OF CARE
Problem: Adult Inpatient Plan of Care  Goal: Plan of Care Review  Outcome: Ongoing, Progressing  Flowsheets (Taken 3/12/2022 1639)  Progress: improving  Plan of Care Reviewed With:   patient   spouse  Outcome Evaluation: Pt's A&Ox3, although hallucinating, paranoid and fearful of staff.  at bedside. Pt refused meals and PO meds today, tolerated remdesevir and one-time dose zyprexa IM. Pt remains on RA, no c/o pain, no signs of distress. Neuro checks Q4H as tolerated by pt( refused noon and evening checks). Awaiting ECHO. Monitoring VS closely, more so now that medications have been refused.  Goal: Patient-Specific Goal (Individualized)  Outcome: Ongoing, Progressing  Goal: Absence of Hospital-Acquired Illness or Injury  Outcome: Ongoing, Progressing  Intervention: Identify and Manage Fall Risk  Recent Flowsheet Documentation  Taken 3/12/2022 1411 by Missael Baltazar, RN  Safety Promotion/Fall Prevention:   activity supervised   assistive device/personal items within reach   clutter free environment maintained   fall prevention program maintained   lighting adjusted   muscle strengthening facilitated   nonskid shoes/slippers when out of bed   safety round/check completed   room organization consistent  Taken 3/12/2022 1211 by Missael Baltazar, RN  Safety Promotion/Fall Prevention:   activity supervised   clutter free environment maintained   assistive device/personal items within reach   fall prevention program maintained   nonskid shoes/slippers when out of bed   room organization consistent   safety round/check completed  Taken 3/12/2022 1040 by Missael Baltazar, RN  Safety Promotion/Fall Prevention:   activity supervised   assistive device/personal items within reach   clutter free environment maintained   fall prevention program maintained   lighting adjusted   muscle strengthening facilitated   nonskid shoes/slippers when out of bed   room organization consistent   safety round/check  completed  Taken 3/12/2022 0828 by Missael Baltazar RN  Safety Promotion/Fall Prevention:   activity supervised   assistive device/personal items within reach   clutter free environment maintained   fall prevention program maintained   lighting adjusted   muscle strengthening facilitated   nonskid shoes/slippers when out of bed   room organization consistent   safety round/check completed  Intervention: Prevent Skin Injury  Recent Flowsheet Documentation  Taken 3/12/2022 1411 by Missael Baltazar RN  Body Position: position changed independently  Skin Protection:   adhesive use limited   transparent dressing maintained   tubing/devices free from skin contact  Taken 3/12/2022 1211 by Missael Baltazar RN  Body Position:   tilted   left   position changed independently  Taken 3/12/2022 1040 by Missael Baltazar RN  Body Position: position changed independently  Taken 3/12/2022 0828 by Missael Baltazar RN  Body Position: supine, legs elevated  Skin Protection:   adhesive use limited   transparent dressing maintained   tubing/devices free from skin contact  Intervention: Prevent and Manage VTE (Venous Thromboembolism) Risk  Recent Flowsheet Documentation  Taken 3/12/2022 1411 by Missael Baltazar RN  Activity Management:   activity encouraged   activity adjusted per tolerance  VTE Prevention/Management: (pt refuses) --  Range of Motion:   ROM (range of motion) performed   active ROM (range of motion) encouraged  Taken 3/12/2022 1211 by Missael Baltazar RN  Activity Management:   patient refuses activity   activity adjusted per tolerance   activity encouraged  Taken 3/12/2022 1040 by Missael Baltazar RN  Activity Management:   activity encouraged   activity adjusted per tolerance  Taken 3/12/2022 0828 by Missael Baltazar RN  Activity Management:   activity adjusted per tolerance   activity encouraged  VTE Prevention/Management:   bilateral   dorsiflexion/plantar flexion  performed  Range of Motion:   active ROM (range of motion) encouraged   ROM (range of motion) performed  Intervention: Prevent Infection  Recent Flowsheet Documentation  Taken 3/12/2022 1411 by Missael Baltazar RN  Infection Prevention:   visitors restricted/screened   rest/sleep promoted   hand hygiene promoted   single patient room provided   personal protective equipment utilized  Taken 3/12/2022 1211 by Missael Baltazar RN  Infection Prevention:   visitors restricted/screened   single patient room provided   rest/sleep promoted   personal protective equipment utilized   hand hygiene promoted  Taken 3/12/2022 1040 by Missael Baltazar RN  Infection Prevention:   single patient room provided   personal protective equipment utilized   hand hygiene promoted   rest/sleep promoted   visitors restricted/screened  Taken 3/12/2022 0828 by Missael Baltazar RN  Infection Prevention:   visitors restricted/screened   single patient room provided   rest/sleep promoted   personal protective equipment utilized   hand hygiene promoted  Goal: Optimal Comfort and Wellbeing  Outcome: Ongoing, Progressing  Intervention: Provide Person-Centered Care  Recent Flowsheet Documentation  Taken 3/12/2022 1411 by Missael Baltazar RN  Trust Relationship/Rapport: care explained  Taken 3/12/2022 0828 by Missael Baltazar RN  Trust Relationship/Rapport:   care explained   reassurance provided   thoughts/feelings acknowledged   empathic listening provided   emotional support provided   choices provided  Goal: Readiness for Transition of Care  Outcome: Ongoing, Progressing     Problem: Skin Injury Risk Increased  Goal: Skin Health and Integrity  Outcome: Ongoing, Progressing  Intervention: Optimize Skin Protection  Recent Flowsheet Documentation  Taken 3/12/2022 1411 by Missael Baltazar RN  Pressure Reduction Techniques:   sit time limited to 2 hours   weight shift assistance provided   pressure points  protected   heels elevated off bed  Head of Bed (HOB) Positioning: HOB elevated  Pressure Reduction Devices:   alternating pressure pump (ADD)   positioning supports utilized   heel offloading device utilized  Skin Protection:   adhesive use limited   transparent dressing maintained   tubing/devices free from skin contact  Taken 3/12/2022 1211 by Missael Baltazar RN  Head of Bed (HOB) Positioning: HOB elevated  Taken 3/12/2022 1040 by Missael Baltazar RN  Head of Bed (HOB) Positioning: HOB elevated  Taken 3/12/2022 0828 by Missael Baltazar RN  Pressure Reduction Techniques:   sit time limited to 2 hours   frequent weight shift encouraged   pressure points protected  Head of Bed (HOB) Positioning: HOB elevated  Pressure Reduction Devices:   alternating pressure pump (ADD)   positioning supports utilized   heel offloading device utilized  Skin Protection:   adhesive use limited   transparent dressing maintained   tubing/devices free from skin contact     Problem: Fall Injury Risk  Goal: Absence of Fall and Fall-Related Injury  Outcome: Ongoing, Progressing  Intervention: Identify and Manage Contributors  Recent Flowsheet Documentation  Taken 3/12/2022 1411 by Missael Baltazar RN  Medication Review/Management: medications reviewed  Taken 3/12/2022 1211 by Missael Baltazar RN  Medication Review/Management: medications reviewed  Taken 3/12/2022 1040 by Missael Baltazar RN  Medication Review/Management: medications reviewed  Taken 3/12/2022 0828 by Missael Baltazar RN  Medication Review/Management: medications reviewed  Intervention: Promote Injury-Free Environment  Recent Flowsheet Documentation  Taken 3/12/2022 1411 by Missael Baltazar RN  Safety Promotion/Fall Prevention:   activity supervised   assistive device/personal items within reach   clutter free environment maintained   fall prevention program maintained   lighting adjusted   muscle strengthening facilitated    nonskid shoes/slippers when out of bed   safety round/check completed   room organization consistent  Taken 3/12/2022 1211 by Missael Baltazar, RN  Safety Promotion/Fall Prevention:   activity supervised   clutter free environment maintained   assistive device/personal items within reach   fall prevention program maintained   nonskid shoes/slippers when out of bed   room organization consistent   safety round/check completed  Taken 3/12/2022 1040 by Missael Baltazar, RN  Safety Promotion/Fall Prevention:   activity supervised   assistive device/personal items within reach   clutter free environment maintained   fall prevention program maintained   lighting adjusted   muscle strengthening facilitated   nonskid shoes/slippers when out of bed   room organization consistent   safety round/check completed  Taken 3/12/2022 0828 by Missael Baltazar, RN  Safety Promotion/Fall Prevention:   activity supervised   assistive device/personal items within reach   clutter free environment maintained   fall prevention program maintained   lighting adjusted   muscle strengthening facilitated   nonskid shoes/slippers when out of bed   room organization consistent   safety round/check completed   Goal Outcome Evaluation:  Plan of Care Reviewed With: patient, spouse        Progress: improving  Outcome Evaluation: Pt's A&Ox3, although hallucinating, paranoid and fearful of staff.  at bedside. Pt refused meals and PO meds today, tolerated remdesevir and one-time dose zyprexa IM. Pt remains on RA, no c/o pain, no signs of distress. Neuro checks Q4H as tolerated by pt( refused noon and evening checks). Awaiting ECHO. Monitoring VS closely, more so now that medications have been refused.

## 2022-03-12 NOTE — PROGRESS NOTES
"  Patient Identification:  NAME:  Marlena Navarrete  Age:  72 y.o.   Sex:  female   :  1950   MRN:  [id number]       Chief complaint: COVID encephalopathy, psychosis    History of present illness: She is very upset today clasping her hands together paranoid accusing me of \"not letting her keep her hands together\" and is refusing all p.o. medication.      Past medical history:  Past Medical History:   Diagnosis Date   • Arthritis     OSTEO   • Chronic back pain    • Chronic bilateral low back pain with right-sided sciatica 2020   • CKD (chronic kidney disease)     FOLLOWED BY DR LANRE BYNUM   • Diverticulosis    • Foot drop, right    • GERD (gastroesophageal reflux disease)    • History of DVT (deep vein thrombosis) 2018    RIGHT CALF S/P BACK SURGERY    • History of transfusion     no reaction   • Hyperlipidemia    • Hypertension    • Kidney transplant recipient 1979    HX TRANSPLANT D/T REFLUX    • Neuropathy    • Secondary hyperparathyroidism  3/9/2022   • Spinal cord stimulator status 3/9/2022   • Spinal headache    • Stage 3b chronic kidney disease (HCC) 2020    FOLLOWED BY DR LANRE BYNUM   • Torn rotator cuff    • Vitamin D deficiency 3/9/2022       Allergies:  Patient has no known allergies.    Home medications:  Medications Prior to Admission   Medication Sig Dispense Refill Last Dose   • amLODIPine (NORVASC) 5 MG tablet Take 5 mg by mouth Every Night.  11    • atorvastatin (LIPITOR) 10 MG tablet Take 10 mg by mouth Every Night.      • Cholecalciferol (Vitamin D3) 50 MCG (2000 UT) capsule Take 2,000 Units by mouth Daily.      • diphenhydrAMINE-acetaminophen (TYLENOL PM)  MG tablet per tablet Take 1 tablet by mouth At Night As Needed for Sleep.      • omeprazole (priLOSEC) 20 MG capsule Take 20 mg by mouth Daily.      • oxyCODONE-acetaminophen (PERCOCET)  MG per tablet Take 1 tablet by mouth 2 (Two) Times a Day.      • pregabalin (LYRICA) 150 MG capsule Take 150 mg by mouth " "Daily.      • pregabalin (LYRICA) 50 MG capsule Take 50 mg by mouth 2 (Two) Times a Day.      • Sodium Bicarbonate powder powder Take  by mouth See Admin Instructions. 1 Teaspoon by mouth daily           Hospital medications:  acetaminophen, 650 mg, Oral, BID  atorvastatin, 10 mg, Oral, Nightly  clopidogrel, 75 mg, Oral, Daily  enoxaparin, 30 mg, Subcutaneous, Nightly  nystatin, , Topical, Q12H  pantoprazole, 40 mg, Oral, QAM  pregabalin, 25 mg, Oral, Q12H  remdesivir, 100 mg, Intravenous, Q24H  senna-docusate sodium, 2 tablet, Oral, BID  sodium chloride, 10 mL, Intravenous, Q12H         •  acetaminophen **OR** acetaminophen **OR** acetaminophen  •  albuterol sulfate HFA  •  benzonatate  •  senna-docusate sodium **AND** polyethylene glycol **AND** bisacodyl **AND** bisacodyl  •  calcium carbonate  •  melatonin  •  metaxalone  •  muscle rub  •  ondansetron **OR** ondansetron  •  oxyCODONE-acetaminophen  •  [COMPLETED] Insert peripheral IV **AND** sodium chloride      Objective:  Vitals Ranges:   Temp:  [96.3 °F (35.7 °C)-97.9 °F (36.6 °C)] 97.4 °F (36.3 °C)  Heart Rate:  [55-88] 55  Resp:  [16-18] 18  BP: (132-179)/(56-91) 156/82      Physical Exam:  Awake alert upset and floridly psychotic she may be hallucinating but she is definitely upset with me and paranoid \"you would not let me keep my hands together yesterday\" and a bunch of other things that are paranoid accusatory and are not making any sense at all.  She is refused meds and is refusing me to even touch her to any great extent her tone is equal in all 4 extremities reflexes trace throughout symmetrical toes downgoing bilaterally    Results review:   I reviewed the patient's new clinical results.    Data review:  Lab Results (last 24 hours)     Procedure Component Value Units Date/Time    Comprehensive Metabolic Panel [[id number]]  (Abnormal) Collected: 03/12/22 1029    Specimen: Blood Updated: 03/12/22 1128     Glucose 111 mg/dL      BUN 52 mg/dL      " Creatinine 1.81 mg/dL      Sodium 141 mmol/L      Potassium 3.8 mmol/L      Chloride 112 mmol/L      CO2 17.0 mmol/L      Calcium 9.1 mg/dL      Total Protein 6.3 g/dL      Albumin 3.70 g/dL      ALT (SGPT) 12 U/L      AST (SGOT) 12 U/L      Alkaline Phosphatase 75 U/L      Total Bilirubin 0.3 mg/dL      Globulin 2.6 gm/dL      A/G Ratio 1.4 g/dL      BUN/Creatinine Ratio 28.7     Anion Gap 12.0 mmol/L      eGFR 29.8 mL/min/1.73      Comment: National Kidney Foundation and American Society of Nephrology (ASN) Task Force recommended calculation based on the Chronic Kidney Disease Epidemiology Collaboration (CKD-EPI) equation refit without adjustment for race.       Narrative:      GFR Normal >60  Chronic Kidney Disease <60  Kidney Failure <15      CK [[id number]]  (Normal) Collected: 03/12/22 1029    Specimen: Blood Updated: 03/12/22 1119     Creatine Kinase 38 U/L     Bilirubin, Direct [[id number]]  (Normal) Collected: 03/12/22 1029    Specimen: Blood Updated: 03/12/22 1119     Bilirubin, Direct <0.2 mg/dL     C-reactive Protein [[id number]]  (Abnormal) Collected: 03/12/22 1029    Specimen: Blood Updated: 03/12/22 1119     C-Reactive Protein 0.79 mg/dL     Phosphorus [[id number]]  (Normal) Collected: 03/12/22 1029    Specimen: Blood Updated: 03/12/22 1119     Phosphorus 4.0 mg/dL     Magnesium [[id number]]  (Abnormal) Collected: 03/12/22 1029    Specimen: Blood Updated: 03/12/22 1119     Magnesium 2.6 mg/dL     CBC & Differential [[id number]]  (Abnormal) Collected: 03/12/22 1029    Specimen: Blood Updated: 03/12/22 1044    Narrative:      The following orders were created for panel order CBC & Differential.  Procedure                               Abnormality         Status                     ---------                               -----------         ------                     CBC Auto Differential[[id number]]        Abnormal            Final result                 Please view results for these tests on the individual  orders.    CBC Auto Differential [[id number]]  (Abnormal) Collected: 03/12/22 1029    Specimen: Blood Updated: 03/12/22 1044     WBC 13.25 10*3/mm3      RBC 4.24 10*6/mm3      Hemoglobin 13.4 g/dL      Hematocrit 38.9 %      MCV 91.7 fL      MCH 31.6 pg      MCHC 34.4 g/dL      RDW 12.9 %      RDW-SD 42.5 fl      MPV 10.6 fL      Platelets 293 10*3/mm3      Neutrophil % 89.2 %      Lymphocyte % 5.4 %      Monocyte % 5.0 %      Eosinophil % 0.0 %      Basophil % 0.1 %      Immature Grans % 0.3 %      Neutrophils, Absolute 11.83 10*3/mm3      Lymphocytes, Absolute 0.71 10*3/mm3      Monocytes, Absolute 0.66 10*3/mm3      Eosinophils, Absolute 0.00 10*3/mm3      Basophils, Absolute 0.01 10*3/mm3      Immature Grans, Absolute 0.04 10*3/mm3      nRBC 0.0 /100 WBC     Uric Acid [[id number]] Collected: 03/12/22 1029    Specimen: Blood Updated: 03/12/22 1036    CK [[id number]]  (Normal) Collected: 03/11/22 2135    Specimen: Blood Updated: 03/11/22 2231     Creatine Kinase 47 U/L            Imaging:  Imaging Results (Last 24 Hours)     ** No results found for the last 24 hours. **         PPE worn at all times washed before washed afterwards disposed of everything properly is now within 6 feet of her for more than few minutes during my exam no aerosols used at any point    Assessment and Plan:     We have our answer: This patient is floridly psychotic and paranoid and likely actively hallucinating.  Yesterday I could not tell if she was aphasic or psychotic but I am confident this patient is not aphasic.  She is upset with me today paranoid about things etc..  She is refusing medications and at this time the only medication that is going to help her is going to be a neuroleptic.  It is an antipsychotic because she is psychotic and is the only class that will help.  Benefits outweigh risk it would not cause respiratory compromise Zyprexa is a very very friendly QT interval medication and I am aware of the QT effect.  At this  point however if this were my loved 1 I would definitely let her have some low-dose Zyprexa now and tonight and we will see how she looks tomorrow.    Armando Payne MD  03/12/22  12:11 EST

## 2022-03-12 NOTE — PLAN OF CARE
Goal Outcome Evaluation:  Plan of Care Reviewed With: patient           Outcome Evaluation: Patient more alert tonight remains confused taking pills with water no choking or difficulty swallowing noted, ice packs used for patient comfort,vss will continue to monitor closely.  Problem: Fall Injury Risk  Goal: Absence of Fall and Fall-Related Injury  Intervention: Promote Injury-Free Environment  Recent Flowsheet Documentation  Taken 3/12/2022 0015 by Brenda Romeo, RN  Safety Promotion/Fall Prevention: safety round/check completed  Taken 3/11/2022 2200 by Brenda Romeo, RN  Safety Promotion/Fall Prevention: safety round/check completed  Taken 3/11/2022 2021 by Brenda Romeo, RN  Safety Promotion/Fall Prevention: safety round/check completed

## 2022-03-12 NOTE — PROGRESS NOTES
"  Patient Identification:  NAME:  Marlena Navarrete  Age:  72 y.o.   Sex:  female   :  1950   MRN:  [id number]       Chief complaint: COVID encephalopathy, psychosis    History of present illness: She is very upset today clasping her hands together paranoid accusing me of \"not letting her keep her hands together\" and is refusing all p.o. medication.      Past medical history:  Past Medical History:   Diagnosis Date   • Arthritis     OSTEO   • Chronic back pain    • Chronic bilateral low back pain with right-sided sciatica 2020   • CKD (chronic kidney disease)     FOLLOWED BY DR LANRE BYNUM   • Diverticulosis    • Foot drop, right    • GERD (gastroesophageal reflux disease)    • History of DVT (deep vein thrombosis) 2018    RIGHT CALF S/P BACK SURGERY    • History of transfusion     no reaction   • Hyperlipidemia    • Hypertension    • Kidney transplant recipient 1979    HX TRANSPLANT D/T REFLUX    • Neuropathy    • Secondary hyperparathyroidism  3/9/2022   • Spinal cord stimulator status 3/9/2022   • Spinal headache    • Stage 3b chronic kidney disease (HCC) 2020    FOLLOWED BY DR LANRE BYNUM   • Torn rotator cuff    • Vitamin D deficiency 3/9/2022       Allergies:  Patient has no known allergies.    Home medications:  Medications Prior to Admission   Medication Sig Dispense Refill Last Dose   • amLODIPine (NORVASC) 5 MG tablet Take 5 mg by mouth Every Night.  11    • atorvastatin (LIPITOR) 10 MG tablet Take 10 mg by mouth Every Night.      • Cholecalciferol (Vitamin D3) 50 MCG (2000 UT) capsule Take 2,000 Units by mouth Daily.      • diphenhydrAMINE-acetaminophen (TYLENOL PM)  MG tablet per tablet Take 1 tablet by mouth At Night As Needed for Sleep.      • omeprazole (priLOSEC) 20 MG capsule Take 20 mg by mouth Daily.      • oxyCODONE-acetaminophen (PERCOCET)  MG per tablet Take 1 tablet by mouth 2 (Two) Times a Day.      • pregabalin (LYRICA) 150 MG capsule Take 150 mg by mouth " "Daily.      • pregabalin (LYRICA) 50 MG capsule Take 50 mg by mouth 2 (Two) Times a Day.      • Sodium Bicarbonate powder powder Take  by mouth See Admin Instructions. 1 Teaspoon by mouth daily           Hospital medications:  acetaminophen, 650 mg, Oral, BID  atorvastatin, 10 mg, Oral, Nightly  clopidogrel, 75 mg, Oral, Daily  enoxaparin, 30 mg, Subcutaneous, Nightly  nystatin, , Topical, Q12H  OLANZapine, 2.5 mg, Intramuscular, Q12H  OLANZapine, 5 mg, Intramuscular, Once  pantoprazole, 40 mg, Oral, QAM  pregabalin, 25 mg, Oral, Q12H  remdesivir, 100 mg, Intravenous, Q24H  senna-docusate sodium, 2 tablet, Oral, BID  sodium chloride, 10 mL, Intravenous, Q12H         •  acetaminophen **OR** acetaminophen **OR** acetaminophen  •  albuterol sulfate HFA  •  benzonatate  •  senna-docusate sodium **AND** polyethylene glycol **AND** bisacodyl **AND** bisacodyl  •  calcium carbonate  •  melatonin  •  metaxalone  •  muscle rub  •  ondansetron **OR** ondansetron  •  oxyCODONE-acetaminophen  •  [COMPLETED] Insert peripheral IV **AND** sodium chloride      Objective:  Vitals Ranges:   Temp:  [96.3 °F (35.7 °C)-97.9 °F (36.6 °C)] 97.4 °F (36.3 °C)  Heart Rate:  [55-88] 55  Resp:  [16-18] 18  BP: (132-179)/(56-91) 156/82      Physical Exam:  Awake alert upset and floridly psychotic she may be hallucinating but she is definitely upset with me and paranoid \"you would not let me keep my hands together yesterday\" and a bunch of other things that are paranoid accusatory and are not making any sense at all.  She is refused meds and is refusing me to even touch her to any great extent her tone is equal in all 4 extremities reflexes trace throughout symmetrical toes downgoing bilaterally    Results review:   I reviewed the patient's new clinical results.    Data review:  Lab Results (last 24 hours)     Procedure Component Value Units Date/Time    Comprehensive Metabolic Panel [[id number]]  (Abnormal) Collected: 03/12/22 1029    Specimen: " Blood Updated: 03/12/22 1128     Glucose 111 mg/dL      BUN 52 mg/dL      Creatinine 1.81 mg/dL      Sodium 141 mmol/L      Potassium 3.8 mmol/L      Chloride 112 mmol/L      CO2 17.0 mmol/L      Calcium 9.1 mg/dL      Total Protein 6.3 g/dL      Albumin 3.70 g/dL      ALT (SGPT) 12 U/L      AST (SGOT) 12 U/L      Alkaline Phosphatase 75 U/L      Total Bilirubin 0.3 mg/dL      Globulin 2.6 gm/dL      A/G Ratio 1.4 g/dL      BUN/Creatinine Ratio 28.7     Anion Gap 12.0 mmol/L      eGFR 29.8 mL/min/1.73      Comment: National Kidney Foundation and American Society of Nephrology (ASN) Task Force recommended calculation based on the Chronic Kidney Disease Epidemiology Collaboration (CKD-EPI) equation refit without adjustment for race.       Narrative:      GFR Normal >60  Chronic Kidney Disease <60  Kidney Failure <15      CK [[id number]]  (Normal) Collected: 03/12/22 1029    Specimen: Blood Updated: 03/12/22 1119     Creatine Kinase 38 U/L     Bilirubin, Direct [[id number]]  (Normal) Collected: 03/12/22 1029    Specimen: Blood Updated: 03/12/22 1119     Bilirubin, Direct <0.2 mg/dL     C-reactive Protein [[id number]]  (Abnormal) Collected: 03/12/22 1029    Specimen: Blood Updated: 03/12/22 1119     C-Reactive Protein 0.79 mg/dL     Phosphorus [[id number]]  (Normal) Collected: 03/12/22 1029    Specimen: Blood Updated: 03/12/22 1119     Phosphorus 4.0 mg/dL     Magnesium [[id number]]  (Abnormal) Collected: 03/12/22 1029    Specimen: Blood Updated: 03/12/22 1119     Magnesium 2.6 mg/dL     CBC & Differential [[id number]]  (Abnormal) Collected: 03/12/22 1029    Specimen: Blood Updated: 03/12/22 1044    Narrative:      The following orders were created for panel order CBC & Differential.  Procedure                               Abnormality         Status                     ---------                               -----------         ------                     CBC Auto Differential[[id number]]        Abnormal            Final  result                 Please view results for these tests on the individual orders.    CBC Auto Differential [[id number]]  (Abnormal) Collected: 03/12/22 1029    Specimen: Blood Updated: 03/12/22 1044     WBC 13.25 10*3/mm3      RBC 4.24 10*6/mm3      Hemoglobin 13.4 g/dL      Hematocrit 38.9 %      MCV 91.7 fL      MCH 31.6 pg      MCHC 34.4 g/dL      RDW 12.9 %      RDW-SD 42.5 fl      MPV 10.6 fL      Platelets 293 10*3/mm3      Neutrophil % 89.2 %      Lymphocyte % 5.4 %      Monocyte % 5.0 %      Eosinophil % 0.0 %      Basophil % 0.1 %      Immature Grans % 0.3 %      Neutrophils, Absolute 11.83 10*3/mm3      Lymphocytes, Absolute 0.71 10*3/mm3      Monocytes, Absolute 0.66 10*3/mm3      Eosinophils, Absolute 0.00 10*3/mm3      Basophils, Absolute 0.01 10*3/mm3      Immature Grans, Absolute 0.04 10*3/mm3      nRBC 0.0 /100 WBC     Uric Acid [[id number]] Collected: 03/12/22 1029    Specimen: Blood Updated: 03/12/22 1036    CK [[id number]]  (Normal) Collected: 03/11/22 2135    Specimen: Blood Updated: 03/11/22 2231     Creatine Kinase 47 U/L            Imaging:  Imaging Results (Last 24 Hours)     ** No results found for the last 24 hours. **         PPE worn at all times washed before washed afterwards disposed of everything properly is now within 6 feet of her for more than few minutes during my exam no aerosols used at any point    Assessment and Plan:     We have our answer: This patient is floridly psychotic and paranoid and likely actively hallucinating.  Yesterday I could not tell if she was aphasic or psychotic but I am confident this patient is not aphasic.  She is upset with me today paranoid about things etc..  She is refusing medications and at this time the only medication that is going to help her is going to be a neuroleptic.  It is an antipsychotic because she is psychotic and is the only class that will help.  Benefits outweigh risk it would not cause respiratory compromise Zyprexa is a very very  friendly QT interval medication and I am aware of the QT effect.  At this point however if this were my loved 1 I would definitely let her have some low-dose Zyprexa now and tonight and we will see how she looks tomorrow.  She is going to get 5 mg IM Zyprexa x1 then tonight we will again IM 2.5 mg Zyprexa every 12 hours.  The benefits of this greatly outweigh any risks.  Armando Payne MD  03/12/22  12:16 EST

## 2022-03-12 NOTE — PROGRESS NOTES
[facility] Medicine Services  PROGRESS NOTE    Patient Name: Marlena Navarrete  : 1950  MRN: [id number]    Date of Admission: 3/9/2022  Primary Care Physician: Bradley Hernandez MD    Subjective   Subjective     CC:  Follow-up COVID-19 and back pain    HPI:  Confusion much better.  Patient is now completely oriented.  She is having some childlike behavior though.  She says she will refuse to take her medicine but refuses to explain why.  She says she does not want to take them just because she does not want to take them.  She is not repeating words as much as yesterday.  Her mentation is still slow.  She still has some difficulties with short-term memory.      Review of Systems  No current fevers or chills  No current nausea, vomiting, or diarrhea  No current chest pain or palpitations      Objective   Objective     Vital Signs:   Temp:  [96.3 °F (35.7 °C)-97.9 °F (36.6 °C)] 97.4 °F (36.3 °C)  Heart Rate:  [55-88] 55  Resp:  [16-18] 18  BP: (132-179)/(56-91) 156/82  Total (NIH Stroke Scale): 20     Physical Exam:  Constitutional:Awake, alert  HENT: NCAT, mucous membranes moist, neck supple  Respiratory: Rare cough, clear to auscultation bilaterally, respiratory effort normal, nonlabored breathing   Cardiovascular: RRR, normal radial pulses  Gastrointestinal: Positive bowel sounds, soft, nontender, nondistended  Musculoskeletal: Elderly frail and chronically debilitated appearance, obese, BMI is 31, mild lower extremity edema  Psychiatric: Calm affect, cooperative, conversational  Neurologic: Patient has some mildly slow mentation but is otherwise oriented, moving extremities, no slurred speech or facial droop, follows commands  Skin: No rashes or jaundice, warm      Results Reviewed:  Results from last 7 days   Lab Units 22  1029 22  0239 03/10/22  0457   WBC 10*3/mm3 13.25* 8.55 7.36   HEMOGLOBIN g/dL 13.4 11.7* 11.4*   HEMATOCRIT % 38.9 34.1 34.1   PLATELETS 10*3/mm3 293 263 258   INR   --   --   0.99   PROCALCITONIN ng/mL  --   --  0.07     Results from last 7 days   Lab Units 03/10/22  2311 03/10/22  1516 03/10/22  0457 03/09/22  1244   SODIUM mmol/L 139  --  138 141   POTASSIUM mmol/L 4.6  --  4.9 4.0   CHLORIDE mmol/L 106  --  107 109*   CO2 mmol/L 19.0*  --  20.0* 22.5   BUN mg/dL 48*  --  32* 23   CREATININE mg/dL 2.07* 1.96* 1.79* 1.57*   GLUCOSE mg/dL 135*  --  122* 96   CALCIUM mg/dL 9.3  --  9.1 9.5   ALT (SGPT) U/L 9 12 9  --    AST (SGOT) U/L 13 15 15  --      Estimated Creatinine Clearance: 22.1 mL/min (A) (by C-G formula based on SCr of 2.07 mg/dL (H)).    Microbiology Results Abnormal     None          Imaging Results (Last 24 Hours)     ** No results found for the last 24 hours. **              I have reviewed the medications:  Scheduled Meds:acetaminophen, 650 mg, Oral, BID  atorvastatin, 10 mg, Oral, Nightly  clopidogrel, 75 mg, Oral, Daily  enoxaparin, 30 mg, Subcutaneous, Nightly  nystatin, , Topical, Q12H  pantoprazole, 40 mg, Oral, QAM  pregabalin, 25 mg, Oral, Q12H  remdesivir, 100 mg, Intravenous, Q24H  senna-docusate sodium, 2 tablet, Oral, BID  sodium chloride, 10 mL, Intravenous, Q12H      Continuous Infusions:   PRN Meds:.•  acetaminophen **OR** acetaminophen **OR** acetaminophen  •  albuterol sulfate HFA  •  benzonatate  •  senna-docusate sodium **AND** polyethylene glycol **AND** bisacodyl **AND** bisacodyl  •  calcium carbonate  •  melatonin  •  metaxalone  •  muscle rub  •  ondansetron **OR** ondansetron  •  oxyCODONE-acetaminophen  •  [COMPLETED] Insert peripheral IV **AND** sodium chloride    Assessment/Plan   Assessment & Plan     Active Hospital Problems    Diagnosis  POA   • Intractable back pain [M54.9]  Yes   • Spinal cord stimulator status [Z96.89]  Not Applicable   • Secondary hyperparathyroidism  [N25.81]  Yes   • Vitamin D deficiency [E55.9]  Yes   • Stroke associated with COVID-19 (HCC) [U07.1, I63.9]  No   • Stage 3b chronic kidney disease (HCC) [N18.32]  Yes   •  History of DVT (deep vein thrombosis) [Z86.718]  Not Applicable   • 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        Brief Hospital Course to date:  Marlena Navarrete is a 72 y.o. female presents the hospital with intractable back pain and COVID-19 viral infection.    Discussion/plan:  Case discussed with her  at great length.  Patient seems to be showing clinical improvement.  He now provided further history that her Lyrica dose had been increased shortly before her admission.  This likely contributed to her metabolic encephalopathy.  I recommend a lower dose of Lyrica at discharge.  Patient refusing pills today but family will come in later to try and encourage her to take them.  Laboratory studies still pending.  Continue antiviral treatment.  Schedule Tylenol.  Avoid all hydromorphone.  Can likely try to restart Lyrica later today now that mentation is improved but at very low dose.    CT head images reviewed and no acute findings.  Case discussed with neurology.  Worsening confusion last night.  Most likely felt to be toxic metabolic encephalopathy possibly related to chronic medications in the setting of her acute illness.  I have discontinued Lyrica and discontinued any scheduled sedating medications.  Mental status has reportedly improved over the last few hours.  Plan to give IV hydration.  Discussed with nephrology who approved giving her 1 L today and monitor response particularly since she has poor oral intake currently.      Remdesivir has been started for high risk  patient with COVID-19 infection.  Continue to monitor respiratory status.  Discussed with pharmacist.    Etiology of intractable back pain is concerning for viral myositis which I have seen in Covid patients.   also within the differential diagnosis includes lumbar sprain or other lumbar issues is possible however we are unable MRI of the lumbar spine due to spinal cord stimulator.  CT images reviewed and noted that patient does have some disc bulging and lumbar osteophyte noted.  Case discussed with neurosurgery.  On a risk versus benefit assessment I discussed with them and they are agreeable to allow me to discontinue steroids today order and will continue to monitor.    In the setting of renal transplant.  Nephrology has been consulted.  Plan to trend renal function and avoid nephrotoxic medications.  Trending renal function.    Continue PPI for GERD.  Careful low-dose pain medication as needed.  Skelaxin as needed for muscle spasms.  We will hold this medication until mentation further improves and keep them low-dose.      DVT Prophylaxis: Lovenox      Disposition: Pending clinical course    CODE STATUS:   Code Status and Medical Interventions:   Ordered at: 03/09/22 2229     Code Status (Patient has no pulse and is not breathing):    CPR (Attempt to Resuscitate)     Medical Interventions (Patient has pulse or is breathing):    Full Support       Paul Mcmahan MD  03/12/22

## 2022-03-13 LAB
ALBUMIN SERPL-MCNC: 4.1 G/DL (ref 3.5–5.2)
ALBUMIN/GLOB SERPL: 1.5 G/DL
ALP SERPL-CCNC: 79 U/L (ref 39–117)
ALT SERPL W P-5'-P-CCNC: 13 U/L (ref 1–33)
ANION GAP SERPL CALCULATED.3IONS-SCNC: 16 MMOL/L (ref 5–15)
AST SERPL-CCNC: 15 U/L (ref 1–32)
BASOPHILS # BLD AUTO: 0 10*3/MM3 (ref 0–0.2)
BASOPHILS NFR BLD AUTO: 0 % (ref 0–1.5)
BILIRUB CONJ SERPL-MCNC: <0.2 MG/DL (ref 0–0.3)
BILIRUB SERPL-MCNC: 0.3 MG/DL (ref 0–1.2)
BUN SERPL-MCNC: 47 MG/DL (ref 8–23)
BUN/CREAT SERPL: 28.8 (ref 7–25)
CALCIUM SPEC-SCNC: 9.4 MG/DL (ref 8.6–10.5)
CHLORIDE SERPL-SCNC: 112 MMOL/L (ref 98–107)
CO2 SERPL-SCNC: 17 MMOL/L (ref 22–29)
CREAT SERPL-MCNC: 1.63 MG/DL (ref 0.57–1)
CRP SERPL-MCNC: 1.01 MG/DL (ref 0–0.5)
DEPRECATED RDW RBC AUTO: 43.3 FL (ref 37–54)
EGFRCR SERPLBLD CKD-EPI 2021: 33.8 ML/MIN/1.73
EOSINOPHIL # BLD AUTO: 0 10*3/MM3 (ref 0–0.4)
EOSINOPHIL NFR BLD AUTO: 0 % (ref 0.3–6.2)
ERYTHROCYTE [DISTWIDTH] IN BLOOD BY AUTOMATED COUNT: 13 % (ref 12.3–15.4)
GLOBULIN UR ELPH-MCNC: 2.7 GM/DL
GLUCOSE SERPL-MCNC: 108 MG/DL (ref 65–99)
HCT VFR BLD AUTO: 43.5 % (ref 34–46.6)
HGB BLD-MCNC: 14.6 G/DL (ref 12–15.9)
IMM GRANULOCYTES # BLD AUTO: 0.03 10*3/MM3 (ref 0–0.05)
IMM GRANULOCYTES NFR BLD AUTO: 0.3 % (ref 0–0.5)
LYMPHOCYTES # BLD AUTO: 0.9 10*3/MM3 (ref 0.7–3.1)
LYMPHOCYTES NFR BLD AUTO: 8.5 % (ref 19.6–45.3)
MAGNESIUM SERPL-MCNC: 2.6 MG/DL (ref 1.6–2.4)
MCH RBC QN AUTO: 31.1 PG (ref 26.6–33)
MCHC RBC AUTO-ENTMCNC: 33.6 G/DL (ref 31.5–35.7)
MCV RBC AUTO: 92.8 FL (ref 79–97)
MONOCYTES # BLD AUTO: 0.69 10*3/MM3 (ref 0.1–0.9)
MONOCYTES NFR BLD AUTO: 6.5 % (ref 5–12)
NEUTROPHILS NFR BLD AUTO: 84.7 % (ref 42.7–76)
NEUTROPHILS NFR BLD AUTO: 9.02 10*3/MM3 (ref 1.7–7)
NRBC BLD AUTO-RTO: 0 /100 WBC (ref 0–0.2)
PHOSPHATE SERPL-MCNC: 3.6 MG/DL (ref 2.5–4.5)
PLATELET # BLD AUTO: 325 10*3/MM3 (ref 140–450)
PMV BLD AUTO: 10.6 FL (ref 6–12)
POTASSIUM SERPL-SCNC: 3.7 MMOL/L (ref 3.5–5.2)
PROT SERPL-MCNC: 6.8 G/DL (ref 6–8.5)
RBC # BLD AUTO: 4.69 10*6/MM3 (ref 3.77–5.28)
SODIUM SERPL-SCNC: 145 MMOL/L (ref 136–145)
URATE SERPL-MCNC: 6.9 MG/DL (ref 2.4–5.7)
WBC NRBC COR # BLD: 10.64 10*3/MM3 (ref 3.4–10.8)

## 2022-03-13 PROCEDURE — 84100 ASSAY OF PHOSPHORUS: CPT | Performed by: INTERNAL MEDICINE

## 2022-03-13 PROCEDURE — 86140 C-REACTIVE PROTEIN: CPT | Performed by: INTERNAL MEDICINE

## 2022-03-13 PROCEDURE — 25010000002 ENOXAPARIN PER 10 MG: Performed by: INTERNAL MEDICINE

## 2022-03-13 PROCEDURE — 84550 ASSAY OF BLOOD/URIC ACID: CPT | Performed by: INTERNAL MEDICINE

## 2022-03-13 PROCEDURE — 80053 COMPREHEN METABOLIC PANEL: CPT | Performed by: INTERNAL MEDICINE

## 2022-03-13 PROCEDURE — 85025 COMPLETE CBC W/AUTO DIFF WBC: CPT | Performed by: INTERNAL MEDICINE

## 2022-03-13 PROCEDURE — 83735 ASSAY OF MAGNESIUM: CPT | Performed by: INTERNAL MEDICINE

## 2022-03-13 PROCEDURE — 99231 SBSQ HOSP IP/OBS SF/LOW 25: CPT | Performed by: PSYCHIATRY & NEUROLOGY

## 2022-03-13 PROCEDURE — 82248 BILIRUBIN DIRECT: CPT | Performed by: INTERNAL MEDICINE

## 2022-03-13 RX ORDER — LABETALOL HYDROCHLORIDE 5 MG/ML
10 INJECTION, SOLUTION INTRAVENOUS
Status: DISCONTINUED | OUTPATIENT
Start: 2022-03-13 | End: 2022-03-15 | Stop reason: HOSPADM

## 2022-03-13 RX ORDER — OLANZAPINE 10 MG/1
2.5 INJECTION, POWDER, LYOPHILIZED, FOR SOLUTION INTRAMUSCULAR EVERY 8 HOURS PRN
Status: DISCONTINUED | OUTPATIENT
Start: 2022-03-13 | End: 2022-03-14

## 2022-03-13 RX ORDER — SODIUM BICARBONATE 650 MG/1
1300 TABLET ORAL 3 TIMES DAILY
Status: DISCONTINUED | OUTPATIENT
Start: 2022-03-13 | End: 2022-03-15 | Stop reason: HOSPADM

## 2022-03-13 RX ORDER — OLANZAPINE 5 MG/1
5 TABLET, ORALLY DISINTEGRATING ORAL 2 TIMES DAILY
Status: DISCONTINUED | OUTPATIENT
Start: 2022-03-13 | End: 2022-03-14

## 2022-03-13 RX ORDER — UREA 10 %
3 LOTION (ML) TOPICAL NIGHTLY
Status: DISCONTINUED | OUTPATIENT
Start: 2022-03-13 | End: 2022-03-15 | Stop reason: HOSPADM

## 2022-03-13 RX ORDER — OLANZAPINE 5 MG/1
5 TABLET, ORALLY DISINTEGRATING ORAL NIGHTLY
Status: DISCONTINUED | OUTPATIENT
Start: 2022-03-13 | End: 2022-03-13

## 2022-03-13 RX ADMIN — OXYCODONE AND ACETAMINOPHEN 1 TABLET: 5; 325 TABLET ORAL at 21:28

## 2022-03-13 RX ADMIN — ATORVASTATIN CALCIUM 10 MG: 20 TABLET, FILM COATED ORAL at 21:16

## 2022-03-13 RX ADMIN — OLANZAPINE 5 MG: 5 TABLET, ORALLY DISINTEGRATING ORAL at 15:20

## 2022-03-13 RX ADMIN — SODIUM BICARBONATE 1300 MG: 650 TABLET ORAL at 21:16

## 2022-03-13 RX ADMIN — ACETAMINOPHEN 650 MG: 325 TABLET, FILM COATED ORAL at 21:18

## 2022-03-13 RX ADMIN — Medication 3 MG: at 21:16

## 2022-03-13 RX ADMIN — ENOXAPARIN SODIUM 30 MG: 30 INJECTION SUBCUTANEOUS at 21:17

## 2022-03-13 RX ADMIN — Medication 10 ML: at 21:17

## 2022-03-13 RX ADMIN — NYSTATIN: 100000 POWDER TOPICAL at 21:17

## 2022-03-13 RX ADMIN — OLANZAPINE 5 MG: 5 TABLET, ORALLY DISINTEGRATING ORAL at 21:17

## 2022-03-13 RX ADMIN — PREGABALIN 25 MG: 25 CAPSULE ORAL at 21:16

## 2022-03-13 NOTE — PROGRESS NOTES
"    [facility] Medicine Services  PROGRESS NOTE    Patient Name: Marlena Navarrete  : 1950  MRN: [id number]    Date of Admission: 3/9/2022  Primary Care Physician: Bradley Hernandez MD    Subjective   Subjective     CC:  Follow-up COVID-19 and back pain    HPI:  Nursing note agitation continues to improve.  Patient still intermittently refusing some medications.  Patient is able to answer most orientation questions correctly however has poor short-term memory and illogical thought pattern.  She continues to have this childlike behavior and response with many questions \"I do not believe that.\"  Slow mentation still slow but slightly improved.  Respiratory status stable and she denies shortness of breath currently but has occasional cough.      Review of Systems  No current fevers or chills  No current nausea, vomiting, or diarrhea  No current chest pain or palpitations      Objective   Objective     Vital Signs:   Temp:  [97.7 °F (36.5 °C)-97.9 °F (36.6 °C)] 97.9 °F (36.6 °C)  Heart Rate:  [] 101  Resp:  [17-18] 17  BP: (131-179)/(68-94) 144/82  Total (NIH Stroke Scale): 20     Physical Exam:  Constitutional:Awake, alert  HENT: NCAT, mucous membranes moist, neck supple  Respiratory: Rare cough, clear to auscultation bilaterally, respiratory effort normal, nonlabored breathing   Cardiovascular: RRR, normal radial pulses  Gastrointestinal: Positive bowel sounds, soft, nontender, nondistended  Musculoskeletal: Elderly frail and chronically debilitated appearance, obese, BMI is 31, mild lower extremity edema  Psychiatric: Intermittent agitation affect, cooperative, conversational  Neurologic: Patient has some less slow mentation but is otherwise oriented however short-term memory is poor, moving extremities, no slurred speech or facial droop, follows commands  Skin: No rashes or jaundice, warm      Results Reviewed:  Results from last 7 days   Lab Units 22  0332 22  1029 22  0239 03/10/22  0457 "   WBC 10*3/mm3 10.64 13.25* 8.55 7.36   HEMOGLOBIN g/dL 14.6 13.4 11.7* 11.4*   HEMATOCRIT % 43.5 38.9 34.1 34.1   PLATELETS 10*3/mm3 325 293 263 258   INR   --   --   --  0.99   PROCALCITONIN ng/mL  --   --   --  0.07     Results from last 7 days   Lab Units 03/13/22  0332 03/12/22  1029 03/10/22  2311   SODIUM mmol/L 145 141 139   POTASSIUM mmol/L 3.7 3.8 4.6   CHLORIDE mmol/L 112* 112* 106   CO2 mmol/L 17.0* 17.0* 19.0*   BUN mg/dL 47* 52* 48*   CREATININE mg/dL 1.63* 1.81* 2.07*   GLUCOSE mg/dL 108* 111* 135*   CALCIUM mg/dL 9.4 9.1 9.3   ALT (SGPT) U/L 13 12 9   AST (SGOT) U/L 15 12 13     Estimated Creatinine Clearance: 27.8 mL/min (A) (by C-G formula based on SCr of 1.63 mg/dL (H)).    Microbiology Results Abnormal     None          Imaging Results (Last 24 Hours)     ** No results found for the last 24 hours. **              I have reviewed the medications:  Scheduled Meds:acetaminophen, 650 mg, Oral, BID  atorvastatin, 10 mg, Oral, Nightly  clopidogrel, 75 mg, Oral, Daily  enoxaparin, 30 mg, Subcutaneous, Nightly  nystatin, , Topical, Q12H  OLANZapine zydis, 5 mg, Oral, Nightly  pantoprazole, 40 mg, Oral, QAM  pregabalin, 25 mg, Oral, Q12H  senna-docusate sodium, 2 tablet, Oral, BID  sodium chloride, 10 mL, Intravenous, Q12H      Continuous Infusions:   PRN Meds:.•  acetaminophen **OR** acetaminophen **OR** acetaminophen  •  albuterol sulfate HFA  •  benzonatate  •  senna-docusate sodium **AND** polyethylene glycol **AND** bisacodyl **AND** bisacodyl  •  calcium carbonate  •  melatonin  •  metaxalone  •  muscle rub  •  OLANZapine  •  ondansetron **OR** ondansetron  •  oxyCODONE-acetaminophen  •  [COMPLETED] Insert peripheral IV **AND** sodium chloride    Assessment/Plan   Assessment & Plan     Active Hospital Problems    Diagnosis  POA   • Intractable back pain [M54.9]  Yes   • Spinal cord stimulator status [Z96.89]  Not Applicable   • Secondary hyperparathyroidism  [N25.81]  Yes   • Vitamin D deficiency  [E55.9]  Yes   • Stroke associated with COVID-19 (HCC) [U07.1, I63.9]  No   • Stage 3b chronic kidney disease (HCC) [N18.32]  Yes   • History of DVT (deep vein thrombosis) [Z86.718]  Not Applicable   • 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        Brief Hospital Course to date:  Marlean Navarrete is a 72 y.o. female presents the hospital with intractable back pain and COVID-19 viral infection.    Discussion/plan:  Mood stabilizer dose adjusted.  I will add as needed intramuscular dose when patient is refusing to take oral medications.  Patient refusing to take oral medication continues to be an issue and does hinder her treatment.  Plan to continue to try and work with her.  Overall I am more optimistic as she continues to show improvement in her mentation.  Unclear if she will fully return to her previous baseline, time will tell.    Case discussed with her  at great length.  Patient seems to be showing clinical improvement.  He now provided further history that her Lyrica dose had been increased shortly before her admission.  This likely contributed to her metabolic encephalopathy.  I recommend a lower dose of Lyrica at discharge.  Patient refusing pills at times.  Laboratory studies still pending.  Now completed antiviral treatment.  Schedule Tylenol.  Avoid all hydromorphone.  Can likely try to restart Lyrica later today now that mentation is improved but at very low dose, I would be concerned if we hold off completely she may develop some withdrawal symptoms.    CT head  images reviewed and no acute findings.  Case discussed with neurology.  Renal function improving.    Remdesivir has been completed for high risk patient with COVID-19 infection.  Continue to monitor respiratory status.  Discussed with pharmacist.    Etiology of intractable back pain is concerning for viral myositis which I have seen in Covid patients.   also within the differential diagnosis includes lumbar sprain or other lumbar issues is possible however we are unable MRI of the lumbar spine due to spinal cord stimulator.  CT images reviewed and noted that patient does have some disc bulging and lumbar osteophyte noted.  She was evaluated by neurosurgery who started steroids.  Confusion worsened on steroids.  I discussed with their service further and steroids have subsequently been discontinued based on risk versus benefit assessment.  Back pain now improving however patient does have chronic pain issues.      In the setting of renal transplant.  Nephrology has been consulted.  Plan to trend renal function and avoid nephrotoxic medications.  Trending renal function.  No better    Continue PPI for GERD.  Careful low-dose pain medication as needed.  Skelaxin as needed for muscle spasms.  We will hold this medication until mentation further improves and keep them low-dose.      Remains complex case however appears to be improving.    DVT Prophylaxis: Lovenox      Disposition: Pending clinical course    CODE STATUS:   Code Status and Medical Interventions:   Ordered at: 03/09/22 2229     Code Status (Patient has no pulse and is not breathing):    CPR (Attempt to Resuscitate)     Medical Interventions (Patient has pulse or is breathing):    Full Support       Paul Mcmahan MD  03/13/22

## 2022-03-13 NOTE — PLAN OF CARE
"Patient disoriented to situation and talking to \"her Lord\" with praying hands. Continues to be mistrustful and paranoid of staff. Refusing medications tonight. BP's elevated. Refusing to be cooperative for RN to perform thorough neuro assessment. Pericare performed for patient with eaton catheter. Will continue to monitor.    Problem: Cognitive Impairment (Stroke, Ischemic/Transient Ischemic Attack)  Goal: Optimal Cognitive Function  Outcome: Ongoing, Progressing   Goal Outcome Evaluation:                 "

## 2022-03-13 NOTE — PROGRESS NOTES
Patient Identification:  NAME:  Marlena Navarrete  Age:  72 y.o.   Sex:  female   :  1950   MRN:  [id number]       Chief complaint: Psychosis,  COVID encephalopathy    History of present illness: Patient is fairly awake and alert and still very paranoid possibly actively hallucinating      Past medical history:  Past Medical History:   Diagnosis Date   • Arthritis     OSTEO   • Chronic back pain    • Chronic bilateral low back pain with right-sided sciatica 2020   • CKD (chronic kidney disease)     FOLLOWED BY DR LANRE BYNUM   • Diverticulosis    • Foot drop, right    • GERD (gastroesophageal reflux disease)    • History of DVT (deep vein thrombosis) 2018    RIGHT CALF S/P BACK SURGERY    • History of transfusion     no reaction   • Hyperlipidemia    • Hypertension    • Kidney transplant recipient 1979    HX TRANSPLANT D/T REFLUX    • Neuropathy    • Secondary hyperparathyroidism  3/9/2022   • Spinal cord stimulator status 3/9/2022   • Spinal headache    • Stage 3b chronic kidney disease (HCC) 2020    FOLLOWED BY DR LANRE BYNUM   • Torn rotator cuff    • Vitamin D deficiency 3/9/2022       Allergies:  Patient has no known allergies.    Home medications:  Medications Prior to Admission   Medication Sig Dispense Refill Last Dose   • amLODIPine (NORVASC) 5 MG tablet Take 5 mg by mouth Every Night.  11    • atorvastatin (LIPITOR) 10 MG tablet Take 10 mg by mouth Every Night.      • Cholecalciferol (Vitamin D3) 50 MCG (2000 UT) capsule Take 2,000 Units by mouth Daily.      • diphenhydrAMINE-acetaminophen (TYLENOL PM)  MG tablet per tablet Take 1 tablet by mouth At Night As Needed for Sleep.      • omeprazole (priLOSEC) 20 MG capsule Take 20 mg by mouth Daily.      • oxyCODONE-acetaminophen (PERCOCET)  MG per tablet Take 1 tablet by mouth 2 (Two) Times a Day.      • pregabalin (LYRICA) 150 MG capsule Take 150 mg by mouth Daily.      • pregabalin (LYRICA) 50 MG capsule Take 50 mg by mouth 2  "(Two) Times a Day.      • Sodium Bicarbonate powder powder Take  by mouth See Admin Instructions. 1 Teaspoon by mouth daily           Hospital medications:  acetaminophen, 650 mg, Oral, BID  atorvastatin, 10 mg, Oral, Nightly  clopidogrel, 75 mg, Oral, Daily  enoxaparin, 30 mg, Subcutaneous, Nightly  nystatin, , Topical, Q12H  OLANZapine zydis, 5 mg, Oral, BID  pantoprazole, 40 mg, Oral, QAM  pregabalin, 25 mg, Oral, Q12H  senna-docusate sodium, 2 tablet, Oral, BID  sodium chloride, 10 mL, Intravenous, Q12H         •  acetaminophen **OR** acetaminophen **OR** acetaminophen  •  albuterol sulfate HFA  •  benzonatate  •  senna-docusate sodium **AND** polyethylene glycol **AND** bisacodyl **AND** bisacodyl  •  calcium carbonate  •  labetalol  •  melatonin  •  metaxalone  •  muscle rub  •  OLANZapine  •  ondansetron **OR** ondansetron  •  oxyCODONE-acetaminophen  •  [COMPLETED] Insert peripheral IV **AND** sodium chloride      Objective:  Vitals Ranges:   Temp:  [97.7 °F (36.5 °C)-97.9 °F (36.6 °C)] 97.9 °F (36.6 °C)  Heart Rate:  [] 101  Resp:  [17-18] 17  BP: (131-179)/(68-94) 144/82      Physical Exam:  She is awake alert and still very paranoid looking about the room would not bring the sheets down for me did not like me messing with her when I told her my name she said \"that is not your name\" really the exam was very difficult she has symmetrical face but would not cooperate with any other part of the exam.    Results review:   I reviewed the patient's new clinical results.    Data review:  Lab Results (last 24 hours)     Procedure Component Value Units Date/Time    Comprehensive Metabolic Panel [[id number]]  (Abnormal) Collected: 03/13/22 0332    Specimen: Blood Updated: 03/13/22 0533     Glucose 108 mg/dL      BUN 47 mg/dL      Creatinine 1.63 mg/dL      Sodium 145 mmol/L      Potassium 3.7 mmol/L      Chloride 112 mmol/L      CO2 17.0 mmol/L      Calcium 9.4 mg/dL      Total Protein 6.8 g/dL      Albumin " 4.10 g/dL      ALT (SGPT) 13 U/L      AST (SGOT) 15 U/L      Alkaline Phosphatase 79 U/L      Total Bilirubin 0.3 mg/dL      Globulin 2.7 gm/dL      A/G Ratio 1.5 g/dL      BUN/Creatinine Ratio 28.8     Anion Gap 16.0 mmol/L      eGFR 33.8 mL/min/1.73      Comment: National Kidney Foundation and American Society of Nephrology (ASN) Task Force recommended calculation based on the Chronic Kidney Disease Epidemiology Collaboration (CKD-EPI) equation refit without adjustment for race.       Narrative:      GFR Normal >60  Chronic Kidney Disease <60  Kidney Failure <15      C-reactive Protein [[id number]]  (Abnormal) Collected: 03/13/22 0332    Specimen: Blood Updated: 03/13/22 0533     C-Reactive Protein 1.01 mg/dL     Bilirubin, Direct [[id number]]  (Normal) Collected: 03/13/22 0332    Specimen: Blood Updated: 03/13/22 0533     Bilirubin, Direct <0.2 mg/dL     Uric Acid [[id number]]  (Abnormal) Collected: 03/13/22 0332    Specimen: Blood Updated: 03/13/22 0533     Uric Acid 6.9 mg/dL     Phosphorus [[id number]]  (Normal) Collected: 03/13/22 0332    Specimen: Blood Updated: 03/13/22 0533     Phosphorus 3.6 mg/dL     Magnesium [[id number]]  (Abnormal) Collected: 03/13/22 0332    Specimen: Blood Updated: 03/13/22 0533     Magnesium 2.6 mg/dL     CBC & Differential [[id number]]  (Abnormal) Collected: 03/13/22 0332    Specimen: Blood Updated: 03/13/22 0449    Narrative:      The following orders were created for panel order CBC & Differential.  Procedure                               Abnormality         Status                     ---------                               -----------         ------                     CBC Auto Differential[[id number]]        Abnormal            Final result                 Please view results for these tests on the individual orders.    CBC Auto Differential [[id number]]  (Abnormal) Collected: 03/13/22 0332    Specimen: Blood Updated: 03/13/22 0449     WBC 10.64 10*3/mm3      RBC 4.69 10*6/mm3       Hemoglobin 14.6 g/dL      Hematocrit 43.5 %      MCV 92.8 fL      MCH 31.1 pg      MCHC 33.6 g/dL      RDW 13.0 %      RDW-SD 43.3 fl      MPV 10.6 fL      Platelets 325 10*3/mm3      Neutrophil % 84.7 %      Lymphocyte % 8.5 %      Monocyte % 6.5 %      Eosinophil % 0.0 %      Basophil % 0.0 %      Immature Grans % 0.3 %      Neutrophils, Absolute 9.02 10*3/mm3      Lymphocytes, Absolute 0.90 10*3/mm3      Monocytes, Absolute 0.69 10*3/mm3      Eosinophils, Absolute 0.00 10*3/mm3      Basophils, Absolute 0.00 10*3/mm3      Immature Grans, Absolute 0.03 10*3/mm3      nRBC 0.0 /100 WBC     Uric Acid [[id number]]  (Abnormal) Collected: 03/12/22 1029    Specimen: Blood Updated: 03/12/22 1602     Uric Acid 6.8 mg/dL            Imaging:  Imaging Results (Last 24 Hours)     ** No results found for the last 24 hours. **         PPE worn at all times washed before washed up afterwards disposed of everything properly is now within 6 feet of her for more than few minutes during my exam no aerosols used at any point    Assessment and Plan:     This patient is floridly psychotic.  She is probably hallucinating but she is definitely paranoid suspicious and argumentative about nonsensical things.  I am going to give her Zyprexa 5 mg oral disintegrating tablets on a twice daily basis if we can get her to take it.  I have as needed IM Zyprexa if she absolutely needs it  Fact that she is continuing to be psychotic I think is now the time for us to have a psychiatrist see her and he can determine if there is anything else we should be doing.  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  Lastly I do not think she has a paraspinal myositis or any other acute myositis at least based upon the creatinine kinase level of 38 which is normal.    Neurology does not have a lot else to add either I have recommended the Zyprexa and we will  see what psychiatry has to say please reconsult neurology as needed as needed      Armando Payne MD  03/13/22  12:50 EDT

## 2022-03-13 NOTE — PLAN OF CARE
Problem: Adult Inpatient Plan of Care  Goal: Plan of Care Review  Outcome: Ongoing, Progressing  Flowsheets (Taken 3/13/2022 1614)  Progress: no change  Plan of Care Reviewed With:  • patient  • spouse  Outcome Evaluation: VSS, no c/o pain, no signs of distress. A&Ox2-3, continues hallucinating, with paranoia. Pt still refuses meals and PO fluids. Refuses PO meds.  at bedside earlier this evening, was able to give her PO zyprexa. Pt may benefit from having  at bedside to manage PO intake of meals and medication, however pt has refused all this even with spouse at bedside. Neuro checks as tolerated, pt fearful of staff, refuses most interventions. Psych consult in. Lyn patency maintained, tolerated bath. Awaiting ECHO.  updated at the bedside.  Goal: Patient-Specific Goal (Individualized)  Outcome: Ongoing, Progressing  Goal: Absence of Hospital-Acquired Illness or Injury  Outcome: Ongoing, Progressing  Intervention: Identify and Manage Fall Risk  Recent Flowsheet Documentation  Taken 3/13/2022 1430 by Missael Baltazar, RN  Safety Promotion/Fall Prevention:  • activity supervised  • assistive device/personal items within reach  • clutter free environment maintained  • fall prevention program maintained  • lighting adjusted  • room organization consistent  • safety round/check completed  • nonskid shoes/slippers when out of bed  Taken 3/13/2022 1219 by Missael Baltazar, RN  Safety Promotion/Fall Prevention:  • activity supervised  • assistive device/personal items within reach  • clutter free environment maintained  • fall prevention program maintained  • lighting adjusted  • nonskid shoes/slippers when out of bed  • room organization consistent  • safety round/check completed  • muscle strengthening facilitated  Taken 3/13/2022 1032 by Missael Baltazar, RN  Safety Promotion/Fall Prevention:  • activity supervised  • assistive device/personal items within reach  • clutter free  environment maintained  • fall prevention program maintained  • lighting adjusted  • nonskid shoes/slippers when out of bed  • room organization consistent  • safety round/check completed  Taken 3/13/2022 0903 by Missael Baltazar RN  Safety Promotion/Fall Prevention:  • activity supervised  • assistive device/personal items within reach  • clutter free environment maintained  • fall prevention program maintained  • nonskid shoes/slippers when out of bed  • safety round/check completed  • room organization consistent  • lighting adjusted  Intervention: Prevent Skin Injury  Recent Flowsheet Documentation  Taken 3/13/2022 1430 by Missael Baltazar RN  Body Position:  • tilted  • left  Skin Protection:  • adhesive use limited  • transparent dressing maintained  Taken 3/13/2022 1219 by Missael Baltazar RN  Body Position: position changed independently  Taken 3/13/2022 1032 by Missael Baltazar RN  Body Position: position changed independently  Taken 3/13/2022 0903 by Missael Baltazar RN  Body Position: position changed independently  Intervention: Prevent and Manage VTE (Venous Thromboembolism) Risk  Recent Flowsheet Documentation  Taken 3/13/2022 1430 by Missael Baltazar RN  Activity Management:  • activity adjusted per tolerance  • activity encouraged  Range of Motion:  • active ROM (range of motion) encouraged  • ROM (range of motion) performed  Taken 3/13/2022 1219 by Missael Baltazar RN  Activity Management:  • activity adjusted per tolerance  • activity encouraged  Taken 3/13/2022 1032 by Missael Baltazar RN  Activity Management:  • activity adjusted per tolerance  • activity encouraged  Taken 3/13/2022 0903 by Missael Baltazar RN  Activity Management:  • activity encouraged  • activity adjusted per tolerance  Range of Motion:  • active ROM (range of motion) encouraged  • ROM (range of motion) performed  Intervention: Prevent Infection  Recent Flowsheet  Documentation  Taken 3/13/2022 1430 by Missael Baltazar RN  Infection Prevention:  • visitors restricted/screened  • single patient room provided  • rest/sleep promoted  • personal protective equipment utilized  • hand hygiene promoted  Taken 3/13/2022 1219 by Missael Baltazar RN  Infection Prevention:  • visitors restricted/screened  • hand hygiene promoted  • rest/sleep promoted  • single patient room provided  • personal protective equipment utilized  Taken 3/13/2022 1032 by Missael Baltazar RN  Infection Prevention:  • visitors restricted/screened  • single patient room provided  • rest/sleep promoted  • personal protective equipment utilized  • hand hygiene promoted  Taken 3/13/2022 0903 by Missael Baltazar RN  Infection Prevention:  • visitors restricted/screened  • single patient room provided  • rest/sleep promoted  • personal protective equipment utilized  • hand hygiene promoted  Goal: Optimal Comfort and Wellbeing  Outcome: Ongoing, Progressing  Intervention: Provide Person-Centered Care  Recent Flowsheet Documentation  Taken 3/13/2022 0903 by Missael Baltazar RN  Trust Relationship/Rapport:  • care explained  • questions encouraged  • emotional support provided  Goal: Readiness for Transition of Care  Outcome: Ongoing, Progressing     Problem: Skin Injury Risk Increased  Goal: Skin Health and Integrity  Outcome: Ongoing, Progressing  Intervention: Optimize Skin Protection  Recent Flowsheet Documentation  Taken 3/13/2022 1430 by Missael Baltazar RN  Pressure Reduction Techniques:  • sit time limited to 2 hours  • frequent weight shift encouraged  • pressure points protected  • heels elevated off bed  • weight shift assistance provided  Head of Bed (HOB) Positioning: HOB elevated  Pressure Reduction Devices:  • alternating pressure pump (ADD)  • positioning supports utilized  • heel offloading device utilized  Skin Protection:  • adhesive use limited  • transparent  dressing maintained  Taken 3/13/2022 1219 by Missael Baltazar RN  Head of Bed (HOB) Positioning: HOB elevated  Taken 3/13/2022 1032 by Missael Baltazar RN  Head of Bed (HOB) Positioning: HOB elevated  Taken 3/13/2022 0903 by Missael Baltazar RN  Pressure Reduction Techniques:  • sit time limited to 2 hours  • weight shift assistance provided  • pressure points protected  Head of Bed (HOB) Positioning: HOB elevated  Pressure Reduction Devices:  • alternating pressure pump (ADD)  • positioning supports utilized     Problem: Fall Injury Risk  Goal: Absence of Fall and Fall-Related Injury  Outcome: Ongoing, Progressing  Intervention: Identify and Manage Contributors  Recent Flowsheet Documentation  Taken 3/13/2022 1430 by Missael Baltazar RN  Medication Review/Management: medications reviewed  Taken 3/13/2022 1032 by Missael Baltazar RN  Medication Review/Management: medications reviewed  Taken 3/13/2022 0903 by Missael Baltazar RN  Medication Review/Management: medications reviewed  Intervention: Promote Injury-Free Environment  Recent Flowsheet Documentation  Taken 3/13/2022 1430 by Missael Baltazar RN  Safety Promotion/Fall Prevention:  • activity supervised  • assistive device/personal items within reach  • clutter free environment maintained  • fall prevention program maintained  • lighting adjusted  • room organization consistent  • safety round/check completed  • nonskid shoes/slippers when out of bed  Taken 3/13/2022 1219 by Missael Baltazar RN  Safety Promotion/Fall Prevention:  • activity supervised  • assistive device/personal items within reach  • clutter free environment maintained  • fall prevention program maintained  • lighting adjusted  • nonskid shoes/slippers when out of bed  • room organization consistent  • safety round/check completed  • muscle strengthening facilitated  Taken 3/13/2022 1032 by Missael Baltazar, RN  Safety Promotion/Fall  Prevention:  • activity supervised  • assistive device/personal items within reach  • clutter free environment maintained  • fall prevention program maintained  • lighting adjusted  • nonskid shoes/slippers when out of bed  • room organization consistent  • safety round/check completed  Taken 3/13/2022 0903 by Missael Baltazar, RN  Safety Promotion/Fall Prevention:  • activity supervised  • assistive device/personal items within reach  • clutter free environment maintained  • fall prevention program maintained  • nonskid shoes/slippers when out of bed  • safety round/check completed  • room organization consistent  • lighting adjusted     Problem: Adjustment to Illness (Stroke, Ischemic/Transient Ischemic Attack)  Goal: Optimal Coping  Outcome: Ongoing, Progressing  Intervention: Support Psychosocial Response to Stroke  Recent Flowsheet Documentation  Taken 3/13/2022 1430 by Missael Baltazar, RN  Supportive Measures: active listening utilized  Taken 3/13/2022 0903 by Missale Baltazar RN  Supportive Measures:  • active listening utilized  • verbalization of feelings encouraged  Family/Support System Care:  • support provided  • presence promoted  • involvement promoted     Problem: Bowel Elimination Impaired (Stroke, Ischemic/Transient Ischemic Attack)  Goal: Effective Bowel Elimination  Outcome: Ongoing, Progressing     Problem: Cerebral Tissue Perfusion (Stroke, Ischemic/Transient Ischemic Attack)  Goal: Optimal Cerebral Tissue Perfusion  Outcome: Ongoing, Progressing  Intervention: Protect and Optimize Cerebral Perfusion  Recent Flowsheet Documentation  Taken 3/13/2022 0903 by Missael Baltazar, RN  Sensory Stimulation Regulation:  • care clustered  • lighting decreased  Cerebral Perfusion Promotion: blood pressure monitored     Problem: Cognitive Impairment (Stroke, Ischemic/Transient Ischemic Attack)  Goal: Optimal Cognitive Function  Outcome: Ongoing, Progressing  Intervention: Optimize  Cognitive Function  Recent Flowsheet Documentation  Taken 3/13/2022 0903 by Missael Baltazar RN  Sensory Stimulation Regulation:  • care clustered  • lighting decreased  Reorientation Measures:  • clock in view  • reorientation provided  • familiar social contact encouraged  Environment Familiarity/Consistency: daily routine followed     Problem: Functional Ability Impaired (Stroke, Ischemic/Transient Ischemic Attack)  Goal: Optimal Functional Ability  Outcome: Ongoing, Progressing  Intervention: Optimize Functional Ability  Recent Flowsheet Documentation  Taken 3/13/2022 1430 by Missael Baltazar RN  Activity Management:  • activity adjusted per tolerance  • activity encouraged  Taken 3/13/2022 1219 by Missael Baltazar RN  Activity Management:  • activity adjusted per tolerance  • activity encouraged  Taken 3/13/2022 1032 by Missael Baltazar RN  Activity Management:  • activity adjusted per tolerance  • activity encouraged  Taken 3/13/2022 0903 by Missael Baltazar RN  Activity Management:  • activity encouraged  • activity adjusted per tolerance     Problem: Respiratory Compromise (Stroke, Ischemic/Transient Ischemic Attack)  Goal: Effective Oxygenation and Ventilation  Outcome: Ongoing, Progressing  Intervention: Optimize Oxygenation and Ventilation  Recent Flowsheet Documentation  Taken 3/13/2022 1430 by Missael Baltazar RN  Head of Bed (HOB) Positioning: HOB elevated  Taken 3/13/2022 1219 by Missael Baltazar RN  Head of Bed (HOB) Positioning: HOB elevated  Taken 3/13/2022 1032 by Missael Baltazar RN  Head of Bed (HOB) Positioning: HOB elevated  Taken 3/13/2022 0903 by Missael Baltazar RN  Head of Bed (HOB) Positioning: HOB elevated     Problem: Sensorimotor Impairment (Stroke, Ischemic/Transient Ischemic Attack)  Goal: Improved Sensorimotor Function  Outcome: Ongoing, Progressing  Intervention: Optimize Range of Motion, Motor Control and Function  Recent  Flowsheet Documentation  Taken 3/13/2022 1430 by Missael Baltazar RN  Positioning/Transfer Devices:  • in use  • pillows  Range of Motion:  • active ROM (range of motion) encouraged  • ROM (range of motion) performed  Taken 3/13/2022 1219 by Missael Baltazar RN  Positioning/Transfer Devices:  • in use  • pillows  Taken 3/13/2022 1032 by Missael Baltazar RN  Positioning/Transfer Devices:  • in use  • pillows  Taken 3/13/2022 0903 by Missael Baltazar RN  Positioning/Transfer Devices:  • in use  • pillows  Range of Motion:  • active ROM (range of motion) encouraged  • ROM (range of motion) performed  Intervention: Optimize Sensory and Perceptual Ability  Recent Flowsheet Documentation  Taken 3/13/2022 1430 by Missael Baltazar RN  Pressure Reduction Techniques:  • sit time limited to 2 hours  • frequent weight shift encouraged  • pressure points protected  • heels elevated off bed  • weight shift assistance provided  Pressure Reduction Devices:  • alternating pressure pump (ADD)  • positioning supports utilized  • heel offloading device utilized  Taken 3/13/2022 0903 by Missael Baltazar RN  Pressure Reduction Techniques:  • sit time limited to 2 hours  • weight shift assistance provided  • pressure points protected  Pressure Reduction Devices:  • alternating pressure pump (ADD)  • positioning supports utilized     Problem: Swallowing Impairment (Stroke, Ischemic/Transient Ischemic Attack)  Goal: Optimal Eating and Swallowing without Aspiration  Outcome: Ongoing, Progressing     Problem: Urinary Elimination Impaired (Stroke, Ischemic/Transient Ischemic Attack)  Goal: Effective Urinary Elimination  Outcome: Ongoing, Progressing   Goal Outcome Evaluation:  Plan of Care Reviewed With: patient, spouse        Progress: no change  Outcome Evaluation: VSS, no c/o pain, no signs of distress. A&Ox2-3, continues hallucinating, with paranoia. Pt still refuses meals and PO fluids. Refuses PO meds.   at bedside earlier this evening, was able to give her PO zyprexa. Pt may benefit from having  at bedside to manage PO intake of meals and medication, however pt has refused all this even with spouse at bedside. Neuro checks as tolerated, pt fearful of staff, refuses most interventions. Psych consult in. Lyn patency maintained. Awaiting ECHO.  updated at the bedside.

## 2022-03-13 NOTE — PROGRESS NOTES
[facility] Progress Note      Patient Name: Marlena Navarrete  : 1950  MRN: [id number]  Primary Care Physician:  Bradley Hernandez MD  Date of admission: 3/9/2022    Subjective     Interval History:   Follow-up kidney transplant    Patient is feeling much better today, denies any chest pain or shortness of air, no orthopnea PND, no nausea or vomiting, no abdominal pain, no dysuria or gross hematuria, no renal allograft pain.    Review of Systems:   As noted above    Objective     Vitals:   Temp:  [97.7 °F (36.5 °C)-98.4 °F (36.9 °C)] 98.4 °F (36.9 °C)  Heart Rate:  [] 100  Resp:  [17-18] 17  BP: (131-179)/(68-93) 133/76    Intake/Output Summary (Last 24 hours) at 3/13/2022 1455  Last data filed at 3/13/2022 1412  Gross per 24 hour   Intake 0 ml   Output 2800 ml   Net -2800 ml       Physical Exam:    Constitutional: Awake, alert, no acute distress.    Obese, chronically ill and frail  HEENT: Sclera anicteric, no conjunctival injection, oral mucosa is normal  Neck: Supple, no thyromegaly, no lymphadenopathy, trachea at midline, no JVD  Respiratory: Clear to auscultation bilaterally, nonlabored respiration  Cardiovascular: RRR, no murmurs, no rubs or gallops, no carotid bruit  Gastrointestinal: Positive bowel sounds, abdomen is soft, nontender and nondistended  : No palpable bladder, renal allograft in the right lower quadrant is nontender with no bruit over the allograft  Musculoskeletal: No edema, no clubbing or cyanosis  Psychiatric: Flat affect, cooperative  Neurologic:  More oriented today, moving all extremities, normal speech .  Skin: Warm and dry        Scheduled Meds:     acetaminophen, 650 mg, Oral, BID  atorvastatin, 10 mg, Oral, Nightly  clopidogrel, 75 mg, Oral, Daily  enoxaparin, 30 mg, Subcutaneous, Nightly  melatonin, 3 mg, Oral, Nightly  nystatin, , Topical, Q12H  OLANZapine zydis, 5 mg, Oral, BID  pantoprazole, 40 mg, Oral, QAM  pregabalin, 25 mg, Oral,  Q12H  senna-docusate sodium, 2 tablet, Oral, BID  sodium chloride, 10 mL, Intravenous, Q12H      IV Meds:        Results Reviewed:   I have personally reviewed the results from the time of this admission to 3/13/2022 14:55 EDT     Results from last 7 days   Lab Units 03/13/22  0332 03/12/22  1029 03/10/22  2311   SODIUM mmol/L 145 141 139   POTASSIUM mmol/L 3.7 3.8 4.6   CHLORIDE mmol/L 112* 112* 106   CO2 mmol/L 17.0* 17.0* 19.0*   BUN mg/dL 47* 52* 48*   CREATININE mg/dL 1.63* 1.81* 2.07*   CALCIUM mg/dL 9.4 9.1 9.3   BILIRUBIN mg/dL 0.3 0.3 0.3   ALK PHOS U/L 79 75 84   ALT (SGPT) U/L 13 12 9   AST (SGOT) U/L 15 12 13   GLUCOSE mg/dL 108* 111* 135*       Estimated Creatinine Clearance: 27.8 mL/min (A) (by C-G formula based on SCr of 1.63 mg/dL (H)).    Results from last 7 days   Lab Units 03/13/22  0332 03/12/22  1029 03/10/22  0457   MAGNESIUM mg/dL 2.6* 2.6* 2.3   PHOSPHORUS mg/dL 3.6 4.0 5.0*       Results from last 7 days   Lab Units 03/13/22  0332 03/12/22  1029   URIC ACID mg/dL 6.9* 6.8*       Results from last 7 days   Lab Units 03/13/22  0332 03/12/22  1029 03/11/22  0239 03/10/22  0457 03/09/22  1244   WBC 10*3/mm3 10.64 13.25* 8.55 7.36 8.99   HEMOGLOBIN g/dL 14.6 13.4 11.7* 11.4* 12.1   PLATELETS 10*3/mm3 325 293 263 258 271       Results from last 7 days   Lab Units 03/10/22  0457   INR  0.99       Assessment / Plan     ASSESSMENT:  1.  Living related kidney transplant in 1979 from her sister with withdrawal of immunosuppressive drugs in 1985 renal function has been stable.  Creatinine today 1.63, very stable, electrolyte within normal range other than total CO2 17   2.  Chronic kidney disease stage III-IV has been very stable  3.  Reflux nephropathy as the etiology of the end-stage renal disease of the native kidneys, patient had bilateral nephrectomies  4.  COVID-19 positive currently on no oxygen  5.  Anemia  resolved, hemoglobin 14.6  6.  Secondary hyperparathyroidism related to renal disease and  vitamin D deficiency  7.  Severe back pain.  8.    Metabolic acidosis, total CO2 17  9.  Dyslipidemia, treated    PLAN:  1.  Continue the same treatment  2.  Add oral sodium bicarbonate, 1300 mg 3 times daily  3.  Surveillance labs      Thank you for involving us in the care of Marlena Navarrete.  Please feel free to call with any questions.    Franco Clemens MD  03/13/22  14:55 EDT    [facility]  [phone]      Much of this encounter note is an electronic transcription/translation of spoken language to printed text. The electronic translation of spoken language may permit erroneous, or at times, nonsensical words or phrases to be inadvertently transcribed; Although I have reviewed the note for such errors, some may still exist.

## 2022-03-14 ENCOUNTER — APPOINTMENT (OUTPATIENT)
Dept: CARDIOLOGY | Facility: HOSPITAL | Age: 72
End: 2022-03-14

## 2022-03-14 PROBLEM — F11.20 NARCOTIC DEPENDENCE: Status: ACTIVE | Noted: 2022-03-14

## 2022-03-14 PROBLEM — U07.1 PNEUMONIA DUE TO COVID-19 VIRUS: Status: ACTIVE | Noted: 2022-03-14

## 2022-03-14 PROBLEM — J12.82 PNEUMONIA DUE TO COVID-19 VIRUS: Status: ACTIVE | Noted: 2022-03-14

## 2022-03-14 LAB
ALBUMIN SERPL-MCNC: 3.4 G/DL (ref 3.5–5.2)
ALBUMIN/GLOB SERPL: 1.4 G/DL
ALP SERPL-CCNC: 66 U/L (ref 39–117)
ALT SERPL W P-5'-P-CCNC: 11 U/L (ref 1–33)
ANION GAP SERPL CALCULATED.3IONS-SCNC: 12 MMOL/L (ref 5–15)
AORTIC DIMENSIONLESS INDEX: 0.9 (DI)
AST SERPL-CCNC: 12 U/L (ref 1–32)
BASOPHILS # BLD AUTO: 0.01 10*3/MM3 (ref 0–0.2)
BASOPHILS NFR BLD AUTO: 0.1 % (ref 0–1.5)
BH CV ECHO LEFT VENTRICLE GLOBAL LONGITUDINAL STRAIN: -15.5 %
BH CV ECHO MEAS - AO MAX PG: 10.8 MMHG
BH CV ECHO MEAS - AO MEAN PG: 5.9 MMHG
BH CV ECHO MEAS - AO ROOT DIAM: 3 CM
BH CV ECHO MEAS - AO V2 MAX: 164.2 CM/SEC
BH CV ECHO MEAS - AO V2 VTI: 21.9 CM
BH CV ECHO MEAS - AVA(I,D): 2.6 CM2
BH CV ECHO MEAS - EDV(CUBED): 45 ML
BH CV ECHO MEAS - EDV(MOD-SP2): 55 ML
BH CV ECHO MEAS - EDV(MOD-SP4): 63 ML
BH CV ECHO MEAS - EF(MOD-BP): 76.4 %
BH CV ECHO MEAS - EF(MOD-SP2): 76.4 %
BH CV ECHO MEAS - EF(MOD-SP4): 74.6 %
BH CV ECHO MEAS - ESV(CUBED): 19.9 ML
BH CV ECHO MEAS - ESV(MOD-SP2): 13 ML
BH CV ECHO MEAS - ESV(MOD-SP4): 16 ML
BH CV ECHO MEAS - FS: 23.8 %
BH CV ECHO MEAS - IVS/LVPW: 1.03 CM
BH CV ECHO MEAS - IVSD: 1.04 CM
BH CV ECHO MEAS - LAT PEAK E' VEL: 8.7 CM/SEC
BH CV ECHO MEAS - LV DIASTOLIC VOL/BSA (35-75): 40.5 CM2
BH CV ECHO MEAS - LV MASS(C)D: 109.8 GRAMS
BH CV ECHO MEAS - LV MAX PG: 8.6 MMHG
BH CV ECHO MEAS - LV MEAN PG: 4.9 MMHG
BH CV ECHO MEAS - LV SYSTOLIC VOL/BSA (12-30): 10.3 CM2
BH CV ECHO MEAS - LV V1 MAX: 146.5 CM/SEC
BH CV ECHO MEAS - LV V1 VTI: 20.7 CM
BH CV ECHO MEAS - LVIDD: 3.6 CM
BH CV ECHO MEAS - LVIDS: 2.7 CM
BH CV ECHO MEAS - LVOT AREA: 2.8 CM2
BH CV ECHO MEAS - LVOT DIAM: 1.87 CM
BH CV ECHO MEAS - LVPWD: 1.01 CM
BH CV ECHO MEAS - MED PEAK E' VEL: 7.5 CM/SEC
BH CV ECHO MEAS - MV A DUR: 0.09 SEC
BH CV ECHO MEAS - MV A MAX VEL: 140.8 CM/SEC
BH CV ECHO MEAS - MV DEC SLOPE: 382.8 CM/SEC2
BH CV ECHO MEAS - MV DEC TIME: 0.3 MSEC
BH CV ECHO MEAS - MV E MAX VEL: 70.4 CM/SEC
BH CV ECHO MEAS - MV E/A: 0.5
BH CV ECHO MEAS - MV MAX PG: 8.2 MMHG
BH CV ECHO MEAS - MV MEAN PG: 2.4 MMHG
BH CV ECHO MEAS - MV V2 VTI: 19.6 CM
BH CV ECHO MEAS - MVA(VTI): 2.9 CM2
BH CV ECHO MEAS - PA ACC TIME: 0.09 SEC
BH CV ECHO MEAS - PA PR(ACCEL): 39.8 MMHG
BH CV ECHO MEAS - PA V2 MAX: 112.1 CM/SEC
BH CV ECHO MEAS - RAP SYSTOLE: 3 MMHG
BH CV ECHO MEAS - RV MAX PG: 0.9 MMHG
BH CV ECHO MEAS - RV V1 MAX: 47.6 CM/SEC
BH CV ECHO MEAS - RV V1 VTI: 8.8 CM
BH CV ECHO MEAS - RVSP: 11.1 MMHG
BH CV ECHO MEAS - SI(MOD-SP2): 27 ML/M2
BH CV ECHO MEAS - SI(MOD-SP4): 30.2 ML/M2
BH CV ECHO MEAS - SV(LVOT): 57 ML
BH CV ECHO MEAS - SV(MOD-SP2): 42 ML
BH CV ECHO MEAS - SV(MOD-SP4): 47 ML
BH CV ECHO MEAS - TAPSE (>1.6): 1.4 CM
BH CV ECHO MEAS - TR MAX PG: 8.1 MMHG
BH CV ECHO MEAS - TR MAX VEL: 142.5 CM/SEC
BH CV ECHO MEASUREMENTS AVERAGE E/E' RATIO: 8.69
BH CV XLRA - RV BASE: 2.13 CM
BH CV XLRA - RV LENGTH: 7.2 CM
BH CV XLRA - RV MID: 1.64 CM
BH CV XLRA - TDI S': 13.5 CM/SEC
BILIRUB CONJ SERPL-MCNC: <0.2 MG/DL (ref 0–0.3)
BILIRUB SERPL-MCNC: 0.5 MG/DL (ref 0–1.2)
BUN SERPL-MCNC: 43 MG/DL (ref 8–23)
BUN/CREAT SERPL: 26.4 (ref 7–25)
CALCIUM SPEC-SCNC: 8.9 MG/DL (ref 8.6–10.5)
CHLORIDE SERPL-SCNC: 111 MMOL/L (ref 98–107)
CHOLEST SERPL-MCNC: 128 MG/DL (ref 0–200)
CO2 SERPL-SCNC: 19 MMOL/L (ref 22–29)
CREAT SERPL-MCNC: 1.63 MG/DL (ref 0.57–1)
CRP SERPL-MCNC: 0.94 MG/DL (ref 0–0.5)
DEPRECATED RDW RBC AUTO: 44 FL (ref 37–54)
EGFRCR SERPLBLD CKD-EPI 2021: 33.8 ML/MIN/1.73
EOSINOPHIL # BLD AUTO: 0.02 10*3/MM3 (ref 0–0.4)
EOSINOPHIL NFR BLD AUTO: 0.2 % (ref 0.3–6.2)
ERYTHROCYTE [DISTWIDTH] IN BLOOD BY AUTOMATED COUNT: 13.1 % (ref 12.3–15.4)
GLOBULIN UR ELPH-MCNC: 2.4 GM/DL
GLUCOSE SERPL-MCNC: 97 MG/DL (ref 65–99)
HBA1C MFR BLD: 6 % (ref 4.8–5.6)
HCT VFR BLD AUTO: 38.4 % (ref 34–46.6)
HDLC SERPL-MCNC: 26 MG/DL (ref 40–60)
HGB BLD-MCNC: 12.8 G/DL (ref 12–15.9)
IMM GRANULOCYTES # BLD AUTO: 0.01 10*3/MM3 (ref 0–0.05)
IMM GRANULOCYTES NFR BLD AUTO: 0.1 % (ref 0–0.5)
LDLC SERPL CALC-MCNC: 73 MG/DL (ref 0–100)
LDLC/HDLC SERPL: 2.63 {RATIO}
LEFT ATRIUM VOLUME INDEX: 13.1 ML/M2
LV EF 2D ECHO EST: 76 %
LYMPHOCYTES # BLD AUTO: 2.04 10*3/MM3 (ref 0.7–3.1)
LYMPHOCYTES NFR BLD AUTO: 24.2 % (ref 19.6–45.3)
MAGNESIUM SERPL-MCNC: 2.5 MG/DL (ref 1.6–2.4)
MAXIMAL PREDICTED HEART RATE: 150 BPM
MCH RBC QN AUTO: 31 PG (ref 26.6–33)
MCHC RBC AUTO-ENTMCNC: 33.3 G/DL (ref 31.5–35.7)
MCV RBC AUTO: 93 FL (ref 79–97)
MONOCYTES # BLD AUTO: 0.75 10*3/MM3 (ref 0.1–0.9)
MONOCYTES NFR BLD AUTO: 8.9 % (ref 5–12)
NEUTROPHILS NFR BLD AUTO: 5.6 10*3/MM3 (ref 1.7–7)
NEUTROPHILS NFR BLD AUTO: 66.5 % (ref 42.7–76)
NRBC BLD AUTO-RTO: 0 /100 WBC (ref 0–0.2)
PHOSPHATE SERPL-MCNC: 3.3 MG/DL (ref 2.5–4.5)
PLATELET # BLD AUTO: 283 10*3/MM3 (ref 140–450)
PMV BLD AUTO: 10.5 FL (ref 6–12)
POTASSIUM SERPL-SCNC: 3.7 MMOL/L (ref 3.5–5.2)
PROT SERPL-MCNC: 5.8 G/DL (ref 6–8.5)
RBC # BLD AUTO: 4.13 10*6/MM3 (ref 3.77–5.28)
SODIUM SERPL-SCNC: 142 MMOL/L (ref 136–145)
STRESS TARGET HR: 128 BPM
TRIGL SERPL-MCNC: 168 MG/DL (ref 0–150)
URATE SERPL-MCNC: 6.8 MG/DL (ref 2.4–5.7)
VLDLC SERPL-MCNC: 29 MG/DL (ref 5–40)
WBC NRBC COR # BLD: 8.43 10*3/MM3 (ref 3.4–10.8)

## 2022-03-14 PROCEDURE — 83735 ASSAY OF MAGNESIUM: CPT | Performed by: INTERNAL MEDICINE

## 2022-03-14 PROCEDURE — 86140 C-REACTIVE PROTEIN: CPT | Performed by: INTERNAL MEDICINE

## 2022-03-14 PROCEDURE — 84550 ASSAY OF BLOOD/URIC ACID: CPT | Performed by: INTERNAL MEDICINE

## 2022-03-14 PROCEDURE — 84100 ASSAY OF PHOSPHORUS: CPT | Performed by: INTERNAL MEDICINE

## 2022-03-14 PROCEDURE — 83036 HEMOGLOBIN GLYCOSYLATED A1C: CPT | Performed by: INTERNAL MEDICINE

## 2022-03-14 PROCEDURE — 25010000002 ENOXAPARIN PER 10 MG: Performed by: INTERNAL MEDICINE

## 2022-03-14 PROCEDURE — 80053 COMPREHEN METABOLIC PANEL: CPT | Performed by: INTERNAL MEDICINE

## 2022-03-14 PROCEDURE — 97530 THERAPEUTIC ACTIVITIES: CPT

## 2022-03-14 PROCEDURE — 80061 LIPID PANEL: CPT | Performed by: INTERNAL MEDICINE

## 2022-03-14 PROCEDURE — 85025 COMPLETE CBC W/AUTO DIFF WBC: CPT | Performed by: INTERNAL MEDICINE

## 2022-03-14 PROCEDURE — 93306 TTE W/DOPPLER COMPLETE: CPT

## 2022-03-14 PROCEDURE — 82248 BILIRUBIN DIRECT: CPT | Performed by: INTERNAL MEDICINE

## 2022-03-14 PROCEDURE — 25010000002 PERFLUTREN (DEFINITY) 8.476 MG IN SODIUM CHLORIDE (PF) 0.9 % 10 ML INJECTION: Performed by: PSYCHIATRY & NEUROLOGY

## 2022-03-14 PROCEDURE — 97110 THERAPEUTIC EXERCISES: CPT

## 2022-03-14 PROCEDURE — 93356 MYOCRD STRAIN IMG SPCKL TRCK: CPT

## 2022-03-14 RX ORDER — ATORVASTATIN CALCIUM 20 MG/1
40 TABLET, FILM COATED ORAL NIGHTLY
Status: DISCONTINUED | OUTPATIENT
Start: 2022-03-14 | End: 2022-03-15 | Stop reason: HOSPADM

## 2022-03-14 RX ORDER — OLANZAPINE 10 MG/1
5 INJECTION, POWDER, LYOPHILIZED, FOR SOLUTION INTRAMUSCULAR EVERY 8 HOURS PRN
Status: DISCONTINUED | OUTPATIENT
Start: 2022-03-14 | End: 2022-03-15 | Stop reason: HOSPADM

## 2022-03-14 RX ADMIN — PREGABALIN 25 MG: 25 CAPSULE ORAL at 21:19

## 2022-03-14 RX ADMIN — NYSTATIN: 100000 POWDER TOPICAL at 22:37

## 2022-03-14 RX ADMIN — PANTOPRAZOLE SODIUM 40 MG: 40 TABLET, DELAYED RELEASE ORAL at 06:13

## 2022-03-14 RX ADMIN — SODIUM BICARBONATE 1300 MG: 650 TABLET ORAL at 09:49

## 2022-03-14 RX ADMIN — Medication 3 MG: at 21:17

## 2022-03-14 RX ADMIN — DOCUSATE SODIUM 50MG AND SENNOSIDES 8.6MG 2 TABLET: 8.6; 5 TABLET, FILM COATED ORAL at 09:49

## 2022-03-14 RX ADMIN — ATORVASTATIN CALCIUM 40 MG: 20 TABLET, FILM COATED ORAL at 21:17

## 2022-03-14 RX ADMIN — OXYCODONE AND ACETAMINOPHEN 1 TABLET: 5; 325 TABLET ORAL at 06:13

## 2022-03-14 RX ADMIN — OXYCODONE AND ACETAMINOPHEN 1 TABLET: 5; 325 TABLET ORAL at 09:52

## 2022-03-14 RX ADMIN — OXYCODONE AND ACETAMINOPHEN 1 TABLET: 5; 325 TABLET ORAL at 22:31

## 2022-03-14 RX ADMIN — ACETAMINOPHEN 650 MG: 325 TABLET, FILM COATED ORAL at 21:18

## 2022-03-14 RX ADMIN — Medication 10 ML: at 22:37

## 2022-03-14 RX ADMIN — Medication 10 ML: at 09:50

## 2022-03-14 RX ADMIN — NYSTATIN: 100000 POWDER TOPICAL at 09:50

## 2022-03-14 RX ADMIN — PREGABALIN 25 MG: 25 CAPSULE ORAL at 09:49

## 2022-03-14 RX ADMIN — PERFLUTREN 2 ML: 6.52 INJECTION, SUSPENSION INTRAVENOUS at 16:21

## 2022-03-14 RX ADMIN — ENOXAPARIN SODIUM 30 MG: 30 INJECTION SUBCUTANEOUS at 21:17

## 2022-03-14 RX ADMIN — SODIUM BICARBONATE 1300 MG: 650 TABLET ORAL at 21:18

## 2022-03-14 RX ADMIN — CLOPIDOGREL 75 MG: 75 TABLET, FILM COATED ORAL at 09:49

## 2022-03-14 RX ADMIN — OLANZAPINE 5 MG: 5 TABLET, ORALLY DISINTEGRATING ORAL at 09:49

## 2022-03-14 RX ADMIN — OXYCODONE AND ACETAMINOPHEN 1 TABLET: 5; 325 TABLET ORAL at 02:16

## 2022-03-14 RX ADMIN — OXYCODONE AND ACETAMINOPHEN 1 TABLET: 5; 325 TABLET ORAL at 18:19

## 2022-03-14 NOTE — PROGRESS NOTES
[facility] Progress Note      Patient Name: Marlena Navarrete  : 1950  MRN: [id number]  Primary Care Physician:  Bradley Hernandez MD  Date of admission: 3/9/2022    Subjective     Interval History:   Follow-up kidney transplant    Patient is feeling much better today, denies any chest pain or shortness of air, no orthopnea PND, no nausea or vomiting, no abdominal pain, no dysuria or gross hematuria, no renal allograft pain.  Complaining of foot pain and lower back discomfort    Review of Systems:   As noted above    Objective     Vitals:   Temp:  [97.7 °F (36.5 °C)-98.4 °F (36.9 °C)] 97.7 °F (36.5 °C)  Heart Rate:  [] 83  Resp:  [17-20] 18  BP: (133-174)/(76-92) 162/77    Intake/Output Summary (Last 24 hours) at 3/14/2022 1023  Last data filed at 3/14/2022 0935  Gross per 24 hour   Intake 776 ml   Output 1175 ml   Net -399 ml       Physical Exam:    Constitutional: Awake, alert, no acute distress.    Obese, chronically ill and frail  HEENT: Sclera anicteric, no conjunctival injection, oral mucosa is normal  Neck: Supple, no thyromegaly, no lymphadenopathy, trachea at midline, no JVD  Respiratory: Clear to auscultation bilaterally, nonlabored respiration  Cardiovascular: RRR, no murmurs, no rubs or gallops, no carotid bruit  Gastrointestinal: Positive bowel sounds, abdomen is soft, nontender and nondistended  : No palpable bladder, renal allograft in the right lower quadrant is nontender with no bruit over the allograft  Musculoskeletal: No edema, no clubbing or cyanosis  Psychiatric: Flat affect, cooperative  Neurologic:  Oriented x3, moving all extremities, normal speech .  Skin: Warm and dry        Scheduled Meds:     acetaminophen, 650 mg, Oral, BID  atorvastatin, 10 mg, Oral, Nightly  clopidogrel, 75 mg, Oral, Daily  enoxaparin, 30 mg, Subcutaneous, Nightly  melatonin, 3 mg, Oral, Nightly  nystatin, , Topical, Q12H  OLANZapine zydis, 5 mg, Oral, BID  pantoprazole, 40 mg,  Oral, QAM  pregabalin, 25 mg, Oral, Q12H  senna-docusate sodium, 2 tablet, Oral, BID  sodium bicarbonate, 1,300 mg, Oral, TID  sodium chloride, 10 mL, Intravenous, Q12H      IV Meds:        Results Reviewed:   I have personally reviewed the results from the time of this admission to 3/14/2022 10:23 EDT     Results from last 7 days   Lab Units 03/14/22 0443 03/13/22 0332 03/12/22  1029   SODIUM mmol/L 142 145 141   POTASSIUM mmol/L 3.7 3.7 3.8   CHLORIDE mmol/L 111* 112* 112*   CO2 mmol/L 19.0* 17.0* 17.0*   BUN mg/dL 43* 47* 52*   CREATININE mg/dL 1.63* 1.63* 1.81*   CALCIUM mg/dL 8.9 9.4 9.1   BILIRUBIN mg/dL 0.5 0.3 0.3   ALK PHOS U/L 66 79 75   ALT (SGPT) U/L 11 13 12   AST (SGOT) U/L 12 15 12   GLUCOSE mg/dL 97 108* 111*       Estimated Creatinine Clearance: 27.4 mL/min (A) (by C-G formula based on SCr of 1.63 mg/dL (H)).    Results from last 7 days   Lab Units 03/14/22 0443 03/13/22 0332 03/12/22  1029   MAGNESIUM mg/dL 2.5* 2.6* 2.6*   PHOSPHORUS mg/dL 3.3 3.6 4.0       Results from last 7 days   Lab Units 03/14/22 0443 03/13/22 0332 03/12/22  1029   URIC ACID mg/dL 6.8* 6.9* 6.8*       Results from last 7 days   Lab Units 03/14/22 0443 03/13/22 0332 03/12/22  1029 03/11/22  0239 03/10/22  0457   WBC 10*3/mm3 8.43 10.64 13.25* 8.55 7.36   HEMOGLOBIN g/dL 12.8 14.6 13.4 11.7* 11.4*   PLATELETS 10*3/mm3 283 325 293 263 258       Results from last 7 days   Lab Units 03/10/22  0457   INR  0.99       Assessment / Plan     ASSESSMENT:  1.  Living related kidney transplant in 1979 from her sister with withdrawal of immunosuppressive drugs in 1985 renal function has been stable.  Creatinine today 1.63, very stable, electrolyte within normal range other than total CO2 19, was started on oral sodium bicarb  2.  Chronic kidney disease stage III-IV has been very stable  3.  Reflux nephropathy as the etiology of the end-stage renal disease of the native kidneys, patient had bilateral nephrectomies  4.  COVID-19  positive currently on no oxygen  5.  Anemia  resolved, hemoglobin 12.8  6.  Secondary hyperparathyroidism related to renal disease and vitamin D deficiency  7.  Severe back pain.  8.    Metabolic acidosis, total CO2 19, was started on oral sodium bicarb  9.  Dyslipidemia, treated    PLAN:  1.  Continue the same treatment  2.  The patient could be discharged anytime from the renal standpoint  3.  Surveillance labs      Thank you for involving us in the care of Marlena Navarrete.  Please feel free to call with any questions.    Franco Clemens MD  03/14/22  10:23 EDT    [facility]  [phone]      Much of this encounter note is an electronic transcription/translation of spoken language to printed text. The electronic translation of spoken language may permit erroneous, or at times, nonsensical words or phrases to be inadvertently transcribed; Although I have reviewed the note for such errors, some may still exist.

## 2022-03-14 NOTE — CASE MANAGEMENT/SOCIAL WORK
Continued Stay Note  [facility]     Patient Name: Marlena Navarrete  MRN: [id number]  Today's Date: 3/14/2022    Admit Date: 3/9/2022     Discharge Plan     Row Name 03/14/22 1506       Plan    Plan Comments Cayden stated she will follow up tomorrow to see how the patient does overnight to ensure she has no behaviors and participates with care. Oxana JIMENEZ RN CCP    Row Name 03/14/22 1068       Plan    Plan Comments Message sent to Cayden requesting update on referral for Alfnozo Ramirez. Oxana JIMENEZ RN CCP               Discharge Codes    No documentation.               Expected Discharge Date and Time     Expected Discharge Date Expected Discharge Time    Mar 14, 2022             Oxana Prakash RN

## 2022-03-14 NOTE — CASE MANAGEMENT/SOCIAL WORK
Continued Stay Note  Psychiatric     Patient Name: Marlena Navarrete  MRN: [id number]  Today's Date: 3/14/2022    Admit Date: 3/9/2022     Discharge Plan     Row Name 03/14/22 1459       Plan    Plan Comments Message sent to Cayden requesting update on referral for Alfonzo Ramirez. Oxana JIMENEZ RN CCP               Discharge Codes    No documentation.               Expected Discharge Date and Time     Expected Discharge Date Expected Discharge Time    Mar 14, 2022             Oxana Prakash RN

## 2022-03-14 NOTE — PLAN OF CARE
Much more alert and oriented compared to prior assessments. Lyn removed today, voiding trial complete. Pt now up with asst to [facility].

## 2022-03-14 NOTE — PLAN OF CARE
Goal Outcome Evaluation:  Plan of Care Reviewed With: patient        Progress: improving  Outcome Evaluation: Patient alert and oriented x 4 today. Signfiicant mobility improvement since previous therapy session. Patient did require modA for supine<>sit, CGA for transfers and ambulated a total of 10' CGA with a RW. Patient did report she wears a R AFO at all times- searched for brace and shoes in room but unable to find. Patient did fatigue quickly with short ambulation trial but overall progressing. Reviewed DC planning and patient reported her plan is to return home with spouse- she did report spouse can assist her as needed and there is only one small step to enter home. Instructed patient to contact spouse to see if he can bring brace to her as this may increase mobility independence level. Will continue to follow.    Patient was not wearing a face mask during this therapy encounter. Therapist used appropriate personal protective equipment including gown, eye protection, mask and gloves.  Mask used was N95/[id number]. Appropriate PPE was worn during the entire therapy session. Hand hygiene was completed before and after therapy session. Patient is in enhanced droplet precautions.

## 2022-03-14 NOTE — PLAN OF CARE
PRN percocet given x2 for back, and RLE pain. Patient more alert tonight, and calls out frequently to be repositioned in the bed. Patient took medications as ordered and ate 2 packs of crackers. Nutrition consult ordered. Waffle seat cushion and wedge utilized to help with positioning. Access to see today. Will continue to monitor.    Problem: Pain Chronic (Persistent) (Comorbidity Management)  Goal: Acceptable Pain Control and Functional Ability  Outcome: Ongoing, Progressing  Intervention: Manage Persistent Pain  Recent Flowsheet Documentation  Taken 3/13/2022 2128 by Kami Patton RN  Medication Review/Management: medications reviewed  Intervention: Develop Pain Management Plan  Recent Flowsheet Documentation  Taken 3/14/2022 0216 by Kami Patton RN  Pain Management Interventions:  • see MAR  • quiet environment facilitated  • position adjusted  • pillow support provided  Taken 3/13/2022 2128 by Kami Patton, RN  Pain Management Interventions:  • see MAR  • quiet environment facilitated  • position adjusted  • pillow support provided   Goal Outcome Evaluation:

## 2022-03-14 NOTE — CONSULTS
"Adult Nutrition  Assessment/PES    Patient Name:  Marlena Navarrete  YOB: 1950  MRN: [id number]  Admit Date:  3/9/2022    Assessment Date:  3/14/2022    Comments:  Chronic poor intake. Pt admit with PNA due to COVID, possible stroke, associated with COVID, narcotic dependence, secondary hyperparathyroidism, hx: CKD III, hx kidney transplant, high bp. 0% x 3 meals documented. Pt reported not eating well. Pt likes meatloaf and chicken salad. Pt dislikes roast beef. Pt open to trying Boost Pudding in vanilla - ordered TID. Continue to follow.     Reason for Assessment     Row Name 03/14/22 1223          Reason for Assessment    Reason For Assessment nurse/nurse practitioner consult     Diagnosis other (see comments)  Pt admit with: PNA due to COVID, possible stroke associated with COVID, narcotic dependence, secondary hyperparathyroidism, hx: CKD III, hx kidney transplant, high bp     Identified At Risk by Screening Criteria other (see comments)  Chronic poor intake                Nutrition/Diet History     Row Name 03/14/22 1228          Nutrition/Diet History    Typical Intake (Food/Fluid/EN/PN) Pt reported not eating well.     Food Preferences Pt dislikes roast beef. Pt likes meatloaf and chicken salad. Pt open to trying vanilla Boost Pudding.     Factors Affecting Nutritional Intake other (see comments)  decreased appetite                Anthropometrics     Row Name 03/14/22 1231          Anthropometrics    Height 147.3 cm (57.99\")     Weight 64.7 kg (142 lb 10.2 oz)  not weighed by RD     Height for Calculation 1.473 m (4' 9.99\")     Weight for Calculation 64.7 kg (142 lb 10.2 oz)     Additional Documentation --  BMI: 29.8            Ideal Body Weight (IBW)    Retired Ideal Body Weight (IBW) (kg) 41.27     Retired % Ideal Body Weight 156.77            Retired Body Mass Index (BMI)    Retired BMI (kg/m2) 29.88                Labs/Tests/Procedures/Meds     Row Name 03/14/22 1229          " "Labs/Procedures/Meds    Lab Results Reviewed reviewed     Lab Results Comments chloride (high), BUN (high), hemoglobin a1c 6.00, creatinine (high), magnesium (high), triglycerides (high), albumin (low), c-reactive protein (high)            Diagnostic Tests/Procedures    Diagnostic Test/Procedure Reviewed reviewed            Medications    Pertinent Medications Reviewed reviewed     Pertinent Medications Comments lipitor, plavix, lovenox, melatonin, nystatin, pantoprazole, lyrica, pericolace, sodium bicarbonate, sodium chloride, prn: metaxalone, percocet                  Estimated/Assessed Needs - Anthropometrics     Row Name 03/14/22 1231          Anthropometrics    Height 147.3 cm (57.99\")     Weight 64.7 kg (142 lb 10.2 oz)  not weighed by RD     Height for Calculation 1.473 m (4' 9.99\")     Weight for Calculation 64.7 kg (142 lb 10.2 oz)     Additional Documentation --  BMI: 29.8            Estimated/Assessed Needs    Additional Documentation KCAL/KG (Group);Fluid Requirements (Group);Protein Requirements (Group)            KCAL/KG    KCAL/KG 25 Kcal/Kg (kcal);30 Kcal/Kg (kcal)     25 Kcal/Kg (kcal) 1617.5     30 Kcal/Kg (kcal) 1941            Protein Requirements    Weight Used For Protein Calculations 64.7 kg (142 lb 10.2 oz)     Est Protein Requirement Amount (gms/kg) 1.0 gm protein     Estimated Protein Requirements (gms/day) 64.7            Fluid Requirements    Fluid Requirements (mL/day) 1700     RDA Method (mL) 1700                Nutrition Prescription Ordered     Row Name 03/14/22 1232          Nutrition Prescription PO    Current PO Diet Soft Texture     Texture Ground     Fluid Consistency Thin                Evaluation of Received Nutrient/Fluid Intake     Row Name 03/14/22 1233          PO Evaluation    % PO Intake 0% x 3 meals                     Problem/Interventions:   Problem 1     Row Name 03/14/22 1233          Nutrition Diagnoses Problem 1    Problem 1 Inadequate Nutrient Intake     Etiology " (related to) Factors Affecting Nutrition     Appetite Poor     Signs/Symptoms (evidenced by) PO Intake     Percent (%) intake recorded 0 %     Over number of meals 3                      Intervention Goal     Row Name 03/14/22 1233          Intervention Goal    General Maintain nutrition;Meet nutritional needs for age/condition     PO Tolerate PO;Increase intake;Meet estimated needs     Weight Maintain weight                Nutrition Intervention     Row Name 03/14/22 1233          Nutrition Intervention    RD/Tech Action Follow Tx progress;Care plan reviewd;Encourage intake;Recommend/ordered     Recommended/Ordered Supplement                Nutrition Prescription     Row Name 03/14/22 1234          Nutrition Prescription PO    PO Prescription Begin/change supplement     Supplement Boost Pudding     Supplement Frequency 3 times a day     New PO Prescription Ordered? Yes                Education/Evaluation     Row Name 03/14/22 1234          Education    Education Will Instruct as appropriate            Monitor/Evaluation    Monitor Per protocol;PO intake;I&O;Supplement intake;Pertinent labs;Weight;Skin status;Symptoms                 Electronically signed by:  Nati Duenas RD  03/14/22 12:35 EDT

## 2022-03-14 NOTE — PROGRESS NOTES
[facility] Medicine Services  PROGRESS NOTE    Patient Name: Marlena Navarrete  : 1950  MRN: [id number]    Date of Admission: 3/9/2022  Primary Care Physician: Bradley Hernandez MD    Subjective   Subjective     CC:  Follow-up COVID-19 and back pain    HPI:  Patient is resting in bed calm.  She is oriented.  Her short-term memory is still poor but improved.  Her thought pattern is more logical.  She has started taking her medications again.  She still complains of back pain.  Shortness of breath much improved    Review of Systems  No current fevers or chills  No current nausea, vomiting, or diarrhea  No current chest pain or palpitations      Objective   Objective     Vital Signs:   Temp:  [97.7 °F (36.5 °C)-98.4 °F (36.9 °C)] 97.7 °F (36.5 °C)  Heart Rate:  [] 83  Resp:  [17-20] 18  BP: (133-174)/(76-92) 162/77  Total (NIH Stroke Scale): 20     Physical Exam:  Constitutional:Awake, alert  HENT: NCAT, mucous membranes moist, neck supple  Respiratory: Rare cough, clear to auscultation bilaterally, respiratory effort normal, nonlabored breathing   Cardiovascular: RRR, normal radial pulses  Gastrointestinal: Positive bowel sounds, soft, nontender, nondistended  Musculoskeletal: Elderly frail and chronically debilitated appearance, obese, BMI is 31, mild lower extremity edema  Psychiatric: Intermittent agitation affect, cooperative, conversational  Neurologic: Patient has some less slow mentation but is otherwise oriented however short-term memory is poor, moving extremities, no slurred speech or facial droop, follows commands  Skin: No rashes or jaundice, warm      Results Reviewed:  Results from last 7 days   Lab Units 22  0443 22  0332 22  1029 22  0239 03/10/22  0457   WBC 10*3/mm3 8.43 10.64 13.25*   < > 7.36   HEMOGLOBIN g/dL 12.8 14.6 13.4   < > 11.4*   HEMATOCRIT % 38.4 43.5 38.9   < > 34.1   PLATELETS 10*3/mm3 283 325 293   < > 258   INR   --   --   --   --  0.99    PROCALCITONIN ng/mL  --   --   --   --  0.07    < > = values in this interval not displayed.     Results from last 7 days   Lab Units 03/14/22  0443 03/13/22  0332 03/12/22  1029   SODIUM mmol/L 142 145 141   POTASSIUM mmol/L 3.7 3.7 3.8   CHLORIDE mmol/L 111* 112* 112*   CO2 mmol/L 19.0* 17.0* 17.0*   BUN mg/dL 43* 47* 52*   CREATININE mg/dL 1.63* 1.63* 1.81*   GLUCOSE mg/dL 97 108* 111*   CALCIUM mg/dL 8.9 9.4 9.1   ALT (SGPT) U/L 11 13 12   AST (SGOT) U/L 12 15 12     Estimated Creatinine Clearance: 27.4 mL/min (A) (by C-G formula based on SCr of 1.63 mg/dL (H)).    Microbiology Results Abnormal     None          Imaging Results (Last 24 Hours)     ** No results found for the last 24 hours. **              I have reviewed the medications:  Scheduled Meds:acetaminophen, 650 mg, Oral, BID  atorvastatin, 40 mg, Oral, Nightly  clopidogrel, 75 mg, Oral, Daily  enoxaparin, 30 mg, Subcutaneous, Nightly  melatonin, 3 mg, Oral, Nightly  nystatin, , Topical, Q12H  OLANZapine zydis, 5 mg, Oral, BID  pantoprazole, 40 mg, Oral, QAM  pregabalin, 25 mg, Oral, Q12H  senna-docusate sodium, 2 tablet, Oral, BID  sodium bicarbonate, 1,300 mg, Oral, TID  sodium chloride, 10 mL, Intravenous, Q12H      Continuous Infusions:   PRN Meds:.•  acetaminophen **OR** acetaminophen **OR** acetaminophen  •  albuterol sulfate HFA  •  benzonatate  •  senna-docusate sodium **AND** polyethylene glycol **AND** bisacodyl **AND** bisacodyl  •  calcium carbonate  •  labetalol  •  melatonin  •  metaxalone  •  muscle rub  •  OLANZapine  •  ondansetron **OR** ondansetron  •  oxyCODONE-acetaminophen  •  [COMPLETED] Insert peripheral IV **AND** sodium chloride    Assessment/Plan   Assessment & Plan     Active Hospital Problems    Diagnosis  POA   • Pneumonia due to COVID-19 virus [U07.1, J12.82]  Yes   • Narcotic dependence (HCC) [F11.20]  Yes   • Intractable back pain [M54.9]  Yes   • Spinal cord stimulator status [Z96.89]  Not Applicable   • Secondary  hyperparathyroidism  [N25.81]  Yes   • Vitamin D deficiency [E55.9]  Yes   • Possible stroke associated with COVID-19 (HCC), unable to confirm with MRI due to final stimulator [U07.1, I63.9]  No   • Stage 3b chronic kidney disease (HCC) [N18.32]  Yes   • History of DVT (deep vein thrombosis) [Z86.718]  Not Applicable   • 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        Brief Hospital Course to date:  Marlena Navarrete is a 72 y.o. female presents the hospital with intractable back pain and COVID-19 viral infection.    Discussion/plan for today:  Mentation much improved.  Continue lower dose Lyrica and narcotics.  Continue supportive care and symptom treatment.  Voiding trial.  Otherwise per below    Covid infection:  Patient with vascular congestion on chest x-ray concerning for early Covid pneumonia.  Patient had high risk factors and met criteria for 3-day course of remdesivir which has been completed.  Respiratory status currently stable.    Metabolic encephalopathy / psychosis and possible stroke related to Covid:  CT negative but unable to get MRI due to implant.  Seen by neurology and an initial consult I started her on Plavix and she is on higher dose atorvastatin.  Carotid ultrasound negative for stenosis.  Initially unable to get echocardiogram due to Covid status.  Continue neurologic monitoring.  Mental status has improved.  High-dose Lyrica thought to be contributing which had recently been increased.  Lyrica dose drastically reduced.    Acute on chronic pain with narcotic and  Lyrica dependence:  Narcotics and Lyrica decreased which is help with mentation.      Acute lumbar back pain:  differential diagnosis includes lumbar sprain or other lumbar issues is possible however we are unable MRI of the lumbar spine due to spinal cord stimulator.  CT images reviewed and noted that patient does have some disc bulging and lumbar osteophyte noted.  She was evaluated by neurosurgery who started steroids.  Confusion worsened on steroids.  I discussed with their service further and steroids have subsequently been discontinued based on risk versus benefit assessment.  Needs to follow-up with surgery outpatient.    Renal transplant:  In the setting of renal transplant.  Nephrology has been consulted.  Plan to trend renal function and avoid nephrotoxic medications.  Trending renal function.  Patient is not on antirejection medication at home.    Debility: PT OT.  Skilled facility at discharge.  Supportive care    Acute Urinary retention:   Plan for voiding trial and can replace if needed    Continue PPI for GERD.  Careful low-dose pain medication as needed.      Skelaxin as needed for muscle spasms.      Remains complex case however appears to be improving.    DVT Prophylaxis: Lovenox    Disposition: Skilled facility when possible    CODE STATUS:   Code Status and Medical Interventions:   Ordered at: 03/09/22 2229     Code Status (Patient has no pulse and is not breathing):    CPR (Attempt to Resuscitate)     Medical Interventions (Patient has pulse or is breathing):    Full Support       Paul Mcmahan MD  03/14/22

## 2022-03-14 NOTE — CONSULTS
IDENTIFYING INFORMATION: The patient is a 72-year-old white female admitted on 3/9/2022 with chronic back pain.  She is COVID positive.  She had been exhibiting some confusion but when seen today appears to be returning to normal mentation.    CHIEF COMPLAINT: My back hurts    INFORMANT: Patient and chart    RELIABILITY: Good    HISTORY OF PRESENT ILLNESS: The patient is a 9-year-old white female with a history of chronic intractable back pain.  She is also status post kidney transplant and presented to the emergency room with complaints of worsening back pain.  On admission she was found to be COVID positive.  The patient has a history of lumbar discectomy and fusion by Dr. Escalante and has a spinal cord stimulator.  She is also involved with pain management.  Dr. Payne's assessment of the patient indicated that he felt as though she was probably suffering from COVID induced encephalopathy.  When seen today the patient is pleasant cooperative and exhibits no signs of psychosis or confusion.  She is fully oriented but continues to complain of physical discomfort.    PAST PSYCHIATRIC HISTORY: None reported    PAST MEDICAL HISTORY: See above, also significant for hypertension, obesity, GERD, dyslipidemia and vitamin D deficiency    MEDICATIONS: Amlodipine, atorvastatin, vitamin D, Tylenol PM, Prilosec, Percocet, Lyrica, sodium bicarbonate    ALLERGIES: None    FAMILY HISTORY: Noncontributory    SOCIAL HISTORY: Patient denies use of alcohol tobacco street drugs    MENTAL STATUS EXAM: The patient is an obese white female appearing her stated age.  She has no apparent physical distress at the time examination.  She is awake alert and oriented all spheres.  Her mood is euthymic her affect congruent.  Speech is relevant and coherent there are no deficits memory or cognition noted.  Intelligence is judged to be in the average range based on fund of knowledge, the patient is without interview.  She denies suicidal or homicidal  ideation psychotic features.  Judgment insight are intact.    ASSETS/LIABILITIES: Motivation for change/health issues    DIAGNOSTIC IMPRESSION: Metabolic encephalopathy secondary to COVID-19 resolving, medical problems as described previously    PLAN: I am in agreement with Dr. Payne's assessment.  The patient's mentation appears to be improving greatl and I will discontinue scheduled olanzapine but will leave in place as needed olanzapine for any episodes of possible agitation as I may have caught her during a lucid interval.  I will continue to follow with you.

## 2022-03-14 NOTE — NURSING NOTE
Spoke to Gee in [facility] to review consult to MD Noel. States he will make sure patient is seen today.

## 2022-03-14 NOTE — THERAPY TREATMENT NOTE
Patient Name: Marlena Navarrete  : 1950    MRN: [id number]                              Today's Date: 3/14/2022       Admit Date: 3/9/2022    Visit Dx:     ICD-10-CM ICD-9-CM   1. Intractable back pain  M54.9 724.5   2. Lumbar radiculopathy  M54.16 724.4     Patient Active Problem List   Diagnosis   • Lumbar radiculopathy   • Essential hypertension   • Right foot drop   • Kidney transplant recipient   • History of lumbar laminectomy for spinal cord decompression   • Spinal enthesopathy of thoracic region ([facility])   • Postlaminectomy syndrome, lumbar region   • Chronic bilateral low back pain with right-sided sciatica   • Displacement of other nervous system device, implant or graft, initial encounter ([facility])   • Postlaminectomy syndrome of lumbar region   • Epigastric pain   • Nausea   • Gastroesophageal reflux disease   • Shoulder arthritis   • Acute renal failure (ARF) ([facility])   • Stage 3b chronic kidney disease ([facility])   • Iron deficiency anemia   • Status post rotator cuff surgery   • History of DVT (deep vein thrombosis)   • Peripheral neuropathy   • Intractable back pain   • Spinal cord stimulator status   • Adhesive arachnoiditis   • 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   • Inflammation of sacroiliac joint ([facility])   • Secondary hyperparathyroidism    • Vitamin D deficiency   • Stroke associated with COVID-19 ([facility])     Past Medical History:   Diagnosis Date   • Arthritis     OSTEO   • Chronic back pain    • Chronic bilateral low back pain with right-sided sciatica 2020   • CKD (chronic kidney disease)     FOLLOWED BY DR LANRE BYNUM   • Diverticulosis    • Foot drop, right    • GERD (gastroesophageal reflux disease)    • History of DVT (deep vein thrombosis) 2018    RIGHT CALF S/P BACK SURGERY    • History of transfusion     no reaction   • Hyperlipidemia    • Hypertension    • Kidney transplant recipient 1979    HX TRANSPLANT D/T REFLUX    • Neuropathy    •  Secondary hyperparathyroidism  3/9/2022   • Spinal cord stimulator status 3/9/2022   • Spinal headache    • Stage 3b chronic kidney disease (HCC) 2020    FOLLOWED BY DR LANRE BYNUM   • Torn rotator cuff    • Vitamin D deficiency 3/9/2022     Past Surgical History:   Procedure Laterality Date   • APPENDECTOMY     • CATARACT EXTRACTION EXTRACAPSULAR W/ INTRAOCULAR LENS IMPLANTATION     •  SECTION     • COLONOSCOPY  approx     Anastacio WHARTON   • ENDOSCOPY N/A 2020    Procedure: ESOPHAGOGASTRODUODENOSCOPY with biopsies;  Surgeon: Wicho Chaves MD;  Location: [facility] ENDOSCOPY;  Service: Gastroenterology;  Laterality: N/A;  pre- epigastric pain, nausea  post-- gastritis, duodenitis, bile reflux   • LUMBAR DISCECTOMY FUSION INSTRUMENTATION Right 2018    Procedure: Right L2-3 laminectomy with Metrix;  Surgeon: Oscar Paulino MD;  Location: [facility] OR;  Service: Neurosurgery   • SHOULDER ARTHROSCOPY W/ ROTATOR CUFF REPAIR Right 2020    Procedure: SHOULDER ARTHROSCOPY, ROTATOR CUFF REPAIR, SUBACROMIAL DECOMPRESSION, DISTAL CLAVICLE EXCISION, BICEPS TENOTOMY, AND LABRAL DEBRIDEMENT;  Surgeon: Rishabh Florence MD;  Location: [facility] OR [facility];  Service: Orthopedics;  Laterality: Right;   • SHOULDER ROTATOR CUFF REPAIR Left    • SPINAL CORD STIMULATOR IMPLANT N/A 2020    Procedure: SPINAL CORD STIMULATOR INSERTION PHASE 2, triall of right gluteal internal pulse generator;  Surgeon: Cuate Kemp MD;  Location: [facility] OR;  Service: Neurosurgery;  Laterality: N/A;   • SPINAL CORD STIMULATOR IMPLANT N/A 2020    Procedure: SPINAL CORD STIMULATOR INSERTION PHASE 1, Thoracic 10 laminotomy for revision of [facility] Tom surgical lead at Thoracic 8 to Thoracic 9;  Surgeon: Cuate Kemp MD;  Location: [facility] OR;  Service: Neurosurgery;  Laterality: N/A;   • TONSILLECTOMY     • TRANSPLANTATION RENAL Right 1979    SISTER GAVE PT    • TUBAL ABDOMINAL LIGATION         General Information     Row Name 03/14/22 1049          Physical Therapy Time and Intention    Document Type therapy note (daily note)  -MS     Mode of Treatment physical therapy  -MS     Row Name 03/14/22 1049          General Information    Existing Precautions/Restrictions fall;oxygen therapy device and L/min  -MS     Row Name 03/14/22 1049          Cognition    Orientation Status (Cognition) oriented x 3  -MS     Row Name 03/14/22 1049          Safety Issues, Functional Mobility    Impairments Affecting Function (Mobility) cognition;balance;endurance/activity tolerance;strength;postural/trunk control;shortness of breath;range of motion (ROM)  -MS     Comment, Safety Issues/Impairments (Mobility) Gait belt and non skid socks donned.  -MS           User Key  (r) = Recorded By, (t) = Taken By, (c) = Cosigned By    Initials Name Provider Type    MS BoothYumiko, PT Physical Therapist               Mobility     Row Name 03/14/22 1049          Bed Mobility    Bed Mobility supine-sit;sit-supine  -MS     Supine-Sit Ceiba (Bed Mobility) moderate assist (50% patient effort);verbal cues;nonverbal cues (demo/gesture)  -MS     Sit-Supine Ceiba (Bed Mobility) supervision;verbal cues;nonverbal cues (demo/gesture)  -MS     Assistive Device (Bed Mobility) bed rails;head of bed elevated  -MS     Comment, (Bed Mobility) SV-SBA for sitting balance, able to participate in B LE TE, sensitivity noted in R foot.  -MS     Row Name 03/14/22 1049          Transfers    Comment, (Transfers) Multiple STS from bed, postural cues.  -MS     Row Name 03/14/22 1049          Sit-Stand Transfer    Sit-Stand Ceiba (Transfers) contact guard;verbal cues;nonverbal cues (demo/gesture)  -MS     Assistive Device (Sit-Stand Transfers) walker, front-wheeled  -MS     Row Name 03/14/22 1049          Gait/Stairs (Locomotion)    [place]iba Level (Gait) contact guard;verbal cues;nonverbal cues (demo/gesture)  -MS     Assistive Device  (Gait) walker, front-wheeled  -MS     Distance in Feet (Gait) 10' total  -MS     Deviations/Abnormal Patterns (Gait) sanjuana decreased;gait speed decreased;steppage  -MS     Right Sided Gait Deviations heel strike decreased  -MS     Comment, (Gait/Stairs) Reported wears R AFO at all times- currently not in room, no overt LOB but did fatigue quickly, attempted second trial after resting but patient reported fatigue.  -MS           User Key  (r) = Recorded By, (t) = Taken By, (c) = Cosigned By    Initials Name Provider Type    Yumiko Aparicio, PT Physical Therapist               Obj/Interventions     Row Name 03/14/22 1051          Balance    Balance Assessment sitting static balance;standing static balance  -MS     Static Sitting Balance supervision  -MS     Position, Sitting Balance sitting edge of bed  -MS     Static Standing Balance contact guard  -MS     Position/Device Used, Standing Balance supported;walker, rolling  -MS           User Key  (r) = Recorded By, (t) = Taken By, (c) = Cosigned By    Initials Name Provider Type    Yumiko Aparicio, PT Physical Therapist               Goals/Plan    No documentation.                Clinical Impression     Row Name 03/14/22 1052          Pain    Pre/Posttreatment Pain Comment No pain numerical value but shouted in pain with R LE and reported very sensitive at baseline.  -MS     Row Name 03/14/22 1052          Plan of Care Review    Plan of Care Reviewed With patient  -MS     Progress improving  -MS     Outcome Evaluation Patient alert and oriented x 4 today. Signfiicant mobility improvement since previous therapy session. Patient did require modA for supine<>sit, CGA for transfers and ambulated a total of 10' CGA with a RW. Patient did report she wears a R AFO at all times- searched for brace and shoes in room but unable to find. Patient did fatigue quickly with short ambulation trial but overall progressing. Reviewed DC planning and patient reported her  plan is to return home with spouse- she did report spouse can assist her as needed and there is only one small step to enter home. Instructed patient to contact spouse to see if he can bring brace to her as this may increase mobility independence level. Will continue to follow.  -MS     Row Name 03/14/22 1052          Vital Signs    O2 Delivery Pre Treatment room air  -MS     Post SpO2 (%) 94  -MS     O2 Delivery Post Treatment room air  -MS     Row Name 03/14/22 1052          Positioning and Restraints    Pre-Treatment Position in bed  -MS     Post Treatment Position bed  -MS     In Bed fowlers;call light within reach;encouraged to call for assist;exit alarm on  -MS           User Key  (r) = Recorded By, (t) = Taken By, (c) = Cosigned By    Initials Name Provider Type    Yumiko Aparicio PT Physical Therapist               Outcome Measures     Row Name 03/14/22 1055          How much help from another person do you currently need...    Turning from your back to your side while in flat bed without using bedrails? 3  -MS     Moving from lying on back to sitting on the side of a flat bed without bedrails? 2  -MS     Moving to and from a bed to a chair (including a wheelchair)? 2  -MS     Standing up from a chair using your arms (e.g., wheelchair, bedside chair)? 3  -MS     Climbing 3-5 steps with a railing? 1  -MS     To walk in hospital room? 2  -MS     AM-PAC 6 Clicks Score (PT) 13  -MS           User Key  (r) = Recorded By, (t) = Taken By, (c) = Cosigned By    Initials Name Provider Type    Yumiko Aparicio PT Physical Therapist                             Physical Therapy Education                 Title: PT OT SLP Therapies (Done)     Topic: Physical Therapy (Done)     Point: Mobility training (Done)     Learning Progress Summary           Patient Acceptance, E, NR,VU by  at 3/13/2022 1613   Family Acceptance, E, NR,VU by  at 3/13/2022 1613      Show all documentation for this point (3)                  Point: Home exercise program (Done)     Learning Progress Summary           Patient Acceptance, E, NR,VU by AG at 3/13/2022 1613   Family Acceptance, E, NR,VU by AG at 3/13/2022 1613      Show all documentation for this point (3)                 Point: Body mechanics (Done)     Learning Progress Summary           Patient Acceptance, E, NR,VU by AG at 3/13/2022 1613   Family Acceptance, E, NR,VU by AG at 3/13/2022 1613      Show all documentation for this point (3)                 Point: Precautions (Done)     Learning Progress Summary           Patient Acceptance, E, NR,VU by AG at 3/13/2022 1613   Family Acceptance, E, NR,VU by AG at 3/13/2022 1613      Show all documentation for this point (3)                             User Key     Initials Effective Dates Name Provider Type Discipline     06/16/21 -  Missael Baltazar, RN Registered Nurse Nurse              PT Recommendation and Plan  Planned Therapy Interventions (PT): balance training, bed mobility training, gait training, home exercise program, postural re-education, patient/family education, ROM (range of motion), stair training, strengthening, transfer training  Plan of Care Reviewed With: patient  Progress: improving  Outcome Evaluation: Patient alert and oriented x 4 today. Signfiicant mobility improvement since previous therapy session. Patient did require modA for supine<>sit, CGA for transfers and ambulated a total of 10' CGA with a RW. Patient did report she wears a R AFO at all times- searched for brace and shoes in room but unable to find. Patient did fatigue quickly with short ambulation trial but overall progressing. Reviewed DC planning and patient reported her plan is to return home with spouse- she did report spouse can assist her as needed and there is only one small step to enter home. Instructed patient to contact spouse to see if he can bring brace to her as this may increase mobility independence level. Will continue to  follow.     Time Calculation:    PT Charges     Row Name 03/14/22 1049             Time Calculation    Start Time 0943  -MS      Stop Time 1010  -MS      Time Calculation (min) 27 min  -MS      PT Received On 03/14/22  -MS      PT - Next Appointment 03/15/22  -MS            User Key  (r) = Recorded By, (t) = Taken By, (c) = Cosigned By    Initials Name Provider Type    Yumiko Aparicio PT Physical Therapist              Therapy Charges for Today     Code Description Service Date Service Provider Modifiers Qty    [id number]  PT THER PROC EA 15 MIN 3/14/2022 Yumiko Booth, PT GP 1    [id number]  PT THERAPEUTIC ACT EA 15 MIN 3/14/2022 Yumiko Booth, PT GP 1          PT G-Codes  Outcome Measure Options: AM-PAC 6 Clicks Basic Mobility (PT)  AM-PAC 6 Clicks Score (PT): 13  AM-PAC 6 Clicks Score (OT): 7  Modified [place] Scale: 5 - Severe disability.  Bedridden, incontinent, and requiring constant nursing care and attention.    Yumiko Booth PT  3/14/2022

## 2022-03-15 VITALS
RESPIRATION RATE: 18 BRPM | DIASTOLIC BLOOD PRESSURE: 77 MMHG | WEIGHT: 140.87 LBS | HEIGHT: 57 IN | TEMPERATURE: 97.5 F | HEART RATE: 95 BPM | BODY MASS INDEX: 30.39 KG/M2 | SYSTOLIC BLOOD PRESSURE: 130 MMHG | OXYGEN SATURATION: 99 %

## 2022-03-15 PROBLEM — D89.831 CYTOKINE RELEASE SYNDROME, GRADE 1: Status: ACTIVE | Noted: 2022-03-15

## 2022-03-15 LAB
ALBUMIN SERPL-MCNC: 3.6 G/DL (ref 3.5–5.2)
ANION GAP SERPL CALCULATED.3IONS-SCNC: 14.4 MMOL/L (ref 5–15)
BASOPHILS # BLD AUTO: 0.02 10*3/MM3 (ref 0–0.2)
BASOPHILS NFR BLD AUTO: 0.2 % (ref 0–1.5)
BUN SERPL-MCNC: 33 MG/DL (ref 8–23)
BUN/CREAT SERPL: 18.8 (ref 7–25)
CALCIUM SPEC-SCNC: 9.1 MG/DL (ref 8.6–10.5)
CHLORIDE SERPL-SCNC: 109 MMOL/L (ref 98–107)
CO2 SERPL-SCNC: 20.6 MMOL/L (ref 22–29)
CREAT SERPL-MCNC: 1.76 MG/DL (ref 0.57–1)
DEPRECATED RDW RBC AUTO: 44.1 FL (ref 37–54)
EGFRCR SERPLBLD CKD-EPI 2021: 30.8 ML/MIN/1.73
EOSINOPHIL # BLD AUTO: 0.23 10*3/MM3 (ref 0–0.4)
EOSINOPHIL NFR BLD AUTO: 2.2 % (ref 0.3–6.2)
ERYTHROCYTE [DISTWIDTH] IN BLOOD BY AUTOMATED COUNT: 13.2 % (ref 12.3–15.4)
GLUCOSE SERPL-MCNC: 96 MG/DL (ref 65–99)
HCT VFR BLD AUTO: 40.1 % (ref 34–46.6)
HGB BLD-MCNC: 13.4 G/DL (ref 12–15.9)
IMM GRANULOCYTES # BLD AUTO: 0.03 10*3/MM3 (ref 0–0.05)
IMM GRANULOCYTES NFR BLD AUTO: 0.3 % (ref 0–0.5)
LYMPHOCYTES # BLD AUTO: 3.44 10*3/MM3 (ref 0.7–3.1)
LYMPHOCYTES NFR BLD AUTO: 33.5 % (ref 19.6–45.3)
MAGNESIUM SERPL-MCNC: 2.3 MG/DL (ref 1.6–2.4)
MCH RBC QN AUTO: 31 PG (ref 26.6–33)
MCHC RBC AUTO-ENTMCNC: 33.4 G/DL (ref 31.5–35.7)
MCV RBC AUTO: 92.8 FL (ref 79–97)
MONOCYTES # BLD AUTO: 0.85 10*3/MM3 (ref 0.1–0.9)
MONOCYTES NFR BLD AUTO: 8.3 % (ref 5–12)
NEUTROPHILS NFR BLD AUTO: 5.69 10*3/MM3 (ref 1.7–7)
NEUTROPHILS NFR BLD AUTO: 55.5 % (ref 42.7–76)
NRBC BLD AUTO-RTO: 0 /100 WBC (ref 0–0.2)
PHOSPHATE SERPL-MCNC: 3.4 MG/DL (ref 2.5–4.5)
PLATELET # BLD AUTO: 303 10*3/MM3 (ref 140–450)
PMV BLD AUTO: 10.3 FL (ref 6–12)
POTASSIUM SERPL-SCNC: 3.2 MMOL/L (ref 3.5–5.2)
RBC # BLD AUTO: 4.32 10*6/MM3 (ref 3.77–5.28)
SODIUM SERPL-SCNC: 144 MMOL/L (ref 136–145)
URATE SERPL-MCNC: 6.6 MG/DL (ref 2.4–5.7)
WBC NRBC COR # BLD: 10.26 10*3/MM3 (ref 3.4–10.8)

## 2022-03-15 PROCEDURE — 80069 RENAL FUNCTION PANEL: CPT | Performed by: INTERNAL MEDICINE

## 2022-03-15 PROCEDURE — 85025 COMPLETE CBC W/AUTO DIFF WBC: CPT | Performed by: INTERNAL MEDICINE

## 2022-03-15 PROCEDURE — 83735 ASSAY OF MAGNESIUM: CPT | Performed by: INTERNAL MEDICINE

## 2022-03-15 PROCEDURE — 84550 ASSAY OF BLOOD/URIC ACID: CPT | Performed by: INTERNAL MEDICINE

## 2022-03-15 RX ORDER — OXYCODONE HYDROCHLORIDE AND ACETAMINOPHEN 5; 325 MG/1; MG/1
1 TABLET ORAL EVERY 4 HOURS PRN
Qty: 18 TABLET | Refills: 0 | Status: SHIPPED | OUTPATIENT
Start: 2022-03-15 | End: 2022-03-18

## 2022-03-15 RX ORDER — PREGABALIN 25 MG/1
25 CAPSULE ORAL EVERY 12 HOURS SCHEDULED
Qty: 6 CAPSULE | Refills: 0 | Status: SHIPPED | OUTPATIENT
Start: 2022-03-15

## 2022-03-15 RX ORDER — LANOLIN ALCOHOL/MO/W.PET/CERES
3 CREAM (GRAM) TOPICAL NIGHTLY
Qty: 30 TABLET
Start: 2022-03-15

## 2022-03-15 RX ORDER — CLOPIDOGREL BISULFATE 75 MG/1
75 TABLET ORAL DAILY
Qty: 30 TABLET
Start: 2022-03-16

## 2022-03-15 RX ORDER — ACETAMINOPHEN 325 MG/1
650 TABLET ORAL EVERY 4 HOURS PRN
Start: 2022-03-15

## 2022-03-15 RX ORDER — ALBUTEROL SULFATE 90 UG/1
2 AEROSOL, METERED RESPIRATORY (INHALATION) EVERY 6 HOURS PRN
Start: 2022-03-15

## 2022-03-15 RX ORDER — NYSTATIN 100000 [USP'U]/G
POWDER TOPICAL EVERY 12 HOURS SCHEDULED
Start: 2022-03-15 | End: 2022-03-25

## 2022-03-15 RX ORDER — SODIUM BICARBONATE 650 MG/1
1300 TABLET ORAL 3 TIMES DAILY
Start: 2022-03-15

## 2022-03-15 RX ORDER — ATORVASTATIN CALCIUM 40 MG/1
40 TABLET, FILM COATED ORAL NIGHTLY
Start: 2022-03-15

## 2022-03-15 RX ADMIN — PREGABALIN 25 MG: 25 CAPSULE ORAL at 08:54

## 2022-03-15 RX ADMIN — PANTOPRAZOLE SODIUM 40 MG: 40 TABLET, DELAYED RELEASE ORAL at 08:54

## 2022-03-15 RX ADMIN — ACETAMINOPHEN 650 MG: 325 TABLET, FILM COATED ORAL at 08:54

## 2022-03-15 RX ADMIN — Medication 10 ML: at 09:06

## 2022-03-15 RX ADMIN — SODIUM BICARBONATE 1300 MG: 650 TABLET ORAL at 08:54

## 2022-03-15 RX ADMIN — NYSTATIN: 100000 POWDER TOPICAL at 09:05

## 2022-03-15 RX ADMIN — CLOPIDOGREL 75 MG: 75 TABLET, FILM COATED ORAL at 08:54

## 2022-03-15 NOTE — CASE MANAGEMENT/SOCIAL WORK
Continued Stay Note  [facility]     Patient Name: Marlena Navarrete  MRN: [id number]  Today's Date: 3/15/2022    Admit Date: 3/9/2022     Discharge Plan     Row Name 03/15/22 1203       Plan    Plan [facility]    Patient/Family in Agreement with Plan yes    Plan Comments Per Cayden they can accept and will have a bed today. MD updated and patient DC'd. Spoke with patient's spouse and he will provide transportation. Partial packet at the station with the nurse. Cayden updated the spouse will be bringing the patient this afternoon. Oxana JIMENEZ RN CCP               Discharge Codes    No documentation.               Expected Discharge Date and Time     Expected Discharge Date Expected Discharge Time    Mar 15, 2022             Oxana Prakash RN

## 2022-03-15 NOTE — PROGRESS NOTES
[facility] Progress Note      Patient Name: Marlena Navarrete  : 1950  MRN: [id number]  Primary Care Physician:  Bradley Hernandez MD  Date of admission: 3/9/2022    Subjective     Interval History:   Follow-up kidney transplant    The patient is feeling much better today, denies chest pain or shortness of air, no nausea or vomiting, no abdominal pain, no dysuria or gross hematuria, she has been having productive cough but not requiring any oxygen    Review of Systems:   As noted above    Objective     Vitals:   Temp:  [97.3 °F (36.3 °C)-98.1 °F (36.7 °C)] 98.1 °F (36.7 °C)  Heart Rate:  [100-120] 120  Resp:  [18] 18  BP: (150-168)/(82-95) 167/93    Intake/Output Summary (Last 24 hours) at 3/15/2022 0826  Last data filed at 3/14/2022 1800  Gross per 24 hour   Intake 240 ml   Output 600 ml   Net -360 ml       Physical Exam:    Constitutional: Awake, alert, no acute distress.    Obese, chronically ill and frail  HEENT: Sclera anicteric, no conjunctival injection, oral mucosa is normal  Neck: Supple, no thyromegaly, no lymphadenopathy, trachea at midline, no JVD  Respiratory: Clear to auscultation bilaterally, nonlabored respiration  Cardiovascular: RRR, no murmurs, no rubs or gallops, no carotid bruit  Gastrointestinal: Positive bowel sounds, abdomen is soft, nontender and nondistended  : No palpable bladder, renal allograft in the right lower quadrant is nontender with no bruit over the allograft  Musculoskeletal: No edema, no clubbing or cyanosis  Psychiatric: Flat affect, cooperative  Neurologic:  Oriented x3, moving all extremities, normal speech .  Skin: Warm and dry        Scheduled Meds:     acetaminophen, 650 mg, Oral, BID  atorvastatin, 40 mg, Oral, Nightly  clopidogrel, 75 mg, Oral, Daily  enoxaparin, 30 mg, Subcutaneous, Nightly  melatonin, 3 mg, Oral, Nightly  nystatin, , Topical, Q12H  pantoprazole, 40 mg, Oral, QAM  pregabalin, 25 mg, Oral, Q12H  senna-docusate sodium, 2  tablet, Oral, BID  sodium bicarbonate, 1,300 mg, Oral, TID  sodium chloride, 10 mL, Intravenous, Q12H      IV Meds:        Results Reviewed:   I have personally reviewed the results from the time of this admission to 3/15/2022 08:26 EDT     Results from last 7 days   Lab Units 03/15/22  0730 03/14/22  0443 03/13/22  0332 03/12/22  1029   SODIUM mmol/L 144 142 145 141   POTASSIUM mmol/L 3.2* 3.7 3.7 3.8   CHLORIDE mmol/L 109* 111* 112* 112*   CO2 mmol/L 20.6* 19.0* 17.0* 17.0*   BUN mg/dL 33* 43* 47* 52*   CREATININE mg/dL 1.76* 1.63* 1.63* 1.81*   CALCIUM mg/dL 9.1 8.9 9.4 9.1   BILIRUBIN mg/dL  --  0.5 0.3 0.3   ALK PHOS U/L  --  66 79 75   ALT (SGPT) U/L  --  11 13 12   AST (SGOT) U/L  --  12 15 12   GLUCOSE mg/dL 96 97 108* 111*       Estimated Creatinine Clearance: 24.8 mL/min (A) (by C-G formula based on SCr of 1.76 mg/dL (H)).    Results from last 7 days   Lab Units 03/15/22  0730 03/14/22  0443 03/13/22  0332   MAGNESIUM mg/dL 2.3 2.5* 2.6*   PHOSPHORUS mg/dL 3.4 3.3 3.6       Results from last 7 days   Lab Units 03/15/22  0730 03/14/22  0443 03/13/22  0332 03/12/22  1029   URIC ACID mg/dL 6.6* 6.8* 6.9* 6.8*       Results from last 7 days   Lab Units 03/15/22  0730 03/14/22  0443 03/13/22  0332 03/12/22  1029 03/11/22  0239   WBC 10*3/mm3 10.26 8.43 10.64 13.25* 8.55   HEMOGLOBIN g/dL 13.4 12.8 14.6 13.4 11.7*   PLATELETS 10*3/mm3 303 283 325 293 263       Results from last 7 days   Lab Units 03/10/22  0457   INR  0.99       Assessment / Plan     ASSESSMENT:  1.  Living related kidney transplant in 1979 from her sister with withdrawal of immunosuppressive drugs in 1985 renal function has been stable.  Creatinine today 1.76, very stable, electrolyte within normal range other than total CO2 20.6, was started on oral sodium bicarb  2.  Chronic kidney disease stage III-IV has been very stable  3.  Reflux nephropathy as the etiology of the end-stage renal disease of the native kidneys, patient had bilateral  nephrectomies  4.  COVID-19 positive currently on no oxygen  5.  Anemia  resolved, hemoglobin 13.4  6.  Secondary hyperparathyroidism related to renal disease and vitamin D deficiency  7.  Severe back pain.  8.    Metabolic acidosis, total CO2 20.3, was started on oral sodium bicarb  9.  Dyslipidemia, treated    PLAN:  1.  Continue the same treatment  2.  Surveillance labs      Thank you for involving us in the care of Marlena Navarrete.  Please feel free to call with any questions.    Franco Clemens MD  03/15/22  08:26 EDT    [facility]  [phone]      Much of this encounter note is an electronic transcription/translation of spoken language to printed text. The electronic translation of spoken language may permit erroneous, or at times, nonsensical words or phrases to be inadvertently transcribed; Although I have reviewed the note for such errors, some may still exist.

## 2022-03-15 NOTE — DISCHARGE SUMMARY
Date of Admission: 3/9/2022  Date of Discharge:  3/15/2022  Primary Care Physician: Bradley Hernandez MD     Discharge Diagnosis:  Active Hospital Problems    Diagnosis  POA   • Cytokine release syndrome, grade 1 [D89.831]  No   • Pneumonia due to COVID-19 virus [U07.1, J12.82]  Yes   • Narcotic dependence (HCC) [F11.20]  Yes   • Intractable back pain [M54.9]  Yes   • Spinal cord stimulator status [Z96.89]  Not Applicable   • Secondary hyperparathyroidism  [N25.81]  Yes   • Vitamin D deficiency [E55.9]  Yes   • Possible stroke associated with COVID-19 (HCC), unable to confirm with MRI due to final stimulator [U07.1, I63.9]  No   • Stage 3b chronic kidney disease (HCC) [N18.32]  Yes   • History of DVT (deep vein thrombosis) [Z86.718]  Not Applicable   • 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       DETAILS OF HOSPITAL STAY     Pertinent Test Results and Procedures Performed    CT scan of the lumbar spine:  Status post right laminectomy procedure at L2-3.       Mild levoconvex scoliotic curvature of the lumbar spine with its apex   centered at L2 with advanced degenerative disc changes seen along the   right side of the L2-3 disc space with marked loss of disc space height,   vacuum disc phenomena, and degenerative endplate sclerotic change. A   disc osteophyte complex eccentric to the right at L2-3 in combination   with facet hypertrophic change results in a moderate degree of right   foraminal stenosis which may be mildly worse when compared to the prior   CT myelogram, given  differences in modalities. A mild-to-moderate degree   of left L2-3 foraminal narrowing is also appreciated which appears   similar when compared to the prior exam. A disc osteophyte complex at   L2-3 results in a mild degree of canal stenosis which appears generally   stable when compared to the prior CT myelogram.       At L5-S1, there is bulging disc material which approaches the ventral   surface of the conjoined right L5 and S1 nerve root sleeve. A disc   osteophyte complex eccentric to the left at L5-S1 mild to moderately   narrows the left neural foramen and there is a mild degree of right   foraminal narrowing secondary to loss of foraminal height. Similar   findings were seen at the L5-S1 level on the prior study.       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 Of course, this is not evaluated on this   noncontrast CT scan of the lumbar spine.     Chest x-ray showed no acute findings    Head CT showed no acute intracranial abnormality    Bilateral carotid artery Doppler negative for any acute changes.  Both sides looked normal.    Hospital Course  This is a 72-year-old female who presented with worsening back pain and cough and myalgias.  Please see H&P for full details of admission.  She was found to be positive for Covid.  She is unvaccinated.  She completed a course of remdesivir.  She is not hypoxic or having any respiratory issues/complaints at this point.  Neurosurgery saw her.  She has a history of spinal stimulator.  She was placed on steroids but this made her floridly psychotic and confused.  She was evaluated by neurology who had some concern for at least the possibility of an acute stroke.  Brain MRI cannot be obtained due to the spinal stimulator.  Neurology recommended placing her on Plavix and increasing her aspirin dose empirically.  Going along with this it was also  felt that she had acute toxic/metabolic encephalopathy due to Lyrica and high-dose narcotics along with the steroids.  These doses were decreased and the steroids were stopped and her mentation has now returned to baseline.  She was followed by nephrology for history of kidney transplant with stable renal function.  All consultants have signed off and cleared her for discharge.  She has placement at SNF and will be released today in stable condition.  She should continue to follow-up with neurology per her typical outpatient schedule and we recommend that she be reevaluated by neurosurgery in the office and for 6 weeks.      Physical Exam at Discharge:  General: No acute distress, AAOx3  HEENT: EOMI, PERRL  Cardiovascular: +s1 and s2, RRR  Lungs: No rhonchi or wheezing  Abdomen: soft, nontender    Consults:   Consults     Date and Time Order Name Status Description    3/14/2022  4:54 AM Inpatient Psychiatrist Consult Completed     3/11/2022  6:23 AM Inpatient Nephrology Consult Completed     3/11/2022 12:40 AM Inpatient Neurology Consult Other (see comments) Completed     3/9/2022 12:32 PM Neurosurgery (on-call MD unless specified) Completed             Condition on Discharge: Stable, improved    Discharge Disposition  Skilled Nursing Facility (DC - External)    Discharge Medications     Discharge Medications      New Medications      Instructions Start Date   acetaminophen 325 MG tablet  Commonly known as: TYLENOL   650 mg, Oral, Every 4 Hours PRN      albuterol sulfate  (90 Base) MCG/ACT inhaler  Commonly known as: PROVENTIL HFA;VENTOLIN HFA;PROAIR HFA   2 puffs, Inhalation, Every 6 Hours PRN      clopidogrel 75 MG tablet  Commonly known as: PLAVIX   75 mg, Oral, Daily   Start Date: March 16, 2022     melatonin 3 MG tablet   3 mg, Oral, Nightly      nystatin 330818 UNIT/GM powder  Commonly known as: MYCOSTATIN   Topical, Every 12 Hours Scheduled      oxyCODONE-acetaminophen 5-325 MG per tablet  Commonly  known as: PERCOCET  Replaces: oxyCODONE-acetaminophen  MG per tablet   1 tablet, Oral, Every 4 Hours PRN      sodium bicarbonate 650 MG tablet   1,300 mg, Oral, 3 Times Daily         Changes to Medications      Instructions Start Date   atorvastatin 40 MG tablet  Commonly known as: LIPITOR  What changed:   · medication strength  · how much to take   40 mg, Oral, Nightly      pregabalin 25 MG capsule  Commonly known as: LYRICA  What changed:   · medication strength  · how much to take  · when to take this  · Another medication with the same name was removed. Continue taking this medication, and follow the directions you see here.   25 mg, Oral, Every 12 Hours Scheduled         Continue These Medications      Instructions Start Date   omeprazole 20 MG capsule  Commonly known as: priLOSEC   20 mg, Oral, Daily      Vitamin D3 50 MCG (2000 UT) capsule   2,000 Units, Oral, Daily         Stop These Medications    amLODIPine 5 MG tablet  Commonly known as: NORVASC     diphenhydrAMINE-acetaminophen  MG tablet per tablet  Commonly known as: TYLENOL PM     oxyCODONE-acetaminophen  MG per tablet  Commonly known as: PERCOCET  Replaced by: oxyCODONE-acetaminophen 5-325 MG per tablet     Sodium Bicarbonate powder powder            Discharge Diet:   Diet Instructions     Diet: Soft Texture; Thin Liquids, No Restrictions; Ground      Discharge Diet: Soft Texture    Fluid Consistency: Thin Liquids, No Restrictions    Soft Options: Ground          Activity at Discharge:   Activity Instructions     Activity as Tolerated            Follow-up Appointments  No future appointments.  Additional Instructions for the Follow-ups that You Need to Schedule     Discharge Follow-up with PCP   As directed       Currently Documented PCP:    Bradley Hernandez MD    PCP Phone Number:    [phone]     Follow Up Details: 1 week         Discharge Follow-up with Specialty: Neurosurgery in 4-6 weeks   As directed      Specialty:  Neurosurgery in 4-6 weeks               I have examined and discussed discharge planning with the patient today.    I wore full protective equipment throughout the patient encounter including eye protection and facemask.  Hand hygiene was performed before donning protective equipment and after removal when leaving the room.     Az Mckee MD  03/15/22  11:42 EDT    Time: Discharge greater than 30 min

## 2022-03-15 NOTE — PROGRESS NOTES
The patient is pleasant and cooperative when seen today.  Her mentation appears to have returned to baseline and she exhibits no confusion.  She reports some hesitance regarding a rehabilitation referral.  I will sign off.

## 2022-03-16 NOTE — CASE MANAGEMENT/SOCIAL WORK
Case Management Discharge Note      Final Note: Jolanta Wick'keyonna to [facility]         Selected Continued Care - Discharged on 3/15/2022 Admission date: 3/9/2022 - Discharge disposition: Skilled Nursing Facility (DC - External)    Destination Coordination complete.    Service Provider Selected Services Address Phone Fax Patient Preferred    [facility]  Skilled Nursing [facility] [address] [facility] [id number] [phone] --          Durable Medical Equipment    No services have been selected for the patient.              Dialysis/Infusion    No services have been selected for the patient.              Home Medical Care    No services have been selected for the patient.              Therapy    No services have been selected for the patient.              Community Resources    No services have been selected for the patient.              Community & DME    No services have been selected for the patient.                  Transportation Services  Private: Car    Final Discharge Disposition Code: 03 - skilled nursing facility (SNF)

## 2022-03-21 ENCOUNTER — TRANSCRIBE ORDERS (OUTPATIENT)
Dept: HOME HEALTH SERVICES | Facility: HOME HEALTHCARE | Age: 72
End: 2022-03-21

## 2022-03-21 ENCOUNTER — HOME HEALTH ADMISSION (OUTPATIENT)
Dept: HOME HEALTH SERVICES | Facility: HOME HEALTHCARE | Age: 72
End: 2022-03-21

## 2022-03-21 DIAGNOSIS — U07.1 COVID-19: Primary | ICD-10-CM

## 2022-03-22 ENCOUNTER — HOME CARE VISIT (OUTPATIENT)
Dept: HOME HEALTH SERVICES | Facility: HOME HEALTHCARE | Age: 72
End: 2022-03-22

## 2022-03-22 VITALS
OXYGEN SATURATION: 95 % | SYSTOLIC BLOOD PRESSURE: 110 MMHG | DIASTOLIC BLOOD PRESSURE: 72 MMHG | HEIGHT: 58 IN | TEMPERATURE: 97.6 F | WEIGHT: 138 LBS | HEART RATE: 85 BPM | BODY MASS INDEX: 28.97 KG/M2 | RESPIRATION RATE: 18 BRPM

## 2022-03-22 PROCEDURE — G0151 HHCP-SERV OF PT,EA 15 MIN: HCPCS

## 2022-03-22 NOTE — HOME HEALTH
Patient is a 71yo female discharged on 3/17/22 from [facility] for COVID pneumonia, and back pain. Patient lives with spouse in 1 story home with steps inside to enter different rooms. Patient has cane, pain pump but it's not working right now and plans to meet with [facility] to get it fixed/synced today at pain management MD appointment. Patient has AFO right foot due to foot drop. Skilled Physical Therapy is medically necessary to establish home exercise program after recent functional decline from hospitalization and rehab stay for COVID pneumonia. Patient education necessary for appropriate progression of home exercise program, and gait training to reduce reliance on assist device and improve ambulation tolerance. Without skilled physical therapy, patient at risk for falls, increased burden of care.    PLAN FOR NEXT VISIT:  --Continue therapeutic exercises to improve muscle weakness, stretching to improve sciatic nerve pain  --Continue gait training to improve ambulation tolerance  --Continue pain/edema management

## 2022-03-23 ENCOUNTER — HOME CARE VISIT (OUTPATIENT)
Dept: HOME HEALTH SERVICES | Facility: HOME HEALTHCARE | Age: 72
End: 2022-03-23

## 2022-03-24 ENCOUNTER — HOME CARE VISIT (OUTPATIENT)
Dept: HOME HEALTH SERVICES | Facility: HOME HEALTHCARE | Age: 72
End: 2022-03-24

## 2022-03-25 ENCOUNTER — OFFICE VISIT (OUTPATIENT)
Dept: FAMILY MEDICINE CLINIC | Facility: CLINIC | Age: 72
End: 2022-03-25

## 2022-03-25 VITALS
BODY MASS INDEX: 30.63 KG/M2 | DIASTOLIC BLOOD PRESSURE: 88 MMHG | OXYGEN SATURATION: 95 % | TEMPERATURE: 97.3 F | HEART RATE: 57 BPM | HEIGHT: 57 IN | WEIGHT: 142 LBS | SYSTOLIC BLOOD PRESSURE: 140 MMHG

## 2022-03-25 DIAGNOSIS — Z09 HOSPITAL DISCHARGE FOLLOW-UP: Primary | ICD-10-CM

## 2022-03-25 DIAGNOSIS — I10 ESSENTIAL HYPERTENSION: ICD-10-CM

## 2022-03-25 DIAGNOSIS — G93.41 ACUTE METABOLIC ENCEPHALOPATHY: ICD-10-CM

## 2022-03-25 DIAGNOSIS — Z94.0 KIDNEY TRANSPLANT RECIPIENT: ICD-10-CM

## 2022-03-25 DIAGNOSIS — H61.21 IMPACTED CERUMEN OF RIGHT EAR: ICD-10-CM

## 2022-03-25 DIAGNOSIS — U07.1 COVID: ICD-10-CM

## 2022-03-25 DIAGNOSIS — G61.89 OTHER INFLAMMATORY POLYNEUROPATHIES: Chronic | ICD-10-CM

## 2022-03-25 DIAGNOSIS — M46.04 SPINAL ENTHESOPATHY OF THORACIC REGION: ICD-10-CM

## 2022-03-25 PROCEDURE — 99214 OFFICE O/P EST MOD 30 MIN: CPT | Performed by: NURSE PRACTITIONER

## 2022-03-25 PROCEDURE — 69209 REMOVE IMPACTED EAR WAX UNI: CPT | Performed by: NURSE PRACTITIONER

## 2022-03-25 RX ORDER — AMLODIPINE BESYLATE 5 MG/1
5 TABLET ORAL
COMMUNITY
Start: 2021-12-19 | End: 2022-12-19

## 2022-03-25 NOTE — PROGRESS NOTES
Transitional Care Follow Up Visit  Subjective     Marlena Navarrete is a 72 y.o. female who presents for a transitional care management visit.    Within 48 business hours after discharge our office contacted her via telephone to coordinate her care and needs.      I reviewed and discussed the details of that call along with the discharge summary, hospital problems, inpatient lab results, inpatient diagnostic studies, and consultation reports with Marlena.     Current outpatient and discharge medications have been reconciled for the patient.  Reviewed by: DANY Miller      Date of TCM Phone Call 12/31/2020   [facility]   Date of Admission 12/29/2020   Date of Discharge 12/31/2020   Discharge Disposition Home or Self Care     Risk for Readmission (LACE) Score: 12 (3/15/2022  6:01 AM)      Follows nephrologist on Friday This is a 72-year-old female who presented with worsening back pain and cough and myalgias.  Please see H&P for full details of admission.  She was found to be positive for Covid.  She is unvaccinated.  She completed a course of remdesivir.  She is not hypoxic or having any respiratory issues/complaints at this point.  Neurosurgery saw her.  She has a history of spinal stimulator.  She was placed on steroids but this made her floridly psychotic and confused.  She was evaluated by neurology who had some concern for at least the possibility of an acute stroke.  Brain MRI cannot be obtained due to the spinal stimulator.  Neurology recommended placing her on Plavix and increasing her aspirin dose empirically.  Going along with this it was also felt that she had acute toxic/metabolic encephalopathy due to Lyrica and high-dose narcotics along with the steroids.  These doses were decreased and the steroids were stopped and her mentation has now returned to baseline.  She was followed by nephrology for history of kidney transplant with stable renal function.  All consultants have signed off and  cleared her for discharge.  She has placement at SNF and will be released today in stable condition.  She should continue to follow-up with neurology per her typical outpatient schedule and we recommend that she be reevaluated by neurosurgery in the office and for 6 weeks.     Course During Hospital Stay:  3/9/2022-3/15/2022     The following portions of the patient's history were reviewed and updated as appropriate: allergies, current medications, past family history, past medical history, past social history, past surgical history and problem list.    Review of Systems   Constitutional: Negative for activity change and appetite change.   HENT: Negative for congestion, dental problem, drooling and ear discharge.    Eyes: Negative for discharge and itching.   Respiratory: Negative for apnea, chest tightness and wheezing.    Gastrointestinal: Negative for blood in stool and constipation.   Endocrine: Negative for cold intolerance and heat intolerance.   Genitourinary: Negative for difficulty urinating, dysuria, flank pain and frequency.   Musculoskeletal: Positive for arthralgias and back pain.   Allergic/Immunologic: Positive for environmental allergies.   Neurological: Negative for dizziness, tremors, weakness and headaches.   Hematological: Negative for adenopathy. Does not bruise/bleed easily.   Psychiatric/Behavioral: Negative for agitation and behavioral problems. The patient is not nervous/anxious.        Objective   Physical Exam  Constitutional:       General: She is not in acute distress.     Appearance: Normal appearance.   HENT:      Head: Normocephalic.      Right Ear: There is impacted cerumen.      Left Ear: There is impacted cerumen.   Eyes:      Pupils: Pupils are equal, round, and reactive to light.   Cardiovascular:      Rate and Rhythm: Normal rate and regular rhythm.      Pulses: Normal pulses.      Heart sounds: Normal heart sounds.   Pulmonary:      Effort: Pulmonary effort is normal. No  respiratory distress.      Breath sounds: Normal breath sounds. No wheezing or rales.   Abdominal:      General: Abdomen is flat.      Palpations: Abdomen is soft. There is no mass.      Tenderness: There is no abdominal tenderness.      Hernia: No hernia is present.   Musculoskeletal:         General: Tenderness present.      Cervical back: Normal. No spasms or tenderness. Normal range of motion.      Thoracic back: Normal. No spasms or tenderness. Normal range of motion.      Lumbar back: Spasms and tenderness present. No edema or bony tenderness. Decreased range of motion. No scoliosis.   Neurological:      Mental Status: She is alert.   Psychiatric:         Attention and Perception: Attention and perception normal.         Mood and Affect: Mood normal.         Speech: Speech normal.         Behavior: Behavior normal.         Cognition and Memory: Cognition normal.         Judgment: Judgment normal.         Assessment/Plan   Problems Addressed this Visit        Cardiac and Vasculature    Essential hypertension     Hypertension is improving with treatment.  Continue current treatment regimen.  Dietary sodium restriction.  Weight loss.  Regular aerobic exercise.  Continue current medications.  Blood pressure will be reassessed at the next regular appointment.           Relevant Medications    amLODIPine (NORVASC) 5 MG tablet       ENT    Impacted cerumen of right ear     Patient tolerated bilateral ear irrigation wtihout difficulty            Relevant Orders    Ear Cerumen Removal       Genitourinary and Reproductive     Kidney transplant recipient       Health Encounters    Hospital discharge follow-up - Primary     3/9/2022-3/15/2022              Musculoskeletal and Injuries    Spinal enthesopathy of thoracic region (HCC)       Neuro    Peripheral neuropathy (Chronic)    Acute metabolic encephalopathy     Due to lyrica pt taking high dosages; once lyrica had been decreased pt started improving  Pt stabel lyrica  25mg BID   Pain stable              Other    COVID     Pt has resolved from COVID infection; denies any post covid complaints             Diagnoses       Codes Comments    Hospital discharge follow-up    -  Primary ICD-10-CM: Z09  ICD-9-CM: V67.59     Essential hypertension     ICD-10-CM: I10  ICD-9-CM: 401.9     Spinal enthesopathy of thoracic region (HCC)     ICD-10-CM: M46.04  ICD-9-CM: 720.1     Kidney transplant recipient     ICD-10-CM: Z94.0  ICD-9-CM: V42.0     Other inflammatory polyneuropathies (HCC)     ICD-10-CM: G61.89  ICD-9-CM: 357.89     Impacted cerumen of right ear     ICD-10-CM: H61.21  ICD-9-CM: 380.4     Acute metabolic encephalopathy     ICD-10-CM: G93.41  ICD-9-CM: 348.31     COVID     ICD-10-CM: U07.1  ICD-9-CM: 079.89

## 2022-03-28 ENCOUNTER — HOME CARE VISIT (OUTPATIENT)
Dept: HOME HEALTH SERVICES | Facility: HOME HEALTHCARE | Age: 72
End: 2022-03-28

## 2022-03-28 VITALS
HEART RATE: 73 BPM | OXYGEN SATURATION: 93 % | RESPIRATION RATE: 18 BRPM | SYSTOLIC BLOOD PRESSURE: 120 MMHG | TEMPERATURE: 97.7 F | DIASTOLIC BLOOD PRESSURE: 76 MMHG

## 2022-03-28 PROBLEM — G93.41 ACUTE METABOLIC ENCEPHALOPATHY: Status: ACTIVE | Noted: 2022-03-28

## 2022-03-28 PROCEDURE — G0151 HHCP-SERV OF PT,EA 15 MIN: HCPCS

## 2022-03-28 NOTE — HOME HEALTH
"Patient sitting in chair, stated, \"they couldn't sync my pain pump last week and i'm still hurting.\"    ASSESSMENT: Patient continues to be limited secondary to complaints of back pain, but had good participation    PLAN FOR NEXT VISIT:  --Continue home exercise program to improve muscle weakness  --Continue gait training to improve gait mechanics"

## 2022-03-28 NOTE — ASSESSMENT & PLAN NOTE
Due to lyrica pt taking high dosages; once lyrica had been decreased pt started improving  Pt stabel lyrica 25mg BID   Pain stable

## 2022-03-30 ENCOUNTER — HOME CARE VISIT (OUTPATIENT)
Dept: HOME HEALTH SERVICES | Facility: HOME HEALTHCARE | Age: 72
End: 2022-03-30

## 2022-03-30 VITALS
HEART RATE: 83 BPM | RESPIRATION RATE: 18 BRPM | OXYGEN SATURATION: 97 % | SYSTOLIC BLOOD PRESSURE: 122 MMHG | DIASTOLIC BLOOD PRESSURE: 68 MMHG | TEMPERATURE: 97.7 F

## 2022-03-30 PROCEDURE — G0180 MD CERTIFICATION HHA PATIENT: HCPCS | Performed by: FAMILY MEDICINE

## 2022-03-30 PROCEDURE — G0151 HHCP-SERV OF PT,EA 15 MIN: HCPCS

## 2022-03-31 NOTE — HOME HEALTH
"Patient sitting on couch upon arrival, reported, \"been having rough nights due to pain in back.\"    ASSESSMENT: Patient continues to be limited secondary to complaints of back pain and radicular symptoms. Will reassess next visit, discharge if appropriate     PLAN FOR NEXT VISIT:  --Ensure independence with home exercise program  --Ensure independence ambulating"

## 2022-04-05 ENCOUNTER — HOME CARE VISIT (OUTPATIENT)
Dept: HOME HEALTH SERVICES | Facility: HOME HEALTHCARE | Age: 72
End: 2022-04-05

## 2022-04-05 VITALS
OXYGEN SATURATION: 95 % | TEMPERATURE: 97.8 F | SYSTOLIC BLOOD PRESSURE: 142 MMHG | DIASTOLIC BLOOD PRESSURE: 92 MMHG | HEART RATE: 79 BPM | RESPIRATION RATE: 18 BRPM

## 2022-04-05 PROCEDURE — G0151 HHCP-SERV OF PT,EA 15 MIN: HCPCS

## 2022-04-05 NOTE — HOME HEALTH
Patient sitting on couch upon arrival. Reported still being on Plavix and having trouble getting neurologist to discontinue  so she can have her stimulator redone. Discussed discharge due to meeting goals. Patient agreeable.

## 2022-04-07 ENCOUNTER — TELEPHONE (OUTPATIENT)
Dept: NEUROLOGY | Facility: CLINIC | Age: 72
End: 2022-04-07

## 2022-04-07 NOTE — TELEPHONE ENCOUNTER
[facility] pain called asking where this patient should be scheduled next.  Explained neurosurgery is what discharge papers states

## 2022-04-08 ENCOUNTER — TELEPHONE (OUTPATIENT)
Dept: NEUROLOGY | Facility: CLINIC | Age: 72
End: 2022-04-08

## 2022-04-08 NOTE — TELEPHONE ENCOUNTER
Caller: ROGERS      Best call back number:[phone]    New or established patient?  [x] New  [] Established    Date of discharge: 03.15.22    Facility discharged from: [facility]    Diagnosis/Symptoms: CYTOKINE RELEASE SYNDROME , PNEUMONIA     Length of stay (If applicable): 6 DAYS     Specialty Only: Did you see a [facility] provider?    [x] Yes  [] No  If so, who? DREAD CASTELLON    PT WAS IN HOSPITAL CONSULTED BY DR CASTELLON . SHE WAS PUT ON PLAVIX 75 MG BY HIM .  PT WAS DISCHARGED TO [facility] . SHE CONTINUED WITH PLAVIX ORDERED BY DR CASTELLON? SHE NEEDS TO FIND OUT IF SHE NEEDS TO CONTINUE WITH RX? STATED  SHE WAS NOT GIVEN ANY PAPERWORK FOR FOLLOW UP JUST A LIST OF RX AND TO F.U WITH PCP AT DISCHARGE FROM [facility]. ??     DOES PT NEED F.U WITH NEURO ?  AND WHO WILL BE TAKING CARE OF HER RX PLAVIX ?     PT NEEDS TO GET OFF OF RX SO SHE CAN GET BATTERY REPLACED ON HER SPINAL CORD STIMULATOR. SHE CAN GET IN ON 04.19.22 TO HAVE IT REPLACED AS LONG AS CAN STOP PLAVIX?     PLEASE ADVISE.

## 2022-04-11 NOTE — TELEPHONE ENCOUNTER
Caller: Marlena Navarrete    Relationship: Self    Best call back number: [phone]    What is the best time to reach you: ANY    Who are you requesting to speak with (clinical staff, provider,  specific staff member): DR. CASTELLON    What was the call regarding: PT IS CALLING TO GET AN UPDATE.  PT STATES SHE REALLY NEEDS THIS SURGERY, BUT CANNOT SCHED W/O A PLAN ON THE PLAVIX.    Do you require a callback: YES

## 2022-04-14 ENCOUNTER — TELEPHONE (OUTPATIENT)
Dept: SOCIAL WORK | Facility: HOSPITAL | Age: 72
End: 2022-04-14

## 2022-04-14 NOTE — TELEPHONE ENCOUNTER
Received call on 4/13/2022 from  stating that she needed clearance from Dr. Payne for an upcoming surgical procedure.  She was placed on Plavix at his direction during her recent hospitalization and Dr. Jon Gamez with [facility] ([phone]) needs to know if he can take her off the Plavix.  Call placed to Dr. Payne's office and spoke with staff who state that they will get hold of Dr. Payne to communicate the need.  Call from [facility] for Dr. Payne requesting Dr. Gamez's name and contact information.  All provided.    Call placed to Ms. Navarrete this am to verify that all was resolved.  Ms. Navarrete was very appreciative of Dr. Payne who called her directly with the needed information.  She has spoken with Dr. Gamez and he has assured her that he got what was needed.  SI

## 2022-04-15 ENCOUNTER — TELEPHONE (OUTPATIENT)
Dept: FAMILY MEDICINE CLINIC | Facility: CLINIC | Age: 72
End: 2022-04-15

## 2022-04-15 NOTE — TELEPHONE ENCOUNTER
[facility] CALL[facility] STATING SHE IS NEEDING THE LAST OFFICE VISIT ALONG WITH A CHEST XRAY IF IT WAS COMPLETED PLEASE FAX THIS INFORMATION.      GOOD CALL BACK  [phone]    FAX: [phone]

## 2022-09-29 ENCOUNTER — TRANSCRIBE ORDERS (OUTPATIENT)
Dept: ADMINISTRATIVE | Facility: HOSPITAL | Age: 72
End: 2022-09-29

## 2022-09-29 DIAGNOSIS — N18.6 END STAGE RENAL DISEASE: Primary | ICD-10-CM

## 2022-10-06 ENCOUNTER — APPOINTMENT (OUTPATIENT)
Dept: ULTRASOUND IMAGING | Facility: HOSPITAL | Age: 72
End: 2022-10-06

## 2022-10-06 ENCOUNTER — HOSPITAL ENCOUNTER (OUTPATIENT)
Dept: ULTRASOUND IMAGING | Facility: HOSPITAL | Age: 72
Discharge: HOME | End: 2022-10-06
Admitting: INTERNAL MEDICINE

## 2022-10-06 DIAGNOSIS — N18.6 END STAGE RENAL DISEASE: ICD-10-CM

## 2022-10-06 PROCEDURE — 76776 US EXAM K TRANSPL W/DOPPLER: CPT

## 2023-06-07 ENCOUNTER — TRANSCRIBE ORDERS (OUTPATIENT)
Dept: ADMINISTRATIVE | Facility: HOSPITAL | Age: 73
End: 2023-06-07
Payer: MEDICARE

## 2023-06-07 ENCOUNTER — TRANSCRIBE ORDERS (OUTPATIENT)
Dept: GENERAL RADIOLOGY | Facility: HOSPITAL | Age: 73
End: 2023-06-07
Payer: MEDICARE

## 2023-06-07 DIAGNOSIS — M96.1 POSTLAMINECTOMY SYNDROME, THORACIC REGION: ICD-10-CM

## 2023-06-07 DIAGNOSIS — M54.16 LUMBAR RADICULOPATHY: Primary | ICD-10-CM

## 2023-06-07 DIAGNOSIS — M96.1 POST LAMINECTOMY SYNDROME: ICD-10-CM

## 2023-06-13 ENCOUNTER — HOSPITAL ENCOUNTER (OUTPATIENT)
Dept: CT IMAGING | Facility: HOSPITAL | Age: 73
Discharge: HOME | End: 2023-06-13
Payer: MEDICARE

## 2023-06-13 ENCOUNTER — HOSPITAL ENCOUNTER (OUTPATIENT)
Dept: GENERAL RADIOLOGY | Facility: HOSPITAL | Age: 73
Discharge: HOME | End: 2023-06-13
Payer: MEDICARE

## 2023-06-13 VITALS
HEART RATE: 60 BPM | SYSTOLIC BLOOD PRESSURE: 138 MMHG | DIASTOLIC BLOOD PRESSURE: 73 MMHG | BODY MASS INDEX: 30.44 KG/M2 | OXYGEN SATURATION: 97 % | TEMPERATURE: 97.8 F | RESPIRATION RATE: 16 BRPM | WEIGHT: 145 LBS | HEIGHT: 58 IN

## 2023-06-13 DIAGNOSIS — M54.16 LUMBAR RADICULOPATHY: ICD-10-CM

## 2023-06-13 DIAGNOSIS — M96.1 POST LAMINECTOMY SYNDROME: ICD-10-CM

## 2023-06-13 DIAGNOSIS — M96.1 POSTLAMINECTOMY SYNDROME, THORACIC REGION: ICD-10-CM

## 2023-06-13 PROCEDURE — 72132 CT LUMBAR SPINE W/DYE: CPT

## 2023-06-13 PROCEDURE — 63710000001 HYDROCODONE-ACETAMINOPHEN 7.5-325 MG TABLET: Performed by: RADIOLOGY

## 2023-06-13 PROCEDURE — 72072 X-RAY EXAM THORAC SPINE 3VWS: CPT

## 2023-06-13 PROCEDURE — 72129 CT CHEST SPINE W/DYE: CPT

## 2023-06-13 PROCEDURE — 62305 MYELOGRAPHY LUMBAR INJECTION: CPT

## 2023-06-13 PROCEDURE — A9270 NON-COVERED ITEM OR SERVICE: HCPCS | Performed by: RADIOLOGY

## 2023-06-13 PROCEDURE — 72240 MYELOGRAPHY NECK SPINE: CPT

## 2023-06-13 PROCEDURE — 25510000001 IOPAMIDOL 61 % SOLUTION: Performed by: RADIOLOGY

## 2023-06-13 PROCEDURE — 72110 X-RAY EXAM L-2 SPINE 4/>VWS: CPT

## 2023-06-13 PROCEDURE — 0 LIDOCAINE 1 % SOLUTION: Performed by: RADIOLOGY

## 2023-06-13 RX ORDER — HYDROCODONE BITARTRATE AND ACETAMINOPHEN 7.5; 325 MG/1; MG/1
1 TABLET ORAL EVERY 6 HOURS PRN
Status: DISCONTINUED | OUTPATIENT
Start: 2023-06-13 | End: 2023-06-13

## 2023-06-13 RX ORDER — AMLODIPINE BESYLATE 5 MG/1
TABLET ORAL
COMMUNITY
Start: 2023-03-17

## 2023-06-13 RX ORDER — SODIUM CHLORIDE 0.9 % (FLUSH) 0.9 %
10 SYRINGE (ML) INJECTION AS NEEDED
OUTPATIENT
Start: 2023-06-13

## 2023-06-13 RX ORDER — OXYCODONE AND ACETAMINOPHEN 7.5; 325 MG/1; MG/1
TABLET ORAL
COMMUNITY
Start: 2023-06-12

## 2023-06-13 RX ORDER — PROMETHAZINE HYDROCHLORIDE 12.5 MG/1
TABLET ORAL
COMMUNITY

## 2023-06-13 RX ORDER — SODIUM CHLORIDE 9 MG/ML
40 INJECTION, SOLUTION INTRAVENOUS AS NEEDED
OUTPATIENT
Start: 2023-06-13

## 2023-06-13 RX ORDER — METHOCARBAMOL 500 MG/1
TABLET, FILM COATED ORAL
COMMUNITY

## 2023-06-13 RX ORDER — LIDOCAINE HYDROCHLORIDE 10 MG/ML
10 INJECTION, SOLUTION INFILTRATION; PERINEURAL ONCE
Status: COMPLETED | OUTPATIENT
Start: 2023-06-13 | End: 2023-06-13

## 2023-06-13 RX ORDER — HYDROCODONE BITARTRATE AND ACETAMINOPHEN 7.5; 325 MG/1; MG/1
1 TABLET ORAL ONCE AS NEEDED
Status: COMPLETED | OUTPATIENT
Start: 2023-06-13 | End: 2023-06-13

## 2023-06-13 RX ORDER — SODIUM CHLORIDE 0.9 % (FLUSH) 0.9 %
3 SYRINGE (ML) INJECTION EVERY 12 HOURS SCHEDULED
OUTPATIENT
Start: 2023-06-13

## 2023-06-13 RX ADMIN — HYDROCODONE BITARTRATE AND ACETAMINOPHEN 1 TABLET: 7.5; 325 TABLET ORAL at 10:57

## 2023-06-13 RX ADMIN — LIDOCAINE HYDROCHLORIDE 2.5 ML: 10 INJECTION, SOLUTION INFILTRATION; PERINEURAL at 09:40

## 2023-06-13 RX ADMIN — IOPAMIDOL 10 ML: 612 INJECTION, SOLUTION INTRATHECAL at 09:45

## 2023-06-13 NOTE — DISCHARGE INSTRUCTIONS
EDUCATION /DISCHARGE INSTRUCTIONS:  A myelogram is a special radiology procedure of the spinal cord, spinal nerves and other related structures.  You will be awake during the examination.  An area of your lower back will be cleansed with an antiseptic solution.  The physician will inject a numbing medication in your lower back.  While your back is numb, a needle will be placed in the lower back area.  A small amount of spinal fluid may be withdrawn and sent to the lab if ordered by your physician. While the needle is in the back, an injection of a contrast material (xray dye) will be given through the needle.  The contrast material will allow the physician to see the spinal cord and spinal nerves.  Once injected, the needle will be removed and a band aid will be placed over the injection site.  The table will be tilted during the process to allow the contrast material to flow to particular areas in the spine.  Following the injection and xrays, you will be taken to the CT scan where more pictures will be taken. After the procedure is finished, the contrast material will be absorbed by your body and eliminated through your kidneys.  The radiologist will study and interpret your myelogram and send the results to your physician.  Procedure risks of a myelogram include, but are not limited to:  *  Bleeding   *  Seizure  *  Infection   *  Headache, possibly severe requiring a blood patch  *  Contrast reaction  *  Nerve or cord injury  *  Paralysis and death    Benefits of the procedure:    Best examination for delineating pathology related to spinal cord compression from a disc and/or nerve root compression  Alternatives to the procedure:MRI - a non invasive procedure requiring intravenous contrast injection. Cannot be done on patients with certain pacemakers or metal in the body.  MRI risks include possible reaction to the contrast material, movement of metal located in the body.   Benefit to MRI:  Non-invasive and  usually painless procedure.  THIS EDUCATION INFORMATION WAS REVIEWED PRIOR TO THE PROCEDURE AND CONSENT. Patient initials __________________Time_________________      Important information following your myelogram:    *  When you get home, lie down with no more than 2 pillows under your head.  *  Sit up in the car going home. At home, sit up to eat and use the restroom only, then lie back down.   *  24 HOURS COMPLETE AT ________________________   *  Tomorrow, after 24 hours completed, take it easy and rest the next 2-3 days.  *  Do not drive for 24 hours following a myelogram  *  You may remove the bandage and shower in the morning  *  Increase your fluids for the next 24 hours.  Caffeinated drinks are encouraged.Increase your intake of fluids the next 2-3 days      CALL YOUR PHYSICIAN FOR THE FOLLOWING:  * Pain at the injection site  * Redness, swelling, bruising or drainage at the injection site  * A fever by mouth of 101.0  * Any new symptoms  If you have problems with a headache that is not relieved with rest and medication, please call the Radiology Triage Nurse desk [phone]

## 2023-06-13 NOTE — NURSING NOTE
Pt discharge instructions reviewed. Pt verbalized understanding. NAD noted. Pt taken to main entrance in  by Sara MCCALL II.  Pt released to care of

## 2023-06-14 ENCOUNTER — TELEPHONE (OUTPATIENT)
Dept: INTERVENTIONAL RADIOLOGY/VASCULAR | Facility: HOSPITAL | Age: 73
End: 2023-06-14
Payer: MEDICARE

## 2023-07-26 ENCOUNTER — TELEPHONE (OUTPATIENT)
Dept: NEUROSURGERY | Facility: CLINIC | Age: 73
End: 2023-07-26

## 2023-07-26 NOTE — TELEPHONE ENCOUNTER
Reason for sending: MEDICAL RECORDS NOTIFICATION    Documents Description:   LABS\MULTI_[facility]_07/25/23  XR CHEST 2VW_[facility]_07/25/23  LAB\HEMATOLOGY_[facility]_07/25/23    Name of Sender: [facility]    Date Indexed: 07/26/23

## 2023-08-06 ENCOUNTER — APPOINTMENT (OUTPATIENT)
Dept: CT IMAGING | Facility: HOSPITAL | Age: 73
DRG: 699 | End: 2023-08-06
Payer: MEDICARE

## 2023-08-06 ENCOUNTER — APPOINTMENT (OUTPATIENT)
Dept: ULTRASOUND IMAGING | Facility: HOSPITAL | Age: 73
DRG: 699 | End: 2023-08-06
Payer: MEDICARE

## 2023-08-06 ENCOUNTER — HOSPITAL ENCOUNTER (INPATIENT)
Facility: HOSPITAL | Age: 73
LOS: 3 days | Discharge: HOME | DRG: 699 | End: 2023-08-10
Attending: EMERGENCY MEDICINE | Admitting: STUDENT IN AN ORGANIZED HEALTH CARE EDUCATION/TRAINING PROGRAM
Payer: MEDICARE

## 2023-08-06 DIAGNOSIS — N18.32 STAGE 3B CHRONIC KIDNEY DISEASE: ICD-10-CM

## 2023-08-06 DIAGNOSIS — R10.13 EPIGASTRIC PAIN: ICD-10-CM

## 2023-08-06 DIAGNOSIS — M51.36 DEGENERATION OF INTERVERTEBRAL DISC OF LUMBAR REGION WITH OSTEOPHYTE OF LUMBAR VERTEBRA: ICD-10-CM

## 2023-08-06 DIAGNOSIS — M25.78 DEGENERATION OF INTERVERTEBRAL DISC OF LUMBAR REGION WITH OSTEOPHYTE OF LUMBAR VERTEBRA: ICD-10-CM

## 2023-08-06 DIAGNOSIS — R11.0 NAUSEA: Primary | ICD-10-CM

## 2023-08-06 DIAGNOSIS — E86.9 VOLUME DEPLETION: ICD-10-CM

## 2023-08-06 DIAGNOSIS — Z94.0 KIDNEY TRANSPLANT RECIPIENT: ICD-10-CM

## 2023-08-06 PROBLEM — R73.03 PREDIABETES: Status: ACTIVE | Noted: 2023-08-06

## 2023-08-06 PROBLEM — N17.9 AKI (ACUTE KIDNEY INJURY): Status: ACTIVE | Noted: 2023-08-06

## 2023-08-06 PROBLEM — E86.0 DEHYDRATION: Status: ACTIVE | Noted: 2023-08-06

## 2023-08-06 LAB
ALBUMIN SERPL-MCNC: 4.3 G/DL (ref 3.5–5.2)
ALBUMIN/GLOB SERPL: 1.9 G/DL
ALP SERPL-CCNC: 100 U/L (ref 39–117)
ALT SERPL W P-5'-P-CCNC: 6 U/L (ref 1–33)
ANION GAP SERPL CALCULATED.3IONS-SCNC: 9.5 MMOL/L (ref 5–15)
AST SERPL-CCNC: 18 U/L (ref 1–32)
BACTERIA UR QL AUTO: NORMAL /HPF
BASOPHILS # BLD AUTO: 0.07 10*3/MM3 (ref 0–0.2)
BASOPHILS NFR BLD AUTO: 0.7 % (ref 0–1.5)
BILIRUB SERPL-MCNC: 0.3 MG/DL (ref 0–1.2)
BILIRUB UR QL STRIP: NEGATIVE
BUN SERPL-MCNC: 28 MG/DL (ref 8–23)
BUN/CREAT SERPL: 11.8 (ref 7–25)
CALCIUM SPEC-SCNC: 10.4 MG/DL (ref 8.6–10.5)
CHLORIDE SERPL-SCNC: 104 MMOL/L (ref 98–107)
CHLORIDE UR-SCNC: 65 MMOL/L
CLARITY UR: CLEAR
CO2 SERPL-SCNC: 29.5 MMOL/L (ref 22–29)
COLOR UR: YELLOW
CREAT SERPL-MCNC: 2.37 MG/DL (ref 0.57–1)
CREAT UR-MCNC: 62.6 MG/DL
DEPRECATED RDW RBC AUTO: 39.7 FL (ref 37–54)
EGFRCR SERPLBLD CKD-EPI 2021: 21.4 ML/MIN/1.73
EOSINOPHIL # BLD AUTO: 0.09 10*3/MM3 (ref 0–0.4)
EOSINOPHIL NFR BLD AUTO: 0.9 % (ref 0.3–6.2)
ERYTHROCYTE [DISTWIDTH] IN BLOOD BY AUTOMATED COUNT: 11.8 % (ref 12.3–15.4)
GLOBULIN UR ELPH-MCNC: 2.3 GM/DL
GLUCOSE BLDC GLUCOMTR-MCNC: 87 MG/DL (ref 70–130)
GLUCOSE SERPL-MCNC: 107 MG/DL (ref 65–99)
GLUCOSE UR STRIP-MCNC: NEGATIVE MG/DL
HCT VFR BLD AUTO: 34.5 % (ref 34–46.6)
HGB BLD-MCNC: 11.8 G/DL (ref 12–15.9)
HGB UR QL STRIP.AUTO: NEGATIVE
HYALINE CASTS UR QL AUTO: NORMAL /LPF
IMM GRANULOCYTES # BLD AUTO: 0.04 10*3/MM3 (ref 0–0.05)
IMM GRANULOCYTES NFR BLD AUTO: 0.4 % (ref 0–0.5)
KETONES UR QL STRIP: ABNORMAL
LEUKOCYTE ESTERASE UR QL STRIP.AUTO: NEGATIVE
LIPASE SERPL-CCNC: 24 U/L (ref 13–60)
LYMPHOCYTES # BLD AUTO: 2.42 10*3/MM3 (ref 0.7–3.1)
LYMPHOCYTES NFR BLD AUTO: 24.9 % (ref 19.6–45.3)
MCH RBC QN AUTO: 31.6 PG (ref 26.6–33)
MCHC RBC AUTO-ENTMCNC: 34.2 G/DL (ref 31.5–35.7)
MCV RBC AUTO: 92.5 FL (ref 79–97)
MONOCYTES # BLD AUTO: 0.75 10*3/MM3 (ref 0.1–0.9)
MONOCYTES NFR BLD AUTO: 7.7 % (ref 5–12)
NEUTROPHILS NFR BLD AUTO: 6.36 10*3/MM3 (ref 1.7–7)
NEUTROPHILS NFR BLD AUTO: 65.4 % (ref 42.7–76)
NITRITE UR QL STRIP: NEGATIVE
NRBC BLD AUTO-RTO: 0 /100 WBC (ref 0–0.2)
PH UR STRIP.AUTO: >=9 [PH] (ref 5–8)
PLATELET # BLD AUTO: 313 10*3/MM3 (ref 140–450)
PMV BLD AUTO: 10 FL (ref 6–12)
POTASSIUM SERPL-SCNC: 3.5 MMOL/L (ref 3.5–5.2)
POTASSIUM UR-SCNC: 23.6 MMOL/L
PROT SERPL-MCNC: 6.6 G/DL (ref 6–8.5)
PROT UR QL STRIP: ABNORMAL
RBC # BLD AUTO: 3.73 10*6/MM3 (ref 3.77–5.28)
RBC # UR STRIP: NORMAL /HPF
REF LAB TEST METHOD: NORMAL
SODIUM SERPL-SCNC: 143 MMOL/L (ref 136–145)
SODIUM UR-SCNC: 124 MMOL/L
SP GR UR STRIP: 1.01 (ref 1–1.03)
SQUAMOUS #/AREA URNS HPF: NORMAL /HPF
UROBILINOGEN UR QL STRIP: ABNORMAL
UUN 24H UR-MCNC: 393 MG/DL
WBC # UR STRIP: NORMAL /HPF
WBC NRBC COR # BLD: 9.73 10*3/MM3 (ref 3.4–10.8)

## 2023-08-06 PROCEDURE — 84540 ASSAY OF URINE/UREA-N: CPT | Performed by: STUDENT IN AN ORGANIZED HEALTH CARE EDUCATION/TRAINING PROGRAM

## 2023-08-06 PROCEDURE — G0378 HOSPITAL OBSERVATION PER HR: HCPCS

## 2023-08-06 PROCEDURE — 99285 EMERGENCY DEPT VISIT HI MDM: CPT

## 2023-08-06 PROCEDURE — 85025 COMPLETE CBC W/AUTO DIFF WBC: CPT | Performed by: EMERGENCY MEDICINE

## 2023-08-06 PROCEDURE — 81001 URINALYSIS AUTO W/SCOPE: CPT | Performed by: STUDENT IN AN ORGANIZED HEALTH CARE EDUCATION/TRAINING PROGRAM

## 2023-08-06 PROCEDURE — 84133 ASSAY OF URINE POTASSIUM: CPT | Performed by: STUDENT IN AN ORGANIZED HEALTH CARE EDUCATION/TRAINING PROGRAM

## 2023-08-06 PROCEDURE — 84300 ASSAY OF URINE SODIUM: CPT | Performed by: STUDENT IN AN ORGANIZED HEALTH CARE EDUCATION/TRAINING PROGRAM

## 2023-08-06 PROCEDURE — 74176 CT ABD & PELVIS W/O CONTRAST: CPT

## 2023-08-06 PROCEDURE — 82570 ASSAY OF URINE CREATININE: CPT | Performed by: STUDENT IN AN ORGANIZED HEALTH CARE EDUCATION/TRAINING PROGRAM

## 2023-08-06 PROCEDURE — 25010000002 ONDANSETRON PER 1 MG: Performed by: EMERGENCY MEDICINE

## 2023-08-06 PROCEDURE — 82436 ASSAY OF URINE CHLORIDE: CPT | Performed by: STUDENT IN AN ORGANIZED HEALTH CARE EDUCATION/TRAINING PROGRAM

## 2023-08-06 PROCEDURE — 76700 US EXAM ABDOM COMPLETE: CPT

## 2023-08-06 PROCEDURE — 25010000002 ENOXAPARIN PER 10 MG: Performed by: STUDENT IN AN ORGANIZED HEALTH CARE EDUCATION/TRAINING PROGRAM

## 2023-08-06 PROCEDURE — 80053 COMPREHEN METABOLIC PANEL: CPT | Performed by: EMERGENCY MEDICINE

## 2023-08-06 PROCEDURE — 82948 REAGENT STRIP/BLOOD GLUCOSE: CPT

## 2023-08-06 PROCEDURE — 25010000002 ONDANSETRON PER 1 MG: Performed by: STUDENT IN AN ORGANIZED HEALTH CARE EDUCATION/TRAINING PROGRAM

## 2023-08-06 PROCEDURE — 83690 ASSAY OF LIPASE: CPT | Performed by: EMERGENCY MEDICINE

## 2023-08-06 RX ORDER — CHOLECALCIFEROL (VITAMIN D3) 125 MCG
5 CAPSULE ORAL NIGHTLY PRN
Status: DISCONTINUED | OUTPATIENT
Start: 2023-08-06 | End: 2023-08-10 | Stop reason: HOSPADM

## 2023-08-06 RX ORDER — POLYETHYLENE GLYCOL 3350 17 G/17G
17 POWDER, FOR SOLUTION ORAL DAILY PRN
Status: DISCONTINUED | OUTPATIENT
Start: 2023-08-06 | End: 2023-08-10 | Stop reason: HOSPADM

## 2023-08-06 RX ORDER — SODIUM CHLORIDE 0.9 % (FLUSH) 0.9 %
10 SYRINGE (ML) INJECTION AS NEEDED
Status: DISCONTINUED | OUTPATIENT
Start: 2023-08-06 | End: 2023-08-10 | Stop reason: HOSPADM

## 2023-08-06 RX ORDER — DEXTROSE MONOHYDRATE 25 G/50ML
25 INJECTION, SOLUTION INTRAVENOUS
Status: DISCONTINUED | OUTPATIENT
Start: 2023-08-06 | End: 2023-08-10 | Stop reason: HOSPADM

## 2023-08-06 RX ORDER — AMLODIPINE BESYLATE 5 MG/1
5 TABLET ORAL
Status: DISCONTINUED | OUTPATIENT
Start: 2023-08-07 | End: 2023-08-09

## 2023-08-06 RX ORDER — SODIUM CHLORIDE 0.9 % (FLUSH) 0.9 %
10 SYRINGE (ML) INJECTION EVERY 12 HOURS SCHEDULED
Status: DISCONTINUED | OUTPATIENT
Start: 2023-08-06 | End: 2023-08-10 | Stop reason: HOSPADM

## 2023-08-06 RX ORDER — ONDANSETRON 2 MG/ML
4 INJECTION INTRAMUSCULAR; INTRAVENOUS ONCE
Status: COMPLETED | OUTPATIENT
Start: 2023-08-06 | End: 2023-08-06

## 2023-08-06 RX ORDER — ONDANSETRON 4 MG/1
4 TABLET, FILM COATED ORAL EVERY 6 HOURS PRN
Status: DISCONTINUED | OUTPATIENT
Start: 2023-08-06 | End: 2023-08-10 | Stop reason: HOSPADM

## 2023-08-06 RX ORDER — ONDANSETRON 2 MG/ML
4 INJECTION INTRAMUSCULAR; INTRAVENOUS EVERY 6 HOURS PRN
Status: DISCONTINUED | OUTPATIENT
Start: 2023-08-06 | End: 2023-08-10 | Stop reason: HOSPADM

## 2023-08-06 RX ORDER — ACETAMINOPHEN,DIPHENHYDRAMINE HCL 500; 25 MG/1; MG/1
1 TABLET, FILM COATED ORAL NIGHTLY PRN
COMMUNITY

## 2023-08-06 RX ORDER — INSULIN LISPRO 100 [IU]/ML
2-7 INJECTION, SOLUTION INTRAVENOUS; SUBCUTANEOUS
Status: DISCONTINUED | OUTPATIENT
Start: 2023-08-06 | End: 2023-08-10 | Stop reason: HOSPADM

## 2023-08-06 RX ORDER — PANTOPRAZOLE SODIUM 40 MG/1
40 TABLET, DELAYED RELEASE ORAL
Status: DISCONTINUED | OUTPATIENT
Start: 2023-08-07 | End: 2023-08-10 | Stop reason: HOSPADM

## 2023-08-06 RX ORDER — ATORVASTATIN CALCIUM 20 MG/1
20 TABLET, FILM COATED ORAL NIGHTLY
Status: DISCONTINUED | OUTPATIENT
Start: 2023-08-06 | End: 2023-08-10 | Stop reason: HOSPADM

## 2023-08-06 RX ORDER — ACETAMINOPHEN 325 MG/1
650 TABLET ORAL EVERY 4 HOURS PRN
Status: DISCONTINUED | OUTPATIENT
Start: 2023-08-06 | End: 2023-08-10 | Stop reason: HOSPADM

## 2023-08-06 RX ORDER — SODIUM CHLORIDE 9 MG/ML
125 INJECTION, SOLUTION INTRAVENOUS CONTINUOUS
Status: DISCONTINUED | OUTPATIENT
Start: 2023-08-06 | End: 2023-08-07

## 2023-08-06 RX ORDER — ACETAMINOPHEN 160 MG/5ML
650 SOLUTION ORAL EVERY 4 HOURS PRN
Status: DISCONTINUED | OUTPATIENT
Start: 2023-08-06 | End: 2023-08-10 | Stop reason: HOSPADM

## 2023-08-06 RX ORDER — METHOCARBAMOL 500 MG/1
500 TABLET, FILM COATED ORAL EVERY 8 HOURS PRN
Status: DISCONTINUED | OUTPATIENT
Start: 2023-08-06 | End: 2023-08-10 | Stop reason: HOSPADM

## 2023-08-06 RX ORDER — SODIUM CHLORIDE 9 MG/ML
40 INJECTION, SOLUTION INTRAVENOUS AS NEEDED
Status: DISCONTINUED | OUTPATIENT
Start: 2023-08-06 | End: 2023-08-10 | Stop reason: HOSPADM

## 2023-08-06 RX ORDER — OXYCODONE HYDROCHLORIDE AND ACETAMINOPHEN 5; 325 MG/1; MG/1
1 TABLET ORAL EVERY 6 HOURS PRN
Status: DISCONTINUED | OUTPATIENT
Start: 2023-08-06 | End: 2023-08-10 | Stop reason: HOSPADM

## 2023-08-06 RX ORDER — NICOTINE POLACRILEX 4 MG
15 LOZENGE BUCCAL
Status: DISCONTINUED | OUTPATIENT
Start: 2023-08-06 | End: 2023-08-10 | Stop reason: HOSPADM

## 2023-08-06 RX ORDER — AMOXICILLIN 250 MG
2 CAPSULE ORAL 2 TIMES DAILY
Status: DISCONTINUED | OUTPATIENT
Start: 2023-08-06 | End: 2023-08-10 | Stop reason: HOSPADM

## 2023-08-06 RX ORDER — IBUPROFEN 600 MG/1
1 TABLET ORAL
Status: DISCONTINUED | OUTPATIENT
Start: 2023-08-06 | End: 2023-08-10 | Stop reason: HOSPADM

## 2023-08-06 RX ORDER — ENOXAPARIN SODIUM 100 MG/ML
30 INJECTION SUBCUTANEOUS NIGHTLY
Status: DISCONTINUED | OUTPATIENT
Start: 2023-08-06 | End: 2023-08-07

## 2023-08-06 RX ORDER — ONDANSETRON 4 MG/1
4 TABLET, FILM COATED ORAL EVERY 8 HOURS PRN
COMMUNITY

## 2023-08-06 RX ORDER — ACETAMINOPHEN 650 MG/1
650 SUPPOSITORY RECTAL EVERY 4 HOURS PRN
Status: DISCONTINUED | OUTPATIENT
Start: 2023-08-06 | End: 2023-08-10 | Stop reason: HOSPADM

## 2023-08-06 RX ORDER — PREGABALIN 25 MG/1
25 CAPSULE ORAL EVERY 12 HOURS SCHEDULED
Status: DISCONTINUED | OUTPATIENT
Start: 2023-08-06 | End: 2023-08-07

## 2023-08-06 RX ORDER — BISACODYL 5 MG/1
5 TABLET, DELAYED RELEASE ORAL DAILY PRN
Status: DISCONTINUED | OUTPATIENT
Start: 2023-08-06 | End: 2023-08-10 | Stop reason: HOSPADM

## 2023-08-06 RX ORDER — BISACODYL 10 MG
10 SUPPOSITORY, RECTAL RECTAL DAILY PRN
Status: DISCONTINUED | OUTPATIENT
Start: 2023-08-06 | End: 2023-08-10 | Stop reason: HOSPADM

## 2023-08-06 RX ADMIN — ONDANSETRON 4 MG: 2 INJECTION INTRAMUSCULAR; INTRAVENOUS at 16:10

## 2023-08-06 RX ADMIN — SENNOSIDES AND DOCUSATE SODIUM 2 TABLET: 50; 8.6 TABLET ORAL at 22:30

## 2023-08-06 RX ADMIN — SODIUM CHLORIDE 125 ML/HR: 9 INJECTION, SOLUTION INTRAVENOUS at 22:30

## 2023-08-06 RX ADMIN — SODIUM CHLORIDE 500 ML: 9 INJECTION, SOLUTION INTRAVENOUS at 16:41

## 2023-08-06 RX ADMIN — SODIUM CHLORIDE 125 ML/HR: 9 INJECTION, SOLUTION INTRAVENOUS at 17:13

## 2023-08-06 RX ADMIN — ENOXAPARIN SODIUM 30 MG: 100 INJECTION SUBCUTANEOUS at 22:30

## 2023-08-06 RX ADMIN — ATORVASTATIN CALCIUM 20 MG: 20 TABLET, FILM COATED ORAL at 22:30

## 2023-08-06 RX ADMIN — Medication 5 MG: at 22:44

## 2023-08-06 RX ADMIN — ONDANSETRON 4 MG: 2 INJECTION INTRAMUSCULAR; INTRAVENOUS at 22:45

## 2023-08-06 NOTE — ED NOTES
Nursing report ED to floor  Marlena Navarrete  73 y.o.  female    HPI :   Chief Complaint   Patient presents with    Nausea    Abdominal Pain       Admitting doctor:   Mark Kennedy DO    Admitting diagnosis:   The primary encounter diagnosis was Nausea. Diagnoses of Volume depletion, Stage 3b chronic kidney disease, and Kidney transplant recipient were also pertinent to this visit.    Code status:   Current Code Status       Date Active Code Status Order ID Comments User Context       Prior            Allergies:   Patient has no known allergies.    Isolation:   No active isolations    Intake and Output    Intake/Output Summary (Last 24 hours) at 8/6/2023 1731  Last data filed at 8/6/2023 1713  Gross per 24 hour   Intake 500 ml   Output --   Net 500 ml       Weight:       08/06/23  1548   Weight: 63.5 kg (140 lb)       Most recent vitals:   Vitals:    08/06/23 1601 08/06/23 1634 08/06/23 1635 08/06/23 1701   BP:  139/71  140/61   BP Location:       Patient Position:       Pulse: 70  71 63   Resp:       Temp:       TempSrc:       SpO2: 96%  93% 94%   Weight:       Height:           Active LDAs/IV Access:   Lines, Drains & Airways       Active LDAs       Name Placement date Placement time Site Days    Peripheral IV 08/06/23 1604 Left Antecubital 08/06/23  1604  Antecubital  less than 1                    Labs (abnormal labs have a star):   Labs Reviewed   COMPREHENSIVE METABOLIC PANEL - Abnormal; Notable for the following components:       Result Value    Glucose 107 (*)     BUN 28 (*)     Creatinine 2.37 (*)     CO2 29.5 (*)     eGFR 21.4 (*)     All other components within normal limits    Narrative:     GFR Normal >60  Chronic Kidney Disease <60  Kidney Failure <15    The GFR formula is only valid for adults with stable renal function between ages 18 and 70.   CBC WITH AUTO DIFFERENTIAL - Abnormal; Notable for the following components:    RBC 3.73 (*)     Hemoglobin 11.8 (*)     RDW 11.8 (*)     All other components  within normal limits   LIPASE - Normal   CBC AND DIFFERENTIAL    Narrative:     The following orders were created for panel order CBC & Differential.  Procedure                               Abnormality         Status                     ---------                               -----------         ------                     CBC Auto Differential[[id number]]        Abnormal            Final result                 Please view results for these tests on the individual orders.       EKG:   No orders to display       Meds given in ED:   Medications   sodium chloride 0.9 % flush 10 mL (has no administration in time range)   sodium chloride 0.9 % flush 10 mL (has no administration in time range)   sodium chloride 0.9 % infusion (125 mL/hr Intravenous New Bag 23 1713)   ondansetron (ZOFRAN) injection 4 mg (4 mg Intravenous Given 23 1610)   sodium chloride 0.9 % bolus 500 mL (0 mL Intravenous Stopped 23)       Imaging results:  CT Abdomen Pelvis Without Contrast    Result Date: 2023  1. 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 No hydronephrosis is seen. 2. Colonic diverticulosis. 3. No acute findings are noted.  Radiation dose reduction techniques were utilized, including automated exposure control and exposure modulation based on body size.        Ambulatory status:   - assist    Social issues:   Social History     Socioeconomic History    Marital status:     Number of children: 1    Years of education: 12   Tobacco Use    Smoking status: Former     Packs/day: 1.00     Years: 30.00     Pack years: 30.00     Types: Cigarettes     Quit date: 2012     Years since quittin.2    Smokeless tobacco: Never   Substance and Sexual Activity    Alcohol use: No    Drug use: Never    Sexual activity: Defer       NIH Stroke Scale:       Nguyen Ferreira RN  23 17:31 EDT

## 2023-08-06 NOTE — H&P
"    Patient Name:  Marlena Navarrete  YOB: 1950  MRN:  [id number]  Admit Date:  8/6/2023  Patient Care Team:  Bradley Hernandez MD as PCP - General (Family Medicine)  Paul Gamez MD as Consulting Physician (Pain Medicine)  Lanre Erwin MD as Consulting Physician (Nephrology)      Subjective   History Present Illness     Chief Complaint   Patient presents with    Nausea    Abdominal Pain     Ms. Navarrete is a 73 y.o. with a history of living relative kidney transplant (1979), CKD 3B, HTN, chronic low back pain, spinal cord stimulator, peripheral neuropathy, GERD, prediabetes that presents to [facility] complaining of worsening nausea, epigastric abdominal pain and decreased appetite over the past 1-2 weeks prior to arrival.  Denies recollection of possible inciting factors including recent illness, sick contacts, changes in diet.  Symptoms intermittent in nature since onset.  Abdominal pain is epigastric in nature with radiation across bilateral upper abdominal quadrants, described as \"achy\" aggravated with oral intake of any kind, without noticeable alleviating factors. Endorses associated decreased oral intake, feelings of post-prandial fullness, early satiety.  Denies fever, chills, chest pain, dyspnea, emesis, diarrhea.    Personal History     Past Medical History:   Diagnosis Date    Arthritis     OSTEO    Chronic back pain     Chronic bilateral low back pain with right-sided sciatica 2/17/2020    CKD (chronic kidney disease)     FOLLOWED BY DR LANRE ERWIN    Diverticulosis     Foot drop, right     GERD (gastroesophageal reflux disease)     History of DVT (deep vein thrombosis) 2018    RIGHT CALF S/P BACK SURGERY     History of transfusion     no reaction    Hyperlipidemia     Hypertension     Kidney transplant recipient 09/07/1979    HX TRANSPLANT D/T REFLUX     Neuropathy     Secondary hyperparathyroidism  3/9/2022    Spinal cord stimulator status 3/9/2022    Spinal headache "     Stage 3b chronic kidney disease 2020    FOLLOWED BY DR LARNE Valdez rotator cuff     Vitamin D deficiency 3/9/2022     Past Surgical History:   Procedure Laterality Date    APPENDECTOMY      CATARACT EXTRACTION EXTRACAPSULAR W/ INTRAOCULAR LENS IMPLANTATION       SECTION      COLONOSCOPY  approx     Anastacio WHARTON    ENDOSCOPY N/A 2020    Procedure: ESOPHAGOGASTRODUODENOSCOPY with biopsies;  Surgeon: Wicho Chaves MD;  Location: [facility] ENDOSCOPY;  Service: Gastroenterology;  Laterality: N/A;  pre- epigastric pain, nausea  post-- gastritis, duodenitis, bile reflux    LUMBAR DISCECTOMY FUSION INSTRUMENTATION Right 2018    Procedure: Right L2-3 laminectomy with Metrix;  Surgeon: Oscar Paulino MD;  Location: [facility] OR;  Service: Neurosurgery    SHOULDER ARTHROSCOPY W/ ROTATOR CUFF REPAIR Right 2020    Procedure: SHOULDER ARTHROSCOPY, ROTATOR CUFF REPAIR, SUBACROMIAL DECOMPRESSION, DISTAL CLAVICLE EXCISION, BICEPS TENOTOMY, AND LABRAL DEBRIDEMENT;  Surgeon: Rishabh Florence MD;  Location: [facility] OR OSC;  Service: Orthopedics;  Laterality: Right;    SHOULDER ROTATOR CUFF REPAIR Left     SPINAL CORD STIMULATOR IMPLANT N/A 2020    Procedure: SPINAL CORD STIMULATOR INSERTION PHASE 2, triall of right gluteal internal pulse generator;  Surgeon: Cuate Kemp MD;  Location: [facility] OR;  Service: Neurosurgery;  Laterality: N/A;    SPINAL CORD STIMULATOR IMPLANT N/A 2020    Procedure: SPINAL CORD STIMULATOR INSERTION PHASE 1, Thoracic 10 laminotomy for revision of [place] surgical lead at Thoracic 8 to Thoracic 9;  Surgeon: Cuate Kemp MD;  Location: [facility] OR;  Service: Neurosurgery;  Laterality: N/A;    TONSILLECTOMY      TRANSPLANTATION RENAL Right     SISTER GAVE PT     TUBAL ABDOMINAL LIGATION       Family History   Problem Relation Age of Onset    Aneurysm Mother         BRAIN ANEURYSM    Heart failure Father     Kidney failure  Father     Lung cancer Sister     Lung cancer Sister     Lung cancer Son     [place] Hyperthermia Neg Hx      Social History     Tobacco Use    Smoking status: Former     Packs/day: 1.00     Years: 30.00     Pack years: 30.00     Types: Cigarettes     Quit date: 2012     Years since quittin.2    Smokeless tobacco: Never   Vaping Use    Vaping Use: Never used   Substance Use Topics    Alcohol use: No    Drug use: Never     No current facility-administered medications on file prior to encounter.     Current Outpatient Medications on File Prior to Encounter   Medication Sig Dispense Refill    acetaminophen (TYLENOL) 325 MG tablet Take 2 tablets by mouth Every 4 (Four) Hours As Needed for Mild Pain , Moderate Pain , Headache or Fever.      amLODIPine (NORVASC) 5 MG tablet       atorvastatin (LIPITOR) 40 MG tablet Take 1 tablet by mouth Every Night.      melatonin 3 MG tablet Take 1 tablet by mouth Every Night. (Patient not taking: Reported on 2023) 30 tablet     methocarbamol (ROBAXIN) 500 MG tablet methocarbamol 500 mg tablet   TAKE 1 TABLET BY MOUTH THREE TIMES DAILY AS NEEDED      omeprazole (priLOSEC) 20 MG capsule Take 20 mg by mouth Daily. (Patient not taking: Reported on 2023)      oxyCODONE-acetaminophen (PERCOCET) 5-325 MG per tablet Take 1 tablet by mouth Every 6 (Six) Hours As Needed for Severe Pain . (Patient not taking: Reported on 2023)      oxyCODONE-acetaminophen (PERCOCET) 7.5-325 MG per tablet       pregabalin (LYRICA) 25 MG capsule Take 1 capsule by mouth Every 12 (Twelve) Hours. 6 capsule 0    promethazine (PHENERGAN) 12.5 MG tablet promethazine 12.5 mg tablet   TAKE 1 TABLET BY MOUTH FOUR TIMES DAILY AS NEEDED      sodium bicarbonate 650 MG tablet Take 2 tablets by mouth 3 (Three) Times a Day. (Patient not taking: Reported on 2023)       No Known Allergies    Objective    Objective     Vital Signs  Temp:  [97.2 øF (36.2 øC)-97.9 øF (36.6 øC)] 97.2 øF (36.2 øC)  Heart  Rate:  [63-80] 72  Resp:  [18] 18  BP: (114-157)/(61-90) 157/87  SpO2:  [93 %-96 %] 94 %  on   ;   Device (Oxygen Therapy): room air  Body mass index is 28.21 kg/mý.    Physical Exam  Vitals and nursing note reviewed.   Constitutional:       Appearance: She is obese.      Comments: Elderly, chronically ill-appearing, slightly uncomfortable secondary to abdominal pain and nausea   Cardiovascular:      Rate and Rhythm: Normal rate and regular rhythm.      Pulses: Normal pulses.      Heart sounds: Normal heart sounds.   Pulmonary:      Effort: Pulmonary effort is normal. No respiratory distress.      Breath sounds: Normal breath sounds. No wheezing.   Abdominal:      General: Bowel sounds are normal. There is distension.      Palpations: Abdomen is soft.      Tenderness: There is abdominal tenderness (To palpation of bilateral upper abdominal quadrants and epigastric region). There is no guarding or rebound.   Musculoskeletal:      Comments: Brace on right lower extremity for foot drop   Skin:     General: Skin is warm and dry.      Coloration: Skin is pale.   Neurological:      Mental Status: She is alert.       Results Review:  I reviewed the patient's new clinical results.  I reviewed the patient's new imaging results and agree with the interpretation.  I reviewed the patient's other test results and agree with the interpretation  I personally viewed and interpreted the patient's EKG/Telemetry data  Discussed with ED provider.    Lab Results (last 24 hours)       Procedure Component Value Units Date/Time    CBC & Differential [[id number]]  (Abnormal) Collected: 08/06/23 1604    Specimen: Blood Updated: 08/06/23 1613    Narrative:      The following orders were created for panel order CBC & Differential.  Procedure                               Abnormality         Status                     ---------                               -----------         ------                     CBC Auto Differential[[id number]]         Abnormal            Final result                 Please view results for these tests on the individual orders.    Comprehensive Metabolic Panel [[id number]]  (Abnormal) Collected: 08/06/23 1604    Specimen: Blood Updated: 08/06/23 1631     Glucose 107 mg/dL      BUN 28 mg/dL      Creatinine 2.37 mg/dL      Sodium 143 mmol/L      Potassium 3.5 mmol/L      Chloride 104 mmol/L      CO2 29.5 mmol/L      Calcium 10.4 mg/dL      Total Protein 6.6 g/dL      Albumin 4.3 g/dL      ALT (SGPT) 6 U/L      AST (SGOT) 18 U/L      Alkaline Phosphatase 100 U/L      Total Bilirubin 0.3 mg/dL      Globulin 2.3 gm/dL      A/G Ratio 1.9 g/dL      BUN/Creatinine Ratio 11.8     Anion Gap 9.5 mmol/L      eGFR 21.4 mL/min/1.73     Narrative:      GFR Normal >60  Chronic Kidney Disease <60  Kidney Failure <15    The GFR formula is only valid for adults with stable renal function between ages 18 and 70.    Lipase [[id number]]  (Normal) Collected: 08/06/23 1604    Specimen: Blood Updated: 08/06/23 1631     Lipase 24 U/L     CBC Auto Differential [[id number]]  (Abnormal) Collected: 08/06/23 1604    Specimen: Blood Updated: 08/06/23 1613     WBC 9.73 10*3/mm3      RBC 3.73 10*6/mm3      Hemoglobin 11.8 g/dL      Hematocrit 34.5 %      MCV 92.5 fL      MCH 31.6 pg      MCHC 34.2 g/dL      RDW 11.8 %      RDW-SD 39.7 fl      MPV 10.0 fL      Platelets 313 10*3/mm3      Neutrophil % 65.4 %      Lymphocyte % 24.9 %      Monocyte % 7.7 %      Eosinophil % 0.9 %      Basophil % 0.7 %      Immature Grans % 0.4 %      Neutrophils, Absolute 6.36 10*3/mm3      Lymphocytes, Absolute 2.42 10*3/mm3      Monocytes, Absolute 0.75 10*3/mm3      Eosinophils, Absolute 0.09 10*3/mm3      Basophils, Absolute 0.07 10*3/mm3      Immature Grans, Absolute 0.04 10*3/mm3      nRBC 0.0 /100 WBC             Imaging Results (Last 24 Hours)       Procedure Component Value Units Date/Time    CT Abdomen Pelvis Without Contrast [[id number]] Collected: 08/06/23 8137     Updated:  08/06/23 1645    Narrative:      CT OF THE ABDOMEN AND PELVIS WITHOUT CONTRAST 08/06/2023     HISTORY: Abdominal pain.     Spiral images were obtained from the lung bases to the symphysis pubis.  No intravenous or oral contrast was given.     The liver, gallbladder, spleen, pancreas, adrenals appear within normal  limits. Bilateral kidneys are absent. Transplanted kidney is seen in the  right hemipelvis. No bowel wall thickening or bowel dilatation is seen.  There is some aortoiliac calcification. Uterus and urinary bladder  appear unremarkable. There is colonic diverticulosis. Spinal cord  stimulator device is seen in the subcutaneous tissues of the posterior  left pelvis.       Impression:      1. 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 No  hydronephrosis is seen.  2. Colonic diverticulosis.  3. No acute findings are noted.     Radiation dose reduction techniques were utilized, including automated  exposure control and exposure modulation based on body size.                  Results for orders placed during the hospital encounter of 03/09/22    Adult Transthoracic Echo Complete W/ Cont if Necessary Per Protocol    Interpretation Summary  ú Estimated left ventricular EF = 76% Left ventricular systolic function is hyperdynamic (EF > 70%).  ú Left ventricular diastolic function was normal.  ú Saline test results are negative.      No orders to display        Assessment/Plan     Active Hospital Problems    Diagnosis  POA    **Volume depletion [E86.9]  Yes    PIYUSH (acute kidney injury) [N17.9]  Yes    Dehydration [E86.0]  Yes    Prediabetes [R73.03]  Yes    Spinal cord stimulator status [Z96.89]  Not Applicable    Stage 3b chronic kidney disease [N18.32]  Yes    Peripheral neuropathy [G62.9]  Yes    Epigastric pain [R10.13]  Yes    Nausea [R11.0]  Yes    Gastroesophageal reflux disease [K21.9]  Yes    Chronic bilateral low back pain with right-sided sciatica [M54.41, G89.29]  Yes    Right foot  drop [M21.371]  Yes    Essential hypertension [I10]  Yes    Kidney transplant recipient [Z94.0]  Not Applicable      Resolved Hospital Problems   No resolved problems to display.     Ms. Navarrete is a 73 y.o. with a history of living relative kidney transplant (1979), CKD 3B, HTN, chronic low back pain, spinal cord stimulator, peripheral neuropathy, GERD, prediabetes that presents to [facility] complaining of worsening nausea, epigastric abdominal pain and decreased appetite over the past 1-2 weeks prior to arrival.    Nausea  Epigastric abdominal pain  Other symptoms and signs concerning food and fluid intake  GERD  - Symptoms ongoing intermittently over the past 1-2 weeks prior to arrival. Endorses associated feelings of post-prandial fullness, early satiety. CT AP ordered on admission, negative for acute findings. WBC normal, patient afebrile, no signs/symptoms of infectious etiology at present. Lipase within normal limits, ESR/CRP negative.  - Will order abdominal US to assess for evidence of cholelithiasis.  - Consult GI.  - IVF, PRN medications for symptom control.  - Clear liquid diet for now.  - Continue PPI as prescribed.    PIYUSH on CKD stage 3B  Dehydration  - Etiology most likely pre renal in nature, due to hypovolemia/dehydration from decreased oral intake.  - Creatinine on admission: 2.37. Baseline creatinine appears to be ~1.7, per chart review. Patient has history of living relative kidney transplant (1979), withdrawal of immunosuppressive therapy (1985).  - Continue IVF for volume resuscitation and closely monitor.  - Order repeat BMP in AM for reassessment of creatinine, further management based on results of testing.  - Will consider renal ultrasound and nephrology consult based on clinical course.  - Avoid nephrotoxic medications, IVF, strict I/O, daily weights.    Hypertension  - BP acceptable acutely. Appears stable. No indications to warrant acute changes/intervention at this  time.  - Continue current medications as prescribed. Trend BP to guide ongoing management decisions.    Anemia  - Hemoglobin low on most recent labs. No evidence of overt blood loss. No indications for acute intervention at this time.    - Order repeat CBC in AM for reassessment. Continue to monitor, transfuse for hemoglobin <7.    Prediabetes with Hyperglycemia  - Glucose elevated on most recent labs. Patient without known history of T2DM.  Most recent hemoglobin A1c 5.70% (07/25/23).  - Monitor for now, continue with POC glucose checks, correctional SSI as needed.    Chronic low back pain  - Continue PRN pain medications as prescribed.    Peripheral neuropathy  - Continue Pregabalin as prescribed.    Hyperlipidemia  - Stable. Continue Statin as prescribed.    I discussed the patient's findings and my recommendations with patient, family, nursing staff, and ED provider.    VTE Prophylaxis - Pharmacy to dose Lovenox.  Code Status - Full code.       Mark Kennedy DO  [facility]  08/06/23  18:22 EDT

## 2023-08-06 NOTE — ED PROVIDER NOTES
EMERGENCY DEPARTMENT ENCOUNTER    Room Number:  16/16  Date seen:  8/6/2023  PCP: Bradley Hernandez MD  Historian: Patient and spouse      HPI:  Chief Complaint: Abdominal pain, nausea    A complete HPI/ROS/PMH/PSH/SH/FH are unobtainable due to: N/A    Context: Marlena Navarrete is a 73 y.o. female who presents to the ED c/o nausea and epigastric fullness for the past few weeks.  No vomiting.  Last bowel movement was 4 to 5 days ago which is not unusual for her.  Abdomen feels bloated.  Pain is in the epigastrium and does not radiate.  Symptoms are worsened by food.  No fevers or chills.  No urinary complaints.        Additional sources:  - Discussed/ obtained information from independent historians: Yes-she is accompanied by her  confirms pertinent aspects of the HPI.    - External (non-ED) record review: The patient's last admission to this facility was in March 2022 for COVID-19 pneumonia.  She has a history of chronic, intractable lower  back pain (status post laminectomy), vitamin D deficiency, stage IIIb chronic kidney disease, prior DVT, hypertension and is status post renal transplant (related donor, 1979).    - Chronic or social conditions impacting care: Multiple medical problems      PAST MEDICAL HISTORY  Active Ambulatory Problems     Diagnosis Date Noted    Lumbar radiculopathy 06/07/2018    Essential hypertension 08/25/2018    Right foot drop 08/25/2018    Kidney transplant recipient 09/07/1979    History of lumbar laminectomy for spinal cord decompression 08/25/2018    Spinal enthesopathy of thoracic region 03/12/2019    Postlaminectomy syndrome, lumbar region 02/17/2020    Chronic bilateral low back pain with right-sided sciatica 02/17/2020    Displacement of other nervous system device, implant or graft, initial encounter 03/09/2020    Postlaminectomy syndrome of lumbar region 06/04/2020    Epigastric pain 08/13/2020    Nausea 08/13/2020    Gastroesophageal reflux disease 08/13/2020    Shoulder  arthritis 2020    Acute renal failure (ARF) 2020    Stage 3b chronic kidney disease 2020    Iron deficiency anemia 2020    Status post rotator cuff surgery 2020    History of DVT (deep vein thrombosis) 2020    Peripheral neuropathy 2020    Intractable back pain 2022    Spinal cord stimulator status 2022    Adhesive arachnoiditis 2019    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 04/10/2020    Inflammation of sacroiliac joint 2021    Secondary hyperparathyroidism  2022    Vitamin D deficiency 2022    COVID 2022    Pneumonia due to COVID-19 virus 2022    Narcotic dependence 2022    Cytokine release syndrome, grade 1 03/15/2022    Impacted cerumen of right ear 2022    Hospital discharge follow-up 2022    Acute metabolic encephalopathy 2022     Resolved Ambulatory Problems     Diagnosis Date Noted    Lumbar radiculopathy, acute 2018    Respiratory failure, post-operative 2020    Acute blood loss anemia 2020    Postoperative hypoxia 2020    Arthritis of shoulder 2020     Past Medical History:   Diagnosis Date    Arthritis     Chronic back pain     CKD (chronic kidney disease)     Diverticulosis     Foot drop, right     GERD (gastroesophageal reflux disease)     History of transfusion     Hyperlipidemia     Hypertension     Neuropathy     Spinal headache     Torn rotator cuff          PAST SURGICAL HISTORY  Past Surgical History:   Procedure Laterality Date    APPENDECTOMY      CATARACT EXTRACTION EXTRACAPSULAR W/ INTRAOCULAR LENS IMPLANTATION       SECTION      COLONOSCOPY  approx     Anastacio WHARTON    ENDOSCOPY N/A 2020    Procedure: ESOPHAGOGASTRODUODENOSCOPY with biopsies;  Surgeon: Wicho Chaves MD;  Location: [facility] ENDOSCOPY;  Service: Gastroenterology;  Laterality: N/A;  pre- epigastric pain,  nausea  post-- gastritis, duodenitis, bile reflux    LUMBAR DISCECTOMY FUSION INSTRUMENTATION Right 2018    Procedure: Right L2-3 laminectomy with Metrix;  Surgeon: Oscar Paulino MD;  Location: [facility];  Service: Neurosurgery    SHOULDER ARTHROSCOPY W/ ROTATOR CUFF REPAIR Right 2020    Procedure: SHOULDER ARTHROSCOPY, ROTATOR CUFF REPAIR, SUBACROMIAL DECOMPRESSION, DISTAL CLAVICLE EXCISION, BICEPS TENOTOMY, AND LABRAL DEBRIDEMENT;  Surgeon: Rishabh Florence MD;  Location: [facility];  Service: Orthopedics;  Laterality: Right;    SHOULDER ROTATOR CUFF REPAIR Left     SPINAL CORD STIMULATOR IMPLANT N/A 2020    Procedure: SPINAL CORD STIMULATOR INSERTION PHASE 2, triall of right gluteal internal pulse generator;  Surgeon: Cuate Kemp MD;  Location: [facility];  Service: Neurosurgery;  Laterality: N/A;    SPINAL CORD STIMULATOR IMPLANT N/A 2020    Procedure: SPINAL CORD STIMULATOR INSERTION PHASE 1, Thoracic 10 laminotomy for revision of [place] Tom surgical lead at Thoracic 8 to Thoracic 9;  Surgeon: Cuate Kemp MD;  Location: [facility];  Service: Neurosurgery;  Laterality: N/A;    TONSILLECTOMY      TRANSPLANTATION RENAL Right 1979    [facility] PT     TUBAL ABDOMINAL LIGATION           FAMILY HISTORY  Family History   Problem Relation Age of Onset    Aneurysm Mother         BRAIN ANEURYSM    Heart failure Father     Kidney failure Father     Lung cancer Sister     Lung cancer Sister     Lung cancer Son     Malig Hyperthermia Neg Hx          SOCIAL HISTORY  Social History     Socioeconomic History    Marital status:     Number of children: 1    Years of education: 12   Tobacco Use    Smoking status: Former     Packs/day: 1.00     Years: 30.00     Pack years: 30.00     Types: Cigarettes     Quit date: 2012     Years since quittin.2    Smokeless tobacco: Never   Substance and Sexual Activity    Alcohol use: No    Drug use: Never    Sexual activity:  Defer         ALLERGIES  Patient has no known allergies.        REVIEW OF SYSTEMS  Review of Systems   Constitutional:  Positive for appetite change. Negative for fever.   Respiratory:  Negative for shortness of breath.    Cardiovascular:  Negative for chest pain and leg swelling.   Gastrointestinal:  Positive for abdominal pain, constipation and nausea. Negative for blood in stool, diarrhea and vomiting.   Genitourinary:  Negative for dysuria.          PHYSICAL EXAM  ED Triage Vitals   Temp Heart Rate Resp BP SpO2   08/06/23 1548 08/06/23 1548 08/06/23 1548 08/06/23 1550 08/06/23 1548   97.9 øF (36.6 øC) 80 18 133/80 96 %      Temp src Heart Rate Source Patient Position BP Location FiO2 (%)   08/06/23 1548 08/06/23 1548 08/06/23 1550 08/06/23 1550 --   Tympanic Monitor Sitting Right arm        Physical Exam      Physical Exam   Constitutional: Pt. is oriented to person, place, and time.  She is well-developed, well-nourished, and in no distress.    HENT: Normocephalic and atraumatic. Pupils are equal, round, and reactive to light.   Neck: Normal range of motion. Neck supple. No JVD present. No tracheal deviation present.   Cardiovascular: Normal rate, regular rhythm and normal heart sounds.   Pulmonary/Chest: Effort normal and breath sounds normal. No stridor. No respiratory distress. No wheezes, no rales.   Abdominal: Soft.  Mild distension. There is mild epigastric tenderness. There is no rebound and no guarding.   Musculoskeletal: No edema, tenderness or deformity.   Neurological: She is alert and oriented to person, place, and time.  She has no focal neurologic deficits.  Skin: Skin is warm and dry. Pt. is not diaphoretic.  Psychiatric: Mood, affect normal.  She is pleasant and cooperative.  Nursing note and vitals reviewed.            LAB RESULTS  Recent Results (from the past 24 hour(s))   Comprehensive Metabolic Panel    Collection Time: 08/06/23  4:04 PM    Specimen: Blood   Result Value Ref Range     Glucose 107 (H) 65 - 99 mg/dL    BUN 28 (H) 8 - 23 mg/dL    Creatinine 2.37 (H) 0.57 - 1.00 mg/dL    Sodium 143 136 - 145 mmol/L    Potassium 3.5 3.5 - 5.2 mmol/L    Chloride 104 98 - 107 mmol/L    CO2 29.5 (H) 22.0 - 29.0 mmol/L    Calcium 10.4 8.6 - 10.5 mg/dL    Total Protein 6.6 6.0 - 8.5 g/dL    Albumin 4.3 3.5 - 5.2 g/dL    ALT (SGPT) 6 1 - 33 U/L    AST (SGOT) 18 1 - 32 U/L    Alkaline Phosphatase 100 39 - 117 U/L    Total Bilirubin 0.3 0.0 - 1.2 mg/dL    Globulin 2.3 gm/dL    A/G Ratio 1.9 g/dL    BUN/Creatinine Ratio 11.8 7.0 - 25.0    Anion Gap 9.5 5.0 - 15.0 mmol/L    eGFR 21.4 (L) >60.0 mL/min/1.73   Lipase    Collection Time: 08/06/23  4:04 PM    Specimen: Blood   Result Value Ref Range    Lipase 24 13 - 60 U/L   CBC Auto Differential    Collection Time: 08/06/23  4:04 PM    Specimen: Blood   Result Value Ref Range    WBC 9.73 3.40 - 10.80 10*3/mm3    RBC 3.73 (L) 3.77 - 5.28 10*6/mm3    Hemoglobin 11.8 (L) 12.0 - 15.9 g/dL    Hematocrit 34.5 34.0 - 46.6 %    MCV 92.5 79.0 - 97.0 fL    MCH 31.6 26.6 - 33.0 pg    MCHC 34.2 31.5 - 35.7 g/dL    RDW 11.8 (L) 12.3 - 15.4 %    RDW-SD 39.7 37.0 - 54.0 fl    MPV 10.0 6.0 - 12.0 fL    Platelets 313 140 - 450 10*3/mm3    Neutrophil % 65.4 42.7 - 76.0 %    Lymphocyte % 24.9 19.6 - 45.3 %    Monocyte % 7.7 5.0 - 12.0 %    Eosinophil % 0.9 0.3 - 6.2 %    Basophil % 0.7 0.0 - 1.5 %    Immature Grans % 0.4 0.0 - 0.5 %    Neutrophils, Absolute 6.36 1.70 - 7.00 10*3/mm3    Lymphocytes, Absolute 2.42 0.70 - 3.10 10*3/mm3    Monocytes, Absolute 0.75 0.10 - 0.90 10*3/mm3    Eosinophils, Absolute 0.09 0.00 - 0.40 10*3/mm3    Basophils, Absolute 0.07 0.00 - 0.20 10*3/mm3    Immature Grans, Absolute 0.04 0.00 - 0.05 10*3/mm3    nRBC 0.0 0.0 - 0.2 /100 WBC       Ordered the above labs and reviewed the results.        RADIOLOGY  CT Abdomen Pelvis Without Contrast    Result Date: 8/6/2023  CT OF THE ABDOMEN AND PELVIS WITHOUT CONTRAST 08/06/2023  HISTORY: Abdominal pain.  Spiral  images were obtained from the lung bases to the symphysis pubis. No intravenous or oral contrast was given.  The liver, gallbladder, spleen, pancreas, adrenals appear within normal limits. Bilateral kidneys are absent. Transplanted kidney is seen in the right hemipelvis. No bowel wall thickening or bowel dilatation is seen. There is some aortoiliac calcification. Uterus and urinary bladder appear unremarkable. There is colonic diverticulosis. Spinal cord stimulator device is seen in the subcutaneous tissues of the posterior left pelvis.      1. 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 No hydronephrosis is seen. 2. Colonic diverticulosis. 3. No acute findings are noted.  Radiation dose reduction techniques were utilized, including automated exposure control and exposure modulation based on body size.        Ordered the above noted radiological studies. Reviewed by me in PACS.        PROCEDURES  Procedures      MEDICATIONS GIVEN IN ER  Medications   sodium chloride 0.9 % flush 10 mL (has no administration in time range)   sodium chloride 0.9 % flush 10 mL (has no administration in time range)   sodium chloride 0.9 % infusion (125 mL/hr Intravenous New Bag 8/6/23 1713)   ondansetron (ZOFRAN) injection 4 mg (4 mg Intravenous Given 8/6/23 1610)   sodium chloride 0.9 % bolus 500 mL (0 mL Intravenous Stopped 8/6/23 1713)             MEDICAL DECISION MAKING, PROGRESS, and CONSULTS    All labs have been independently reviewed by me.  All radiology studies have been reviewed by me and discussed with radiologist dictating the report.   EKG's independently viewed and interpreted by me.  Discussion below represents my analysis of pertinent findings related to patient's condition, differential diagnosis, treatment plan and final disposition.      Orders placed during this visit:  Orders Placed This Encounter   Procedures    CT Abdomen Pelvis Without Contrast    Comprehensive Metabolic Panel    Lipase    CBC  Auto Differential    [facility] (on-call MD unless specified) Details    Insert Peripheral IV    Insert Peripheral IV    Initiate Observation Status    CBC & Differential       Additional orders considered but not ordered:  N/A    Differential diagnosis:  DDx includes but is not limited to: Cholecystitis, choledocholithiasis, cholangitis, peritonitis, pancreatitis/pseudocyst, peptic ulcer, bowel obstruction/ileus, constipation, diverticulitis/perforation/abscess, inflammatory bowel disease, renal colic/stone, urinary tract infection/pyelonephritis, prostatitis, mesenteric ischemia, expanding/leaking AAA, testicular/ovarian torsion.      Independent interpretation of labs, radiology studies, and discussions with consultants:  ED Course as of 08/06/23 1727   Sun Aug 06, 2023   1616 Hemoglobin(!): 11.8  CBC is otherwise unremarkable. [WC]   1633 BUN(!): 28 [WC]   1633 Creatinine(!): 2.37  Baseline creatinine is 1.6-1.7. [WC]   1633 Lipase: 24 [WC]   1647 CT of the abdomen pelvis was independently visualized by me and discussed with/interpreted by Dr. Linn (radiology)-there is diverticulosis.  Kidneys are absent bilaterally with a transplanted right pelvic kidney.  No hydronephrosis is seen.  No acute findings are identified.  For official interpretation, see dictated report. [WC]   1725 Discussed with Dr. Kennedy ([facility])-he agrees to admit the patient in observation status to a telemetry bed. [WC]      ED Course User Index  [WC] Ron Griffin MD              Appropriate PPE was worn by myself and the patient throughout our entire interaction.    DIAGNOSIS  Final diagnoses:   Nausea   Volume depletion   Stage 3b chronic kidney disease   Kidney transplant recipient         DISPOSITION  Observation-telemetry            Latest Documented Vital Signs:  As of 17:27 EDT  BP- 140/61 HR- 63 Temp- 97.9 øF (36.6 øC) (Tympanic) O2 sat- 94%        --    Please note that portions of this were completed with a voice recognition program.        Note Disclaimer: At [facility], we believe that sharing information builds trust and better relationships. You are receiving this note because you are receiving care at [facility] or recently visited. It is possible you will see health information before a provider has talked with you about it. This kind of information can be easy to misunderstand. To help you fully understand what it means for your health, we urge you to discuss this note with your provider.             Ron Griffin MD  08/06/23 [id number]

## 2023-08-06 NOTE — PROGRESS NOTES
"[facility] Clinical Pharmacy Services: Enoxaparin Consult    Marlena Navarrete has a pharmacy consult to dose prophylactic enoxaparin per Dr. Kennedy's request.     Indication: VTE Prophylaxis  Home Anticoagulation: None     Relevant clinical data and objective history reviewed:  73 y.o. female 147.3 cm (57.99\") 61.2 kg (134 lb 14.7 oz)   Body mass index is 28.21 kg/mý.   Results from last 7 days   Lab Units 08/06/23  1604   PLATELETS 10*3/mm3 313     Estimated Creatinine Clearance: 18.1 mL/min (A) (by C-G formula based on SCr of 2.37 mg/dL (H)).    Assessment/Plan    Will start patient on 30mg subcutaneous every 24 hours, adjusted for renal function. Consult order will be discontinued but pharmacy will continue to follow.     Ciera Ambriz, Pharm.D., [facility]   Clinical Pharmacist   "

## 2023-08-06 NOTE — ED NOTES
PT presents to ED with family. Pt reports nausea x2 weeks and upper abd pain. Pt reports this has been going on for the last week and worse after she eats. Pt is A&OX4 and able to ambulate to wheelchair.     Yusra Hernandez RN

## 2023-08-06 NOTE — PLAN OF CARE
Goal Outcome Evaluation:  Plan of Care Reviewed With: patient        Progress: no change  Outcome Evaluation: Patient admitted from ER with c/o upper abdominal pain/fullness and nausea. IV Zofran given in ER, but patient reports no relief. Clear liquid diet ordered. Abd US to be done bedside, so patient will remain NPO until that is completed. IVF infusing. Up with assist x 1 and cane to bathroom, fall precautions initiated. VSS. Urine sample ordered, will obtain with next void. Dressing to left buttock CD&I from recent surgery. K+ 3.5, notified MD of decreased CrCl and replacement is contraindicated.  at bedside.

## 2023-08-07 LAB
ANION GAP SERPL CALCULATED.3IONS-SCNC: 11.2 MMOL/L (ref 5–15)
BASOPHILS # BLD AUTO: 0.04 10*3/MM3 (ref 0–0.2)
BASOPHILS NFR BLD AUTO: 0.5 % (ref 0–1.5)
BUN SERPL-MCNC: 24 MG/DL (ref 8–23)
BUN/CREAT SERPL: 12.2 (ref 7–25)
CALCIUM SPEC-SCNC: 8.7 MG/DL (ref 8.6–10.5)
CHLORIDE SERPL-SCNC: 111 MMOL/L (ref 98–107)
CO2 SERPL-SCNC: 21.8 MMOL/L (ref 22–29)
CREAT SERPL-MCNC: 1.97 MG/DL (ref 0.57–1)
DEPRECATED RDW RBC AUTO: 41.1 FL (ref 37–54)
EGFRCR SERPLBLD CKD-EPI 2021: 26.8 ML/MIN/1.73
EOSINOPHIL # BLD AUTO: 0.08 10*3/MM3 (ref 0–0.4)
EOSINOPHIL NFR BLD AUTO: 1.1 % (ref 0.3–6.2)
ERYTHROCYTE [DISTWIDTH] IN BLOOD BY AUTOMATED COUNT: 11.9 % (ref 12.3–15.4)
GLUCOSE BLDC GLUCOMTR-MCNC: 87 MG/DL (ref 70–130)
GLUCOSE BLDC GLUCOMTR-MCNC: 88 MG/DL (ref 70–130)
GLUCOSE BLDC GLUCOMTR-MCNC: 91 MG/DL (ref 70–130)
GLUCOSE BLDC GLUCOMTR-MCNC: 95 MG/DL (ref 70–130)
GLUCOSE SERPL-MCNC: 87 MG/DL (ref 65–99)
HCT VFR BLD AUTO: 31.7 % (ref 34–46.6)
HGB BLD-MCNC: 10.6 G/DL (ref 12–15.9)
IMM GRANULOCYTES # BLD AUTO: 0.02 10*3/MM3 (ref 0–0.05)
IMM GRANULOCYTES NFR BLD AUTO: 0.3 % (ref 0–0.5)
LYMPHOCYTES # BLD AUTO: 1.75 10*3/MM3 (ref 0.7–3.1)
LYMPHOCYTES NFR BLD AUTO: 23.7 % (ref 19.6–45.3)
MCH RBC QN AUTO: 31.7 PG (ref 26.6–33)
MCHC RBC AUTO-ENTMCNC: 33.4 G/DL (ref 31.5–35.7)
MCV RBC AUTO: 94.9 FL (ref 79–97)
MONOCYTES # BLD AUTO: 0.5 10*3/MM3 (ref 0.1–0.9)
MONOCYTES NFR BLD AUTO: 6.8 % (ref 5–12)
NEUTROPHILS NFR BLD AUTO: 4.99 10*3/MM3 (ref 1.7–7)
NEUTROPHILS NFR BLD AUTO: 67.6 % (ref 42.7–76)
NRBC BLD AUTO-RTO: 0 /100 WBC (ref 0–0.2)
PLATELET # BLD AUTO: 252 10*3/MM3 (ref 140–450)
PMV BLD AUTO: 10.6 FL (ref 6–12)
POTASSIUM SERPL-SCNC: 3.6 MMOL/L (ref 3.5–5.2)
RBC # BLD AUTO: 3.34 10*6/MM3 (ref 3.77–5.28)
SODIUM SERPL-SCNC: 144 MMOL/L (ref 136–145)
WBC NRBC COR # BLD: 7.38 10*3/MM3 (ref 3.4–10.8)

## 2023-08-07 PROCEDURE — 85025 COMPLETE CBC W/AUTO DIFF WBC: CPT | Performed by: STUDENT IN AN ORGANIZED HEALTH CARE EDUCATION/TRAINING PROGRAM

## 2023-08-07 PROCEDURE — 82948 REAGENT STRIP/BLOOD GLUCOSE: CPT

## 2023-08-07 PROCEDURE — 80048 BASIC METABOLIC PNL TOTAL CA: CPT | Performed by: STUDENT IN AN ORGANIZED HEALTH CARE EDUCATION/TRAINING PROGRAM

## 2023-08-07 PROCEDURE — 25010000002 PROCHLORPERAZINE 10 MG/2ML SOLUTION: Performed by: HOSPITALIST

## 2023-08-07 PROCEDURE — 99222 1ST HOSP IP/OBS MODERATE 55: CPT | Performed by: PHYSICIAN ASSISTANT

## 2023-08-07 PROCEDURE — 25010000002 ONDANSETRON PER 1 MG: Performed by: STUDENT IN AN ORGANIZED HEALTH CARE EDUCATION/TRAINING PROGRAM

## 2023-08-07 PROCEDURE — 97165 OT EVAL LOW COMPLEX 30 MIN: CPT

## 2023-08-07 PROCEDURE — 97161 PT EVAL LOW COMPLEX 20 MIN: CPT

## 2023-08-07 RX ORDER — ENOXAPARIN SODIUM 100 MG/ML
30 INJECTION SUBCUTANEOUS NIGHTLY
Status: DISCONTINUED | OUTPATIENT
Start: 2023-08-08 | End: 2023-08-10 | Stop reason: HOSPADM

## 2023-08-07 RX ORDER — LAMOTRIGINE 25 MG/1
25 TABLET ORAL 2 TIMES DAILY
COMMUNITY
End: 2023-08-10 | Stop reason: HOSPADM

## 2023-08-07 RX ORDER — SODIUM CHLORIDE 450 MG/100ML
125 INJECTION, SOLUTION INTRAVENOUS CONTINUOUS
Status: DISCONTINUED | OUTPATIENT
Start: 2023-08-07 | End: 2023-08-08

## 2023-08-07 RX ORDER — PROCHLORPERAZINE EDISYLATE 5 MG/ML
10 INJECTION INTRAMUSCULAR; INTRAVENOUS EVERY 6 HOURS PRN
Status: DISCONTINUED | OUTPATIENT
Start: 2023-08-07 | End: 2023-08-10 | Stop reason: HOSPADM

## 2023-08-07 RX ORDER — POLYETHYLENE GLYCOL 3350 17 G/17G
17 POWDER, FOR SOLUTION ORAL DAILY
Status: DISCONTINUED | OUTPATIENT
Start: 2023-08-07 | End: 2023-08-10 | Stop reason: HOSPADM

## 2023-08-07 RX ADMIN — SENNOSIDES AND DOCUSATE SODIUM 2 TABLET: 50; 8.6 TABLET ORAL at 09:58

## 2023-08-07 RX ADMIN — OXYCODONE HYDROCHLORIDE AND ACETAMINOPHEN 1 TABLET: 5; 325 TABLET ORAL at 02:37

## 2023-08-07 RX ADMIN — ATORVASTATIN CALCIUM 20 MG: 20 TABLET, FILM COATED ORAL at 20:04

## 2023-08-07 RX ADMIN — OXYCODONE HYDROCHLORIDE AND ACETAMINOPHEN 1 TABLET: 5; 325 TABLET ORAL at 20:06

## 2023-08-07 RX ADMIN — SODIUM CHLORIDE 125 ML/HR: 9 INJECTION, SOLUTION INTRAVENOUS at 06:44

## 2023-08-07 RX ADMIN — ONDANSETRON 4 MG: 2 INJECTION INTRAMUSCULAR; INTRAVENOUS at 06:44

## 2023-08-07 RX ADMIN — SODIUM CHLORIDE 125 ML/HR: 4.5 INJECTION, SOLUTION INTRAVENOUS at 18:28

## 2023-08-07 RX ADMIN — PROCHLORPERAZINE EDISYLATE 10 MG: 5 INJECTION INTRAMUSCULAR; INTRAVENOUS at 11:33

## 2023-08-07 RX ADMIN — AMLODIPINE BESYLATE 5 MG: 5 TABLET ORAL at 09:54

## 2023-08-07 RX ADMIN — ONDANSETRON 4 MG: 2 INJECTION INTRAMUSCULAR; INTRAVENOUS at 12:47

## 2023-08-07 RX ADMIN — PANTOPRAZOLE SODIUM 40 MG: 40 TABLET, DELAYED RELEASE ORAL at 06:44

## 2023-08-07 RX ADMIN — SODIUM CHLORIDE 125 ML/HR: 9 INJECTION, SOLUTION INTRAVENOUS at 15:45

## 2023-08-07 RX ADMIN — POLYETHYLENE GLYCOL 3350 17 G: 17 POWDER, FOR SOLUTION ORAL at 12:55

## 2023-08-07 RX ADMIN — OXYCODONE HYDROCHLORIDE AND ACETAMINOPHEN 1 TABLET: 5; 325 TABLET ORAL at 10:11

## 2023-08-07 RX ADMIN — PROCHLORPERAZINE EDISYLATE 10 MG: 5 INJECTION INTRAMUSCULAR; INTRAVENOUS at 18:21

## 2023-08-07 NOTE — THERAPY EVALUATION
Patient Name: Marlena Navarrete  : 1950    MRN: [id number]                              Today's Date: 2023       Admit Date: 2023    Visit Dx:     ICD-10-CM ICD-9-CM   1. Nausea  R11.0 787.02   2. Volume depletion  E86.9 276.50   3. Stage 3b chronic kidney disease  N18.32 585.3   4. Kidney transplant recipient  Z94.0 V42.0     Patient Active Problem List   Diagnosis    Lumbar radiculopathy    Essential hypertension    Right foot drop    Kidney transplant recipient    History of lumbar laminectomy for spinal cord decompression    Spinal enthesopathy of thoracic region    Postlaminectomy syndrome, lumbar region    Chronic bilateral low back pain with right-sided sciatica    Displacement of other nervous system device, implant or graft, initial encounter    Postlaminectomy syndrome of lumbar region    Epigastric pain    Nausea    Gastroesophageal reflux disease    Shoulder arthritis    Acute renal failure (ARF)    Stage 3b chronic kidney disease    Iron deficiency anemia    Status post rotator cuff surgery    History of DVT (deep vein thrombosis)    Peripheral neuropathy    Intractable back pain    Spinal cord stimulator status    Adhesive arachnoiditis    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    Inflammation of sacroiliac joint    Secondary hyperparathyroidism     Vitamin D deficiency    COVID    Pneumonia due to COVID-19 virus    Narcotic dependence    Cytokine release syndrome, grade 1    Impacted cerumen of right ear    Hospital discharge follow-up    Acute metabolic encephalopathy    Volume depletion    PIYUSH (acute kidney injury)    Dehydration    Prediabetes     Past Medical History:   Diagnosis Date    Arthritis     OSTEO    Chronic back pain     Chronic bilateral low back pain with right-sided sciatica 2020    CKD (chronic kidney disease)     FOLLOWED BY DR LANRE BYNUM    Diverticulosis     Foot drop, right     GERD (gastroesophageal reflux  disease)     History of DVT (deep vein thrombosis) 2018    RIGHT CALF S/P BACK SURGERY     History of transfusion     no reaction    Hyperlipidemia     Hypertension     Kidney transplant recipient 1979    HX TRANSPLANT D/T REFLUX     Neuropathy     Secondary hyperparathyroidism  3/9/2022    Spinal cord stimulator status 3/9/2022    Spinal headache     Stage 3b chronic kidney disease 2020    FOLLOWED BY DR LANRE Valdez rotator cuff     Vitamin D deficiency 3/9/2022     Past Surgical History:   Procedure Laterality Date    APPENDECTOMY      CATARACT EXTRACTION EXTRACAPSULAR W/ INTRAOCULAR LENS IMPLANTATION       SECTION      COLONOSCOPY  approx     Anastacio WHARTON    ENDOSCOPY N/A 2020    Procedure: ESOPHAGOGASTRODUODENOSCOPY with biopsies;  Surgeon: Wicho Chaves MD;  Location: [facility] ENDOSCOPY;  Service: Gastroenterology;  Laterality: N/A;  pre- epigastric pain, nausea  post-- gastritis, duodenitis, bile reflux    LUMBAR DISCECTOMY FUSION INSTRUMENTATION Right 2018    Procedure: Right L2-3 laminectomy with Metrix;  Surgeon: Oscar Paulino MD;  Location: [facility] OR;  Service: Neurosurgery    SHOULDER ARTHROSCOPY W/ ROTATOR CUFF REPAIR Right 2020    Procedure: SHOULDER ARTHROSCOPY, ROTATOR CUFF REPAIR, SUBACROMIAL DECOMPRESSION, DISTAL CLAVICLE EXCISION, BICEPS TENOTOMY, AND LABRAL DEBRIDEMENT;  Surgeon: Rishabh Florence MD;  [place] [facility] [place] [facility];  Service: Orthopedics;  Laterality: Right;    SHOULDER ROTATOR CUFF REPAIR Left     SPINAL CORD STIMULATOR IMPLANT N/A 2020    Procedure: SPINAL CORD STIMULATOR INSERTION PHASE 2, triall of right gluteal internal pulse generator;  Surgeon: Cuate Kemp MD;  Location: [facility] OR;  Service: Neurosurgery;  Laterality: N/A;    SPINAL CORD STIMULATOR IMPLANT N/A 2020    Procedure: SPINAL CORD STIMULATOR INSERTION PHASE 1, Thoracic 10 laminotomy for revision of St. Tom surgical lead at Thoracic 8  to Thoracic 9;  Surgeon: Cuate Kemp MD;  Location: [facility] MAIN OR;  Service: Neurosurgery;  Laterality: N/A;    TONSILLECTOMY      TRANSPLANTATION RENAL Right 1979    SISTER GAVE PT     TUBAL ABDOMINAL LIGATION        General Information       Row Name 08/07/23 1300          OT Time and Intention    Document Type evaluation  -     Mode of Treatment occupational therapy  -       Row Name 08/07/23 1300          General Information    Patient Profile Reviewed yes  -KA     Prior Level of Function independent:  -     Existing Precautions/Restrictions fall  -     Barriers to Rehab none identified  -       Row Name 08/07/23 1300          Living Environment    People in Home spouse  -       Row Name 08/07/23 1300          Home Main Entrance    Number of Stairs, Main Entrance one  -KA     Stair Railings, Main Entrance railings safe and in good condition  -       Row Name 08/07/23 1300          Stairs Within Home, Primary    Stairs, Within Home, Primary from living room to kitchen  -     Number of Stairs, Within Home, Primary three  -KA       Row Name 08/07/23 1300          Cognition    Orientation Status (Cognition) oriented x 4  -KA       Row Name 08/07/23 1300          Safety Issues, Functional Mobility    Impairments Affecting Function (Mobility) balance;endurance/activity tolerance;pain;strength  -               User Key  (r) = Recorded By, (t) = Taken By, (c) = Cosigned By      Initials Name Provider Type    Ai Joe, HETAL Occupational Therapist                     Mobility/ADL's       Row Name 08/07/23 1301          Bed Mobility    Bed Mobility supine-sit;sit-supine  -REJI     Supine-Sit [place] (Bed Mobility) supervision  -     Assistive Device (Bed Mobility) bed rails;head of bed elevated  -       Row Name 08/07/23 1301          Sit-Stand Transfer    Sit-Stand [place] (Transfers) standby assist;verbal cues  -     Assistive Device (Sit-Stand Transfers) cane, quad  -REJI        Row Name 08/07/23 1301          Functional Mobility    Functional Mobility- Ind. Level contact guard assist;standby assist  -     Functional Mobility- Device cane, quad  -KA     Functional Mobility-Distance (Feet) --  within room to window in hallway to simulate household distances  -       Row Name 08/07/23 1301          Activities of Daily Living    BADL Assessment/Intervention --  politely declined ADLs stating she just completed with nsg and had no difficulty with toileting needs  -[facility] Name 08/07/23 1301          Lower Body Dressing Assessment/Training    [place] Level (Lower Body Dressing) lower body dressing skills;doff;don;socks;minimum assist (75% patient effort)  -               User Key  (r) = Recorded By, (t) = Taken By, (c) = Cosigned By      Initials Name Provider Type    Ai Joe OT Occupational Therapist                   Obj/Interventions       Row Name 08/07/23 1304 08/07/23 1303       Range of Motion Comprehensive    General Range of Motion bilateral upper extremity ROM WFL  - no range of motion deficits identified  -KA      Row Name 08/07/23 1304          Strength Comprehensive (MMT)    General Manual Muscle Testing (MMT) Assessment upper extremity strength deficits identified  -KA       Row Name 08/07/23 1304          Balance    Static Sitting Balance modified independence  -     Dynamic Sitting Balance modified independence  -     Position, Sitting Balance sitting edge of bed  -     Static Standing Balance supervision  -     Dynamic Standing Balance standby assist  -KA     Position/Device Used, Standing Balance cane, quad  -KA     Balance Interventions sitting;standing  -               User Key  (r) = Recorded By, (t) = Taken By, (c) = Cosigned By      Initials Name Provider Type    Ai Joe OT Occupational Therapist                   Goals/Plan       Row Name 08/07/23 1309          Transfer Goal 1 (OT)    Activity/Assistive Device  (Transfer Goal 1, OT) transfers, all  -     [place] Level/Cues Needed (Transfer Goal 1, OT) standby assist  -     Progress/Outcome (Transfer Goal 1, OT) goal met  -               User Key  (r) = Recorded By, (t) = Taken By, (c) = Cosigned By      Initials Name Provider Type    Ai Joe, HETAL Occupational Therapist                   Clinical Impression       Row Name 08/07/23 1305          Pain Assessment    Pretreatment Pain Rating 0/10 - no pain  -KA     Posttreatment Pain Rating 0/10 - no pain  -KA       Row Name 08/07/23 1305          Plan of Care Review    Plan of Care Reviewed With patient;spouse  -     Outcome Evaluation Pt seen for OT eval this AM. Admitted with epigastric pain and nausea. Pt also with PMHx of R foot drop. Pt is independent at baseline and lives with her spouse. She uses a cane for mobility and has a brace for her RLE for foot drop. Today she performed bed mobility with supervision and stood and ambulated within hallway to simulate household distances with cane and SBA. Pt reports she had returned from bathroom with nsg and reports no issues with ADLs at this time. Anticipate return home with assist. OT to sign off at this time.  -       Row Name 08/07/23 1305          Therapy Assessment/Plan (OT)    Therapy Frequency (OT) evaluation only  -       Row Name 08/07/23 1305          Therapy Plan Review/Discharge Plan (OT)    Anticipated Discharge Disposition (OT) home with assist  -       Row Name 08/07/23 1305          Vital Signs    Pre Patient Position Supine  -KA     Intra Patient Position Standing  -KA     Post Patient Position Supine  -       Row Name 08/07/23 1305          Positioning and Restraints    Pre-Treatment Position in bed  -KA     Post Treatment Position bed  -KA     In Bed notified nsg;supine;call light within reach;encouraged to call for assist;with family/caregiver  -               User Key  (r) = Recorded By, (t) = Taken By, (c) = Cosigned By       Initials Name Provider Type    Ai Joe OT Occupational Therapist                   Outcome Measures       Row Name 08/07/23 1309          How much help from another is currently needed...    Putting on and taking off regular lower body clothing? 3  -KA     Bathing (including washing, rinsing, and drying) 3  -KA     Toileting (which includes using toilet bed pan or urinal) 4  -KA     Putting on and taking off regular upper body clothing 4  -KA     Taking care of personal grooming (such as brushing teeth) 4  -KA     Eating meals 4  -KA     AM-PAC 6 Clicks Score (OT) 22  -KA       Row Name 08/07/23 1201 08/07/23 0800       How much help from another person do you currently need...    Turning from your back to your side while in flat bed without using bedrails? 4  -DB 4  -CH    Moving from lying on back to sitting on the side of a flat bed without bedrails? 4  -DB 4  -CH    Moving to and from a bed to a chair (including a wheelchair)? 4  -DB 3  -CH    Standing up from a chair using your arms (e.g., wheelchair, bedside chair)? 4  -DB 3  -CH    Climbing 3-5 steps with a railing? 3  -DB 3  -CH    To walk in hospital room? 3  -DB 3  -CH    AM-PAC 6 Clicks Score (PT) 22  -DB 20  -CH    Highest level of mobility 7 --> Walked 25 feet or more  -DB 6 --> Walked 10 steps or more  -CH      Row Name 08/07/23 1309 08/07/23 1201       Functional Assessment    Outcome Measure Options AM-PAC 6 Clicks Daily Activity (OT)  -KA AM-PAC 6 Clicks Basic Mobility (PT)  -DB              User Key  (r) = Recorded By, (t) = Taken By, (c) = Cosigned By      Initials Name Provider Type     Zoya Spears RN Registered Nurse    DB Maricel Gan, PT Physical Therapist    Ai Joe OT Occupational Therapist                    Occupational Therapy Education       Title: PT OT SLP Therapies (Done)       Topic: Occupational Therapy (Done)       Point: ADL training (Done)       Description:   Instruct learner(s) on  proper safety adaptation and remediation techniques during self care or transfers.   Instruct in proper use of assistive devices.                  Learning Progress Summary             Patient Acceptance, E, VU,DU by REJI at 8/7/2023 1309                         Point: Home exercise program (Done)       Description:   Instruct learner(s) on appropriate technique for monitoring, assisting and/or progressing therapeutic exercises/activities.                  Learning Progress Summary             Patient Acceptance, E, VU,DU by REJI at 8/7/2023 1309                         Point: Precautions (Done)       Description:   Instruct learner(s) on prescribed precautions during self-care and functional transfers.                  Learning Progress Summary             Patient Acceptance, E, VU,DU by REJI at 8/7/2023 1309                                         User Key       Initials Effective Dates Name Provider Type Discipline     09/22/22 -  Ai Boyce OT Occupational Therapist OT                  OT Recommendation and Plan  Therapy Frequency (OT): evaluation only  Plan of Care Review  Plan of Care Reviewed With: patient, spouse  Outcome Evaluation: Pt seen for OT eval this AM. Admitted with epigastric pain and nausea. Pt also with PMHx of R foot drop. Pt is independent at baseline and lives with her spouse. She uses a cane for mobility and has a brace for her RLE for foot drop. Today she performed bed mobility with supervision and stood and ambulated within hallway to simulate household distances with cane and SBA. Pt reports she had returned from bathroom with nsg and reports no issues with ADLs at this time. Anticipate return home with assist. OT to sign off at this time.     Time Calculation:   Evaluation Complexity (OT)  Review Occupational Profile/Medical/Therapy History Complexity: brief/low complexity  Assessment, Occupational Performance/Identification of Deficit Complexity: 1-3 performance deficits  Clinical  Decision Making Complexity (OT): problem focused assessment/low complexity  Overall Complexity of Evaluation (OT): low complexity     Time Calculation- OT       Row Name 08/07/23 1310             Time Calculation- OT    OT Start Time 1039  -KA      OT Stop Time 1050  -KA      OT Time Calculation (min) 11 min  -KA      OT Received On 08/07/23  -KA         Untimed Charges    OT Eval/Re-eval Minutes 11  -KA         Total Minutes    Untimed Charges Total Minutes 11  -KA       Total Minutes 11  -KA                User Key  (r) = Recorded By, (t) = Taken By, (c) = Cosigned By      Initials Name Provider Type    Ai Joe OT Occupational Therapist                  Therapy Charges for Today       Code Description Service Date Service Provider Modifiers Qty    [id number] HC OT EVAL LOW COMPLEXITY 3 8/7/2023 Ai Boyce OT GO 1                 Ai Boyce OT  8/7/2023

## 2023-08-07 NOTE — CONSULTS
[facility]  Initial Inpatient Consult Note    Referring Provider: A    Reason for Consultation: Epigastric pain    Subjective     History of present illness:    73 y.o. female known patient of Dr. Chvaes's with a history of kidney transplant in 1979, CKD 3B, hypertension, chronic back pain, spinal cord stimulator, peripheral neuropathy, GERD who presents to [facility] in the setting of abdominal pain, nausea, reduced appetite.  She has had nausea and upper abdominal pain for approximately 2 weeks which has worsened over the last couple of days.  She has a history of reflux but not been on any acid suppressants.  She had an EGD in 2020 with Dr. Chaves for similar symptoms with evidence of gastritis, bilious gastric fluid, duodenitis.  Patient reports that she occasionally takes Tums or [place] at home and has had intermittent reflux symptoms for the last 10 years.  She additionally reports significant constipation.  She has been constipated for approximately 7 days with her first bowel movement this morning in which she reports hard to formed stool. She denies any rectal bleeding or melena.  She reports its been over 10 years since her last colonoscopy.  She states that she had evidence of polyps and diverticulosis.  She has a chronic back pain and is on oxycodone up to 3 times a day.  She does not use NSAIDs in the setting of her kidney transplant.    Patient with normal liver enzymes, lipase, white blood cell count.  CT abdomen and pelvis without contrast with normal-appearing gallbladder, pancreas, small bowel.  Evidence of colonic diverticulosis. She additionally had an abdominal ultrasound in which I reviewed with no abnormalities seen in the gallbladder or abdomen.      Past Medical History:  Past Medical History:   Diagnosis Date    Arthritis     OSTEO    Chronic back pain     Chronic bilateral low back pain with right-sided sciatica 2/17/2020    CKD (chronic kidney disease)      FOLLOWED BY DR LANRE BYNUM    Diverticulosis     Foot drop, right     GERD (gastroesophageal reflux disease)     History of DVT (deep vein thrombosis) 2018    RIGHT CALF S/P BACK SURGERY     History of transfusion     no reaction    Hyperlipidemia     Hypertension     Kidney transplant recipient 1979    HX TRANSPLANT D/T REFLUX     Neuropathy     Secondary hyperparathyroidism  3/9/2022    Spinal cord stimulator status 3/9/2022    Spinal headache     Stage 3b chronic kidney disease 2020    FOLLOWED BY DR LANRE BYNUM    Torn rotator cuff     Vitamin D deficiency 3/9/2022     Past Surgical History:  Past Surgical History:   Procedure Laterality Date    APPENDECTOMY      CATARACT EXTRACTION EXTRACAPSULAR W/ INTRAOCULAR LENS IMPLANTATION       SECTION      COLONOSCOPY  approx     Anastacio WHARTON    ENDOSCOPY N/A 2020    Procedure: ESOPHAGOGASTRODUODENOSCOPY with biopsies;  Surgeon: Wicho Chaves MD;  Location: [facility] ENDOSCOPY;  Service: Gastroenterology;  Laterality: N/A;  pre- epigastric pain, nausea  post-- gastritis, duodenitis, bile reflux    LUMBAR DISCECTOMY FUSION INSTRUMENTATION Right 2018    Procedure: Right L2-3 laminectomy with Metrix;  Surgeon: Oscar Paulino MD;  Location: [facility] OR;  Service: Neurosurgery    SHOULDER ARTHROSCOPY W/ ROTATOR CUFF REPAIR Right 2020    Procedure: SHOULDER ARTHROSCOPY, ROTATOR CUFF REPAIR, SUBACROMIAL DECOMPRESSION, DISTAL CLAVICLE EXCISION, BICEPS TENOTOMY, AND LABRAL DEBRIDEMENT;  Surgeon: Rishabh Florence MD;  Location: [facility];  Service: Orthopedics;  Laterality: Right;    SHOULDER ROTATOR CUFF REPAIR Left     SPINAL CORD STIMULATOR IMPLANT N/A 2020    Procedure: SPINAL CORD STIMULATOR INSERTION PHASE 2, triall of right gluteal internal pulse generator;  Surgeon: Cuate Kemp MD;  Location: [facility] OR;  Service: Neurosurgery;  Laterality: N/A;    SPINAL CORD STIMULATOR IMPLANT N/A 2020     Procedure: SPINAL CORD STIMULATOR INSERTION PHASE 1, Thoracic 10 laminotomy for revision of St. Tom surgical lead at Thoracic 8 to Thoracic 9;  Surgeon: Cuate Kemp MD;  Location: [facility];  Service: Neurosurgery;  Laterality: N/A;    TONSILLECTOMY      TRANSPLANTATION RENAL Right 1979    SISTER GAVE PT     TUBAL ABDOMINAL LIGATION        Social History:   Social History     Tobacco Use    Smoking status: Former     Packs/day: 1.00     Years: 30.00     Pack years: 30.00     Types: Cigarettes     Quit date: 2012     Years since quittin.2    Smokeless tobacco: Never   Substance Use Topics    Alcohol use: No      Family History:  Family History   Problem Relation Age of Onset    Aneurysm Mother         BRAIN ANEURYSM    Heart failure Father     Kidney failure Father     Lung cancer Sister     Lung cancer Sister     Lung cancer Son     Malig Hyperthermia Neg Hx        Home Meds:  Medications Prior to Admission   Medication Sig Dispense Refill Last Dose    amLODIPine (NORVASC) 5 MG tablet Take 1 tablet by mouth Daily.       atorvastatin (LIPITOR) 40 MG tablet Take 1 tablet by mouth Every Night.       diphenhydrAMINE-acetaminophen (TYLENOL PM)  MG tablet per tablet Take 1 tablet by mouth At Night As Needed for Sleep.       methocarbamol (ROBAXIN) 500 MG tablet methocarbamol 500 mg tablet   TAKE 1 TABLET BY MOUTH THREE TIMES DAILY AS NEEDED       ondansetron (ZOFRAN) 4 MG tablet Take 1 tablet by mouth Every 8 (Eight) Hours As Needed for Nausea or Vomiting.       oxyCODONE-acetaminophen (PERCOCET) 7.5-325 MG per tablet Take 1 tablet by mouth 3 (Three) Times a Day.       sodium bicarbonate 650 MG tablet Take 2 tablets by mouth 3 (Three) Times a Day.       vitamin D3 125 MCG (5000 UT) capsule capsule Take 2,000 Units by mouth Daily.        Current Meds:   amLODIPine, 5 mg, Oral, Q24H  atorvastatin, 20 mg, Oral, Nightly  enoxaparin, 30 mg, Subcutaneous, Nightly  insulin lispro, 2-7 Units,  Subcutaneous, 4x Daily AC & at Bedtime  pantoprazole, 40 mg, Oral, Q AM  pregabalin, 25 mg, Oral, Q12H  senna-docusate sodium, 2 tablet, Oral, BID  sodium chloride, 10 mL, Intravenous, Q12H      Allergies:  No Known Allergies    Objective     Vital Signs  Temp:  [97 øF (36.1 øC)-97.9 øF (36.6 øC)] 97.3 øF (36.3 øC)  Heart Rate:  [63-80] 67  Resp:  [18] 18  BP: (114-157)/(61-90) 129/64  Physical Exam:  General Appearance:     Alert, cooperative, in no acute distress   Abdomen:     Normal bowel sounds, no masses, no organomegaly, soft     nontender, nondistended, no guarding, no rebound                 tenderness   Rectal:     Deferred       Results Review:   I reviewed the patient's new clinical results.  I reviewed the patient's new imaging results and agree with the interpretation.    Results from last 7 days   Lab Units 08/06/23  1604   WBC 10*3/mm3 9.73   HEMOGLOBIN g/dL 11.8*   HEMATOCRIT % 34.5   PLATELETS 10*3/mm3 313     Results from last 7 days   Lab Units 08/06/23  1604   SODIUM mmol/L 143   POTASSIUM mmol/L 3.5   CHLORIDE mmol/L 104   CO2 mmol/L 29.5*   BUN mg/dL 28*   CREATININE mg/dL 2.37*   CALCIUM mg/dL 10.4   BILIRUBIN mg/dL 0.3   ALK PHOS U/L 100   ALT (SGPT) U/L 6   AST (SGOT) U/L 18   GLUCOSE mg/dL 107*         Lab Results   Lab Value Date/Time    LIPASE 24 08/06/2023 1604    LIPASE 33 07/04/2020 1038       Radiology:  US Abdomen Complete   Final Result   1. Absent native kidneys with transplanted kidney in the right   hemipelvis appearing unremarkable.   2. No abnormalities are seen in the gallbladder or in the remainder of   this abdominal ultrasound.       This report was finalized on 8/6/2023 7:58 PM by Dr. Jeromy Linn M.D.          CT Abdomen Pelvis Without Contrast   Final Result   1. 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 No   hydronephrosis is seen.   2. Colonic diverticulosis.   3. No acute findings are noted.       Radiation dose reduction techniques were  utilized, including automated   exposure control and exposure modulation based on body size.       This report was finalized on 8/6/2023 7:44 PM by Dr. Jeromy Linn M.D.              Assessment & Plan   Assessment:   Upper abdominal pain  Nausea  Constipation  History of GERD  PIYUSH on CKD stage III with history of kidney transplant    Plan:   Continue PPI  Continue supportive care with IV fluids and as needed antiemetics  Patient has a history of significant constipation and I think this is contributing to some of her nausea and symptoms. Bowel regimen increased  Continue clear liquid diet.  N.p.o. at midnight  Hold p.m. Lovenox dose  We will plan for EGD tomorrow  Outpatient follow up with Dr. Chaves or [facility] for constipation and outpatient colonoscopy with colorectal cancer screening.     I discussed the patients findings and my recommendations with patient and family.    Dictated utilizing Dragon dictation.         Jayleen Wild PA-C   [facility]  [address] [place] [address]  Office: [phone]

## 2023-08-07 NOTE — CASE MANAGEMENT/SOCIAL WORK
Discharge Planning Assessment  [facility]     Patient Name: Marlena Navarrete  MRN: [id number]  Today's Date: 8/7/2023    Admit Date: 8/6/2023    Plan: Home with .   Discharge Needs Assessment       Row Name 08/07/23 1103       Living Environment    People in Home spouse    Current Living Arrangements home    Potentially Unsafe Housing Conditions none    Primary Care Provided by self    Provides Primary Care For no one    Family Caregiver if Needed spouse    Quality of Family Relationships helpful;involved;supportive    Able to Return to Prior Arrangements yes       Resource/Environmental Concerns    Resource/Environmental Concerns none    Transportation Concerns none       Transition Planning    Patient/Family Anticipates Transition to home with family    Patient/Family Anticipated Services at Transition none    Transportation Anticipated family or friend will provide       Discharge Needs Assessment    Readmission Within the Last 30 Days no previous admission in last 30 days    Equipment Currently Used at Home cane, quad tip    Concerns to be Addressed no discharge needs identified;denies needs/concerns at this time    Anticipated Changes Related to Illness none    Equipment Needed After Discharge none    Provided Post Acute Provider List? N/A    Provided Post Acute Provider Quality & Resource List? N/A                   Discharge Plan       Row Name 08/07/23 1101       Plan    Plan Home with .    Plan Comments ARORA delivered. CCP met with patient at bedside, introduced self and explained role. Patient confirmed the information on her face sheet was accurate. Patient states that she lives at home with her  Rolf. Patient confirmed her PCP is Bradley Hernandez. Patient states she has worked with [facility] in the past. Patient states she has been to [facility] for 2 days in the past for COVID. Patient states she uses a quad tip cane (she has her personal quad tip cane in her room by her bed).  Patient states there is 1 step at the back door without any hand rails. Patient states there are 3 steps inside the home with hand rails on both sides. Patient states her pharmacy is [facility] in [place], [place]. Patient denies needs and states her  can transport her at discharge. CCP to follow.                  Continued Care and Services - Admitted Since 8/6/2023    Coordination has not been started for this encounter.          Demographic Summary       Row Name 08/07/23 1100       General Information    Admission Type observation    Arrived From emergency department    Required Notices Provided Observation Status Notice    Referral Source admission list    Reason for Consult discharge planning    Preferred Language English                   Functional Status       Row Name 08/07/23 1103       Functional Status    Usual Activity Tolerance good    Current Activity Tolerance moderate       Functional Status, IADL    Medications assistive equipment    Meal Preparation assistive equipment    Housekeeping assistive equipment    Laundry assistive equipment    Shopping assistive equipment       Mental Status    General Appearance WDL WDL       Mental Status Summary    Recent Changes in Mental Status/Cognitive Functioning no changes       Employment/    Employment Status retired                   Psychosocial    No documentation.                  Abuse/Neglect    No documentation.                  Legal    No documentation.                  Substance Abuse    No documentation.                  Patient Forms    No documentation.

## 2023-08-07 NOTE — PLAN OF CARE
Goal Outcome Evaluation:  Plan of Care Reviewed With: patient, spouse         Patient is a 73 y.o. female admitted to [facility] for nausea and epigastric pain on 8/6/2023. PMHx includes R foot drop. Patient is (I) at baseline and lives with her spouse. She uses a SPC for mobility and has brace for RLE to assist with foot drop. Pt reports 1 GEREMIAS and 3 stairs inside with HR. Today, patient performed bed mobility with SV, required SBA for transfers, and ambulated 100' with SPC and CGA/SBA. Ambulates without brace today, but would benefit from use of brace to inc ambulation distance. Strength and endurance deficits noted. Patient may benefit from skilled PT services acutely to address functional deficits as well as improve level of independence prior to discharge. Anticipate home with assist upon DC.        Anticipated Discharge Disposition (PT): home with assist, home with 24/7 care

## 2023-08-07 NOTE — THERAPY EVALUATION
Patient Name: Marlena Navarrete  : 1950    MRN: [id number]                              Today's Date: 2023       Admit Date: 2023    Visit Dx:     ICD-10-CM ICD-9-CM   1. Nausea  R11.0 787.02   2. Volume depletion  E86.9 276.50   3. Stage 3b chronic kidney disease  N18.32 585.3   4. Kidney transplant recipient  Z94.0 V42.0     Patient Active Problem List   Diagnosis    Lumbar radiculopathy    Essential hypertension    Right foot drop    Kidney transplant recipient    History of lumbar laminectomy for spinal cord decompression    Spinal enthesopathy of thoracic region    Postlaminectomy syndrome, lumbar region    Chronic bilateral low back pain with right-sided sciatica    Displacement of other nervous system device, implant or graft, initial encounter    Postlaminectomy syndrome of lumbar region    Epigastric pain    Nausea    Gastroesophageal reflux disease    Shoulder arthritis    Acute renal failure (ARF)    Stage 3b chronic kidney disease    Iron deficiency anemia    Status post rotator cuff surgery    History of DVT (deep vein thrombosis)    Peripheral neuropathy    Intractable back pain    Spinal cord stimulator status    Adhesive arachnoiditis    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    Inflammation of sacroiliac joint    Secondary hyperparathyroidism     Vitamin D deficiency    COVID    Pneumonia due to COVID-19 virus    Narcotic dependence    Cytokine release syndrome, grade 1    Impacted cerumen of right ear    Hospital discharge follow-up    Acute metabolic encephalopathy    Volume depletion    PIYUSH (acute kidney injury)    Dehydration    Prediabetes     Past Medical History:   Diagnosis Date    Arthritis     OSTEO    Chronic back pain     Chronic bilateral low back pain with right-sided sciatica 2020    CKD (chronic kidney disease)     FOLLOWED BY DR LANRE BYNUM    Diverticulosis     Foot drop, right     GERD (gastroesophageal reflux  disease)     History of DVT (deep vein thrombosis) 2018    RIGHT CALF S/P BACK SURGERY     History of transfusion     no reaction    Hyperlipidemia     Hypertension     Kidney transplant recipient 1979    HX TRANSPLANT D/T REFLUX     Neuropathy     Secondary hyperparathyroidism  3/9/2022    Spinal cord stimulator status 3/9/2022    Spinal headache     Stage 3b chronic kidney disease 2020    FOLLOWED BY DR LANRE Valdez rotator cuff     Vitamin D deficiency 3/9/2022     Past Surgical History:   Procedure Laterality Date    APPENDECTOMY      CATARACT EXTRACTION EXTRACAPSULAR W/ INTRAOCULAR LENS IMPLANTATION       SECTION      COLONOSCOPY  approx     Anastacio WHARTON    ENDOSCOPY N/A 2020    Procedure: ESOPHAGOGASTRODUODENOSCOPY with biopsies;  Surgeon: Wicho Chaves MD;  Location: [facility] ENDOSCOPY;  Service: Gastroenterology;  Laterality: N/A;  pre- epigastric pain, nausea  post-- gastritis, duodenitis, bile reflux    LUMBAR DISCECTOMY FUSION INSTRUMENTATION Right 2018    Procedure: Right L2-3 laminectomy with Metrix;  Surgeon: Oscar Paulino MD;  Location: [facility] OR;  Service: Neurosurgery    SHOULDER ARTHROSCOPY W/ ROTATOR CUFF REPAIR Right 2020    Procedure: SHOULDER ARTHROSCOPY, ROTATOR CUFF REPAIR, SUBACROMIAL DECOMPRESSION, DISTAL CLAVICLE EXCISION, BICEPS TENOTOMY, AND LABRAL DEBRIDEMENT;  Surgeon: Rishabh Florence MD;  [place] [facility] [place] [facility];  Service: Orthopedics;  Laterality: Right;    SHOULDER ROTATOR CUFF REPAIR Left     SPINAL CORD STIMULATOR IMPLANT N/A 2020    Procedure: SPINAL CORD STIMULATOR INSERTION PHASE 2, triall of right gluteal internal pulse generator;  Surgeon: Cuate Kemp MD;  Location: [facility] OR;  Service: Neurosurgery;  Laterality: N/A;    SPINAL CORD STIMULATOR IMPLANT N/A 2020    Procedure: SPINAL CORD STIMULATOR INSERTION PHASE 1, Thoracic 10 laminotomy for revision of [place] surgical lead at Thoracic 8  to Thoracic 9;  Surgeon: Cuate Kemp MD;  Location: [facility] MAIN OR;  Service: Neurosurgery;  Laterality: N/A;    TONSILLECTOMY      TRANSPLANTATION RENAL Right 1979    SISTER GAVE PT     TUBAL ABDOMINAL LIGATION        General Information       Row Name 08/07/23 1158          Physical Therapy Time and Intention    Document Type evaluation  -DB     Mode of Treatment physical therapy  -DB       Row Name 08/07/23 1158          General Information    Patient Profile Reviewed yes  -DB     Prior Level of Function independent:  -DB     Existing Precautions/Restrictions fall  -DB     Barriers to Rehab none identified  -DB       Row Name 08/07/23 1158          Living Environment    People in Home spouse  -DB       Row Name 08/07/23 1158          Home Main Entrance    Number of Stairs, Main Entrance one  -DB     Stair Railings, Main Entrance railings safe and in good condition  -DB       Row Name 08/07/23 1158          Stairs Within Home, Primary    Number of Stairs, Within Home, Primary three  -DB       Row Name 08/07/23 1158          Cognition    Orientation Status (Cognition) oriented x 4  -DB       Row Name 08/07/23 1158          Safety Issues, Functional Mobility    Impairments Affecting Function (Mobility) balance;endurance/activity tolerance;pain;strength  -DB               User Key  (r) = Recorded By, (t) = Taken By, (c) = Cosigned By      Initials Name Provider Type    DB Maricel Gan PT Physical Therapist                   Mobility       Row Name 08/07/23 1158          Bed Mobility    Bed Mobility supine-sit;sit-supine  -DB     Supine-Sit [place] (Bed Mobility) supervision  -DB     Assistive Device (Bed Mobility) bed rails;head of bed elevated  -DB       Row Name 08/07/23 1158          Sit-Stand Transfer    Sit-Stand [place] (Transfers) standby assist;verbal cues  -DB     Assistive Device (Sit-Stand Transfers) cane, straight  -DB       Row Name 08/07/23 1158          Gait/Stairs (Locomotion)     [place] Level (Gait) contact guard;standby assist;verbal cues  -DB     Assistive Device (Gait) cane, straight  -DB     Distance in Feet (Gait) 100'  -DB     Deviations/Abnormal Patterns (Gait) stride length decreased  -DB     Bilateral Gait Deviations forward flexed posture  -DB     Right Sided Gait Deviations foot drop/toe drag;heel strike decreased  -DB               User Key  (r) = Recorded By, (t) = Taken By, (c) = Cosigned By      Initials Name Provider Type    DB Maricel Gan PT Physical Therapist                   Obj/Interventions       Row Name 08/07/23 1159          Range of Motion Comprehensive    General Range of Motion no range of motion deficits identified  -DB       [facility] Name 08/07/23 1159          Strength Comprehensive (MMT)    Comment, General Manual Muscle Testing (MMT) Assessment RLE weakness, foot drop  -DB       Row Name 08/07/23 1159          Balance    Balance Assessment sitting static balance;sitting dynamic balance;standing static balance;standing dynamic balance  -DB     Static Sitting Balance modified independence  -DB     Dynamic Sitting Balance modified independence  -DB     Position, Sitting Balance sitting edge of bed  -DB     Static Standing Balance supervision  -DB     Dynamic Standing Balance standby assist  -DB     Position/Device Used, Standing Balance supported;cane, straight  -DB     Balance Interventions sitting;standing;sit to stand  -DB               User Key  (r) = Recorded By, (t) = Taken By, (c) = Cosigned By      Initials Name Provider Type    DB Maricel Gan PT Physical Therapist                   Goals/Plan       Row Name 08/07/23 1201          Gait Training Goal 1 (PT)    Activity/Assistive Device (Gait Training Goal 1, PT) gait (walking locomotion)  -DB     [place] Level (Gait Training Goal 1, PT) supervision required  -DB     Time Frame (Gait Training Goal 1, PT) 1 week  -DB       Row Name 08/07/23 1201          Stairs Goal 1 (PT)     Activity/Assistive Device (Stairs Goal 1, PT) stairs, all skills  -DB     [place] Level/Cues Needed (Stairs Goal 1, PT) standby assist  -DB     Number of Stairs (Stairs Goal 1, PT) 3  -DB     Time Frame (Stairs Goal 1, PT) 1 week  -DB       Row Name 08/07/23 1201          Therapy Assessment/Plan (PT)    Planned Therapy Interventions (PT) balance training;bed mobility training;gait training;neuromuscular re-education;transfer training;postural re-education;patient/family education;stair training;strengthening;home exercise program  -DB               User Key  (r) = Recorded By, (t) = Taken By, (c) = Cosigned By      Initials Name Provider Type    DB Maricel Gan, PT Physical Therapist                   Clinical Impression       Row Name 08/07/23 1200          Pain    Pain Intervention(s) Ambulation/increased activity;Repositioned  -DB       Row Name 08/07/23 1200          Plan of Care Review    Plan of Care Reviewed With patient;spouse  -DB     Outcome Evaluation Patient is a 73 y.o. female admitted to [facility] for nausea and epigastric pain on 8/6/2023. PMHx includes R foot drop. Patient is (I) at baseline and lives with her spouse. She uses a SPC for mobility and has brace for RLE to assist with foot drop. Pt reports 1 GEREMIAS and 3 stairs inside with HR. Today, patient performed bed mobility with SV, required SBA for transfers, and ambulated 100' with SPC and CGA/SBA. Ambulates without brace today, but would benefit from use of brace to inc ambulation distance. Strength and endurance deficits noted. Patient may benefit from skilled PT services acutely to address functional deficits as well as improve level of independence prior to discharge. Anticipate home with assist upon DC.  -DB       Row Name 08/07/23 1200          Therapy Assessment/Plan (PT)    Rehab Potential (PT) good, to achieve stated therapy goals  -DB     Criteria for Skilled Interventions Met (PT) yes  -DB     Therapy Frequency (PT) 3 times/wk  -DB        Row Name 08/07/23 1200          Vital Signs    O2 Delivery Pre Treatment room air  -DB     O2 Delivery Intra Treatment room air  -DB     O2 Delivery Post Treatment room air  -DB     Pre Patient Position Supine  -DB     Intra Patient Position Standing  -DB     Post Patient Position Supine  -DB       Row Name 08/07/23 1200          Positioning and Restraints    Pre-Treatment Position in bed  -DB     Post Treatment Position bed  -DB     In Bed supine;call light within reach;encouraged to call for assist;with family/caregiver  declines bed alarm  -DB               User Key  (r) = Recorded By, (t) = Taken By, (c) = Cosigned By      Initials Name Provider Type    Maricel Hernandez PT Physical Therapist                   Outcome Measures       Row Name 08/07/23 1201 08/07/23 0800       How much help from another person do you currently need...    Turning from your back to your side while in flat bed without using bedrails? 4  -DB 4  -CH    Moving from lying on back to sitting on the side of a flat bed without bedrails? 4  -DB 4  -CH    Moving to and from a bed to a chair (including a wheelchair)? 4  -DB 3  -CH    Standing up from a chair using your arms (e.g., wheelchair, bedside chair)? 4  -DB 3  -CH    Climbing 3-5 steps with a railing? 3  -DB 3  -CH    To walk in hospital room? 3  -DB 3  -CH    AM-PAC 6 Clicks Score (PT) 22  -DB 20  -CH    Highest level of mobility 7 --> Walked 25 feet or more  -DB 6 --> Walked 10 steps or more  -CH      Row Name 08/07/23 1201          Functional Assessment    Outcome Measure Options AM-PAC 6 Clicks Basic Mobility (PT)  -DB               User Key  (r) = Recorded By, (t) = Taken By, (c) = Cosigned By      Initials Name Provider Type    Zoya Wright RN Registered Nurse    Maricel Hernandez PT Physical Therapist                                 Physical Therapy Education       Title: PT OT SLP Therapies (Done)       Topic: Physical Therapy (Done)       Point: Mobility  training (Done)       Learning Progress Summary             Patient Acceptance, E, VU by DB at 8/7/2023 1202                         Point: Home exercise program (Done)       Learning Progress Summary             Patient Acceptance, E, VU by DB at 8/7/2023 1202                         Point: Body mechanics (Done)       Learning Progress Summary             Patient Acceptance, E, VU by DB at 8/7/2023 1202                         Point: Precautions (Done)       Learning Progress Summary             Patient Acceptance, E, VU by DB at 8/7/2023 1202                                         User Key       Initials Effective Dates Name Provider Type Discipline     06/16/21 -  Maricel Gan, PT Physical Therapist PT                  PT Recommendation and Plan  Planned Therapy Interventions (PT): balance training, bed mobility training, gait training, neuromuscular re-education, transfer training, postural re-education, patient/family education, stair training, strengthening, home exercise program  Plan of Care Reviewed With: patient, spouse  Outcome Evaluation: Patient is a 73 y.o. female admitted to [facility] for nausea and epigastric pain on 8/6/2023. PMHx includes R foot drop. Patient is (I) at baseline and lives with her spouse. She uses a SPC for mobility and has brace for RLE to assist with foot drop. Pt reports 1 GEREMIAS and 3 stairs inside with HR. Today, patient performed bed mobility with SV, required SBA for transfers, and ambulated 100' with SPC and CGA/SBA. Ambulates without brace today, but would benefit from use of brace to inc ambulation distance. Strength and endurance deficits noted. Patient may benefit from skilled PT services acutely to address functional deficits as well as improve level of independence prior to discharge. Anticipate home with assist upon DC.     Time Calculation:         PT Charges       Row Name 08/07/23 1206             Time Calculation    Start Time 1040  -DB      Stop Time 1050  -DB       Time Calculation (min) 10 min  -DB      PT Received On 08/07/23  -DB      PT - Next Appointment 08/09/23  -DB      PT Goal Re-Cert Due Date 08/14/23  -DB         Time Calculation- PT    Total Timed Code Minutes- PT 0 minute(s)  -DB                User Key  (r) = Recorded By, (t) = Taken By, (c) = Cosigned By      Initials Name Provider Type    DB Maricel Gan, PT Physical Therapist                  Therapy Charges for Today       Code Description Service Date Service Provider Modifiers Qty    [id number] HC PT EVAL LOW COMPLEXITY 3 8/7/2023 Maricel Gan, PT GP 1            PT G-Codes  Outcome Measure Options: AM-PAC 6 Clicks Basic Mobility (PT)  AM-PAC 6 Clicks Score (PT): 22  PT Discharge Summary  Anticipated Discharge Disposition (PT): home with assist, home with 24/7 care    Maricel Gan, CARLOS ENRIQUE  8/7/2023

## 2023-08-07 NOTE — PLAN OF CARE
Goal Outcome Evaluation:  Plan of Care Reviewed With: patient, spouse        Progress: no change  Outcome Evaluation: continues to c/o upper abdominal discomfort and continuous nausea.  no emesis.  prn compazine added.  seen by GI.  plan for EGD tomorrow afternoon.  hold tonight's lovenox for procedure and NPO after MN.  pt refusing bed alarm. discussed with pt and spouse falls assessment and score and strongly encouraged to not be up without assist jay. since has to navigate IV pole and cane.  evaluated by PT and cleared to be up to [facility] independently.  new consult for nephrology due to PIYUSH with hx of renal transplant. Has not had a BM for 6-7 days.  Miralax given.

## 2023-08-07 NOTE — PLAN OF CARE
Goal Outcome Evaluation:  Plan of Care Reviewed With: patient, spouse           Outcome Evaluation: Pt seen for OT delma this AM. Admitted with epigastric pain and nausea. Pt also with PMHx of R foot drop. Pt is independent at baseline and lives with her spouse. She uses a cane for mobility and has a brace for her RLE for foot drop. Today she performed bed mobility with supervision and stood and ambulated within hallway to simulate household distances with cane and SBA. Pt reports she had returned from bathroom with nsg and reports no issues with ADLs at this time. Anticipate return home with assist. OT to sign off at this time.      Anticipated Discharge Disposition (OT): home with assist

## 2023-08-07 NOTE — CONSULTS
[facility] Consult Note      Patient Name: Marlena Navarrete  : 1950  MRN: [id number]  Primary Care Physician:  Bradley Hernandez MD  Referring Physician: Mark Kennedy DO  Date of admission: 2023    Subjective     Reason for Consult:  PIYUSH     HPI:   Marlena Navarrete is a 73 y.o. female known to have history of end-stage renal disease requiring hemodialysis now status post living related kidney transplantation in  off immunosuppression with baseline creatinine of 1.7-1.9, history of hypertension, chronic back pain, prediabetes who was admitted to the hospital nausea vomiting and abdominal pain for the past 2 weeks and poor appetite.  Initial investigation with lipase, liver function test and CT scan of abdomen pelvis showed no acute abnormalities.  To note that the patient had a bilateral native nephrectomies.  The patient was noted to have a creatinine of 2.4 up from 1.9 in 2023.  She was started on IV hydration and her creatinine trended down to 1.97 very close to baseline.    Review of Systems:   14 point review of systems is otherwise negative except for mentioned above on HPI    Personal History     Past Medical History:   Diagnosis Date    Arthritis     OSTEO    Chronic back pain     Chronic bilateral low back pain with right-sided sciatica 2020    CKD (chronic kidney disease)     FOLLOWED BY DR LANRE BYNUM    Diverticulosis     Foot drop, right     GERD (gastroesophageal reflux disease)     History of DVT (deep vein thrombosis) 2018    RIGHT CALF S/P BACK SURGERY     History of transfusion     no reaction    Hyperlipidemia     Hypertension     Kidney transplant recipient 1979    HX TRANSPLANT D/T REFLUX     Neuropathy     Secondary hyperparathyroidism  3/9/2022    Spinal cord stimulator status 3/9/2022    Spinal headache     Stage 3b chronic kidney disease 2020    FOLLOWED BY DR LANRE BYNUM    Torkarthik rotator cuff     Vitamin D deficiency 3/9/2022       Past  Surgical History:   Procedure Laterality Date    APPENDECTOMY      CATARACT EXTRACTION EXTRACAPSULAR W/ INTRAOCULAR LENS IMPLANTATION       SECTION      COLONOSCOPY  approx     Anastacio WHARTON    ENDOSCOPY N/A 2020    Procedure: ESOPHAGOGASTRODUODENOSCOPY with biopsies;  Surgeon: Wicho Chaves MD;  Location: [facility] ENDOSCOPY;  Service: Gastroenterology;  Laterality: N/A;  pre- epigastric pain, nausea  post-- gastritis, duodenitis, bile reflux    LUMBAR DISCECTOMY FUSION INSTRUMENTATION Right 2018    Procedure: Right L2-3 laminectomy with Metrix;  Surgeon: Oscar Paulino MD;  Location: [facility] OR;  Service: Neurosurgery    SHOULDER ARTHROSCOPY W/ ROTATOR CUFF REPAIR Right 2020    Procedure: SHOULDER ARTHROSCOPY, ROTATOR CUFF REPAIR, SUBACROMIAL DECOMPRESSION, DISTAL CLAVICLE EXCISION, BICEPS TENOTOMY, AND LABRAL DEBRIDEMENT;  Surgeon: Rishabh Florence MD;  Location: [facility] OR OSC;  Service: Orthopedics;  Laterality: Right;    SHOULDER ROTATOR CUFF REPAIR Left     SPINAL CORD STIMULATOR IMPLANT N/A 2020    Procedure: SPINAL CORD STIMULATOR INSERTION PHASE 2, triall of right gluteal internal pulse generator;  Surgeon: Cuate Kemp MD;  Location: [facility] OR;  Service: Neurosurgery;  Laterality: N/A;    SPINAL CORD STIMULATOR IMPLANT N/A 2020    Procedure: SPINAL CORD STIMULATOR INSERTION PHASE 1, Thoracic 10 laminotomy for revision of [facility] Tom surgical lead at Thoracic 8 to Thoracic 9;  Surgeon: Cuate Kemp MD;  Location: [facility] OR;  Service: Neurosurgery;  Laterality: N/A;    TONSILLECTOMY      TRANSPLANTATION RENAL Right 1979    SISTER GAVE PT     TUBAL ABDOMINAL LIGATION         Family History: family history includes Aneurysm in her mother; Heart failure in her father; Kidney failure in her father; Lung cancer in her sister, sister, and son.    Social History:  reports that she quit smoking about 11 years ago. Her smoking use included  cigarettes. She has a 30.00 pack-year smoking history. She has never used smokeless tobacco. She reports that she does not drink alcohol and does not use drugs.    Home Medications:  Prior to Admission medications    Medication Sig Start Date End Date Taking? Authorizing Provider   amLODIPine (NORVASC) 5 MG tablet Take 1 tablet by mouth Daily. 3/17/23  Yes Ita Woodall MD   atorvastatin (LIPITOR) 40 MG tablet Take 1 tablet by mouth Every Night. 3/15/22  Yes Az Mckee MD   diphenhydrAMINE-acetaminophen (TYLENOL PM)  MG tablet per tablet Take 1 tablet by mouth At Night As Needed for Sleep.   Yes Ita Woodall MD   methocarbamol (ROBAXIN) 500 MG tablet methocarbamol 500 mg tablet   TAKE 1 TABLET BY MOUTH THREE TIMES DAILY AS NEEDED   Yes Ita Woodall MD   ondansetron (ZOFRAN) 4 MG tablet Take 1 tablet by mouth Every 8 (Eight) Hours As Needed for Nausea or Vomiting.   Yes Ita Woodall MD   oxyCODONE-acetaminophen (PERCOCET) 7.5-325 MG per tablet Take 1 tablet by mouth 3 (Three) Times a Day. 6/12/23  Yes Ita Woodall MD   sodium bicarbonate 650 MG tablet Take 2 tablets by mouth 3 (Three) Times a Day. 3/15/22  Yes Az Mckee MD   vitamin D3 125 MCG (5000 UT) capsule capsule Take 2,000 Units by mouth Daily.   Yes Ita Woodall MD   lamoTRIgine (LaMICtal) 25 MG tablet Take 1 tablet by mouth 2 (Two) Times a Day.    Ita Woodall MD       Allergies:  No Known Allergies    Objective     Vitals:   Temp:  [97 øF (36.1 øC)-97.3 øF (36.3 øC)] 97 øF (36.1 øC)  Heart Rate:  [61-80] 80  Resp:  [18-20] 18  BP: (129-162)/(63-90) 146/70    Intake/Output Summary (Last 24 hours) at 8/7/2023 1704  Last data filed at 8/7/2023 1543  Gross per 24 hour   Intake 3604.64 ml   Output 650 ml   Net 2954.64 ml       Physical Exam:   Constitutional: Awake, alert and oriented.  HEENT: Sclera anicteric, no conjunctival injection  Neck: Supple, no  thyromegaly, no lymphadenopathy, trachea at midline, no JVD  Respiratory: Clear to auscultation bilaterally, nonlabored respiration  Cardiovascular: RRR, no murmurs, no rubs or gallops, no carotid bruit  Gastrointestinal: Positive bowel sounds, abdomen is soft, nontender and nondistended  : No palpable bladder  Musculoskeletal: No edema, no clubbing or cyanosis  Psychiatric: Appropriate affect, cooperative  Neurologic: Oriented x3, moving all extremities, normal speech and mental status  Skin: Warm and dry       Scheduled Meds:     amLODIPine, 5 mg, Oral, Q24H  atorvastatin, 20 mg, Oral, Nightly  [START ON 8/8/2023] enoxaparin, 30 mg, Subcutaneous, Nightly  insulin lispro, 2-7 Units, Subcutaneous, 4x Daily AC & at Bedtime  pantoprazole, 40 mg, Oral, Q AM  polyethylene glycol, 17 g, Oral, Daily  senna-docusate sodium, 2 tablet, Oral, BID  sodium chloride, 10 mL, Intravenous, Q12H      IV Meds:   sodium chloride, 125 mL/hr, Last Rate: 125 mL/hr (08/07/23 1545)        Results Reviewed:   I have personally reviewed the results from the time of this admission to 8/7/2023 17:04 EDT     Lab Results   Component Value Date    GLUCOSE 87 08/07/2023    CALCIUM 8.7 08/07/2023     08/07/2023    K 3.6 08/07/2023    CO2 21.8 (L) 08/07/2023     (H) 08/07/2023    BUN 24 (H) 08/07/2023    CREATININE 1.97 (H) 08/07/2023    EGFRIFNONA 33 (L) 01/06/2021    BCR 12.2 08/07/2023    ANIONGAP 11.2 08/07/2023      Lab Results   Component Value Date    MG 2.4 07/25/2023    PHOS 3.4 03/15/2022    ALBUMIN 4.3 08/06/2023     US Abdomen Complete    Result Date: 8/6/2023  US ABDOMEN COMPLETE-  08/06/2023  HISTORY: Abdominal pain.  The gallbladder is well-distended with no gallstones wall thickening or pericholecystic fluid. The pancreas and liver appear within normal limits. Pelvic kidney appears unremarkable. Measures approximately 10.2 cm in length.  The native kidneys are absent. Common bile duct is not dilated measuring 4.2 mm.   The aorta and IVC are not dilated. Spleen is not enlarged.      Impression: 1. Absent native kidneys with transplanted kidney in the right hemipelvis appearing unremarkable. 2. No abnormalities are seen in the gallbladder or in the remainder of this abdominal ultrasound.  This report was finalized on 2023 7:58 PM by Dr. Jeromy Linn M.D.        Assessment / Plan     ASSESSMENT:  Acute kidney injury on top of chronic kidney disease stage IIIb of a transplanted kidney with baseline creatinine around 1.7-1.9.  Creatinine peaked at 2.4 likely secondary to mild hypovolemia in the setting of decreased oral intake and nausea.  Creatinine trended down with IV hydration  Status post  living related donor kidney transplantation off immunosuppression.  Hypertension with CKD.  Blood pressure is acceptable  Abdominal pain, nausea.  Followed by GI plan for EGD noted tomorrow.  Metabolic alkalosis with bicarb of 29 likely secondary to mild hypovolemia improved.  Anemia of CKD        PLAN:  We will continue gentle IV hydration but we will switch fluids to half-normal saline given upper limit of normal of sodium level  We will check urine protein to creatinine ratio.  Continue surveillance labs  Avoid nephrotoxic agents.  Thank you for involving us in the care of Marlena Navarrete.  Please feel free to call with any questions.    Nargis Forte MD  23  17:04 EDT    [facility]  [phone]    Parts of this note may be an electronic transcription/translation of spoken language to printed text using the Dragon dictation system.

## 2023-08-07 NOTE — PLAN OF CARE
Goal Outcome Evaluation:  Plan of Care Reviewed With: patient        Progress: no change  Outcome Evaluation: VSS, zofran given for nausea, percocet given for pain, IVF infusing, falls precautions, daily weight, clear liquid diet

## 2023-08-07 NOTE — PROGRESS NOTES
"DAILY PROGRESS NOTE  [facility]    Patient Identification:  Name: Marlena Navarrete  Age: 73 y.o.  Sex: female  :  1950  MRN: [id number]         Primary Care Physician: Bradley Hernandez MD    Subjective:  Interval History: She complains of abdominal pain and nausea.  She has been constipated.    Objective:    Scheduled Meds:amLODIPine, 5 mg, Oral, Q24H  atorvastatin, 20 mg, Oral, Nightly  enoxaparin, 30 mg, Subcutaneous, Nightly  insulin lispro, 2-7 Units, Subcutaneous, 4x Daily AC & at Bedtime  pantoprazole, 40 mg, Oral, Q AM  polyethylene glycol, 17 g, Oral, Daily  senna-docusate sodium, 2 tablet, Oral, BID  sodium chloride, 10 mL, Intravenous, Q12H      Continuous Infusions:sodium chloride, 125 mL/hr, Last Rate: 125 mL/hr (23 0944)        Vital signs in last 24 hours:  Temp:  [97 øF (36.1 øC)-97.9 øF (36.6 øC)] 97 øF (36.1 øC)  Heart Rate:  [61-80] 61  Resp:  [18-20] 20  BP: (114-162)/(61-90) 162/63    Intake/Output:    Intake/Output Summary (Last 24 hours) at 2023 1246  Last data filed at 2023 0643  Gross per 24 hour   Intake 2490.64 ml   Output 650 ml   Net 1840.64 ml       Exam:  /63 (BP Location: Right arm, Patient Position: Lying)   Pulse 61   Temp 97 øF (36.1 øC) (Oral)   Resp 20   Ht 147.3 cm (57.99\")   Wt 62.9 kg (138 lb 10.7 oz)   SpO2 96%   BMI 28.99 kg/mý     General Appearance:    Alert, cooperative, no distress   Head:    Normocephalic, without obvious abnormality, atraumatic   Eyes:       Throat:   Lips, tongue, gums normal   Neck:   Supple, symmetrical, trachea midline, no JVD   Lungs:     Clear to auscultation bilaterally, respirations unlabored   Chest Wall:    No tenderness or deformity    Heart:    Regular rate and rhythm, S1 and S2 normal, no murmur,no  Rub or gallop   Abdomen:     Soft, diffusely tender more in upper abdomen, bowel sounds active, no masses, no organomegaly    Extremities:   Extremities normal, atraumatic, no cyanosis or edema "   Pulses:      Skin:   Skin is warm and dry,  no rashes or palpable lesions   Neurologic:   no focal deficits noted      Lab Results (last 72 hours)       Procedure Component Value Units Date/Time    POC Glucose Once [[id number]]  (Normal) Collected: 08/07/23 1134    Specimen: Blood Updated: 08/07/23 1136     Glucose 88 mg/dL      Comment: Meter: [id number] : [id number] George MCCALL       Basic Metabolic Panel [[id number]]  (Abnormal) Collected: 08/07/23 1010    Specimen: Blood Updated: 08/07/23 1113     Glucose 87 mg/dL      BUN 24 mg/dL      Creatinine 1.97 mg/dL      Sodium 144 mmol/L      Potassium 3.6 mmol/L      Chloride 111 mmol/L      CO2 21.8 mmol/L      Calcium 8.7 mg/dL      BUN/Creatinine Ratio 12.2     Anion Gap 11.2 mmol/L      eGFR 26.8 mL/min/1.73     Narrative:      GFR Normal >60  Chronic Kidney Disease <60  Kidney Failure <15    The GFR formula is only valid for adults with stable renal function between ages 18 and 70.    CBC & Differential [[id number]]  (Abnormal) Collected: 08/07/23 1010    Specimen: Blood Updated: 08/07/23 1053    Narrative:      The following orders were created for panel order CBC & Differential.  Procedure                               Abnormality         Status                     ---------                               -----------         ------                     CBC Auto Differential[[id number]]        Abnormal            Final result                 Please view results for these tests on the individual orders.    CBC Auto Differential [[id number]]  (Abnormal) Collected: 08/07/23 1010    Specimen: Blood Updated: 08/07/23 1053     WBC 7.38 10*3/mm3      RBC 3.34 10*6/mm3      Hemoglobin 10.6 g/dL      Hematocrit 31.7 %      MCV 94.9 fL      MCH 31.7 pg      MCHC 33.4 g/dL      RDW 11.9 %      RDW-SD 41.1 fl      MPV 10.6 fL      Platelets 252 10*3/mm3      Neutrophil % 67.6 %      Lymphocyte % 23.7 %      Monocyte % 6.8 %      Eosinophil % 1.1 %      Basophil % 0.5  %      Immature Grans % 0.3 %      Neutrophils, Absolute 4.99 10*3/mm3      Lymphocytes, Absolute 1.75 10*3/mm3      Monocytes, Absolute 0.50 10*3/mm3      Eosinophils, Absolute 0.08 10*3/mm3      Basophils, Absolute 0.04 10*3/mm3      Immature Grans, Absolute 0.02 10*3/mm3      nRBC 0.0 /100 WBC     POC Glucose Once [[id number]]  (Normal) Collected: 08/07/23 0633    Specimen: Blood Updated: 08/07/23 0634     Glucose 91 mg/dL      Comment: Meter: [id number] : [id number] Luciano Juana NA       POC Glucose Once [[id number]]  (Normal) Collected: 08/06/23 2232    Specimen: Blood Updated: 08/06/23 2234     Glucose 87 mg/dL      Comment: Meter: [id number] : [id number] Luciano Juana NA       Urea Nitrogen, Urine - Urine, Clean Catch [[id number]] Collected: 08/06/23 2100    Specimen: Urine, Clean Catch Updated: 08/06/23 2141     Urea Nitrogen, Urine 393 mg/dL     Narrative:      Reference intervals for random urine have not been established.  Clinical usage is dependent upon physician's interpretation in combination with other laboratory tests.       Potassium, Urine, Random - Urine, Clean Catch [[id number]] Collected: 08/06/23 2100    Specimen: Urine, Clean Catch Updated: 08/06/23 2141     Potassium, Urine 23.6 mmol/L     Narrative:      Reference intervals for random urine have not been established.  Clinical usage is dependent upon physician's interpretation in combination with other laboratory tests.       Sodium, Urine, Random - Urine, Clean Catch [[id number]] Collected: 08/06/23 2100    Specimen: Urine, Clean Catch Updated: 08/06/23 2141     Sodium, Urine 124 mmol/L     Narrative:      Reference intervals for random urine have not been established.  Clinical usage is dependent upon physician's interpretation in combination with other laboratory tests.       Creatinine Urine Random (kidney function) GFR component - Urine, Clean Catch [[id number]] Collected: 08/06/23 2100    Specimen: Urine, Clean Catch Updated:  08/06/23 2141     Creatinine, Urine 62.6 mg/dL     Narrative:      Reference intervals for random urine have not been established.  Clinical usage is dependent upon physician's interpretation in combination with other laboratory tests.       Chloride, Urine, Random - Urine, Clean Catch [[id number]] Collected: 08/06/23 2100    Specimen: Urine, Clean Catch Updated: 08/06/23 2141     Chloride, Urine 65 mmol/L     Narrative:      Reference intervals for random urine have not been established.  Clinical usage is dependent upon physician's interpretation in combination with other laboratory tests.       Urinalysis, Microscopic Only - Urine, Clean Catch [[id number]] Collected: 08/06/23 2100    Specimen: Urine, Clean Catch Updated: 08/06/23 2131     RBC, UA 0-2 /HPF      WBC, UA 0-2 /HPF      Bacteria, UA None Seen /HPF      Squamous Epithelial Cells, UA 0-2 /HPF      Hyaline Casts, UA 0-2 /LPF      Methodology Automated Microscopy    Urinalysis With Microscopic If Indicated (No Culture) - Urine, Clean Catch [[id number]]  (Abnormal) Collected: 08/06/23 2100    Specimen: Urine, Clean Catch Updated: 08/06/23 2131     Color, UA Yellow     Appearance, UA Clear     pH, UA >=9.0     Specific Gravity, UA 1.012     Glucose, UA Negative     Ketones, UA Trace     Bilirubin, UA Negative     Blood, UA Negative     Protein, UA 30 mg/dL (1+)     Leuk Esterase, UA Negative     Nitrite, UA Negative     Urobilinogen, UA 0.2 E.U./dL    Comprehensive Metabolic Panel [[id number]]  (Abnormal) Collected: 08/06/23 1604    Specimen: Blood Updated: 08/06/23 1631     Glucose 107 mg/dL      BUN 28 mg/dL      Creatinine 2.37 mg/dL      Sodium 143 mmol/L      Potassium 3.5 mmol/L      Chloride 104 mmol/L      CO2 29.5 mmol/L      Calcium 10.4 mg/dL      Total Protein 6.6 g/dL      Albumin 4.3 g/dL      ALT (SGPT) 6 U/L      AST (SGOT) 18 U/L      Alkaline Phosphatase 100 U/L      Total Bilirubin 0.3 mg/dL      Globulin 2.3 gm/dL      A/G Ratio 1.9 g/dL       BUN/Creatinine Ratio 11.8     Anion Gap 9.5 mmol/L      eGFR 21.4 mL/min/1.73     Narrative:      GFR Normal >60  Chronic Kidney Disease <60  Kidney Failure <15    The GFR formula is only valid for adults with stable renal function between ages 18 and 70.    Lipase [[id number]]  (Normal) Collected: 08/06/23 1604    Specimen: Blood Updated: 08/06/23 1631     Lipase 24 U/L     CBC & Differential [[id number]]  (Abnormal) Collected: 08/06/23 1604    Specimen: Blood Updated: 08/06/23 1613    Narrative:      The following orders were created for panel order CBC & Differential.  Procedure                               Abnormality         Status                     ---------                               -----------         ------                     CBC Auto Differential[[id number]]        Abnormal            Final result                 Please view results for these tests on the individual orders.    CBC Auto Differential [[id number]]  (Abnormal) Collected: 08/06/23 1604    Specimen: Blood Updated: 08/06/23 1613     WBC 9.73 10*3/mm3      RBC 3.73 10*6/mm3      Hemoglobin 11.8 g/dL      Hematocrit 34.5 %      MCV 92.5 fL      MCH 31.6 pg      MCHC 34.2 g/dL      RDW 11.8 %      RDW-SD 39.7 fl      MPV 10.0 fL      Platelets 313 10*3/mm3      Neutrophil % 65.4 %      Lymphocyte % 24.9 %      Monocyte % 7.7 %      Eosinophil % 0.9 %      Basophil % 0.7 %      Immature Grans % 0.4 %      Neutrophils, Absolute 6.36 10*3/mm3      Lymphocytes, Absolute 2.42 10*3/mm3      Monocytes, Absolute 0.75 10*3/mm3      Eosinophils, Absolute 0.09 10*3/mm3      Basophils, Absolute 0.07 10*3/mm3      Immature Grans, Absolute 0.04 10*3/mm3      nRBC 0.0 /100 WBC           Data Review:  Results from last 7 days   Lab Units 08/07/23  1010 08/06/23  1604   SODIUM mmol/L 144 143   POTASSIUM mmol/L 3.6 3.5   CHLORIDE mmol/L 111* 104   CO2 mmol/L 21.8* 29.5*   BUN mg/dL 24* 28*   CREATININE mg/dL 1.97* 2.37*   GLUCOSE mg/dL 87 107*   CALCIUM mg/dL  8.7 10.4     Results from last 7 days   Lab Units 08/07/23  1010 08/06/23  1604   WBC 10*3/mm3 7.38 9.73   HEMOGLOBIN g/dL 10.6* 11.8*   HEMATOCRIT % 31.7* 34.5   PLATELETS 10*3/mm3 252 313             Lab Results   Lab Value Date/Time    TROPONINT <0.010 07/04/2020 1038         Results from last 7 days   Lab Units 08/06/23  1604   ALK PHOS U/L 100   BILIRUBIN mg/dL 0.3   ALT (SGPT) U/L 6   AST (SGOT) U/L 18             Glucose   Date/Time Value Ref Range Status   08/07/2023 1134 88 70 - 130 mg/dL Final     Comment:     Meter: [id number] : [id number] George Osman NA   08/07/2023 0633 91 70 - 130 mg/dL Final     Comment:     Meter: [id number] : [id number] Luciano Juana NA   08/06/2023 2232 87 70 - 130 mg/dL Final     Comment:     Meter: [id number] : [id number] Luciano Juana NA           Past Medical History:   Diagnosis Date    Arthritis     OSTEO    Chronic back pain     Chronic bilateral low back pain with right-sided sciatica 2/17/2020    CKD (chronic kidney disease)     FOLLOWED BY DR LANRE BYNUM    Diverticulosis     Foot drop, right     GERD (gastroesophageal reflux disease)     History of DVT (deep vein thrombosis) 2018    RIGHT CALF S/P BACK SURGERY     History of transfusion     no reaction    Hyperlipidemia     Hypertension     Kidney transplant recipient 09/07/1979    HX TRANSPLANT D/T REFLUX     Neuropathy     Secondary hyperparathyroidism  3/9/2022    Spinal cord stimulator status 3/9/2022    Spinal headache     Stage 3b chronic kidney disease 12/30/2020    FOLLOWED BY DR LANRE BYNUM    Torkarthik rotator cuff     Vitamin D deficiency 3/9/2022       Assessment:  Active Hospital Problems    Diagnosis  POA    **Volume depletion [E86.9]  Yes    PIYUSH (acute kidney injury) [N17.9]  Yes    Dehydration [E86.0]  Yes    Prediabetes [R73.03]  Yes    Spinal cord stimulator status [Z96.89]  Not Applicable    Stage 3b chronic kidney disease [N18.32]  Yes    Peripheral neuropathy [G62.9]  Yes    Epigastric pain  [R10.13]  Yes    Nausea [R11.0]  Yes    Gastroesophageal reflux disease [K21.9]  Yes    Chronic bilateral low back pain with right-sided sciatica [M54.41, G89.29]  Yes    Right foot drop [M21.371]  Yes    Essential hypertension [I10]  Yes    Kidney transplant recipient [Z94.0]  Not Applicable      Resolved Hospital Problems   No resolved problems to display.       Plan:  Await GI consult.  We will also ask for nephrology to see.  Follow-up lab.    Dmitry Bee MD  8/7/2023  12:46 EDT

## 2023-08-08 ENCOUNTER — ANESTHESIA EVENT (OUTPATIENT)
Dept: GASTROENTEROLOGY | Facility: HOSPITAL | Age: 73
DRG: 699 | End: 2023-08-08
Payer: MEDICARE

## 2023-08-08 ENCOUNTER — ANESTHESIA (OUTPATIENT)
Dept: GASTROENTEROLOGY | Facility: HOSPITAL | Age: 73
DRG: 699 | End: 2023-08-08
Payer: MEDICARE

## 2023-08-08 LAB
ALBUMIN SERPL-MCNC: 4 G/DL (ref 3.5–5.2)
ANION GAP SERPL CALCULATED.3IONS-SCNC: 13 MMOL/L (ref 5–15)
BASOPHILS # BLD AUTO: 0.08 10*3/MM3 (ref 0–0.2)
BASOPHILS NFR BLD AUTO: 0.9 % (ref 0–1.5)
BUN SERPL-MCNC: 18 MG/DL (ref 8–23)
BUN/CREAT SERPL: 10.8 (ref 7–25)
CALCIUM SPEC-SCNC: 8.9 MG/DL (ref 8.6–10.5)
CHLORIDE SERPL-SCNC: 111 MMOL/L (ref 98–107)
CO2 SERPL-SCNC: 17 MMOL/L (ref 22–29)
CREAT SERPL-MCNC: 1.67 MG/DL (ref 0.57–1)
DEPRECATED RDW RBC AUTO: 41.9 FL (ref 37–54)
EGFRCR SERPLBLD CKD-EPI 2021: 32.6 ML/MIN/1.73
EOSINOPHIL # BLD AUTO: 0.19 10*3/MM3 (ref 0–0.4)
EOSINOPHIL NFR BLD AUTO: 2.2 % (ref 0.3–6.2)
ERYTHROCYTE [DISTWIDTH] IN BLOOD BY AUTOMATED COUNT: 12.1 % (ref 12.3–15.4)
GLUCOSE BLDC GLUCOMTR-MCNC: 115 MG/DL (ref 70–130)
GLUCOSE BLDC GLUCOMTR-MCNC: 119 MG/DL (ref 70–130)
GLUCOSE BLDC GLUCOMTR-MCNC: 68 MG/DL (ref 70–130)
GLUCOSE BLDC GLUCOMTR-MCNC: 76 MG/DL (ref 70–130)
GLUCOSE BLDC GLUCOMTR-MCNC: 76 MG/DL (ref 70–130)
GLUCOSE BLDC GLUCOMTR-MCNC: 79 MG/DL (ref 70–130)
GLUCOSE SERPL-MCNC: 73 MG/DL (ref 65–99)
HCT VFR BLD AUTO: 32.7 % (ref 34–46.6)
HGB BLD-MCNC: 11 G/DL (ref 12–15.9)
IMM GRANULOCYTES # BLD AUTO: 0.02 10*3/MM3 (ref 0–0.05)
IMM GRANULOCYTES NFR BLD AUTO: 0.2 % (ref 0–0.5)
LYMPHOCYTES # BLD AUTO: 2.28 10*3/MM3 (ref 0.7–3.1)
LYMPHOCYTES NFR BLD AUTO: 25.8 % (ref 19.6–45.3)
MAGNESIUM SERPL-MCNC: 2 MG/DL (ref 1.6–2.4)
MCH RBC QN AUTO: 32 PG (ref 26.6–33)
MCHC RBC AUTO-ENTMCNC: 33.6 G/DL (ref 31.5–35.7)
MCV RBC AUTO: 95.1 FL (ref 79–97)
MONOCYTES # BLD AUTO: 0.6 10*3/MM3 (ref 0.1–0.9)
MONOCYTES NFR BLD AUTO: 6.8 % (ref 5–12)
NEUTROPHILS NFR BLD AUTO: 5.66 10*3/MM3 (ref 1.7–7)
NEUTROPHILS NFR BLD AUTO: 64.1 % (ref 42.7–76)
NRBC BLD AUTO-RTO: 0 /100 WBC (ref 0–0.2)
PHOSPHATE SERPL-MCNC: 2.7 MG/DL (ref 2.5–4.5)
PLATELET # BLD AUTO: 236 10*3/MM3 (ref 140–450)
PMV BLD AUTO: 10.6 FL (ref 6–12)
POTASSIUM SERPL-SCNC: 3.3 MMOL/L (ref 3.5–5.2)
RBC # BLD AUTO: 3.44 10*6/MM3 (ref 3.77–5.28)
SODIUM SERPL-SCNC: 141 MMOL/L (ref 136–145)
WBC NRBC COR # BLD: 8.83 10*3/MM3 (ref 3.4–10.8)

## 2023-08-08 PROCEDURE — 0DB98ZX EXCISION OF DUODENUM, VIA NATURAL OR ARTIFICIAL OPENING ENDOSCOPIC, DIAGNOSTIC: ICD-10-PCS | Performed by: INTERNAL MEDICINE

## 2023-08-08 PROCEDURE — 83735 ASSAY OF MAGNESIUM: CPT | Performed by: INTERNAL MEDICINE

## 2023-08-08 PROCEDURE — 0 POTASSIUM CHLORIDE 10 MEQ/100ML SOLUTION: Performed by: INTERNAL MEDICINE

## 2023-08-08 PROCEDURE — 25010000002 PROCHLORPERAZINE 10 MG/2ML SOLUTION: Performed by: INTERNAL MEDICINE

## 2023-08-08 PROCEDURE — 25010000002 PROCHLORPERAZINE 10 MG/2ML SOLUTION: Performed by: HOSPITALIST

## 2023-08-08 PROCEDURE — 88305 TISSUE EXAM BY PATHOLOGIST: CPT | Performed by: INTERNAL MEDICINE

## 2023-08-08 PROCEDURE — 82948 REAGENT STRIP/BLOOD GLUCOSE: CPT

## 2023-08-08 PROCEDURE — 25010000002 ONDANSETRON PER 1 MG: Performed by: STUDENT IN AN ORGANIZED HEALTH CARE EDUCATION/TRAINING PROGRAM

## 2023-08-08 PROCEDURE — 80069 RENAL FUNCTION PANEL: CPT | Performed by: HOSPITALIST

## 2023-08-08 PROCEDURE — 43239 EGD BIOPSY SINGLE/MULTIPLE: CPT | Performed by: INTERNAL MEDICINE

## 2023-08-08 PROCEDURE — 25010000002 PROPOFOL 10 MG/ML EMULSION: Performed by: REGISTERED NURSE

## 2023-08-08 PROCEDURE — 0DB68ZX EXCISION OF STOMACH, VIA NATURAL OR ARTIFICIAL OPENING ENDOSCOPIC, DIAGNOSTIC: ICD-10-PCS | Performed by: INTERNAL MEDICINE

## 2023-08-08 PROCEDURE — 85025 COMPLETE CBC W/AUTO DIFF WBC: CPT | Performed by: STUDENT IN AN ORGANIZED HEALTH CARE EDUCATION/TRAINING PROGRAM

## 2023-08-08 PROCEDURE — 25010000002 ENOXAPARIN PER 10 MG: Performed by: INTERNAL MEDICINE

## 2023-08-08 PROCEDURE — 25010000002 ONDANSETRON PER 1 MG: Performed by: INTERNAL MEDICINE

## 2023-08-08 RX ORDER — LIDOCAINE HYDROCHLORIDE 20 MG/ML
INJECTION, SOLUTION INFILTRATION; PERINEURAL AS NEEDED
Status: DISCONTINUED | OUTPATIENT
Start: 2023-08-08 | End: 2023-08-08 | Stop reason: SURG

## 2023-08-08 RX ORDER — SODIUM CHLORIDE 9 MG/ML
1000 INJECTION, SOLUTION INTRAVENOUS CONTINUOUS
Status: DISCONTINUED | OUTPATIENT
Start: 2023-08-08 | End: 2023-08-09

## 2023-08-08 RX ORDER — ONDANSETRON 2 MG/ML
4 INJECTION INTRAMUSCULAR; INTRAVENOUS ONCE AS NEEDED
Status: DISCONTINUED | OUTPATIENT
Start: 2023-08-08 | End: 2023-08-08 | Stop reason: HOSPADM

## 2023-08-08 RX ORDER — POTASSIUM CHLORIDE 7.45 MG/ML
10 INJECTION INTRAVENOUS
Status: COMPLETED | OUTPATIENT
Start: 2023-08-08 | End: 2023-08-08

## 2023-08-08 RX ORDER — DEXTROSE AND SODIUM CHLORIDE 5; .45 G/100ML; G/100ML
75 INJECTION, SOLUTION INTRAVENOUS CONTINUOUS
Status: DISCONTINUED | OUTPATIENT
Start: 2023-08-08 | End: 2023-08-10

## 2023-08-08 RX ORDER — PROPOFOL 10 MG/ML
VIAL (ML) INTRAVENOUS AS NEEDED
Status: DISCONTINUED | OUTPATIENT
Start: 2023-08-08 | End: 2023-08-08 | Stop reason: SURG

## 2023-08-08 RX ADMIN — LIDOCAINE HYDROCHLORIDE 100 MG: 20 INJECTION, SOLUTION INFILTRATION; PERINEURAL at 13:12

## 2023-08-08 RX ADMIN — Medication 5 MG: at 23:51

## 2023-08-08 RX ADMIN — ONDANSETRON 4 MG: 2 INJECTION INTRAMUSCULAR; INTRAVENOUS at 12:14

## 2023-08-08 RX ADMIN — SODIUM CHLORIDE 125 ML/HR: 4.5 INJECTION, SOLUTION INTRAVENOUS at 00:24

## 2023-08-08 RX ADMIN — ENOXAPARIN SODIUM 30 MG: 100 INJECTION SUBCUTANEOUS at 21:19

## 2023-08-08 RX ADMIN — POTASSIUM CHLORIDE 10 MEQ: 7.46 INJECTION, SOLUTION INTRAVENOUS at 10:15

## 2023-08-08 RX ADMIN — PROPOFOL 50 MG: 10 INJECTION, EMULSION INTRAVENOUS at 13:14

## 2023-08-08 RX ADMIN — ONDANSETRON 4 MG: 2 INJECTION INTRAMUSCULAR; INTRAVENOUS at 00:23

## 2023-08-08 RX ADMIN — PROCHLORPERAZINE EDISYLATE 10 MG: 5 INJECTION INTRAMUSCULAR; INTRAVENOUS at 14:47

## 2023-08-08 RX ADMIN — ATORVASTATIN CALCIUM 20 MG: 20 TABLET, FILM COATED ORAL at 21:19

## 2023-08-08 RX ADMIN — PROPOFOL 80 MG: 10 INJECTION, EMULSION INTRAVENOUS at 13:12

## 2023-08-08 RX ADMIN — SODIUM CHLORIDE: 9 INJECTION, SOLUTION INTRAVENOUS at 13:19

## 2023-08-08 RX ADMIN — Medication 10 ML: at 07:54

## 2023-08-08 RX ADMIN — DEXTROSE AND SODIUM CHLORIDE 75 ML/HR: 5; 450 INJECTION, SOLUTION INTRAVENOUS at 17:36

## 2023-08-08 RX ADMIN — PROCHLORPERAZINE EDISYLATE 10 MG: 5 INJECTION INTRAMUSCULAR; INTRAVENOUS at 23:51

## 2023-08-08 RX ADMIN — SENNOSIDES AND DOCUSATE SODIUM 2 TABLET: 50; 8.6 TABLET ORAL at 21:19

## 2023-08-08 RX ADMIN — PROPOFOL 10 MG: 10 INJECTION, EMULSION INTRAVENOUS at 13:16

## 2023-08-08 RX ADMIN — OXYCODONE HYDROCHLORIDE AND ACETAMINOPHEN 1 TABLET: 5; 325 TABLET ORAL at 23:51

## 2023-08-08 RX ADMIN — ONDANSETRON 4 MG: 2 INJECTION INTRAMUSCULAR; INTRAVENOUS at 06:33

## 2023-08-08 RX ADMIN — PROPOFOL 10 MG: 10 INJECTION, EMULSION INTRAVENOUS at 13:17

## 2023-08-08 RX ADMIN — Medication 10 ML: at 21:18

## 2023-08-08 RX ADMIN — PROPOFOL 10 MG: 10 INJECTION, EMULSION INTRAVENOUS at 13:19

## 2023-08-08 RX ADMIN — POTASSIUM CHLORIDE 10 MEQ: 7.46 INJECTION, SOLUTION INTRAVENOUS at 09:09

## 2023-08-08 RX ADMIN — PROCHLORPERAZINE EDISYLATE 10 MG: 5 INJECTION INTRAMUSCULAR; INTRAVENOUS at 05:08

## 2023-08-08 RX ADMIN — ONDANSETRON 4 MG: 2 INJECTION INTRAMUSCULAR; INTRAVENOUS at 21:19

## 2023-08-08 RX ADMIN — AMLODIPINE BESYLATE 5 MG: 5 TABLET ORAL at 07:52

## 2023-08-08 RX ADMIN — SODIUM CHLORIDE 1000 ML: 9 INJECTION, SOLUTION INTRAVENOUS at 11:52

## 2023-08-08 RX ADMIN — SODIUM CHLORIDE 125 ML/HR: 4.5 INJECTION, SOLUTION INTRAVENOUS at 14:48

## 2023-08-08 NOTE — PROGRESS NOTES
[facility] Progress Note      Patient Name: Marlena Navarrete  : 1950  MRN: [id number]  Primary Care Physician:  Bradley Hernandez MD  Date of admission: 2023    Subjective     Interval History:   Follow up PIYUSH on CKD IIIB, LRKT.  SP EGD. Negative. HIDA ordered. Still with abdominal pain and bloating. Bowels moving.     Review of Systems:   As noted above    Objective     Vitals:   Temp:  [96.8 øF (36 øC)-97.8 øF (36.6 øC)] 97 øF (36.1 øC)  Heart Rate:  [71-81] 80  Resp:  [16-18] 17  BP: (128-165)/(62-84) 130/75    Intake/Output Summary (Last 24 hours) at 2023 1701  Last data filed at 2023 1319  Gross per 24 hour   Intake 1999.16 ml   Output --   Net 1999.16 ml       Physical Exam:    General Appearance: alert, oriented x 3, no acute distress   Skin: warm and dry  HEENT: oral mucosa normal, nonicteric sclera  Neck: supple, no JVD  Lungs: Clear to auscultation.   Heart: RRR, normal S1 and S2  Abdomen: soft, nontender, distended. Tender RUQ, and diffusely. No guarding .  : no palpable bladder  Extremities: no edema, cyanosis or clubbing  Neuro: normal speech and mental status     Scheduled Meds:     amLODIPine, 5 mg, Oral, Q24H  atorvastatin, 20 mg, Oral, Nightly  enoxaparin, 30 mg, Subcutaneous, Nightly  insulin lispro, 2-7 Units, Subcutaneous, 4x Daily AC & at Bedtime  pantoprazole, 40 mg, Oral, Q AM  polyethylene glycol, 17 g, Oral, Daily  senna-docusate sodium, 2 tablet, Oral, BID  sodium chloride, 10 mL, Intravenous, Q12H      IV Meds:   sodium chloride, 125 mL/hr, Last Rate: 125 mL/hr (23 1448)  sodium chloride, 1,000 mL, Last Rate: 25 mL/hr at 23 1307        Results Reviewed:   I have personally reviewed the results from the time of this admission to 2023 17:01 EDT     Results from last 7 days   Lab Units 23  0531 23  1010 23  1604   SODIUM mmol/L 141 144 143   POTASSIUM mmol/L 3.3* 3.6 3.5   CHLORIDE mmol/L 111* 111* 104   CO2  mmol/L 17.0* 21.8* 29.5*   BUN mg/dL 18 24* 28*   CREATININE mg/dL 1.67* 1.97* 2.37*   CALCIUM mg/dL 8.9 8.7 10.4   BILIRUBIN mg/dL  --   --  0.3   ALK PHOS U/L  --   --  100   ALT (SGPT) U/L  --   --  6   AST (SGOT) U/L  --   --  18   GLUCOSE mg/dL 73 87 107*       Estimated Creatinine Clearance: 25.8 mL/min (A) (by C-G formula based on SCr of 1.67 mg/dL (H)).    Results from last 7 days   Lab Units 08/08/23  0531   MAGNESIUM mg/dL 2.0   PHOSPHORUS mg/dL 2.7             Results from last 7 days   Lab Units 08/08/23  0531 08/07/23  1010 08/06/23  1604   WBC 10*3/mm3 8.83 7.38 9.73   HEMOGLOBIN g/dL 11.0* 10.6* 11.8*   PLATELETS 10*3/mm3 236 252 313             Assessment / Plan     ASSESSMENT:  LRKT off immunosuppression with CKD IIIB, baseline creatiine 1.7-1.9. Volume depletion. PIYUSH resolved. Replace K.    Abdominal pain. EGD negative. HIDA scan ordered.   3. HTN controlled.     PLAN:  Change  IVF to D5 1/2.   2. Replace K IV  Penny Leary MD  08/08/23  17:01 EDT    [facility]  [phone]

## 2023-08-08 NOTE — PROGRESS NOTES
"DAILY PROGRESS NOTE  [facility]    Patient Identification:  Name: Marlena Navarrete  Age: 73 y.o.  Sex: female  :  1950  MRN: [id number]         Primary Care Physician: Bradley Hernandez MD    Subjective:  Interval History: She complains of abdominal pain and nausea.  Had EGD.  Objective:    Scheduled Meds:amLODIPine, 5 mg, Oral, Q24H  atorvastatin, 20 mg, Oral, Nightly  enoxaparin, 30 mg, Subcutaneous, Nightly  insulin lispro, 2-7 Units, Subcutaneous, 4x Daily AC & at Bedtime  pantoprazole, 40 mg, Oral, Q AM  polyethylene glycol, 17 g, Oral, Daily  senna-docusate sodium, 2 tablet, Oral, BID  sodium chloride, 10 mL, Intravenous, Q12H      Continuous Infusions:sodium chloride, 125 mL/hr, Last Rate: 125 mL/hr (23 1448)  sodium chloride, 1,000 mL, Last Rate: 25 mL/hr at 23 1307        Vital signs in last 24 hours:  Temp:  [96.8 øF (36 øC)-97.8 øF (36.6 øC)] 97 øF (36.1 øC)  Heart Rate:  [71-81] 80  Resp:  [16-18] 17  BP: (128-165)/(62-84) 130/75    Intake/Output:    Intake/Output Summary (Last 24 hours) at 2023 1455  Last data filed at 2023 1319  Gross per 24 hour   Intake 2993.16 ml   Output --   Net 2993.16 ml         Exam:  /75 (BP Location: Left arm, Patient Position: Lying)   Pulse 80   Temp 97 øF (36.1 øC) (Oral)   Resp 17   Ht 147.3 cm (57.99\")   Wt 61.6 kg (135 lb 12.9 oz)   SpO2 97%   BMI 28.39 kg/mý     General Appearance:    Alert, cooperative, no distress   Head:    Normocephalic, without obvious abnormality, atraumatic   Eyes:       Throat:   Lips, tongue, gums normal   Neck:   Supple, symmetrical, trachea midline, no JVD   Lungs:     Clear to auscultation bilaterally, respirations unlabored   Chest Wall:    No tenderness or deformity    Heart:    Regular rate and rhythm, S1 and S2 normal, no murmur,no  Rub or gallop   Abdomen:     Soft, diffusely tender more in upper abdomen, bowel sounds active, no masses, no organomegaly    Extremities:   Extremities " normal, atraumatic, no cyanosis or edema   Pulses:      Skin:   Skin is warm and dry,  no rashes or palpable lesions   Neurologic:   no focal deficits noted      Lab Results (last 72 hours)       Procedure Component Value Units Date/Time    POC Glucose Once [[id number]]  (Normal) Collected: 08/07/23 1134    Specimen: Blood Updated: 08/07/23 1136     Glucose 88 mg/dL      Comment: Meter: [id number] : [id number] George MCCALL       Basic Metabolic Panel [[id number]]  (Abnormal) Collected: 08/07/23 1010    Specimen: Blood Updated: 08/07/23 1113     Glucose 87 mg/dL      BUN 24 mg/dL      Creatinine 1.97 mg/dL      Sodium 144 mmol/L      Potassium 3.6 mmol/L      Chloride 111 mmol/L      CO2 21.8 mmol/L      Calcium 8.7 mg/dL      BUN/Creatinine Ratio 12.2     Anion Gap 11.2 mmol/L      eGFR 26.8 mL/min/1.73     Narrative:      GFR Normal >60  Chronic Kidney Disease <60  Kidney Failure <15    The GFR formula is only valid for adults with stable renal function between ages 18 and 70.    CBC & Differential [[id number]]  (Abnormal) Collected: 08/07/23 1010    Specimen: Blood Updated: 08/07/23 1053    Narrative:      The following orders were created for panel order CBC & Differential.  Procedure                               Abnormality         Status                     ---------                               -----------         ------                     CBC Auto Differential[[id number]]        Abnormal            Final result                 Please view results for these tests on the individual orders.    CBC Auto Differential [[id number]]  (Abnormal) Collected: 08/07/23 1010    Specimen: Blood Updated: 08/07/23 1053     WBC 7.38 10*3/mm3      RBC 3.34 10*6/mm3      Hemoglobin 10.6 g/dL      Hematocrit 31.7 %      MCV 94.9 fL      MCH 31.7 pg      MCHC 33.4 g/dL      RDW 11.9 %      RDW-SD 41.1 fl      MPV 10.6 fL      Platelets 252 10*3/mm3      Neutrophil % 67.6 %      Lymphocyte % 23.7 %      Monocyte % 6.8 %       Eosinophil % 1.1 %      Basophil % 0.5 %      Immature Grans % 0.3 %      Neutrophils, Absolute 4.99 10*3/mm3      Lymphocytes, Absolute 1.75 10*3/mm3      Monocytes, Absolute 0.50 10*3/mm3      Eosinophils, Absolute 0.08 10*3/mm3      Basophils, Absolute 0.04 10*3/mm3      Immature Grans, Absolute 0.02 10*3/mm3      nRBC 0.0 /100 WBC     POC Glucose Once [[id number]]  (Normal) Collected: 08/07/23 0633    Specimen: Blood Updated: 08/07/23 0634     Glucose 91 mg/dL      Comment: Meter: [id number] : [id number] Luciano Juana NA       POC Glucose Once [[id number]]  (Normal) Collected: 08/06/23 2232    Specimen: Blood Updated: 08/06/23 2234     Glucose 87 mg/dL      Comment: Meter: [id number] : [id number] Luciano Juana NA       Urea Nitrogen, Urine - Urine, Clean Catch [[id number]] Collected: 08/06/23 2100    Specimen: Urine, Clean Catch Updated: 08/06/23 2141     Urea Nitrogen, Urine 393 mg/dL     Narrative:      Reference intervals for random urine have not been established.  Clinical usage is dependent upon physician's interpretation in combination with other laboratory tests.       Potassium, Urine, Random - Urine, Clean Catch [[id number]] Collected: 08/06/23 2100    Specimen: Urine, Clean Catch Updated: 08/06/23 2141     Potassium, Urine 23.6 mmol/L     Narrative:      Reference intervals for random urine have not been established.  Clinical usage is dependent upon physician's interpretation in combination with other laboratory tests.       Sodium, Urine, Random - Urine, Clean Catch [[id number]] Collected: 08/06/23 2100    Specimen: Urine, Clean Catch Updated: 08/06/23 2141     Sodium, Urine 124 mmol/L     Narrative:      Reference intervals for random urine have not been established.  Clinical usage is dependent upon physician's interpretation in combination with other laboratory tests.       Creatinine Urine Random (kidney function) GFR component - Urine, Clean Catch [[id number]] Collected: 08/06/23 2100     Specimen: Urine, Clean Catch Updated: 08/06/23 2141     Creatinine, Urine 62.6 mg/dL     Narrative:      Reference intervals for random urine have not been established.  Clinical usage is dependent upon physician's interpretation in combination with other laboratory tests.       Chloride, Urine, Random - Urine, Clean Catch [[id number]] Collected: 08/06/23 2100    Specimen: Urine, Clean Catch Updated: 08/06/23 2141     Chloride, Urine 65 mmol/L     Narrative:      Reference intervals for random urine have not been established.  Clinical usage is dependent upon physician's interpretation in combination with other laboratory tests.       Urinalysis, Microscopic Only - Urine, Clean Catch [[id number]] Collected: 08/06/23 2100    Specimen: Urine, Clean Catch Updated: 08/06/23 2131     RBC, UA 0-2 /HPF      WBC, UA 0-2 /HPF      Bacteria, UA None Seen /HPF      Squamous Epithelial Cells, UA 0-2 /HPF      Hyaline Casts, UA 0-2 /LPF      Methodology Automated Microscopy    Urinalysis With Microscopic If Indicated (No Culture) - Urine, Clean Catch [[id number]]  (Abnormal) Collected: 08/06/23 2100    Specimen: Urine, Clean Catch Updated: 08/06/23 2131     Color, UA Yellow     Appearance, UA Clear     pH, UA >=9.0     Specific Gravity, UA 1.012     Glucose, UA Negative     Ketones, UA Trace     Bilirubin, UA Negative     Blood, UA Negative     Protein, UA 30 mg/dL (1+)     Leuk Esterase, UA Negative     Nitrite, UA Negative     Urobilinogen, UA 0.2 E.U./dL    Comprehensive Metabolic Panel [[id number]]  (Abnormal) Collected: 08/06/23 1604    Specimen: Blood Updated: 08/06/23 1631     Glucose 107 mg/dL      BUN 28 mg/dL      Creatinine 2.37 mg/dL      Sodium 143 mmol/L      Potassium 3.5 mmol/L      Chloride 104 mmol/L      CO2 29.5 mmol/L      Calcium 10.4 mg/dL      Total Protein 6.6 g/dL      Albumin 4.3 g/dL      ALT (SGPT) 6 U/L      AST (SGOT) 18 U/L      Alkaline Phosphatase 100 U/L      Total Bilirubin 0.3 mg/dL      Globulin  2.3 gm/dL      A/G Ratio 1.9 g/dL      BUN/Creatinine Ratio 11.8     Anion Gap 9.5 mmol/L      eGFR 21.4 mL/min/1.73     Narrative:      GFR Normal >60  Chronic Kidney Disease <60  Kidney Failure <15    The GFR formula is only valid for adults with stable renal function between ages 18 and 70.    Lipase [[id number]]  (Normal) Collected: 08/06/23 1604    Specimen: Blood Updated: 08/06/23 1631     Lipase 24 U/L     CBC & Differential [[id number]]  (Abnormal) Collected: 08/06/23 1604    Specimen: Blood Updated: 08/06/23 1613    Narrative:      The following orders were created for panel order CBC & Differential.  Procedure                               Abnormality         Status                     ---------                               -----------         ------                     CBC Auto Differential[[id number]]        Abnormal            Final result                 Please view results for these tests on the individual orders.    CBC Auto Differential [[id number]]  (Abnormal) Collected: 08/06/23 1604    Specimen: Blood Updated: 08/06/23 1613     WBC 9.73 10*3/mm3      RBC 3.73 10*6/mm3      Hemoglobin 11.8 g/dL      Hematocrit 34.5 %      MCV 92.5 fL      MCH 31.6 pg      MCHC 34.2 g/dL      RDW 11.8 %      RDW-SD 39.7 fl      MPV 10.0 fL      Platelets 313 10*3/mm3      Neutrophil % 65.4 %      Lymphocyte % 24.9 %      Monocyte % 7.7 %      Eosinophil % 0.9 %      Basophil % 0.7 %      Immature Grans % 0.4 %      Neutrophils, Absolute 6.36 10*3/mm3      Lymphocytes, Absolute 2.42 10*3/mm3      Monocytes, Absolute 0.75 10*3/mm3      Eosinophils, Absolute 0.09 10*3/mm3      Basophils, Absolute 0.07 10*3/mm3      Immature Grans, Absolute 0.04 10*3/mm3      nRBC 0.0 /100 WBC           Data Review:  Results from last 7 days   Lab Units 08/08/23  0531 08/07/23  1010 08/06/23  1604   SODIUM mmol/L 141 144 143   POTASSIUM mmol/L 3.3* 3.6 3.5   CHLORIDE mmol/L 111* 111* 104   CO2 mmol/L 17.0* 21.8* 29.5*   BUN mg/dL 18 24*  28*   CREATININE mg/dL 1.67* 1.97* 2.37*   GLUCOSE mg/dL 73 87 107*   CALCIUM mg/dL 8.9 8.7 10.4       Results from last 7 days   Lab Units 08/08/23  0531 08/07/23  1010 08/06/23  1604   WBC 10*3/mm3 8.83 7.38 9.73   HEMOGLOBIN g/dL 11.0* 10.6* 11.8*   HEMATOCRIT % 32.7* 31.7* 34.5   PLATELETS 10*3/mm3 236 252 313               Lab Results   Lab Value Date/Time    TROPONINT <0.010 07/04/2020 1038         Results from last 7 days   Lab Units 08/06/23  1604   ALK PHOS U/L 100   BILIRUBIN mg/dL 0.3   ALT (SGPT) U/L 6   AST (SGOT) U/L 18               Glucose   Date/Time Value Ref Range Status   08/08/2023 1205 76 70 - 130 mg/dL Final     Comment:     Meter: [id number] : [id number] Cristian Price RN   08/08/2023 1204 68 (L) 70 - 130 mg/dL Final     Comment:     Meter: [id number] : [id number] Cristian Price RN   08/08/2023 1008 79 70 - 130 mg/dL Final     Comment:     Meter: [id number] : [id number] Hussain Deleon NA   08/08/2023 0617 76 70 - 130 mg/dL Final     Comment:     Meter: [id number] : [id number] Shahram Gagnon RN   08/07/2023 2122 87 70 - 130 mg/dL Final     Comment:     Meter: [id number] : [id number] Marie Roy NA   08/07/2023 1616 95 70 - 130 mg/dL Final     Comment:     Meter: [id number] : [id number] George MCCALL   08/07/2023 1134 88 70 - 130 mg/dL Final     Comment:     Meter: [id number] : [id number] George Osman NA   08/07/2023 0633 91 70 - 130 mg/dL Final     Comment:     Meter: [id number] : [id number] Mustapha MCCALL           Past Medical History:   Diagnosis Date    Arthritis     OSTEO    Chronic back pain     Chronic bilateral low back pain with right-sided sciatica 2/17/2020    CKD (chronic kidney disease)     FOLLOWED BY DR LANRE BYNUM    Diverticulosis     Foot drop, right     GERD (gastroesophageal reflux disease)     History of DVT (deep vein thrombosis) 2018    RIGHT CALF S/P BACK SURGERY     History of transfusion     no reaction     Hyperlipidemia     Hypertension     Kidney transplant recipient 09/07/1979    HX TRANSPLANT D/T REFLUX     Neuropathy     Secondary hyperparathyroidism  3/9/2022    Spinal cord stimulator status 3/9/2022    Spinal headache     Stage 3b chronic kidney disease 12/30/2020    FOLLOWED BY DR LANRE Valdez rotator cuff     Vitamin D deficiency 3/9/2022       Assessment:  Active Hospital Problems    Diagnosis  POA    **Volume depletion [E86.9]  Yes    PIYUSH (acute kidney injury) [N17.9]  Yes    Dehydration [E86.0]  Yes    Prediabetes [R73.03]  Yes    Spinal cord stimulator status [Z96.89]  Not Applicable    Stage 3b chronic kidney disease [N18.32]  Yes    Peripheral neuropathy [G62.9]  Yes    Epigastric pain [R10.13]  Yes    Nausea [R11.0]  Yes    Gastroesophageal reflux disease [K21.9]  Yes    Chronic bilateral low back pain with right-sided sciatica [M54.41, G89.29]  Yes    Right foot drop [M21.371]  Yes    Essential hypertension [I10]  Yes    Kidney transplant recipient [Z94.0]  Not Applicable      Resolved Hospital Problems   No resolved problems to display.       Plan:  GI consult noted   nephrology consult noted.  Follow-up lab.  EGD report noted. Will check HIDA scan.   Follow up lab.    Dmitry Bee MD  8/8/2023  14:55 EDT

## 2023-08-08 NOTE — PLAN OF CARE
Goal Outcome Evaluation:  Plan of Care Reviewed With: patient        Progress: no change  Outcome Evaluation: c/o abdominal discomfort- Percocet given, c/o of nausea- IV Zofran given, NPO since midnight for EGD today, up to BSC, pt is on fall precautions but is refusing the bed alarm- educated pt on importance of calling when getting out of bed to walk in room, can pivot from bed to BSC independently, morning labs ordered, ivf infusing, room air, voiding freely

## 2023-08-08 NOTE — ANESTHESIA POSTPROCEDURE EVALUATION
Patient: Marlena Navarrete    Procedure Summary       Date: 08/08/23 Room / Location:  [facility]    Anesthesia Start: 1307 Anesthesia Stop: 1325    Procedure: ESOPHAGOGASTRODUODENOSCOPY with bx (Esophagus) Diagnosis:       Epigastric pain      (Epigastric pain [R10.13])    Surgeons: Jovan Robin MD Provider: Arturo Armijo MD    Anesthesia Type: MAC ASA Status: 3            Anesthesia Type: MAC    Vitals  Vitals Value Taken Time   /75 08/08/23 1334   Temp     Pulse 80 08/08/23 1334   Resp 17 08/08/23 1334   SpO2 97 % 08/08/23 1334           Post Anesthesia Care and Evaluation    Patient location during evaluation: PACU  Patient participation: complete - patient participated  Level of consciousness: awake and alert  Pain management: adequate    Airway patency: patent  Anesthetic complications: No anesthetic complications    Cardiovascular status: acceptable  Respiratory status: acceptable  Hydration status: acceptable    Comments: --------------------            08/08/23               1334     --------------------   BP:       130/75     Pulse:      80       Resp:       17       Temp:                SpO2:      97%      --------------------

## 2023-08-08 NOTE — PLAN OF CARE
Problem: Adult Inpatient Plan of Care  Goal: Plan of Care Review  Outcome: Ongoing, Progressing  Flowsheets (Taken 8/8/2023 1825)  Progress: no change  Plan of Care Reviewed With: patient  Outcome Evaluation: changed ivf's, x2 K runs this morning, cont nausea with zofran and compazine given, accu cks with sliding scale, EGD done this am, asst x1 with ambulating, uses BSC without difficulty, SCD's RA, VSS, laurent clear liquid diet this evening, HIDA scan ordered/NPO at [facility].   Goal Outcome Evaluation:  Plan of Care Reviewed With: patient        Progress: no change  Outcome Evaluation: changed ivf's, x2 K runs this morning, cont nausea with zofran and compazine given, accu cks with sliding scale, EGD done this am, asst x1 with ambulating, uses BSC without difficulty, SCD's RA, VSS, laurent clear liquid diet this evening, HIDA scan ordered/NPO at [facility].

## 2023-08-08 NOTE — ANESTHESIA PREPROCEDURE EVALUATION
Anesthesia Evaluation     Patient summary reviewed and Nursing notes reviewed   history of anesthetic complications:  PONV               Airway   Mallampati: II  TM distance: >3 FB  Neck ROM: full  Dental      Pulmonary - negative pulmonary ROS   Cardiovascular     ECG reviewed  Rhythm: regular  Rate: normal    (+) hypertensionhyperlipidemia      Neuro/Psych  (+) headaches, numbness  GI/Hepatic/Renal/Endo    (+) GERD, renal disease CRI    Musculoskeletal     Abdominal    Substance History - negative use     OB/GYN negative ob/gyn ROS         Other   arthritis,                   Anesthesia Plan    ASA 3     MAC     intravenous induction     Anesthetic plan, risks, benefits, and alternatives have been provided, discussed and informed consent has been obtained with: patient.    Plan discussed with CRNA.    CODE STATUS:    Code Status (Patient has no pulse and is not breathing): CPR (Attempt to Resuscitate)  Medical Interventions (Patient has pulse or is breathing): Full Support  Release to patient: Routine Release

## 2023-08-09 ENCOUNTER — APPOINTMENT (OUTPATIENT)
Dept: NUCLEAR MEDICINE | Facility: HOSPITAL | Age: 73
DRG: 699 | End: 2023-08-09
Payer: MEDICARE

## 2023-08-09 LAB
ALBUMIN SERPL-MCNC: 3.6 G/DL (ref 3.5–5.2)
ALBUMIN/GLOB SERPL: 2.4 G/DL
ALP SERPL-CCNC: 84 U/L (ref 39–117)
ALT SERPL W P-5'-P-CCNC: 9 U/L (ref 1–33)
ANION GAP SERPL CALCULATED.3IONS-SCNC: 8 MMOL/L (ref 5–15)
AST SERPL-CCNC: 14 U/L (ref 1–32)
BASOPHILS # BLD AUTO: 0.05 10*3/MM3 (ref 0–0.2)
BASOPHILS NFR BLD AUTO: 0.6 % (ref 0–1.5)
BILIRUB SERPL-MCNC: 0.3 MG/DL (ref 0–1.2)
BUN SERPL-MCNC: 23 MG/DL (ref 8–23)
BUN/CREAT SERPL: 15.4 (ref 7–25)
CALCIUM SPEC-SCNC: 8.8 MG/DL (ref 8.6–10.5)
CHLORIDE SERPL-SCNC: 113 MMOL/L (ref 98–107)
CO2 SERPL-SCNC: 20 MMOL/L (ref 22–29)
CREAT SERPL-MCNC: 1.49 MG/DL (ref 0.57–1)
DEPRECATED RDW RBC AUTO: 41.6 FL (ref 37–54)
EGFRCR SERPLBLD CKD-EPI 2021: 37.4 ML/MIN/1.73
EOSINOPHIL # BLD AUTO: 0.2 10*3/MM3 (ref 0–0.4)
EOSINOPHIL NFR BLD AUTO: 2.2 % (ref 0.3–6.2)
ERYTHROCYTE [DISTWIDTH] IN BLOOD BY AUTOMATED COUNT: 12 % (ref 12.3–15.4)
GLOBULIN UR ELPH-MCNC: 1.5 GM/DL
GLUCOSE BLDC GLUCOMTR-MCNC: 106 MG/DL (ref 70–130)
GLUCOSE BLDC GLUCOMTR-MCNC: 111 MG/DL (ref 70–130)
GLUCOSE BLDC GLUCOMTR-MCNC: 118 MG/DL (ref 70–130)
GLUCOSE BLDC GLUCOMTR-MCNC: 127 MG/DL (ref 70–130)
GLUCOSE SERPL-MCNC: 114 MG/DL (ref 65–99)
HCT VFR BLD AUTO: 28.2 % (ref 34–46.6)
HGB BLD-MCNC: 9.5 G/DL (ref 12–15.9)
IMM GRANULOCYTES # BLD AUTO: 0.02 10*3/MM3 (ref 0–0.05)
IMM GRANULOCYTES NFR BLD AUTO: 0.2 % (ref 0–0.5)
LAB AP CASE REPORT: NORMAL
LYMPHOCYTES # BLD AUTO: 2.23 10*3/MM3 (ref 0.7–3.1)
LYMPHOCYTES NFR BLD AUTO: 24.9 % (ref 19.6–45.3)
MCH RBC QN AUTO: 31.8 PG (ref 26.6–33)
MCHC RBC AUTO-ENTMCNC: 33.7 G/DL (ref 31.5–35.7)
MCV RBC AUTO: 94.3 FL (ref 79–97)
MONOCYTES # BLD AUTO: 0.68 10*3/MM3 (ref 0.1–0.9)
MONOCYTES NFR BLD AUTO: 7.6 % (ref 5–12)
NEUTROPHILS NFR BLD AUTO: 5.79 10*3/MM3 (ref 1.7–7)
NEUTROPHILS NFR BLD AUTO: 64.5 % (ref 42.7–76)
NRBC BLD AUTO-RTO: 0 /100 WBC (ref 0–0.2)
PATH REPORT.FINAL DX SPEC: NORMAL
PATH REPORT.GROSS SPEC: NORMAL
PHOSPHATE SERPL-MCNC: 2.7 MG/DL (ref 2.5–4.5)
PLATELET # BLD AUTO: 229 10*3/MM3 (ref 140–450)
PMV BLD AUTO: 10.6 FL (ref 6–12)
POTASSIUM SERPL-SCNC: 3.3 MMOL/L (ref 3.5–5.2)
PROT SERPL-MCNC: 5.1 G/DL (ref 6–8.5)
RBC # BLD AUTO: 2.99 10*6/MM3 (ref 3.77–5.28)
SODIUM SERPL-SCNC: 141 MMOL/L (ref 136–145)
WBC NRBC COR # BLD: 8.97 10*3/MM3 (ref 3.4–10.8)

## 2023-08-09 PROCEDURE — A9537 TC99M MEBROFENIN: HCPCS | Performed by: HOSPITALIST

## 2023-08-09 PROCEDURE — 84100 ASSAY OF PHOSPHORUS: CPT | Performed by: INTERNAL MEDICINE

## 2023-08-09 PROCEDURE — 25010000002 SINCALIDE PER 5 MCG: Performed by: HOSPITALIST

## 2023-08-09 PROCEDURE — 97110 THERAPEUTIC EXERCISES: CPT

## 2023-08-09 PROCEDURE — 0 TECHNETIUM TC 99M MEBROFENIN KIT: Performed by: HOSPITALIST

## 2023-08-09 PROCEDURE — 82948 REAGENT STRIP/BLOOD GLUCOSE: CPT

## 2023-08-09 PROCEDURE — 25010000002 ONDANSETRON PER 1 MG: Performed by: INTERNAL MEDICINE

## 2023-08-09 PROCEDURE — 85025 COMPLETE CBC W/AUTO DIFF WBC: CPT | Performed by: INTERNAL MEDICINE

## 2023-08-09 PROCEDURE — 78227 HEPATOBIL SYST IMAGE W/DRUG: CPT

## 2023-08-09 PROCEDURE — 99232 SBSQ HOSP IP/OBS MODERATE 35: CPT | Performed by: PHYSICIAN ASSISTANT

## 2023-08-09 PROCEDURE — 80053 COMPREHEN METABOLIC PANEL: CPT | Performed by: HOSPITALIST

## 2023-08-09 PROCEDURE — 25010000002 ENOXAPARIN PER 10 MG: Performed by: INTERNAL MEDICINE

## 2023-08-09 RX ORDER — POTASSIUM CHLORIDE 750 MG/1
40 TABLET, FILM COATED, EXTENDED RELEASE ORAL ONCE
Status: COMPLETED | OUTPATIENT
Start: 2023-08-09 | End: 2023-08-09

## 2023-08-09 RX ORDER — AMLODIPINE BESYLATE 10 MG/1
10 TABLET ORAL
Status: DISCONTINUED | OUTPATIENT
Start: 2023-08-10 | End: 2023-08-10 | Stop reason: HOSPADM

## 2023-08-09 RX ORDER — KIT FOR THE PREPARATION OF TECHNETIUM TC 99M MEBROFENIN 45 MG/10ML
1 INJECTION, POWDER, LYOPHILIZED, FOR SOLUTION INTRAVENOUS
Status: COMPLETED | OUTPATIENT
Start: 2023-08-09 | End: 2023-08-09

## 2023-08-09 RX ADMIN — AMLODIPINE BESYLATE 5 MG: 5 TABLET ORAL at 10:53

## 2023-08-09 RX ADMIN — Medication 5 MG: at 23:51

## 2023-08-09 RX ADMIN — ATORVASTATIN CALCIUM 20 MG: 20 TABLET, FILM COATED ORAL at 21:53

## 2023-08-09 RX ADMIN — ONDANSETRON 4 MG: 2 INJECTION INTRAMUSCULAR; INTRAVENOUS at 18:21

## 2023-08-09 RX ADMIN — SINCALIDE 1.2 MCG: 5 INJECTION, POWDER, LYOPHILIZED, FOR SOLUTION INTRAVENOUS at 09:01

## 2023-08-09 RX ADMIN — ONDANSETRON 4 MG: 2 INJECTION INTRAMUSCULAR; INTRAVENOUS at 10:53

## 2023-08-09 RX ADMIN — MEBROFENIN 1 DOSE: 45 INJECTION, POWDER, LYOPHILIZED, FOR SOLUTION INTRAVENOUS at 08:03

## 2023-08-09 RX ADMIN — ENOXAPARIN SODIUM 30 MG: 100 INJECTION SUBCUTANEOUS at 21:53

## 2023-08-09 RX ADMIN — POLYETHYLENE GLYCOL 3350 17 G: 17 POWDER, FOR SOLUTION ORAL at 10:54

## 2023-08-09 RX ADMIN — POTASSIUM CHLORIDE 40 MEQ: 750 TABLET, EXTENDED RELEASE ORAL at 13:08

## 2023-08-09 RX ADMIN — OXYCODONE HYDROCHLORIDE AND ACETAMINOPHEN 1 TABLET: 5; 325 TABLET ORAL at 23:51

## 2023-08-09 RX ADMIN — DEXTROSE AND SODIUM CHLORIDE 75 ML/HR: 5; 450 INJECTION, SOLUTION INTRAVENOUS at 06:49

## 2023-08-09 RX ADMIN — OXYCODONE HYDROCHLORIDE AND ACETAMINOPHEN 1 TABLET: 5; 325 TABLET ORAL at 10:53

## 2023-08-09 RX ADMIN — POLYETHYLENE GLYCOL 3350 17 G: 17 POWDER, FOR SOLUTION ORAL at 20:10

## 2023-08-09 RX ADMIN — Medication 10 ML: at 21:54

## 2023-08-09 RX ADMIN — PANTOPRAZOLE SODIUM 40 MG: 40 TABLET, DELAYED RELEASE ORAL at 13:07

## 2023-08-09 NOTE — THERAPY TREATMENT NOTE
Patient Name: Marlena Navarrete  : 1950    MRN: [id number]                              Today's Date: 2023       Admit Date: 2023    Visit Dx:     ICD-10-CM ICD-9-CM   1. Nausea  R11.0 787.02   2. Volume depletion  E86.9 276.50   3. Stage 3b chronic kidney disease  N18.32 585.3   4. Kidney transplant recipient  Z94.0 V42.0   5. Epigastric pain  R10.13 789.06     Patient Active Problem List   Diagnosis    Lumbar radiculopathy    Essential hypertension    Right foot drop    Kidney transplant recipient    History of lumbar laminectomy for spinal cord decompression    Spinal enthesopathy of thoracic region    Postlaminectomy syndrome, lumbar region    Chronic bilateral low back pain with right-sided sciatica    Displacement of other nervous system device, implant or graft, initial encounter    Postlaminectomy syndrome of lumbar region    Epigastric pain    Nausea    Gastroesophageal reflux disease    Shoulder arthritis    Acute renal failure (ARF)    Stage 3b chronic kidney disease    Iron deficiency anemia    Status post rotator cuff surgery    History of DVT (deep vein thrombosis)    Peripheral neuropathy    Intractable back pain    Spinal cord stimulator status    Adhesive arachnoiditis    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    Inflammation of sacroiliac joint    Secondary hyperparathyroidism     Vitamin D deficiency    COVID    Pneumonia due to COVID-19 virus    Narcotic dependence    Cytokine release syndrome, grade 1    Impacted cerumen of right ear    Hospital discharge follow-up    Acute metabolic encephalopathy    Volume depletion    PIYUSH (acute kidney injury)    Dehydration    Prediabetes     Past Medical History:   Diagnosis Date    Arthritis     OSTEO    Chronic back pain     Chronic bilateral low back pain with right-sided sciatica 2020    CKD (chronic kidney disease)     FOLLOWED BY DR LANRE BYNUM    Diverticulosis     Foot drop, right      GERD (gastroesophageal reflux disease)     History of DVT (deep vein thrombosis) 2018    RIGHT CALF S/P BACK SURGERY     History of transfusion     no reaction    Hyperlipidemia     Hypertension     Kidney transplant recipient 1979    HX TRANSPLANT D/T REFLUX     Neuropathy     Secondary hyperparathyroidism  3/9/2022    Spinal cord stimulator status 3/9/2022    Spinal headache     Stage 3b chronic kidney disease 2020    FOLLOWED BY DR LANRE Valdez rotator cuff     Vitamin D deficiency 3/9/2022     Past Surgical History:   Procedure Laterality Date    APPENDECTOMY      CATARACT EXTRACTION EXTRACAPSULAR W/ INTRAOCULAR LENS IMPLANTATION       SECTION      COLONOSCOPY  approx     Anastacio WHARTON    ENDOSCOPY N/A 2020    Procedure: ESOPHAGOGASTRODUODENOSCOPY with biopsies;  Surgeon: Wicho Chaves MD;  Location: [facility] ENDOSCOPY;  Service: Gastroenterology;  Laterality: N/A;  pre- epigastric pain, nausea  post-- gastritis, duodenitis, bile reflux    ENDOSCOPY N/A 2023    Procedure: ESOPHAGOGASTRODUODENOSCOPY with bx;  Surgeon: Jovan Robin MD;  Location: [facility] ENDOSCOPY;  Service: Gastroenterology;  Laterality: N/A;  pre: abd pain, nausea, vomiting   post: normal    LUMBAR DISCECTOMY FUSION INSTRUMENTATION Right 2018    Procedure: Right L2-3 laminectomy with Metrix;  Surgeon: Oscar Paulino MD;  Location: [facility] OR;  Service: Neurosurgery    SHOULDER ARTHROSCOPY W/ ROTATOR CUFF REPAIR Right 2020    Procedure: SHOULDER ARTHROSCOPY, ROTATOR CUFF REPAIR, SUBACROMIAL DECOMPRESSION, DISTAL CLAVICLE EXCISION, BICEPS TENOTOMY, AND LABRAL DEBRIDEMENT;  Surgeon: Rishabh Florence MD;  Location: [facility];  Service: Orthopedics;  Laterality: Right;    SHOULDER ROTATOR CUFF REPAIR Left     SPINAL CORD STIMULATOR IMPLANT N/A 2020    Procedure: SPINAL CORD STIMULATOR INSERTION PHASE 2, triall of right gluteal internal pulse generator;  Surgeon: Justo  MD Cuate;  Location: [facility] MAIN OR;  Service: Neurosurgery;  Laterality: N/A;    SPINAL CORD STIMULATOR IMPLANT N/A 6/4/2020    Procedure: SPINAL CORD STIMULATOR INSERTION PHASE 1, Thoracic 10 laminotomy for revision of [place] surgical lead at Thoracic 8 to Thoracic 9;  Surgeon: Cuate Kemp MD;  Location: [facility] MAIN OR;  Service: Neurosurgery;  Laterality: N/A;    TONSILLECTOMY      TRANSPLANTATION RENAL Right 1979    SISTER GAVE PT     TUBAL ABDOMINAL LIGATION        General Information       Row Name 08/09/23 1101          Physical Therapy Time and Intention    Document Type therapy note (daily note)  -AR     Mode of Treatment physical therapy  -AR       Row Name 08/09/23 1101          General Information    Patient Profile Reviewed yes  -AR     Existing Precautions/Restrictions --  up ad magda in room  -AR     Barriers to Rehab none identified  -AR       Row Name 08/09/23 1101          Living Environment    People in Home spouse  -AR       Row Name 08/09/23 1101          Cognition    Orientation Status (Cognition) oriented x 4  -AR               User Key  (r) = Recorded By, (t) = Taken By, (c) = Cosigned By      Initials Name Provider Type    AR Elena Mei, PT Physical Therapist                   Mobility       Row Name 08/09/23 1101          Bed Mobility    Supine-Sit [place] (Bed Mobility) not tested  -AR       Row Name 08/09/23 1101          Sit-Stand Transfer    Sit-Stand [place] (Transfers) standby assist;verbal cues  -AR     Assistive Device (Sit-Stand Transfers) cane, quad  -AR       Row Name 08/09/23 1101          Gait/Stairs (Locomotion)    [place] Level (Gait) contact guard;standby assist;verbal cues  -AR     Assistive Device (Gait) cane, quad  -AR     Distance in Feet (Gait) 130' 2 standing rest breaks  -AR     Deviations/Abnormal Patterns (Gait) stride length decreased  -AR     Bilateral Gait Deviations forward flexed posture  -AR               User Key  (r) = Recorded  By, (t) = Taken By, (c) = Cosigned By      Initials Name Provider Type    AR Elena Mei, PT Physical Therapist                   Obj/Interventions       Row Name 08/09/23 1102          Motor Skills    Therapeutic Exercise --  standing marches 10x  -AR       Row Name 08/09/23 1102          Balance    Comment, Balance standing reaching towards ground w/ therapy dog; limited by fatigue  -AR               User Key  (r) = Recorded By, (t) = Taken By, (c) = Cosigned By      Initials Name Provider Type    AR Elena Mei, PT Physical Therapist                   Goals/Plan    No documentation.                  Clinical Impression       Row Name 08/09/23 1102          Pain    Pretreatment Pain Rating 4/10  -AR     Posttreatment Pain Rating 5/10  -AR     Pain Location - abdomen  -AR     Pain Intervention(s) Repositioned  -AR       Row Name 08/09/23 1102          Plan of Care Review    Outcome Evaluation Improved activtiy tolerance during PT today. Able to ambulate 130' w/ contact to stand by assist using cane; limted by fatigue, weakness, abdominal pain and nausea.  Performed few exercises and reaching activities w/ assist.  Recommend ambulating in patel 3x/day.  Recommend DC to home with assist.  -AR       Row Name 08/09/23 1102          Therapy Assessment/Plan (PT)    Criteria for Skilled Interventions Met (PT) yes  -AR     Therapy Frequency (PT) 3 times/wk  -AR       Row Name 08/09/23 1104 08/09/23 1102       Vital Signs    O2 Delivery Pre Treatment room air  -AR room air  -AR      Row Name 08/09/23 1104          Positioning and Restraints    Pre-Treatment Position standing in room  -AR     Post Treatment Position chair  -AR     In Chair sitting;with nsg;with family/caregiver  -AR               User Key  (r) = Recorded By, (t) = Taken By, (c) = Cosigned By      Initials Name Provider Type    AR Elena Mei, PT Physical Therapist                   Outcome Measures       Row Name 08/09/23 1104          How much  help from another person do you currently need...    Turning from your back to your side while in flat bed without using bedrails? 4  -AR     Moving from lying on back to sitting on the side of a flat bed without bedrails? 4  -AR     Moving to and from a bed to a chair (including a wheelchair)? 3  -AR     Standing up from a chair using your arms (e.g., wheelchair, bedside chair)? 3  -AR     Climbing 3-5 steps with a railing? 3  -AR     To walk in hospital room? 3  -AR     AM-PAC 6 Clicks Score (PT) 20  -AR     Highest level of mobility 6 --> Walked 10 steps or more  -AR       Row Name 08/09/23 1104          Functional Assessment    Outcome Measure Options AM-PAC 6 Clicks Basic Mobility (PT)  -AR               User Key  (r) = Recorded By, (t) = Taken By, (c) = Cosigned By      Initials Name Provider Type    AR Elena Mei PT Physical Therapist                                 Physical Therapy Education       Title: PT OT SLP Therapies (In Progress)       Topic: Physical Therapy (In Progress)       Point: Mobility training (In Progress)       Learning Progress Summary             Patient Acceptance, E, NR by AR at 8/9/2023 1104    Acceptance, E, VU by DB at 8/7/2023 1202                         Point: Home exercise program (In Progress)       Learning Progress Summary             Patient Acceptance, E, NR by AR at 8/9/2023 1104    Acceptance, E, VU by DB at 8/7/2023 1202                         Point: Body mechanics (In Progress)       Learning Progress Summary             Patient Acceptance, E, NR by AR at 8/9/2023 1104    Acceptance, E, VU by DB at 8/7/2023 1202                         Point: Precautions (In Progress)       Learning Progress Summary             Patient Acceptance, E, NR by AR at 8/9/2023 1104    Acceptance, E, VU by DB at 8/7/2023 1202                                         User Key       Initials Effective Dates Name Provider Type Discipline    AR 06/16/21 -  Elena Mei PT Physical  Therapist PT    DB 06/16/21 -  Maricel Gan PT Physical Therapist PT                  PT Recommendation and Plan     Outcome Evaluation: Improved activtiy tolerance during PT today. Able to ambulate 130' w/ contact to stand by assist using cane; limted by fatigue, weakness, abdominal pain and nausea.  Performed few exercises and reaching activities w/ assist.  Recommend ambulating in patel 3x/day.  Recommend DC to home with assist.     Time Calculation:         PT Charges       Row Name 08/09/23 1100             Time Calculation    Start Time 1001  -AR      Stop Time 1024  -AR      Time Calculation (min) 23 min  -AR      PT Received On 08/09/23  -AR      PT - Next Appointment 08/11/23  -AR                User Key  (r) = Recorded By, (t) = Taken By, (c) = Cosigned By      Initials Name Provider Type    Elena Oro, CARLOS ENRIQUE Physical Therapist                  Therapy Charges for Today       Code Description Service Date Service Provider Modifiers Qty    46601145736 HC PT THER PROC EA 15 MIN 8/9/2023 Elena Mei, PT GP 2            PT G-Codes  Outcome Measure Options: AM-PAC 6 Clicks Basic Mobility (PT)  AM-PAC 6 Clicks Score (PT): 20  AM-PAC 6 Clicks Score (OT): 22  PT Discharge Summary  Anticipated Discharge Disposition (PT): home with assist, home with 24/7 care    Elena Mei PT  8/9/2023

## 2023-08-09 NOTE — PROGRESS NOTES
"DAILY PROGRESS NOTE  [facility]    Patient Identification:  Name: Marlena Navarrete  Age: 73 y.o.  Sex: female  :  1950  MRN: [id number]         Primary Care Physician: Bradley Hernandez MD    Subjective:  Interval History: She complains of abdominal pain and nausea.  Had nl EGD. HIDA scan normal  Objective:    Scheduled Meds:amLODIPine, 5 mg, Oral, Q24H  atorvastatin, 20 mg, Oral, Nightly  enoxaparin, 30 mg, Subcutaneous, Nightly  insulin lispro, 2-7 Units, Subcutaneous, 4x Daily AC & at Bedtime  pantoprazole, 40 mg, Oral, Q AM  polyethylene glycol, 17 g, Oral, Daily  senna-docusate sodium, 2 tablet, Oral, BID  sodium chloride, 10 mL, Intravenous, Q12H      Continuous Infusions:dextrose 5 % and sodium chloride 0.45 %, 75 mL/hr, Last Rate: 75 mL/hr (23 0649)  sodium chloride, 1,000 mL, Last Rate: 25 mL/hr at 23 1307        Vital signs in last 24 hours:  Temp:  [97.7 øF (36.5 øC)-98.8 øF (37.1 øC)] 97.7 øF (36.5 øC)  Heart Rate:  [72-98] 98  Resp:  [17-18] 18  BP: (128-171)/(70-90) 158/90    Intake/Output:    Intake/Output Summary (Last 24 hours) at 2023 1134  Last data filed at 2023 0648  Gross per 24 hour   Intake 2910 ml   Output --   Net 2910 ml         Exam:  /90 (BP Location: Left arm, Patient Position: Sitting)   Pulse 98   Temp 97.7 øF (36.5 øC) (Oral)   Resp 18   Ht 147.3 cm (57.99\")   Wt 61.6 kg (135 lb 12.9 oz)   SpO2 96%   BMI 28.39 kg/mý     General Appearance:    Alert, cooperative, no distress   Head:    Normocephalic, without obvious abnormality, atraumatic   Eyes:       Throat:   Lips, tongue, gums normal   Neck:   Supple, symmetrical, trachea midline, no JVD   Lungs:     Clear to auscultation bilaterally, respirations unlabored   Chest Wall:    No tenderness or deformity    Heart:    Regular rate and rhythm, S1 and S2 normal, no murmur,no  Rub or gallop   Abdomen:     Soft, diffusely tender more in upper abdomen, bowel sounds active, no masses, no " organomegaly    Extremities:   Extremities normal, atraumatic, no cyanosis or edema   Pulses:      Skin:   Skin is warm and dry,  no rashes or palpable lesions   Neurologic:   no focal deficits noted      Lab Results (last 72 hours)       Procedure Component Value Units Date/Time    POC Glucose Once [[id number]]  (Normal) Collected: 08/07/23 1134    Specimen: Blood Updated: 08/07/23 1136     Glucose 88 mg/dL      Comment: Meter: [id number] : [id number] George MCCALL       Basic Metabolic Panel [[id number]]  (Abnormal) Collected: 08/07/23 1010    Specimen: Blood Updated: 08/07/23 1113     Glucose 87 mg/dL      BUN 24 mg/dL      Creatinine 1.97 mg/dL      Sodium 144 mmol/L      Potassium 3.6 mmol/L      Chloride 111 mmol/L      CO2 21.8 mmol/L      Calcium 8.7 mg/dL      BUN/Creatinine Ratio 12.2     Anion Gap 11.2 mmol/L      eGFR 26.8 mL/min/1.73     Narrative:      GFR Normal >60  Chronic Kidney Disease <60  Kidney Failure <15    The GFR formula is only valid for adults with stable renal function between ages 18 and 70.    CBC & Differential [[id number]]  (Abnormal) Collected: 08/07/23 1010    Specimen: Blood Updated: 08/07/23 1053    Narrative:      The following orders were created for panel order CBC & Differential.  Procedure                               Abnormality         Status                     ---------                               -----------         ------                     CBC Auto Differential[[id number]]        Abnormal            Final result                 Please view results for these tests on the individual orders.    CBC Auto Differential [[id number]]  (Abnormal) Collected: 08/07/23 1010    Specimen: Blood Updated: 08/07/23 1053     WBC 7.38 10*3/mm3      RBC 3.34 10*6/mm3      Hemoglobin 10.6 g/dL      Hematocrit 31.7 %      MCV 94.9 fL      MCH 31.7 pg      MCHC 33.4 g/dL      RDW 11.9 %      RDW-SD 41.1 fl      MPV 10.6 fL      Platelets 252 10*3/mm3      Neutrophil % 67.6 %       Lymphocyte % 23.7 %      Monocyte % 6.8 %      Eosinophil % 1.1 %      Basophil % 0.5 %      Immature Grans % 0.3 %      Neutrophils, Absolute 4.99 10*3/mm3      Lymphocytes, Absolute 1.75 10*3/mm3      Monocytes, Absolute 0.50 10*3/mm3      Eosinophils, Absolute 0.08 10*3/mm3      Basophils, Absolute 0.04 10*3/mm3      Immature Grans, Absolute 0.02 10*3/mm3      nRBC 0.0 /100 WBC     POC Glucose Once [[id number]]  (Normal) Collected: 08/07/23 0633    Specimen: Blood Updated: 08/07/23 0634     Glucose 91 mg/dL      Comment: Meter: [id number] : [id number] Luciano Juana NA       POC Glucose Once [[id number]]  (Normal) Collected: 08/06/23 2232    Specimen: Blood Updated: 08/06/23 2234     Glucose 87 mg/dL      Comment: Meter: [id number] : [id number] Luciano Juana NA       Urea Nitrogen, Urine - Urine, Clean Catch [[id number]] Collected: 08/06/23 2100    Specimen: Urine, Clean Catch Updated: 08/06/23 2141     Urea Nitrogen, Urine 393 mg/dL     Narrative:      Reference intervals for random urine have not been established.  Clinical usage is dependent upon physician's interpretation in combination with other laboratory tests.       Potassium, Urine, Random - Urine, Clean Catch [[id number]] Collected: 08/06/23 2100    Specimen: Urine, Clean Catch Updated: 08/06/23 2141     Potassium, Urine 23.6 mmol/L     Narrative:      Reference intervals for random urine have not been established.  Clinical usage is dependent upon physician's interpretation in combination with other laboratory tests.       Sodium, Urine, Random - Urine, Clean Catch [[id number]] Collected: 08/06/23 2100    Specimen: Urine, Clean Catch Updated: 08/06/23 2141     Sodium, Urine 124 mmol/L     Narrative:      Reference intervals for random urine have not been established.  Clinical usage is dependent upon physician's interpretation in combination with other laboratory tests.       Creatinine Urine Random (kidney function) GFR component - Urine, Clean  Catch [[id number]] Collected: 08/06/23 2100    Specimen: Urine, Clean Catch Updated: 08/06/23 2141     Creatinine, Urine 62.6 mg/dL     Narrative:      Reference intervals for random urine have not been established.  Clinical usage is dependent upon physician's interpretation in combination with other laboratory tests.       Chloride, Urine, Random - Urine, Clean Catch [[id number]] Collected: 08/06/23 2100    Specimen: Urine, Clean Catch Updated: 08/06/23 2141     Chloride, Urine 65 mmol/L     Narrative:      Reference intervals for random urine have not been established.  Clinical usage is dependent upon physician's interpretation in combination with other laboratory tests.       Urinalysis, Microscopic Only - Urine, Clean Catch [[id number]] Collected: 08/06/23 2100    Specimen: Urine, Clean Catch Updated: 08/06/23 2131     RBC, UA 0-2 /HPF      WBC, UA 0-2 /HPF      Bacteria, UA None Seen /HPF      Squamous Epithelial Cells, UA 0-2 /HPF      Hyaline Casts, UA 0-2 /LPF      Methodology Automated Microscopy    Urinalysis With Microscopic If Indicated (No Culture) - Urine, Clean Catch [[id number]]  (Abnormal) Collected: 08/06/23 2100    Specimen: Urine, Clean Catch Updated: 08/06/23 2131     Color, UA Yellow     Appearance, UA Clear     pH, UA >=9.0     Specific Gravity, UA 1.012     Glucose, UA Negative     Ketones, UA Trace     Bilirubin, UA Negative     Blood, UA Negative     Protein, UA 30 mg/dL (1+)     Leuk Esterase, UA Negative     Nitrite, UA Negative     Urobilinogen, UA 0.2 E.U./dL    Comprehensive Metabolic Panel [[id number]]  (Abnormal) Collected: 08/06/23 1604    Specimen: Blood Updated: 08/06/23 1631     Glucose 107 mg/dL      BUN 28 mg/dL      Creatinine 2.37 mg/dL      Sodium 143 mmol/L      Potassium 3.5 mmol/L      Chloride 104 mmol/L      CO2 29.5 mmol/L      Calcium 10.4 mg/dL      Total Protein 6.6 g/dL      Albumin 4.3 g/dL      ALT (SGPT) 6 U/L      AST (SGOT) 18 U/L      Alkaline Phosphatase 100  U/L      Total Bilirubin 0.3 mg/dL      Globulin 2.3 gm/dL      A/G Ratio 1.9 g/dL      BUN/Creatinine Ratio 11.8     Anion Gap 9.5 mmol/L      eGFR 21.4 mL/min/1.73     Narrative:      GFR Normal >60  Chronic Kidney Disease <60  Kidney Failure <15    The GFR formula is only valid for adults with stable renal function between ages 18 and 70.    Lipase [[id number]]  (Normal) Collected: 08/06/23 1604    Specimen: Blood Updated: 08/06/23 1631     Lipase 24 U/L     CBC & Differential [[id number]]  (Abnormal) Collected: 08/06/23 1604    Specimen: Blood Updated: 08/06/23 1613    Narrative:      The following orders were created for panel order CBC & Differential.  Procedure                               Abnormality         Status                     ---------                               -----------         ------                     CBC Auto Differential[[id number]]        Abnormal            Final result                 Please view results for these tests on the individual orders.    CBC Auto Differential [[id number]]  (Abnormal) Collected: 08/06/23 1604    Specimen: Blood Updated: 08/06/23 1613     WBC 9.73 10*3/mm3      RBC 3.73 10*6/mm3      Hemoglobin 11.8 g/dL      Hematocrit 34.5 %      MCV 92.5 fL      MCH 31.6 pg      MCHC 34.2 g/dL      RDW 11.8 %      RDW-SD 39.7 fl      MPV 10.0 fL      Platelets 313 10*3/mm3      Neutrophil % 65.4 %      Lymphocyte % 24.9 %      Monocyte % 7.7 %      Eosinophil % 0.9 %      Basophil % 0.7 %      Immature Grans % 0.4 %      Neutrophils, Absolute 6.36 10*3/mm3      Lymphocytes, Absolute 2.42 10*3/mm3      Monocytes, Absolute 0.75 10*3/mm3      Eosinophils, Absolute 0.09 10*3/mm3      Basophils, Absolute 0.07 10*3/mm3      Immature Grans, Absolute 0.04 10*3/mm3      nRBC 0.0 /100 WBC           Data Review:  Results from last 7 days   Lab Units 08/09/23  0451 08/08/23  0531 08/07/23  1010   SODIUM mmol/L 141 141 144   POTASSIUM mmol/L 3.3* 3.3* 3.6   CHLORIDE mmol/L 113* 111* 111*    CO2 mmol/L 20.0* 17.0* 21.8*   BUN mg/dL 23 18 24*   CREATININE mg/dL 1.49* 1.67* 1.97*   GLUCOSE mg/dL 114* 73 87   CALCIUM mg/dL 8.8 8.9 8.7       Results from last 7 days   Lab Units 08/09/23  0451 08/08/23  0531 08/07/23  1010   WBC 10*3/mm3 8.97 8.83 7.38   HEMOGLOBIN g/dL 9.5* 11.0* 10.6*   HEMATOCRIT % 28.2* 32.7* 31.7*   PLATELETS 10*3/mm3 229 236 252               Lab Results   Lab Value Date/Time    TROPONINT <0.010 07/04/2020 1038         Results from last 7 days   Lab Units 08/09/23  0451 08/06/23  1604   ALK PHOS U/L 84 100   BILIRUBIN mg/dL 0.3 0.3   ALT (SGPT) U/L 9 6   AST (SGOT) U/L 14 18               Glucose   Date/Time Value Ref Range Status   08/09/2023 1106 118 70 - 130 mg/dL Final     Comment:     Meter: [id number] : [id number] Les Lopez    08/09/2023 0547 111 70 - 130 mg/dL Final     Comment:     Meter: [id number] : [id number] [facility]   08/08/2023 2220 115 70 - 130 mg/dL Final     Comment:     Meter: [id number] : [id number] [facility]   08/08/2023 1723 119 70 - 130 mg/dL Final     Comment:     Meter: [id number] : [id number] Hussain Candelariana NA   08/08/2023 1205 76 70 - 130 mg/dL Final     Comment:     Meter: [id number] : [id number] Cristian Price RN   08/08/2023 1204 68 (L) 70 - 130 mg/dL Final     Comment:     Meter: [id number] : [id number] Cristian Price RN   08/08/2023 1008 79 70 - 130 mg/dL Final     Comment:     Meter: [id number] : [id number] Hussain Candelariana NA   08/08/2023 0617 76 70 - 130 mg/dL Final     Comment:     Meter: [id number] : [id number] Shahram Gagnon RN           Past Medical History:   Diagnosis Date    Arthritis     OSTEO    Chronic back pain     Chronic bilateral low back pain with right-sided sciatica 2/17/2020    CKD (chronic kidney disease)     FOLLOWED BY DR LANRE BYNUM    Diverticulosis     Foot drop, right     GERD (gastroesophageal reflux disease)     History of DVT (deep vein thrombosis) 2018     RIGHT CALF S/P BACK SURGERY     History of transfusion     no reaction    Hyperlipidemia     Hypertension     Kidney transplant recipient 09/07/1979    HX TRANSPLANT D/T REFLUX     Neuropathy     Secondary hyperparathyroidism  3/9/2022    Spinal cord stimulator status 3/9/2022    Spinal headache     Stage 3b chronic kidney disease 12/30/2020    FOLLOWED BY DR LANRE Valdez rotator cuff     Vitamin D deficiency 3/9/2022       Assessment:  Active Hospital Problems    Diagnosis  POA    **Volume depletion [E86.9]  Yes    PIYUSH (acute kidney injury) [N17.9]  Yes    Dehydration [E86.0]  Yes    Prediabetes [R73.03]  Yes    Spinal cord stimulator status [Z96.89]  Not Applicable    Stage 3b chronic kidney disease [N18.32]  Yes    Peripheral neuropathy [G62.9]  Yes    Epigastric pain [R10.13]  Yes    Nausea [R11.0]  Yes    Gastroesophageal reflux disease [K21.9]  Yes    Chronic bilateral low back pain with right-sided sciatica [M54.41, G89.29]  Yes    Right foot drop [M21.371]  Yes    Essential hypertension [I10]  Yes    Kidney transplant recipient [Z94.0]  Not Applicable      Resolved Hospital Problems   No resolved problems to display.       Plan:  GI consult noted   nephrology consult noted.  Follow-up lab.  EGD report noted.  HIDA scan nl.   Follow up lab. Try diet.    Dmitry Bee MD  8/9/2023  11:34 EDT

## 2023-08-09 NOTE — PROGRESS NOTES
"[facility]  Inpatient Progress Note    Reason for Followup:  Abdominal pain, nausea    Subjective     Interval History:   Patient denies abdominal pain this morning. She has had multiple bowel movements with mostly loose stool but some \"pebbled\" stool. Without melena or rectal bleeding. Patient reports ongoing nausea which is relieved with compazine.      Current Facility-Administered Medications:     acetaminophen (TYLENOL) tablet 650 mg, 650 mg, Oral, Q4H PRN **OR** acetaminophen (TYLENOL) 160 MG/5ML solution 650 mg, 650 mg, Oral, Q4H PRN **OR** acetaminophen (TYLENOL) suppository 650 mg, 650 mg, Rectal, Q4H PRN, Jovan Robin MD    amLODIPine (NORVASC) tablet 5 mg, 5 mg, Oral, Q24H, Jovan Robin MD, 5 mg at 08/09/23 1053    atorvastatin (LIPITOR) tablet 20 mg, 20 mg, Oral, Nightly, Jovan Robin MD, 20 mg at 08/08/23 2119    sennosides-docusate (PERICOLACE) 8.6-50 MG per tablet 2 tablet, 2 tablet, Oral, BID, 2 tablet at 08/08/23 2119 **AND** polyethylene glycol (MIRALAX) packet 17 g, 17 g, Oral, Daily PRN **AND** bisacodyl (DULCOLAX) EC tablet 5 mg, 5 mg, Oral, Daily PRN **AND** bisacodyl (DULCOLAX) suppository 10 mg, 10 mg, Rectal, Daily PRN, Jovan Robin MD    Calcium Replacement - Follow Nurse / BPA Driven Protocol, , Does not apply, PRN, Jovan Robin MD    dextrose (D50W) (25 g/50 mL) IV injection 25 g, 25 g, Intravenous, Q15 Min PRN, Jovan Robin MD    dextrose (GLUTOSE) oral gel 15 g, 15 g, Oral, Q15 Min PRN, Jovan Robin MD    dextrose 5 % and sodium chloride 0.45 % infusion, 75 mL/hr, Intravenous, Continuous, Penny Leary MD, Last Rate: 75 mL/hr at 08/09/23 0649, 75 mL/hr at 08/09/23 0649    Enoxaparin Sodium (LOVENOX) syringe 30 mg, 30 mg, Subcutaneous, Nightly, Jovan Robin MD, 30 mg at 08/08/23 2119    glucagon (GLUCAGEN) injection 1 mg, 1 mg, Intramuscular, Q15 Min PRN, Jovan Robin MD    insulin lispro (HUMALOG/ADMELOG) injection 2-7 " Units, 2-7 Units, Subcutaneous, 4x Daily AC & at Bedtime, Jovan Robin MD    Magnesium Standard Dose Replacement - Follow Nurse / BPA Driven Protocol, , Does not apply, PRN, Jovan Robin MD    melatonin tablet 5 mg, 5 mg, Oral, Nightly PRN, Jovan Robin MD, 5 mg at 08/08/23 2351    methocarbamol (ROBAXIN) tablet 500 mg, 500 mg, Oral, Q8H PRN, Jovan Robin MD    ondansetron (ZOFRAN) tablet 4 mg, 4 mg, Oral, Q6H PRN **OR** ondansetron (ZOFRAN) injection 4 mg, 4 mg, Intravenous, Q6H PRN, Jovan Robin MD, 4 mg at 08/09/23 1053    oxyCODONE-acetaminophen (PERCOCET) 5-325 MG per tablet 1 tablet, 1 tablet, Oral, Q6H PRN, Jovan Robin MD, 1 tablet at 08/09/23 1053    pantoprazole (PROTONIX) EC tablet 40 mg, 40 mg, Oral, Q AM, Jovan Robin MD, 40 mg at 08/07/23 0644    polyethylene glycol (MIRALAX) packet 17 g, 17 g, Oral, Daily, Jovan Robni MD, 17 g at 08/07/23 1255    prochlorperazine (COMPAZINE) injection 10 mg, 10 mg, Intravenous, Q6H PRN, Jovan Robin MD, 10 mg at 08/08/23 2351    [COMPLETED] Insert Peripheral IV, , , Once **AND** sodium chloride 0.9 % flush 10 mL, 10 mL, Intravenous, PRN, Jovan Robin MD    [COMPLETED] Insert Peripheral IV, , , Once **AND** sodium chloride 0.9 % flush 10 mL, 10 mL, Intravenous, PRN, Jovan Robin MD    sodium chloride 0.9 % flush 10 mL, 10 mL, Intravenous, Q12H, Jovan Robin MD, 10 mL at 08/08/23 2118    sodium chloride 0.9 % flush 10 mL, 10 mL, Intravenous, PRN, Jovan Robin MD    sodium chloride 0.9 % infusion 1,000 mL, 1,000 mL, Intravenous, Continuous, Jovan Robin MD, Last Rate: 25 mL/hr at 08/08/23 1307, New Bag at 08/08/23 1319    sodium chloride 0.9 % infusion 40 mL, 40 mL, Intravenous, PRN, Jovan Robin MD    Objective     Vital Signs  Temp:  [97.7 øF (36.5 øC)-98.8 øF (37.1 øC)] 97.7 øF (36.5 øC)  Heart Rate:  [72-98] 98  Resp:  [17-18] 18  BP: (128-171)/(70-90) 158/90  Body mass index is 28.39  kg/mý.    Intake/Output Summary (Last 24 hours) at 8/9/2023 1134  Last data filed at 8/9/2023 0648  Gross per 24 hour   Intake 2910 ml   Output --   Net 2910 ml     No intake/output data recorded.     Physical Exam:   General: patient awake, alert and cooperative   Abdomen: soft, nontender, nondistended; normal bowel sounds     Results Review:     I reviewed the patient's new clinical results.  I reviewed the patient's new imaging results and agree with the interpretation.    Results from last 7 days   Lab Units 08/09/23  0451 08/08/23  0531 08/07/23  1010   WBC 10*3/mm3 8.97 8.83 7.38   HEMOGLOBIN g/dL 9.5* 11.0* 10.6*   HEMATOCRIT % 28.2* 32.7* 31.7*   PLATELETS 10*3/mm3 229 236 252     Results from last 7 days   Lab Units 08/09/23  0451 08/08/23  0531 08/07/23  1010 08/06/23  1604   SODIUM mmol/L 141 141 144 143   POTASSIUM mmol/L 3.3* 3.3* 3.6 3.5   CHLORIDE mmol/L 113* 111* 111* 104   CO2 mmol/L 20.0* 17.0* 21.8* 29.5*   BUN mg/dL 23 18 24* 28*   CREATININE mg/dL 1.49* 1.67* 1.97* 2.37*   CALCIUM mg/dL 8.8 8.9 8.7 10.4   BILIRUBIN mg/dL 0.3  --   --  0.3   ALK PHOS U/L 84  --   --  100   ALT (SGPT) U/L 9  --   --  6   AST (SGOT) U/L 14  --   --  18   GLUCOSE mg/dL 114* 73 87 107*         Lab Results   Lab Value Date/Time    LIPASE 24 08/06/2023 1604    LIPASE 33 07/04/2020 1038       Radiology:  NM HIDA SCAN WITH PHARMACOLOGICAL INTERVENTION   Final Result   Negative.       This report was finalized on 8/9/2023 11:01 AM by Dr. Armando Feldman M.D.          US Abdomen Complete   Final Result   1. Absent native kidneys with transplanted kidney in the right   hemipelvis appearing unremarkable.   2. No abnormalities are seen in the gallbladder or in the remainder of   this abdominal ultrasound.       This report was finalized on 8/6/2023 7:58 PM by Dr. Jeromy Linn M.D.          CT Abdomen Pelvis Without Contrast   Final Result   1. 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 No    hydronephrosis is seen.   2. Colonic diverticulosis.   3. No acute findings are noted.       Radiation dose reduction techniques were utilized, including automated   exposure control and exposure modulation based on body size.       This report was finalized on 8/6/2023 7:44 PM by Dr. Jeromy Linn M.D.                Assessment & Plan   Assessment:   Upper abdominal pain  Nausea  Constipation  History of GERD  PIYUSH on CKD stage III with history of kidney transplant    Plan:   Continue PPI  Workup thus far for epigastric pain consistent with normal-appearing CT scan, normal labs, normal EGD, negative HIDA scan.   Patient had 4 bowel movements overnight with bowel regimen. Continue bowel regimen. Would recommend avoiding stimulant laxative and utlizing miralax.  Patient has follow up scheduled with me on 8/23/23. Will discuss possible GES and colonoscopy at that time.   Patient recommended to utilize miralax daily upon discharge unless she has ongoing significant diarrhea.   Will advance diet to GI low irritant diet. If patient tolerates diet, okay to go home from a GI standpoint.     I discussed the patient's findings and my recommendations with patient and family.    Dictated utilizing Dragon dictation.        Jayleen Wild PA-C   [facility]  [address] [place] [address]  Office: [phone]

## 2023-08-09 NOTE — PLAN OF CARE
Goal Outcome Evaluation:  Plan of Care Reviewed With: patient        Progress: no change  Outcome Evaluation: ivf, med for pain/nausea-no emesis, using bsc, npo for hida scan

## 2023-08-09 NOTE — PLAN OF CARE
Goal Outcome Evaluation:              Outcome Evaluation: Improved activtiy tolerance during PT today. Able to ambulate 130' w/ contact to stand by assist using cane; limted by fatigue, weakness, abdominal pain and nausea.  Performed few exercises and reaching activities w/ assist.  Recommend ambulating in patel 3x/day.  Recommend DC to home with assist.      Anticipated Discharge Disposition (PT): home with assist, home with 24/7 care

## 2023-08-09 NOTE — PROGRESS NOTES
[facility] Progress Note      Patient Name: Marlena Navarrete  : 1950  MRN: [id number]  Primary Care Physician:  Bradley Hernandez MD  Date of admission: 2023    Subjective     Interval History:   Follow up PIYUSH on CKD IIIB, LRKT.  HIDA negative. Bowels moving. GI plans noted .  Feeling better. Tolerated lunch. Low GI irritant diet .    Review of Systems:   As noted above    Objective     Vitals:   Temp:  [97.7 øF (36.5 øC)-98.8 øF (37.1 øC)] 97.7 øF (36.5 øC)  Heart Rate:  [81-98] 98  Resp:  [17-18] 18  BP: (139-171)/(70-90) 158/90    Intake/Output Summary (Last 24 hours) at 2023 1552  Last data filed at 2023 0648  Gross per 24 hour   Intake 2310 ml   Output --   Net 2310 ml         Physical Exam:    General Appearance: alert, oriented x 3, no acute distress   Skin: warm and dry  HEENT: oral mucosa normal, nonicteric sclera  Neck: supple, no JVD  Lungs: Clear to auscultation.   Heart: RRR, normal S1 and S2  Abdomen: softer, nontender, less distended. Nontender .   : no palpable bladder  Extremities: trace lower ext edema .  Neuro: normal speech and mental status     Scheduled Meds:     amLODIPine, 5 mg, Oral, Q24H  atorvastatin, 20 mg, Oral, Nightly  enoxaparin, 30 mg, Subcutaneous, Nightly  insulin lispro, 2-7 Units, Subcutaneous, 4x Daily AC & at Bedtime  pantoprazole, 40 mg, Oral, Q AM  polyethylene glycol, 17 g, Oral, Daily  senna-docusate sodium, 2 tablet, Oral, BID  sodium chloride, 10 mL, Intravenous, Q12H      IV Meds:   dextrose 5 % and sodium chloride 0.45 %, 75 mL/hr, Last Rate: 75 mL/hr (23 0649)  sodium chloride, 1,000 mL, Last Rate: 25 mL/hr at 23 1307        Results Reviewed:   I have personally reviewed the results from the time of this admission to 2023 15:52 EDT     Results from last 7 days   Lab Units 23  0451 23  0531 23  1010 23  1604   SODIUM mmol/L 141 141 144 143   POTASSIUM mmol/L 3.3* 3.3* 3.6 3.5    CHLORIDE mmol/L 113* 111* 111* 104   CO2 mmol/L 20.0* 17.0* 21.8* 29.5*   BUN mg/dL 23 18 24* 28*   CREATININE mg/dL 1.49* 1.67* 1.97* 2.37*   CALCIUM mg/dL 8.8 8.9 8.7 10.4   BILIRUBIN mg/dL 0.3  --   --  0.3   ALK PHOS U/L 84  --   --  100   ALT (SGPT) U/L 9  --   --  6   AST (SGOT) U/L 14  --   --  18   GLUCOSE mg/dL 114* 73 87 107*         Estimated Creatinine Clearance: 28.9 mL/min (A) (by C-G formula based on SCr of 1.49 mg/dL (H)).    Results from last 7 days   Lab Units 08/09/23  0451 08/08/23  0531   MAGNESIUM mg/dL  --  2.0   PHOSPHORUS mg/dL 2.7 2.7               Results from last 7 days   Lab Units 08/09/23  0451 08/08/23  0531 08/07/23  1010 08/06/23  1604   WBC 10*3/mm3 8.97 8.83 7.38 9.73   HEMOGLOBIN g/dL 9.5* 11.0* 10.6* 11.8*   PLATELETS 10*3/mm3 229 236 252 313               Assessment / Plan     ASSESSMENT:  LRKT off immunosuppression with CKD IIIB, baseline creatiine 1.7-1.9. Volume depletion. PIYUSH resolved. Replace K.    Abdominal pain. EGD negative. HIDA scan negative.  GI to follow up as outpatient .  3. HTN not controlled. Increase norvasc to 10 mg .    PLAN:  Kdur po.   Increase norvasc to 10 mg .  DC IVF.   Penny Leary MD  08/09/23  15:52 EDT    [facility]  [phone]

## 2023-08-10 ENCOUNTER — READMISSION MANAGEMENT (OUTPATIENT)
Dept: CALL CENTER | Facility: HOSPITAL | Age: 73
End: 2023-08-10
Payer: MEDICARE

## 2023-08-10 VITALS
BODY MASS INDEX: 27.58 KG/M2 | SYSTOLIC BLOOD PRESSURE: 142 MMHG | DIASTOLIC BLOOD PRESSURE: 79 MMHG | RESPIRATION RATE: 18 BRPM | OXYGEN SATURATION: 96 % | TEMPERATURE: 97.8 F | WEIGHT: 131.39 LBS | HEIGHT: 58 IN | HEART RATE: 85 BPM

## 2023-08-10 LAB
ALBUMIN SERPL-MCNC: 4.3 G/DL (ref 3.5–5.2)
ALBUMIN SERPL-MCNC: 4.5 G/DL (ref 3.5–5.2)
ANION GAP SERPL CALCULATED.3IONS-SCNC: 11.3 MMOL/L (ref 5–15)
ANION GAP SERPL CALCULATED.3IONS-SCNC: 13.2 MMOL/L (ref 5–15)
BASOPHILS # BLD AUTO: 0.06 10*3/MM3 (ref 0–0.2)
BASOPHILS NFR BLD AUTO: 0.5 % (ref 0–1.5)
BUN SERPL-MCNC: 17 MG/DL (ref 8–23)
BUN SERPL-MCNC: 17 MG/DL (ref 8–23)
BUN/CREAT SERPL: 10.3 (ref 7–25)
BUN/CREAT SERPL: 9 (ref 7–25)
CALCIUM SPEC-SCNC: 10.2 MG/DL (ref 8.6–10.5)
CALCIUM SPEC-SCNC: 9.4 MG/DL (ref 8.6–10.5)
CHLORIDE SERPL-SCNC: 111 MMOL/L (ref 98–107)
CHLORIDE SERPL-SCNC: 112 MMOL/L (ref 98–107)
CO2 SERPL-SCNC: 15.7 MMOL/L (ref 22–29)
CO2 SERPL-SCNC: 16.8 MMOL/L (ref 22–29)
CREAT SERPL-MCNC: 1.65 MG/DL (ref 0.57–1)
CREAT SERPL-MCNC: 1.89 MG/DL (ref 0.57–1)
DEPRECATED RDW RBC AUTO: 40.3 FL (ref 37–54)
EGFRCR SERPLBLD CKD-EPI 2021: 28.1 ML/MIN/1.73
EGFRCR SERPLBLD CKD-EPI 2021: 33.1 ML/MIN/1.73
EOSINOPHIL # BLD AUTO: 0.2 10*3/MM3 (ref 0–0.4)
EOSINOPHIL NFR BLD AUTO: 1.8 % (ref 0.3–6.2)
ERYTHROCYTE [DISTWIDTH] IN BLOOD BY AUTOMATED COUNT: 12 % (ref 12.3–15.4)
GLUCOSE BLDC GLUCOMTR-MCNC: 104 MG/DL (ref 70–130)
GLUCOSE BLDC GLUCOMTR-MCNC: 128 MG/DL (ref 70–130)
GLUCOSE SERPL-MCNC: 105 MG/DL (ref 65–99)
GLUCOSE SERPL-MCNC: 122 MG/DL (ref 65–99)
HCT VFR BLD AUTO: 32.1 % (ref 34–46.6)
HGB BLD-MCNC: 10.9 G/DL (ref 12–15.9)
IMM GRANULOCYTES # BLD AUTO: 0.03 10*3/MM3 (ref 0–0.05)
IMM GRANULOCYTES NFR BLD AUTO: 0.3 % (ref 0–0.5)
LYMPHOCYTES # BLD AUTO: 2.95 10*3/MM3 (ref 0.7–3.1)
LYMPHOCYTES NFR BLD AUTO: 26.5 % (ref 19.6–45.3)
MAGNESIUM SERPL-MCNC: 2.1 MG/DL (ref 1.6–2.4)
MCH RBC QN AUTO: 31.2 PG (ref 26.6–33)
MCHC RBC AUTO-ENTMCNC: 34 G/DL (ref 31.5–35.7)
MCV RBC AUTO: 92 FL (ref 79–97)
MONOCYTES # BLD AUTO: 0.78 10*3/MM3 (ref 0.1–0.9)
MONOCYTES NFR BLD AUTO: 7 % (ref 5–12)
NEUTROPHILS NFR BLD AUTO: 63.9 % (ref 42.7–76)
NEUTROPHILS NFR BLD AUTO: 7.12 10*3/MM3 (ref 1.7–7)
NRBC BLD AUTO-RTO: 0 /100 WBC (ref 0–0.2)
PHOSPHATE SERPL-MCNC: 1.8 MG/DL (ref 2.5–4.5)
PHOSPHATE SERPL-MCNC: 2.9 MG/DL (ref 2.5–4.5)
PLATELET # BLD AUTO: 264 10*3/MM3 (ref 140–450)
PMV BLD AUTO: 10.9 FL (ref 6–12)
POTASSIUM SERPL-SCNC: 3.9 MMOL/L (ref 3.5–5.2)
POTASSIUM SERPL-SCNC: 4.3 MMOL/L (ref 3.5–5.2)
RBC # BLD AUTO: 3.49 10*6/MM3 (ref 3.77–5.28)
SODIUM SERPL-SCNC: 139 MMOL/L (ref 136–145)
SODIUM SERPL-SCNC: 141 MMOL/L (ref 136–145)
WBC NRBC COR # BLD: 11.14 10*3/MM3 (ref 3.4–10.8)

## 2023-08-10 PROCEDURE — 82948 REAGENT STRIP/BLOOD GLUCOSE: CPT

## 2023-08-10 PROCEDURE — 83735 ASSAY OF MAGNESIUM: CPT | Performed by: INTERNAL MEDICINE

## 2023-08-10 PROCEDURE — 80069 RENAL FUNCTION PANEL: CPT | Performed by: INTERNAL MEDICINE

## 2023-08-10 PROCEDURE — 85025 COMPLETE CBC W/AUTO DIFF WBC: CPT | Performed by: INTERNAL MEDICINE

## 2023-08-10 PROCEDURE — 99232 SBSQ HOSP IP/OBS MODERATE 35: CPT | Performed by: PHYSICIAN ASSISTANT

## 2023-08-10 RX ORDER — POLYETHYLENE GLYCOL 3350 17 G/17G
17 POWDER, FOR SOLUTION ORAL DAILY
Qty: 30 PACKET | Refills: 0 | Status: SHIPPED | OUTPATIENT
Start: 2023-08-11 | End: 2023-09-10

## 2023-08-10 RX ORDER — OXYCODONE HYDROCHLORIDE AND ACETAMINOPHEN 5; 325 MG/1; MG/1
1 TABLET ORAL EVERY 6 HOURS PRN
Refills: 0
Start: 2023-08-10

## 2023-08-10 RX ORDER — SODIUM BICARBONATE 650 MG/1
1300 TABLET ORAL 3 TIMES DAILY
Status: DISCONTINUED | OUTPATIENT
Start: 2023-08-10 | End: 2023-08-10 | Stop reason: HOSPADM

## 2023-08-10 RX ORDER — PANTOPRAZOLE SODIUM 40 MG/1
40 TABLET, DELAYED RELEASE ORAL DAILY PRN
Qty: 30 TABLET | Refills: 0 | Status: SHIPPED | OUTPATIENT
Start: 2023-08-10

## 2023-08-10 RX ADMIN — OXYCODONE HYDROCHLORIDE AND ACETAMINOPHEN 1 TABLET: 5; 325 TABLET ORAL at 13:25

## 2023-08-10 RX ADMIN — OXYCODONE HYDROCHLORIDE AND ACETAMINOPHEN 1 TABLET: 5; 325 TABLET ORAL at 06:02

## 2023-08-10 RX ADMIN — Medication 10 ML: at 09:57

## 2023-08-10 RX ADMIN — AMLODIPINE BESYLATE 10 MG: 10 TABLET ORAL at 09:56

## 2023-08-10 RX ADMIN — POTASSIUM PHOSPHATE, MONOBASIC POTASSIUM PHOSPHATE, DIBASIC 9 MMOL: 224; 236 INJECTION, SOLUTION, CONCENTRATE INTRAVENOUS at 09:55

## 2023-08-10 RX ADMIN — PANTOPRAZOLE SODIUM 40 MG: 40 TABLET, DELAYED RELEASE ORAL at 06:02

## 2023-08-10 NOTE — PLAN OF CARE
Goal Outcome Evaluation:              Outcome Evaluation: VSS. Ambulating. voiding. Ready for d/c

## 2023-08-10 NOTE — PROGRESS NOTES
[facility] Progress Note      Patient Name: Marlena Navarrete  : 1950  MRN: [id number]  Primary Care Physician:  Bradley Hernandez MD  Date of admission: 2023    Subjective     Interval History:   Follow up PIYUSH on CKD IIIB, LRKT.   Bowels moving. Feeling better. Tolerated  Low GI irritant diet . Anxious for dc.  Getting Phos replaced    Review of Systems:   As noted above    Objective     Vitals:   Temp:  [97 øF (36.1 øC)-97.9 øF (36.6 øC)] 97.8 øF (36.6 øC)  Heart Rate:  [82-92] 85  Resp:  [16-18] 18  BP: (142-159)/(74-91) 142/79    Intake/Output Summary (Last 24 hours) at 8/10/2023 1128  Last data filed at 8/10/2023 0949  Gross per 24 hour   Intake 120 ml   Output --   Net 120 ml         Physical Exam:    General Appearance: alert, oriented x 3, no acute distress   Skin: warm and dry  HEENT: oral mucosa normal, nonicteric sclera  Neck: supple, no JVD  Lungs: Clear to auscultation.   Heart: RRR, normal S1 and S2  Abdomen: softer, nontender, less distended. Nontender .   : no palpable bladder  Extremities: trace lower ext edema .  Neuro: normal speech and mental status     Scheduled Meds:     amLODIPine, 10 mg, Oral, Q24H  atorvastatin, 20 mg, Oral, Nightly  enoxaparin, 30 mg, Subcutaneous, Nightly  insulin lispro, 2-7 Units, Subcutaneous, 4x Daily AC & at Bedtime  pantoprazole, 40 mg, Oral, Q AM  polyethylene glycol, 17 g, Oral, Daily  potassium phosphate infusion less than 15 mmol, 9 mmol, Intravenous, Once  senna-docusate sodium, 2 tablet, Oral, BID  sodium chloride, 10 mL, Intravenous, Q12H      IV Meds:          Results Reviewed:   I have personally reviewed the results from the time of this admission to 8/10/2023 11:28 EDT     Results from last 7 days   Lab Units 08/10/23  0516 23  0451 23  0531 23  1010 23  1604   SODIUM mmol/L 139 141 141   < > 143   POTASSIUM mmol/L 3.9 3.3* 3.3*   < > 3.5   CHLORIDE mmol/L 112* 113* 111*   < > 104   CO2 mmol/L  15.7* 20.0* 17.0*   < > 29.5*   BUN mg/dL 17 23 18   < > 28*   CREATININE mg/dL 1.65* 1.49* 1.67*   < > 2.37*   CALCIUM mg/dL 9.4 8.8 8.9   < > 10.4   BILIRUBIN mg/dL  --  0.3  --   --  0.3   ALK PHOS U/L  --  84  --   --  100   ALT (SGPT) U/L  --  9  --   --  6   AST (SGOT) U/L  --  14  --   --  18   GLUCOSE mg/dL 105* 114* 73   < > 107*    < > = values in this interval not displayed.         Estimated Creatinine Clearance: 25.7 mL/min (A) (by C-G formula based on SCr of 1.65 mg/dL (H)).    Results from last 7 days   Lab Units 08/10/23  0516 08/09/23  0451 08/08/23  0531   MAGNESIUM mg/dL 2.1  --  2.0   PHOSPHORUS mg/dL 1.8* 2.7 2.7               Results from last 7 days   Lab Units 08/10/23  0516 08/09/23  0451 08/08/23  0531 08/07/23  1010 08/06/23  1604   WBC 10*3/mm3 11.14* 8.97 8.83 7.38 9.73   HEMOGLOBIN g/dL 10.9* 9.5* 11.0* 10.6* 11.8*   PLATELETS 10*3/mm3 264 229 236 252 313               Assessment / Plan     ASSESSMENT:  LRKT off immunosuppression with CKD IIIB, baseline creatiine 1.7-1.9. Volume depletion. PIYUSH resolved. Replace phos .   Abdominal pain. EGD negative. HIDA scan negative.  GI to follow up as outpatient .  3. HTN not controlled. Increased norvasc to 10 mg .    PLAN:  Ok for dc after Phos replaced and rechecked .  Has lab appt Oct 2 at 12:45 pm, then appt with Dr. Armando Wasserman Oct 9 at 12:40 pm.   Resume sodium bicarbonate.   Penny Leary MD  08/10/23  11:28 EDT    [facility]  [phone]

## 2023-08-10 NOTE — PROGRESS NOTES
[facility]  Inpatient Progress Note    Reason for Followup:  abdominal pain, nausea     Subjective     Interval History:   Patient has no other having abdominal pain.  She has not had any antiemetics since yesterday.  She has tolerated 3 meals on a low irritant diet.  Her bowels continue to move with her most recent bowel movement being soft and formed.    Current Facility-Administered Medications:     acetaminophen (TYLENOL) tablet 650 mg, 650 mg, Oral, Q4H PRN **OR** acetaminophen (TYLENOL) 160 MG/5ML solution 650 mg, 650 mg, Oral, Q4H PRN **OR** acetaminophen (TYLENOL) suppository 650 mg, 650 mg, Rectal, Q4H PRN, Jovan Robin MD    amLODIPine (NORVASC) tablet 10 mg, 10 mg, Oral, Q24H, Penny Leary MD, 10 mg at 08/10/23 0956    atorvastatin (LIPITOR) tablet 20 mg, 20 mg, Oral, Nightly, Jovan Robin MD, 20 mg at 08/09/23 2153    sennosides-docusate (PERICOLACE) 8.6-50 MG per tablet 2 tablet, 2 tablet, Oral, BID, 2 tablet at 08/08/23 2119 **AND** polyethylene glycol (MIRALAX) packet 17 g, 17 g, Oral, Daily PRN, 17 g at 08/09/23 2010 **AND** bisacodyl (DULCOLAX) EC tablet 5 mg, 5 mg, Oral, Daily PRN **AND** bisacodyl (DULCOLAX) suppository 10 mg, 10 mg, Rectal, Daily PRN, Jovan Robin MD    Calcium Replacement - Follow Nurse / BPA Driven Protocol, , Does not apply, PRN, Jovan Robin MD    dextrose (D50W) (25 g/50 mL) IV injection 25 g, 25 g, Intravenous, Q15 Min PRN, Jovan Robin MD    dextrose (GLUTOSE) oral gel 15 g, 15 g, Oral, Q15 Min PRN, oJvan Robin MD    Enoxaparin Sodium (LOVENOX) syringe 30 mg, 30 mg, Subcutaneous, Nightly, Jovan Robin MD, 30 mg at 08/09/23 9545    glucagon (GLUCAGEN) injection 1 mg, 1 mg, Intramuscular, Q15 Min PRN, Jovan Robin MD    insulin lispro (HUMALOG/ADMELOG) injection 2-7 Units, 2-7 Units, Subcutaneous, 4x Daily AC & at Bedtime, Jovan Robin MD    Magnesium Standard Dose Replacement - Follow Nurse / BPA Driven  Protocol, , Does not apply, PRN, Jovan Robin MD    melatonin tablet 5 mg, 5 mg, Oral, Nightly PRN, Jovan Robin MD, 5 mg at 08/09/23 2351    methocarbamol (ROBAXIN) tablet 500 mg, 500 mg, Oral, Q8H PRN, Jovan Robin MD    ondansetron (ZOFRAN) tablet 4 mg, 4 mg, Oral, Q6H PRN **OR** ondansetron (ZOFRAN) injection 4 mg, 4 mg, Intravenous, Q6H PRN, Jovan Robin MD, 4 mg at 08/09/23 1821    oxyCODONE-acetaminophen (PERCOCET) 5-325 MG per tablet 1 tablet, 1 tablet, Oral, Q6H PRN, Jovan Robin MD, 1 tablet at 08/10/23 0602    pantoprazole (PROTONIX) EC tablet 40 mg, 40 mg, Oral, Q AM, Jovan Robin MD, 40 mg at 08/10/23 0602    polyethylene glycol (MIRALAX) packet 17 g, 17 g, Oral, Daily, Jovan Robin MD, 17 g at 08/09/23 1054    potassium phosphate 9 mmol in sodium chloride 0.9 % 100 mL infusion, 9 mmol, Intravenous, Once, Penny Leary MD, Last Rate: 25 mL/hr at 08/10/23 0955, 9 mmol at 08/10/23 0955    prochlorperazine (COMPAZINE) injection 10 mg, 10 mg, Intravenous, Q6H PRN, Jovan Robin MD, 10 mg at 08/08/23 2351    [COMPLETED] Insert Peripheral IV, , , Once **AND** sodium chloride 0.9 % flush 10 mL, 10 mL, Intravenous, PRN, Jovan Robin MD    [COMPLETED] Insert Peripheral IV, , , Once **AND** sodium chloride 0.9 % flush 10 mL, 10 mL, Intravenous, PRN, Jovan Robin MD    sodium chloride 0.9 % flush 10 mL, 10 mL, Intravenous, Q12H, Jovan Robin MD, 10 mL at 08/10/23 0957    sodium chloride 0.9 % flush 10 mL, 10 mL, Intravenous, Lobito ORTIZ Brian M, MD    sodium chloride 0.9 % infusion 40 mL, 40 mL, Intravenous, Lobito ORTIZ Brian M, MD    Objective     Vital Signs  Temp:  [97 øF (36.1 øC)-97.9 øF (36.6 øC)] 97.8 øF (36.6 øC)  Heart Rate:  [82-92] 85  Resp:  [16-18] 18  BP: (142-159)/(74-91) 142/79  Body mass index is 27.47 kg/mý.    Intake/Output Summary (Last 24 hours) at 8/10/2023 1107  Last data filed at 8/10/2023 0949  Gross per 24 hour   Intake 120 ml   Output  --   Net 120 ml     I/O this shift:  In: 120 [P.O.:120]  Out: -      Physical Exam:   General: patient awake, alert and cooperative   Abdomen: soft, nontender, nondistended; normal bowel sounds     Results Review:     I reviewed the patient's new clinical results.    Results from last 7 days   Lab Units 08/10/23  0516 08/09/23  0451 08/08/23  0531   WBC 10*3/mm3 11.14* 8.97 8.83   HEMOGLOBIN g/dL 10.9* 9.5* 11.0*   HEMATOCRIT % 32.1* 28.2* 32.7*   PLATELETS 10*3/mm3 264 229 236     Results from last 7 days   Lab Units 08/10/23  0516 08/09/23  0451 08/08/23  0531 08/07/23  1010 08/06/23  1604   SODIUM mmol/L 139 141 141   < > 143   POTASSIUM mmol/L 3.9 3.3* 3.3*   < > 3.5   CHLORIDE mmol/L 112* 113* 111*   < > 104   CO2 mmol/L 15.7* 20.0* 17.0*   < > 29.5*   BUN mg/dL 17 23 18   < > 28*   CREATININE mg/dL 1.65* 1.49* 1.67*   < > 2.37*   CALCIUM mg/dL 9.4 8.8 8.9   < > 10.4   BILIRUBIN mg/dL  --  0.3  --   --  0.3   ALK PHOS U/L  --  84  --   --  100   ALT (SGPT) U/L  --  9  --   --  6   AST (SGOT) U/L  --  14  --   --  18   GLUCOSE mg/dL 105* 114* 73   < > 107*    < > = values in this interval not displayed.         Lab Results   Lab Value Date/Time    LIPASE 24 08/06/2023 1604    LIPASE 33 07/04/2020 1038       Radiology:  NM HIDA SCAN WITH PHARMACOLOGICAL INTERVENTION   Final Result   Negative.       This report was finalized on 8/9/2023 11:01 AM by Dr. Armando Feldman M.D.          US Abdomen Complete   Final Result   1. Absent native kidneys with transplanted kidney in the right   hemipelvis appearing unremarkable.   2. No abnormalities are seen in the gallbladder or in the remainder of   this abdominal ultrasound.       This report was finalized on 8/6/2023 7:58 PM by Dr. Jeromy Linn M.D.          CT Abdomen Pelvis Without Contrast   Final Result   1. 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 No   hydronephrosis is seen.   2. Colonic diverticulosis.   3. No acute findings are  noted.       Radiation dose reduction techniques were utilized, including automated   exposure control and exposure modulation based on body size.       This report was finalized on 8/6/2023 7:44 PM by Dr. Jeromy Linn M.D.                Assessment & Plan   Assessment:   Upper abdominal pain  Nausea  Constipation  History of GERD  PIYUSH on CKD stage III with history of kidney transplant    Plan:   Continue PPI  Workup thus far for epigastric pain consistent with normal-appearing CT scan, normal labs, normal EGD, negative HIDA scan.   Patient continues to have bowel movements.  We had a long discussion about not leaving multiple days prior to taking MiraLAX.  I recommended she continue taking it daily until she sees me in the office.  If you are having significant diarrhea she can hold her doses.  Patient has follow up scheduled with me on 8/23/23. Will discuss possible GES and colonoscopy at that time.   Patient tolerating GI low irritant diet well. Okay to go home from our standpoint.  GI will sign off.    I discussed the patient's findings and my recommendations with patient and family.    Dictated utilizing Dragon dictation.        Jayleen Wild PA-C   [facility]  [address] [place] [address]  Office: [phone]

## 2023-08-10 NOTE — PLAN OF CARE
Goal Outcome Evaluation:  Plan of Care Reviewed With: patient        Progress: no change  Outcome Evaluation: percocet for back pain, no c/o nausea, vdg, uses cane for assist ambulation, accuchecks wnl

## 2023-08-10 NOTE — DISCHARGE SUMMARY
"[facility]    ASSOCIATES  [phone]    DISCHARGE SUMMARY  [facility]    Patient Identification:  Name: Marlena Navarrete  Age: 73 y.o.  Sex: female  :  1950  MRN: [id number]  Primary Care Physician: Bradley Hernandez MD    Admit date: 2023  Discharge date and time:      Discharge Diagnoses:  Volume depletion    Essential hypertension    Right foot drop    Kidney transplant recipient    Chronic bilateral low back pain with right-sided sciatica    Epigastric pain    Nausea    Gastroesophageal reflux disease    Stage 3b chronic kidney disease    Peripheral neuropathy    Spinal cord stimulator status    PIYUSH (acute kidney injury)    Dehydration    Prediabetes       History of present illness from H&P:    Ms. Navarrete is a 73 y.o. with a history of living relative kidney transplant (), CKD 3B, HTN, chronic low back pain, spinal cord stimulator, peripheral neuropathy, GERD, prediabetes that presents to [facility] complaining of worsening nausea, epigastric abdominal pain and decreased appetite over the past 1-2 weeks prior to arrival.  Denies recollection of possible inciting factors including recent illness, sick contacts, changes in diet.  Symptoms intermittent in nature since onset.  Abdominal pain is epigastric in nature with radiation across bilateral upper abdominal quadrants, described as \"achy\" aggravated with oral intake of any kind, without noticeable alleviating factors. Endorses associated decreased oral intake, feelings of post-prandial fullness, early satiety.  Denies fever, chills, chest pain, dyspnea, emesis, diarrhea.     Hospital Course:     As noted this is a 73-year-old female with a prior history of kidney transplant who came to the hospital because of abdominal discomfort poor appetite.  This is resolved during the hospitalization.  She is feeling much better.  Kidney function is stable with creatinine of 1.6.  Electrolytes are stable as " well.  Worsening kidney function was thought to be secondary to hypovolemia.  Creatinine on admission was 2.37.    As far as the abdominal pain is concerned the patient had an EGD which was unremarkable, CT scan of the abdomen that was unremarkable as well as a HIDA scan and ultrasound of the gallbladder.  Patient can follow-up with gastroenterology outpatient.     HTN not controlled. Increased norvasc to 10 mg .  Resume sodium bicarb on discharge.  The patient has an appt Oct 2 at 12:45 pm, then appt with Dr. Armando Wasserman Oct 9 at 12:40 pm.  Would also like for the patient to get a BMP with her primary care doctor within the week.      The patient was seen and examined on the day of discharge.    Consults:   Consults       Date and Time Order Name Status Description    8/7/2023 12:41 PM Inpatient Nephrology Consult Completed     8/7/2023 12:33 AM Inpatient Gastroenterology Consult Completed     8/6/2023  4:51 PM [facility] (on-call MD unless specified) Details              Results from last 7 days   Lab Units 08/10/23  0516   WBC 10*3/mm3 11.14*   HEMOGLOBIN g/dL 10.9*   HEMATOCRIT % 32.1*   PLATELETS 10*3/mm3 264       Results from last 7 days   Lab Units 08/10/23  1511   SODIUM mmol/L 141   POTASSIUM mmol/L 4.3   CHLORIDE mmol/L 111*   CO2 mmol/L 16.8*   BUN mg/dL 17   CREATININE mg/dL 1.89*   GLUCOSE mg/dL 122*   CALCIUM mg/dL 10.2       Significant Diagnostic Studies:   WBC   Date Value Ref Range Status   08/10/2023 11.14 (H) 3.40 - 10.80 10*3/mm3 Final     Hemoglobin   Date Value Ref Range Status   08/10/2023 10.9 (L) 12.0 - 15.9 g/dL Final     Hematocrit   Date Value Ref Range Status   08/10/2023 32.1 (L) 34.0 - 46.6 % Final     Platelets   Date Value Ref Range Status   08/10/2023 264 140 - 450 10*3/mm3 Final     Sodium   Date Value Ref Range Status   08/10/2023 141 136 - 145 mmol/L Final     Potassium   Date Value Ref Range Status   08/10/2023 4.3 3.5 - 5.2 mmol/L Final     Chloride   Date Value Ref Range Status    08/10/2023 111 (H) 98 - 107 mmol/L Final     CO2   Date Value Ref Range Status   08/10/2023 16.8 (L) 22.0 - 29.0 mmol/L Final     BUN   Date Value Ref Range Status   08/10/2023 17 8 - 23 mg/dL Final     Creatinine   Date Value Ref Range Status   08/10/2023 1.89 (H) 0.57 - 1.00 mg/dL Final     Glucose   Date Value Ref Range Status   08/10/2023 122 (H) 65 - 99 mg/dL Final     Calcium   Date Value Ref Range Status   08/10/2023 10.2 8.6 - 10.5 mg/dL Final     Magnesium   Date Value Ref Range Status   08/10/2023 2.1 1.6 - 2.4 mg/dL Final     Phosphorus   Date Value Ref Range Status   08/10/2023 2.9 2.5 - 4.5 mg/dL Final     AST (SGOT)   Date Value Ref Range Status   08/09/2023 14 1 - 32 U/L Final     ALT (SGPT)   Date Value Ref Range Status   08/09/2023 9 1 - 33 U/L Final     Alkaline Phosphatase   Date Value Ref Range Status   08/09/2023 84 39 - 117 U/L Final     No results found for: APTT, INR  No results found for: COLORU, CLARITYU, SPECGRAV, PHUR, PROTEINUR, GLUCOSEU, KETONESU, BLOODU, NITRITE, LEUKOCYTESUR, BILIRUBINUR, UROBILINOGEN, RBCUA, WBCUA, BACTERIA, UACOMMENT  No results found for: TROPONINT, TROPONINI, BNP  No components found for: HGBA1C;2  No components found for: TSH;2    Imaging Results (All)       Procedure Component Value Units Date/Time    NM HIDA SCAN WITH PHARMACOLOGICAL INTERVENTION [[id number]] Collected: 08/09/23 1052     Updated: 08/09/23 1104    Narrative:      NUCLEAR MEDICINE HIDA SCAN WITH PHARMACOLOGIC INTERVENTION     HISTORY: Upper abdominal pain.     TECHNIQUE: HIDA scan with gallbladder functional evaluation was  performed using 5.6 mCi technetium Choletec and 1.2 mcg of  cholecystokinin. Correlation: Right upper quadrant sonogram report  08/06/2023.     FINDINGS: The anterior 30 and 60 minute images show normal activity in  the gallbladder, liver, biliary tree, and bowel.     Gallbladder ejection fraction measures 81%.  Normal range is considered  35% or greater.       Impression:       Negative.     This report was finalized on 8/9/2023 11:01 AM by Dr. Armando Feldman M.D.       US Abdomen Complete [[id number]] Collected: 08/06/23 1955     Updated: 08/06/23 2001    Narrative:      US ABDOMEN COMPLETE-  08/06/2023     HISTORY: Abdominal pain.     The gallbladder is well-distended with no gallstones wall thickening or  pericholecystic fluid. The pancreas and liver appear within normal  limits. Pelvic kidney appears unremarkable.  Measures approximately 10.2 cm in length.     The native kidneys are absent. Common bile duct is not dilated measuring  4.2 mm.     The aorta and IVC are not dilated. Spleen is not enlarged.       Impression:      1. Absent native kidneys with transplanted kidney in the right  hemipelvis appearing unremarkable.  2. No abnormalities are seen in the gallbladder or in the remainder of  this abdominal ultrasound.     This report was finalized on 8/6/2023 7:58 PM by Dr. Jeromy Linn M.D.       CT Abdomen Pelvis Without Contrast [[id number]] Collected: 08/06/23 1645     Updated: 08/06/23 1947    Narrative:      CT OF THE ABDOMEN AND PELVIS WITHOUT CONTRAST 08/06/2023     HISTORY: Abdominal pain.     Spiral images were obtained from the lung bases to the symphysis pubis.  No intravenous or oral contrast was given.     The liver, gallbladder, spleen, pancreas, adrenals appear within normal  limits. Bilateral kidneys are absent. Transplanted kidney is seen in the  right hemipelvis. No bowel wall thickening or bowel dilatation is seen.  There is some aortoiliac calcification. Uterus and urinary bladder  appear unremarkable. There is colonic diverticulosis. Spinal cord  stimulator device is seen in the subcutaneous tissues of the posterior  left pelvis.       Impression:      1. 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 No  hydronephrosis is seen.  2. Colonic diverticulosis.  3. No acute findings are noted.     Radiation dose reduction techniques were  utilized, including automated  exposure control and exposure modulation based on body size.     This report was finalized on 8/6/2023 7:44 PM by Dr. Jeromy Linn M.D.           No results found for: SITE, ALLENTEST, PHART, HCI7SQC, PO2ART, DME4TJO, BASEEXCESS, Q0MUIYSK, HGBBG, HCTABG, OXYHEMOGLOBI, METHHGBN, CARBOXYHGB, CO2CT, BAROMETRIC, MODALITY, FIO2       Discharge Medications        New Medications        Instructions Start Date   pantoprazole 40 MG EC tablet  Commonly known as: PROTONIX  Replaces: omeprazole 20 MG capsule   40 mg, Oral, Daily PRN      polyethylene glycol 17 g packet  Commonly known as: MIRALAX   17 g, Oral, Daily   Start Date: August 11, 2023            Changes to Medications        Instructions Start Date   oxyCODONE-acetaminophen 5-325 MG per tablet  Commonly known as: PERCOCET  What changed: Another medication with the same name was removed. Continue taking this medication, and follow the directions you see here.   1 tablet, Oral, Every 6 Hours PRN             Continue These Medications        Instructions Start Date   amLODIPine 5 MG tablet  Commonly known as: NORVASC   5 mg, Oral, Daily      atorvastatin 40 MG tablet  Commonly known as: LIPITOR   40 mg, Oral, Nightly      diphenhydrAMINE-acetaminophen  MG tablet per tablet  Commonly known as: TYLENOL PM   1 tablet, Oral, Nightly PRN      methocarbamol 500 MG tablet  Commonly known as: ROBAXIN   methocarbamol 500 mg tablet   TAKE 1 TABLET BY MOUTH THREE TIMES DAILY AS NEEDED      ondansetron 4 MG tablet  Commonly known as: ZOFRAN   4 mg, Oral, Every 8 Hours PRN      sodium bicarbonate 650 MG tablet   1,300 mg, Oral, 3 Times Daily      vitamin D3 125 MCG (5000 UT) capsule capsule   2,000 Units, Oral, Daily             Stop These Medications      lamoTRIgine 25 MG tablet  Commonly known as: LaMICtal     omeprazole 20 MG capsule  Commonly known as: priLOSEC  Replaced by: pantoprazole 40 MG EC tablet                Patient  Instructions:       Future Appointments   Date Time Provider Department Center   8/23/2023  1:00 PM Jayleen Wild PA-C [facility]         Follow-up Information       [facility]. Go on 10/2/2023.    Why: has lab appointment oct 2 at 12:45 pm.  Contact information:  [address] [address] [phone]             Armando Wasserman MD. Go on 10/9/2023.    Specialty: Nephrology  Why: has follow up appt with Dr. Wasserman Oct 9 at 12:40  Contact information:  [address] [address]  [phone]               Bradley Hernandez MD .    Specialty: Family Medicine  Contact information:  [address] [id number] [phone]                             Discharge Order (From admission, onward)       Start     Ordered    08/10/23 1558  Discharge patient  Once        Expected Discharge Date: 08/10/23   Discharge Disposition: Home or Self Care   Physician of Record for Attribution - Please select from Treatment Team: PAUL LEDEZMA [[id number]]   Review needed by CMO to determine Physician of Record: No      Question Answer Comment   Physician of Record for Attribution - Please select from Treatment Team PAUL LEDEZMA    Review needed by CMO to determine Physician of Record No        08/10/23 1604                    Diet Order   Procedures    Diet: Gastrointestinal Diets; Low Irritant; Texture: Regular Texture (IDDSI 7); Fluid Consistency: Thin (IDDSI 0)           Total time spent discharging patient including evaluation, post hospitalization follow up,  medication and post hospitalization instructions and education, total time exceeds 30 minutes.    Signed:  Paul Ledezma MD  8/10/2023  16:05 EDT

## 2023-08-11 NOTE — PROGRESS NOTES
Case Management Discharge Note      Final Note: Discharged home. Mary Thornton RN    Provided Post Acute Provider List?: N/A  Provided Post Acute Provider Quality & Resource List?: N/A    Selected Continued Care - Discharged on 8/10/2023 Admission date: 8/6/2023 - Discharge disposition: Home or Self Care         Transportation Services  Private: Car    Final Discharge Disposition Code: 01 - home or self-care

## 2023-08-11 NOTE — OUTREACH NOTE
Prep Survey      Flowsheet Row Responses   [facility] facility patient discharged from? [place]   Is LACE score < 7 ? No   Eligibility Readm Mgmt   Discharge diagnosis Volume depletion   Does the patient have one of the following disease processes/diagnoses(primary or secondary)? Other   Does the patient have Home health ordered? No   Is there a DME ordered? No   Prep survey completed? Yes            MARTHA ZHU - Registered Nurse

## 2023-08-14 ENCOUNTER — READMISSION MANAGEMENT (OUTPATIENT)
Dept: CALL CENTER | Facility: HOSPITAL | Age: 73
End: 2023-08-14
Payer: MEDICARE

## 2023-08-14 NOTE — OUTREACH NOTE
Medical Week 1 Survey      Flowsheet Row Responses   [facility] patient discharged from? [place]   Does the patient have one of the following disease processes/diagnoses(primary or secondary)? Other   Week 1 attempt successful? Yes   Call start time 1724   Call end time 1726   Discharge diagnosis Volume depletion   Meds reviewed with patient/caregiver? Yes   Is the patient having any side effects they believe may be caused by any medication additions or changes? No   Does the patient have all medications ordered at discharge? Yes   Is the patient taking all medications as directed (includes completed medication regime)? Yes   Does the patient have a primary care provider?  Yes   Does the patient have an appointment with their PCP within 7 days of discharge? N/A  [seeing GI specialist, will see PCP at a later]   Has the patient kept scheduled appointments due by today? N/A   Has home health visited the patient within 72 hours of discharge? N/A   Psychosocial issues? No   Did the patient receive a copy of their discharge instructions? Yes   Nursing interventions Reviewed instructions with patient   What is the patient's perception of their health status since discharge? Improving   Is the patient/caregiver able to teach back signs and symptoms related to disease process for when to call PCP? Yes   Is the patient/caregiver able to teach back signs and symptoms related to disease process for when to call 911? Yes   Is the patient/caregiver able to teach back the hierarchy of who to call/visit for symptoms/problems? PCP, Specialist, Home health nurse, Urgent Care, ED, 911 Yes   If the patient is a current smoker, are they able to teach back resources for cessation? Not a smoker   Week 1 call completed? Yes   Call end time 1726            Lynn EJRONIMO - Registered Nurse

## 2023-08-15 ENCOUNTER — TELEPHONE (OUTPATIENT)
Dept: GASTROENTEROLOGY | Facility: CLINIC | Age: 73
End: 2023-08-15
Payer: MEDICARE

## 2023-08-15 NOTE — TELEPHONE ENCOUNTER
Patient called. No answer. Left message as per MALISSA. Advised as per Dr. Robin's note. Advise to call back with questions.

## 2023-08-15 NOTE — TELEPHONE ENCOUNTER
----- Message from Jovan Robin MD sent at 8/14/2023 11:23 AM EDT -----  Pathology benign  Follow-up with KEKE August 23 already scheduled

## 2023-08-23 ENCOUNTER — READMISSION MANAGEMENT (OUTPATIENT)
Dept: CALL CENTER | Facility: HOSPITAL | Age: 73
End: 2023-08-23
Payer: MEDICARE

## 2023-08-23 NOTE — OUTREACH NOTE
Medical Week 2 Survey      Flowsheet Row Responses   [facility] patient discharged from? [place]   Does the patient have one of the following disease processes/diagnoses(primary or secondary)? Other   Week 2 attempt successful? Yes   Call start time 1109   Call end time 1114   Person spoke with today (if not patient) and relationship patient   Meds reviewed with patient/caregiver? Yes   Is the patient having any side effects they believe may be caused by any medication additions or changes? No   Does the patient have all medications ordered at discharge? Yes   Is the patient taking all medications as directed (includes completed medication regime)? Yes   Does the patient have a primary care provider?  Yes   Does the patient have an appointment with their PCP within 7 days of discharge? Yes   Comments regarding PCP Patient sees GI MD tomorrow on 8/24/2023   Has the patient kept scheduled appointments due by today? Yes   Psychosocial issues? No   Did the patient receive a copy of their discharge instructions? Yes   Nursing interventions Reviewed instructions with patient   What is the patient's perception of their health status since discharge? Improving   Is the patient/caregiver able to teach back signs and symptoms related to disease process for when to call PCP? Yes   Is the patient/caregiver able to teach back signs and symptoms related to disease process for when to call 911? Yes   Is the patient/caregiver able to teach back the hierarchy of who to call/visit for symptoms/problems? PCP, Specialist, Home health nurse, Urgent Care, ED, 911 Yes   If the patient is a current smoker, are they able to teach back resources for cessation? Not a smoker   Week 2 Call Completed? Yes   Graduated Yes   Graduated/Revoked comments Patient is doing well. No further abdominal pain.   Call end time 1114            Jovani ZHU - Registered Nurse

## 2023-08-24 ENCOUNTER — OFFICE VISIT (OUTPATIENT)
Dept: GASTROENTEROLOGY | Facility: CLINIC | Age: 73
End: 2023-08-24
Payer: MEDICARE

## 2023-08-24 VITALS
SYSTOLIC BLOOD PRESSURE: 143 MMHG | HEIGHT: 58 IN | WEIGHT: 133.4 LBS | OXYGEN SATURATION: 93 % | HEART RATE: 75 BPM | BODY MASS INDEX: 28 KG/M2 | DIASTOLIC BLOOD PRESSURE: 83 MMHG | TEMPERATURE: 97.3 F

## 2023-08-24 DIAGNOSIS — K30 INDIGESTION: ICD-10-CM

## 2023-08-24 DIAGNOSIS — K58.1 IRRITABLE BOWEL SYNDROME WITH CONSTIPATION: Primary | ICD-10-CM

## 2023-08-24 DIAGNOSIS — R11.0 NAUSEA: ICD-10-CM

## 2023-08-24 RX ORDER — MELOXICAM 7.5 MG/1
1 TABLET ORAL DAILY
COMMUNITY

## 2023-08-24 RX ORDER — FAMOTIDINE 40 MG/1
40 TABLET, FILM COATED ORAL DAILY
Qty: 90 TABLET | Refills: 3 | Status: SHIPPED | OUTPATIENT
Start: 2023-08-24

## 2023-08-24 RX ORDER — DOXYCYCLINE 100 MG/1
1 CAPSULE ORAL 2 TIMES DAILY
COMMUNITY

## 2023-08-24 RX ORDER — ONDANSETRON 4 MG/1
TABLET, ORALLY DISINTEGRATING ORAL
COMMUNITY
End: 2023-08-24 | Stop reason: SDUPTHER

## 2023-08-24 RX ORDER — LAMOTRIGINE 25 MG/1
TABLET ORAL
COMMUNITY

## 2023-08-24 RX ORDER — LEVOFLOXACIN 250 MG/1
TABLET ORAL
COMMUNITY

## 2023-08-24 RX ORDER — PROMETHAZINE HYDROCHLORIDE 12.5 MG/1
1 TABLET ORAL 4 TIMES DAILY PRN
COMMUNITY

## 2023-08-24 NOTE — PROGRESS NOTES
"Chief Complaint  Nausea    Subjective          History Of Present Illness:    Marlena Navarrete is a  73 y.o. female history of kidney transplant in 1979, CKD 3B, hypertension, chronic back pain, spinal cord stimulator, peripheral neuropathy, GERD. She presents as a hospital follow up for upper abdominal pain, reduced appetite, and nausea. She had an EGD done during her hospitalization that was completely normal. She additionally had a normal CT scan and HIDA scan.     Patient reports overall improvement in all of her symptoms since the hospitalization.  She still has some residual nausea but overall feels that it has improved.  She denies any further abdominal pain.  She does complain of some indigestion and bloating intermittently.  Her appetite has improved and she is eating small meals and is overall trying to eat bland.  She denies any melena or hematochezia.  She has been taking MiraLAX daily.  She has held a couple doses secondary to soft stools.      Objective   Vital Signs:   /83   Pulse 75   Temp 97.3 øF (36.3 øC)   Ht 147.3 cm (58\")   Wt 60.5 kg (133 lb 6.4 oz)   SpO2 93%   BMI 27.88 kg/mý       Physical Exam  Vitals reviewed.   Constitutional:       General: She is not in acute distress.     Appearance: Normal appearance. She is not ill-appearing.   HENT:      Head: Normocephalic and atraumatic.      Nose: Nose normal.      Mouth/Throat:      Pharynx: Oropharynx is clear.   Eyes:      Extraocular Movements: Extraocular movements intact.      Conjunctiva/sclera: Conjunctivae normal.      Pupils: Pupils are equal, round, and reactive to light.   Pulmonary:      Effort: Pulmonary effort is normal.   Abdominal:      General: There is no distension.      Palpations: There is no mass.      Tenderness: There is no abdominal tenderness.   Musculoskeletal:         General: No swelling. Normal range of motion.      Cervical back: Normal range of motion.   Skin:     General: Skin is warm and dry.      " Findings: No bruising or rash.   Neurological:      General: No focal deficit present.      Mental Status: She is alert and oriented to person, place, and time.      Motor: No weakness.      Gait: Gait normal.   Psychiatric:         Mood and Affect: Mood normal.        Result Review :   The following data was reviewed by: Jayleen Waller PA-C on 08/24/2023:  CMP          8/8/2023    05:31 8/9/2023    04:51 8/10/2023    05:16 8/10/2023    15:11   CMP   Glucose 73  114  105  122    BUN 18  23  17  17    Creatinine 1.67  1.49  1.65  1.89    EGFR 32.6  37.4  33.1  28.1    Sodium 141  141  139  141    Potassium 3.3  3.3  3.9  4.3    Chloride 111  113  112  111    Calcium 8.9  8.8  9.4  10.2    Total Protein  5.1      Albumin 4.0  3.6  4.3  4.5    Globulin  1.5      Total Bilirubin  0.3      Alkaline Phosphatase  84      AST (SGOT)  14      ALT (SGPT)  9      Albumin/Globulin Ratio  2.4      BUN/Creatinine Ratio 10.8  15.4  10.3  9.0    Anion Gap 13.0  8.0  11.3  13.2      CBC          8/8/2023    05:31 8/9/2023    04:51 8/10/2023    05:16   CBC   WBC 8.83  8.97  11.14    RBC 3.44  2.99  3.49    Hemoglobin 11.0  9.5  10.9    Hematocrit 32.7  28.2  32.1    MCV 95.1  94.3  92.0    MCH 32.0  31.8  31.2    MCHC 33.6  33.7  34.0    RDW 12.1  12.0  12.0    Platelets 236  229  264          CT Abdomen Pelvis Without Contrast    Result Date: 8/6/2023  1. 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 No hydronephrosis is seen. 2. Colonic diverticulosis. 3. No acute findings are noted.  Radiation dose reduction techniques were utilized, including automated exposure control and exposure modulation based on body size.  This report was finalized on 8/6/2023 7:44 PM by Dr. Jeromy Linn M.D.      US Abdomen Complete    Result Date: 8/6/2023  1. Absent native kidneys with transplanted kidney in the right hemipelvis appearing unremarkable. 2. No abnormalities are seen in the gallbladder or in the remainder of  this abdominal ultrasound.  This report was finalized on 8/6/2023 7:58 PM by Dr. Jeromy Linn M.D.      NM HIDA SCAN WITH PHARMACOLOGICAL INTERVENTION    Result Date: 8/9/2023  Negative.  This report was finalized on 8/9/2023 11:01 AM by Dr. Armando Feldman M.D.          Assessment and Plan    Diagnoses and all orders for this visit:    1. Irritable bowel syndrome with constipation (Primary)    2. Nausea  Overview:  Added automatically from request for surgery [id number]      3. Indigestion  -     famotidine (Pepcid) 40 MG tablet; Take 1 tablet by mouth Daily.  Dispense: 90 tablet; Refill: 3       Continue MiraLAX as directed.  She can titrate this to her bowel movements. We discussed that she could take it every other day or take a half of a serving if she was having loose stools.  Continue daily fiber supplementation.  Stop pantoprazole.  Start Pepcid for indigestion the setting of her CKD.  Her EGD was overall normal-appearing with no evidence of H. pylori.  Patient reports that her nausea improved pretty significantly since she addressed her constipation.  We discussed if she has ongoing nausea or worsens that we can complete a gastric emptying study.  She is due for colonoscopy.  It has been over 10 years and she did have polyps on previous colonoscopy.  We discussed scheduling this today but she wanted to hold off since her symptoms are finally starting to improve.  I have scheduled her for a follow-up appointment with me in December 2 rediscuss colonoscopy and reevaluate her symptoms    Follow Up   Return in about 3 months (around 11/24/2023) for Jayleen Wild PA-C.    Dragon dictation used throughout this note.            Jayleen Wild PA-C   [facility]  [address] [place] [address]  Office: [phone]

## 2023-08-31 ENCOUNTER — TELEPHONE (OUTPATIENT)
Dept: GASTROENTEROLOGY | Facility: CLINIC | Age: 73
End: 2023-08-31

## 2023-08-31 NOTE — TELEPHONE ENCOUNTER
Caller: Marlena Navarrete    Relationship: Self    Best call back number: [phone]    What is the best time to reach you: ANYTIME    Who are you requesting to speak with (clinical staff, provider,  specific staff member): CLINICAL STAFF    What was the call regarding: PT CALLING TO SCHEDULE SCAN AS PER REQUEST FROM PA    Is it okay if the provider responds through MyChart: NO

## 2023-12-12 ENCOUNTER — TELEPHONE (OUTPATIENT)
Dept: GASTROENTEROLOGY | Facility: CLINIC | Age: 73
End: 2023-12-12
Payer: MEDICARE

## 2023-12-12 ENCOUNTER — OFFICE VISIT (OUTPATIENT)
Dept: GASTROENTEROLOGY | Facility: CLINIC | Age: 73
End: 2023-12-12
Payer: MEDICARE

## 2023-12-12 VITALS
BODY MASS INDEX: 28.8 KG/M2 | WEIGHT: 137.2 LBS | TEMPERATURE: 97.3 F | OXYGEN SATURATION: 96 % | HEART RATE: 60 BPM | HEIGHT: 58 IN

## 2023-12-12 DIAGNOSIS — K58.1 IRRITABLE BOWEL SYNDROME WITH CONSTIPATION: Primary | ICD-10-CM

## 2023-12-12 DIAGNOSIS — Z12.11 SCREENING FOR COLON CANCER: ICD-10-CM

## 2023-12-12 RX ORDER — POLYETHYLENE GLYCOL 3350 17 G/17G
1 POWDER, FOR SOLUTION ORAL
COMMUNITY
Start: 2023-08-10

## 2023-12-12 RX ORDER — ACETAMINOPHEN 500 MG
1 TABLET ORAL EVERY 24 HOURS
COMMUNITY

## 2023-12-12 RX ORDER — NITROFURANTOIN 25; 75 MG/1; MG/1
1 CAPSULE ORAL 2 TIMES DAILY
COMMUNITY

## 2023-12-12 RX ORDER — CIPROFLOXACIN 250 MG/1
TABLET, FILM COATED ORAL
COMMUNITY
Start: 2023-10-16 | End: 2023-12-12

## 2023-12-12 RX ORDER — METOPROLOL SUCCINATE 25 MG/1
1 TABLET, EXTENDED RELEASE ORAL DAILY
COMMUNITY
Start: 2023-10-09

## 2023-12-12 NOTE — PROGRESS NOTES
"Chief Complaint  Irritable bowel syndrome with constipation    Subjective          History Of Present Illness:    Marlena Navarrete is a  73 y.o. female history of kidney transplant in 1979, CKD 3B, hypertension, chronic back pain, spinal cord stimulator, peripheral neuropathy, GERD. Patient was last seen by me on 8/24/23 for a hospital follow up for abdominal pain and reduced appetite.     Patient reports she has been taking miralax daily and it causes her to generally go daily but will sometimes be loose and cause her to have urgency. No blood in the stool, occasionally has abdominal cramping. Reports she still has occasional nausea but is overall improved since the hospital. No vomiting. She continues to use percocet daily.  Appetite has improved significantly since the hospital.  Weight has been stable.    Additional data reviewed:  EGD 8/8/23 - normal   HIDA scan 8/9/23 - normal   Last colonoscopy was almost 20 years ago.    Objective   Vital Signs:   Pulse 60   Temp 97.3 °F (36.3 °C)   Ht 147.3 cm (58\")   Wt 62.2 kg (137 lb 3.2 oz)   SpO2 96%   BMI 28.67 kg/m²       Physical Exam  Vitals reviewed.   Constitutional:       General: She is not in acute distress.     Appearance: Normal appearance. She is not ill-appearing.   HENT:      Head: Normocephalic and atraumatic.      Nose: Nose normal.      Mouth/Throat:      Pharynx: Oropharynx is clear.   Eyes:      Extraocular Movements: Extraocular movements intact.      Conjunctiva/sclera: Conjunctivae normal.      Pupils: Pupils are equal, round, and reactive to light.   Pulmonary:      Effort: Pulmonary effort is normal.   Abdominal:      General: There is no distension.      Palpations: Abdomen is soft. There is no mass.      Tenderness: There is no abdominal tenderness.   Musculoskeletal:         General: No swelling. Normal range of motion.      Cervical back: Normal range of motion.   Skin:     General: Skin is warm and dry.      Findings: No bruising or rash. "   Neurological:      General: No focal deficit present.      Mental Status: She is alert and oriented to person, place, and time.      Motor: No weakness.      Gait: Gait normal.   Psychiatric:         Mood and Affect: Mood normal.          Result Review :   The following data was reviewed by: Jayleen Waller PA-C on 12/12/2023:  CMP          8/8/2023    05:31 8/9/2023    04:51 8/10/2023    05:16 8/10/2023    15:11   CMP   Glucose 73  114  105  122    BUN 18  23  17  17    Creatinine 1.67  1.49  1.65  1.89    EGFR 32.6  37.4  33.1  28.1    Sodium 141  141  139  141    Potassium 3.3  3.3  3.9  4.3    Chloride 111  113  112  111    Calcium 8.9  8.8  9.4  10.2    Total Protein  5.1      Albumin 4.0  3.6  4.3  4.5    Globulin  1.5      Total Bilirubin  0.3      Alkaline Phosphatase  84      AST (SGOT)  14      ALT (SGPT)  9      Albumin/Globulin Ratio  2.4      BUN/Creatinine Ratio 10.8  15.4  10.3  9.0    Anion Gap 13.0  8.0  11.3  13.2      CBC          8/8/2023    05:31 8/9/2023    04:51 8/10/2023    05:16   CBC   WBC 8.83  8.97  11.14    RBC 3.44  2.99  3.49    Hemoglobin 11.0  9.5  10.9    Hematocrit 32.7  28.2  32.1    MCV 95.1  94.3  92.0    MCH 32.0  31.8  31.2    MCHC 33.6  33.7  34.0    RDW 12.1  12.0  12.0    Platelets 236  229  264              Assessment and Plan    Diagnoses and all orders for this visit:    1. Irritable bowel syndrome with constipation (Primary)    2. Screening for colon cancer  -     Case Request; Standing  -     Implement Anesthesia orders day of procedure.; Standing  -     Obtain informed consent; Standing  -     Verify bowel prep was successful; Standing  -     Give tap water enema if bowel prep was insufficient; Standing  -     Case Request       Continue half a capful of MiraLAX daily for constipation  Continue Pepcid daily  Will proceed with colonoscopy for colon cancer screening as it has been well over 10 years since her last one.     Follow Up   Return for  Colonoscopy.    Dragon dictation used throughout this note.            Jayleen Wild PA-C   [facility]  [address] [place] [address]  Office: [phone]

## 2024-02-20 ENCOUNTER — HOSPITAL ENCOUNTER (EMERGENCY)
Facility: HOSPITAL | Age: 74
Discharge: HOME | End: 2024-02-20
Attending: STUDENT IN AN ORGANIZED HEALTH CARE EDUCATION/TRAINING PROGRAM | Admitting: STUDENT IN AN ORGANIZED HEALTH CARE EDUCATION/TRAINING PROGRAM
Payer: MEDICARE

## 2024-02-20 ENCOUNTER — APPOINTMENT (OUTPATIENT)
Dept: GENERAL RADIOLOGY | Facility: HOSPITAL | Age: 74
End: 2024-02-20
Payer: MEDICARE

## 2024-02-20 VITALS
OXYGEN SATURATION: 97 % | SYSTOLIC BLOOD PRESSURE: 145 MMHG | WEIGHT: 140 LBS | HEIGHT: 58 IN | DIASTOLIC BLOOD PRESSURE: 89 MMHG | BODY MASS INDEX: 29.39 KG/M2 | HEART RATE: 66 BPM | RESPIRATION RATE: 18 BRPM | TEMPERATURE: 97 F

## 2024-02-20 DIAGNOSIS — R06.00 DYSPNEA, UNSPECIFIED TYPE: Primary | ICD-10-CM

## 2024-02-20 DIAGNOSIS — R53.1 GENERALIZED WEAKNESS: ICD-10-CM

## 2024-02-20 LAB
ALBUMIN SERPL-MCNC: 4.4 G/DL (ref 3.5–5.2)
ALBUMIN/GLOB SERPL: 1.9 G/DL
ALP SERPL-CCNC: 86 U/L (ref 39–117)
ALT SERPL W P-5'-P-CCNC: 10 U/L (ref 1–33)
ANION GAP SERPL CALCULATED.3IONS-SCNC: 11.9 MMOL/L (ref 5–15)
AST SERPL-CCNC: 17 U/L (ref 1–32)
B PARAPERT DNA SPEC QL NAA+PROBE: NOT DETECTED
B PERT DNA SPEC QL NAA+PROBE: NOT DETECTED
BACTERIA UR QL AUTO: ABNORMAL /HPF
BASOPHILS # BLD AUTO: 0.06 10*3/MM3 (ref 0–0.2)
BASOPHILS NFR BLD AUTO: 0.7 % (ref 0–1.5)
BILIRUB SERPL-MCNC: 0.5 MG/DL (ref 0–1.2)
BILIRUB UR QL STRIP: NEGATIVE
BUN SERPL-MCNC: 25 MG/DL (ref 8–23)
BUN/CREAT SERPL: 12.1 (ref 7–25)
C PNEUM DNA NPH QL NAA+NON-PROBE: NOT DETECTED
CALCIUM SPEC-SCNC: 9.5 MG/DL (ref 8.6–10.5)
CHLORIDE SERPL-SCNC: 110 MMOL/L (ref 98–107)
CLARITY UR: CLEAR
CO2 SERPL-SCNC: 19.1 MMOL/L (ref 22–29)
COLOR UR: YELLOW
CREAT SERPL-MCNC: 2.07 MG/DL (ref 0.57–1)
DEPRECATED RDW RBC AUTO: 40.8 FL (ref 37–54)
EGFRCR SERPLBLD CKD-EPI 2021: 25.1 ML/MIN/1.73
EOSINOPHIL # BLD AUTO: 0.17 10*3/MM3 (ref 0–0.4)
EOSINOPHIL NFR BLD AUTO: 2 % (ref 0.3–6.2)
ERYTHROCYTE [DISTWIDTH] IN BLOOD BY AUTOMATED COUNT: 12 % (ref 12.3–15.4)
FLUAV SUBTYP SPEC NAA+PROBE: NOT DETECTED
FLUBV RNA ISLT QL NAA+PROBE: NOT DETECTED
GLOBULIN UR ELPH-MCNC: 2.3 GM/DL
GLUCOSE SERPL-MCNC: 104 MG/DL (ref 65–99)
GLUCOSE UR STRIP-MCNC: ABNORMAL MG/DL
HADV DNA SPEC NAA+PROBE: NOT DETECTED
HCOV 229E RNA SPEC QL NAA+PROBE: NOT DETECTED
HCOV HKU1 RNA SPEC QL NAA+PROBE: NOT DETECTED
HCOV NL63 RNA SPEC QL NAA+PROBE: NOT DETECTED
HCOV OC43 RNA SPEC QL NAA+PROBE: NOT DETECTED
HCT VFR BLD AUTO: 34.1 % (ref 34–46.6)
HGB BLD-MCNC: 11.4 G/DL (ref 12–15.9)
HGB UR QL STRIP.AUTO: NEGATIVE
HMPV RNA NPH QL NAA+NON-PROBE: NOT DETECTED
HPIV1 RNA ISLT QL NAA+PROBE: NOT DETECTED
HPIV2 RNA SPEC QL NAA+PROBE: NOT DETECTED
HPIV3 RNA NPH QL NAA+PROBE: NOT DETECTED
HPIV4 P GENE NPH QL NAA+PROBE: NOT DETECTED
HYALINE CASTS UR QL AUTO: ABNORMAL /LPF
IMM GRANULOCYTES # BLD AUTO: 0.02 10*3/MM3 (ref 0–0.05)
IMM GRANULOCYTES NFR BLD AUTO: 0.2 % (ref 0–0.5)
KETONES UR QL STRIP: ABNORMAL
LEUKOCYTE ESTERASE UR QL STRIP.AUTO: NEGATIVE
LYMPHOCYTES # BLD AUTO: 1.55 10*3/MM3 (ref 0.7–3.1)
LYMPHOCYTES NFR BLD AUTO: 18.4 % (ref 19.6–45.3)
M PNEUMO IGG SER IA-ACNC: NOT DETECTED
MAGNESIUM SERPL-MCNC: 2.4 MG/DL (ref 1.6–2.4)
MCH RBC QN AUTO: 31.1 PG (ref 26.6–33)
MCHC RBC AUTO-ENTMCNC: 33.4 G/DL (ref 31.5–35.7)
MCV RBC AUTO: 93.2 FL (ref 79–97)
MONOCYTES # BLD AUTO: 0.62 10*3/MM3 (ref 0.1–0.9)
MONOCYTES NFR BLD AUTO: 7.4 % (ref 5–12)
NEUTROPHILS NFR BLD AUTO: 5.99 10*3/MM3 (ref 1.7–7)
NEUTROPHILS NFR BLD AUTO: 71.3 % (ref 42.7–76)
NITRITE UR QL STRIP: NEGATIVE
NRBC BLD AUTO-RTO: 0 /100 WBC (ref 0–0.2)
NT-PROBNP SERPL-MCNC: 120 PG/ML (ref 0–900)
PH UR STRIP.AUTO: 7.5 [PH] (ref 5–8)
PLATELET # BLD AUTO: 343 10*3/MM3 (ref 140–450)
PMV BLD AUTO: 9.7 FL (ref 6–12)
POTASSIUM SERPL-SCNC: 4.1 MMOL/L (ref 3.5–5.2)
PROCALCITONIN SERPL-MCNC: 0.05 NG/ML (ref 0–0.25)
PROT SERPL-MCNC: 6.7 G/DL (ref 6–8.5)
PROT UR QL STRIP: ABNORMAL
QT INTERVAL: 438 MS
QTC INTERVAL: 416 MS
RBC # BLD AUTO: 3.66 10*6/MM3 (ref 3.77–5.28)
RBC # UR STRIP: ABNORMAL /HPF
REF LAB TEST METHOD: ABNORMAL
RHINOVIRUS RNA SPEC NAA+PROBE: NOT DETECTED
RSV RNA NPH QL NAA+NON-PROBE: NOT DETECTED
SARS-COV-2 RNA NPH QL NAA+NON-PROBE: NOT DETECTED
SODIUM SERPL-SCNC: 141 MMOL/L (ref 136–145)
SP GR UR STRIP: 1.01 (ref 1–1.03)
SQUAMOUS #/AREA URNS HPF: ABNORMAL /HPF
TROPONIN T SERPL HS-MCNC: 17 NG/L
UROBILINOGEN UR QL STRIP: ABNORMAL
WBC # UR STRIP: ABNORMAL /HPF
WBC NRBC COR # BLD AUTO: 8.41 10*3/MM3 (ref 3.4–10.8)

## 2024-02-20 PROCEDURE — 81001 URINALYSIS AUTO W/SCOPE: CPT | Performed by: PHYSICIAN ASSISTANT

## 2024-02-20 PROCEDURE — 84145 PROCALCITONIN (PCT): CPT | Performed by: PHYSICIAN ASSISTANT

## 2024-02-20 PROCEDURE — 83880 ASSAY OF NATRIURETIC PEPTIDE: CPT | Performed by: PHYSICIAN ASSISTANT

## 2024-02-20 PROCEDURE — 85025 COMPLETE CBC W/AUTO DIFF WBC: CPT | Performed by: PHYSICIAN ASSISTANT

## 2024-02-20 PROCEDURE — 83735 ASSAY OF MAGNESIUM: CPT | Performed by: PHYSICIAN ASSISTANT

## 2024-02-20 PROCEDURE — 71045 X-RAY EXAM CHEST 1 VIEW: CPT

## 2024-02-20 PROCEDURE — 99284 EMERGENCY DEPT VISIT MOD MDM: CPT

## 2024-02-20 PROCEDURE — 84484 ASSAY OF TROPONIN QUANT: CPT | Performed by: PHYSICIAN ASSISTANT

## 2024-02-20 PROCEDURE — 80053 COMPREHEN METABOLIC PANEL: CPT | Performed by: PHYSICIAN ASSISTANT

## 2024-02-20 PROCEDURE — 93010 ELECTROCARDIOGRAM REPORT: CPT | Performed by: INTERNAL MEDICINE

## 2024-02-20 PROCEDURE — 93005 ELECTROCARDIOGRAM TRACING: CPT | Performed by: PHYSICIAN ASSISTANT

## 2024-02-20 PROCEDURE — 0202U NFCT DS 22 TRGT SARS-COV-2: CPT | Performed by: PHYSICIAN ASSISTANT

## 2024-02-20 RX ORDER — OXYCODONE AND ACETAMINOPHEN 7.5; 325 MG/1; MG/1
1 TABLET ORAL 3 TIMES DAILY
COMMUNITY
Start: 2024-02-01

## 2024-02-20 RX ORDER — METOPROLOL SUCCINATE 25 MG/1
50 TABLET, EXTENDED RELEASE ORAL
COMMUNITY
Start: 2024-01-15 | End: 2024-02-28

## 2024-02-20 RX ORDER — SODIUM BICARBONATE 650 MG/1
TABLET ORAL
COMMUNITY
Start: 2024-01-23

## 2024-02-20 RX ORDER — SODIUM CHLORIDE 0.9 % (FLUSH) 0.9 %
10 SYRINGE (ML) INJECTION AS NEEDED
Status: DISCONTINUED | OUTPATIENT
Start: 2024-02-20 | End: 2024-02-20 | Stop reason: HOSPADM

## 2024-02-20 NOTE — DISCHARGE INSTRUCTIONS
Follow-up with primary care provider.  I have placed a referral to cardiology for follow-up.  They will contact you to schedule an appointment.  Purchase a finger pulse oximeter.  Wear this if you are feeling short of breath.  If your oxygen drops below 90% return to the emergency department.

## 2024-02-20 NOTE — ED PROVIDER NOTES
EMERGENCY DEPARTMENT ENCOUNTER  Room Number:    PCP: Bradley Henrandez MD  Independent Historians: Patient and Family      HPI:  Chief Complaint: had concerns including Cough, Dizziness, Weakness - Generalized, and Shortness of Breath.     A complete HPI/ROS/PMH/PSH/SH/FH are unobtainable due to: None    Chronic or social conditions impacting patient care (Social Determinants of Health): None      Context: Marlena Navarrete is a 74 y.o. female with a medical history of hypertension, right foot drop, chronic back pain, GERD, CKD, hyperlipidemia, and chronic back pain who presents to the ED c/o acute generalized weakness, dyspnea, lightheadedness.  Patient reports symptoms have been present for the last 4 days.  Patient was recently at a  but denies any known sick contacts.  Patient denies cardiac history.  She is not anticoagulated.  No recent travel or surgeries.  Patient denies fever, headache, vomiting, diarrhea, dysuria, or any other systemic complaint.      Review of prior external notes (non-ED) -and- Review of prior external test results outside of this encounter:  Patient seen in office by nephrology on 1/15/2024 for CKD 3.  Reviewed assessment and plan.  Will increase dose of metoprolol, check urinalysis, patient will return to office in 3 months.  Reviewed labs collected on 8/10/2023.  CBC with hemoglobin 10.9, renal function panel with serum creatinine 1.89.    Prescription drug monitoring program review:     N/A    PAST MEDICAL HISTORY  Active Ambulatory Problems     Diagnosis Date Noted    Lumbar radiculopathy 2018    Essential hypertension 2018    Right foot drop 2018    Kidney transplant recipient 1979    History of lumbar laminectomy for spinal cord decompression 2018    Spinal enthesopathy of thoracic region 2019    Postlaminectomy syndrome, lumbar region 2020    Chronic bilateral low back pain with right-sided sciatica 2020    Displacement of  other nervous system device, implant or graft, initial encounter 03/09/2020    Postlaminectomy syndrome of lumbar region 06/04/2020    Epigastric pain 08/13/2020    Nausea 08/13/2020    Gastroesophageal reflux disease 08/13/2020    Shoulder arthritis 12/30/2020    Acute renal failure (ARF) 12/30/2020    Stage 3b chronic kidney disease 12/30/2020    Iron deficiency anemia 12/30/2020    Status post rotator cuff surgery 12/30/2020    History of DVT (deep vein thrombosis) 12/30/2020    Peripheral neuropathy 12/30/2020    Intractable back pain 03/09/2022    Spinal cord stimulator status 03/09/2022    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 04/10/2020    Inflammation of sacroiliac joint 05/18/2021    Secondary hyperparathyroidism  03/09/2022    Vitamin D deficiency 03/09/2022    COVID 03/09/2022    Pneumonia due to COVID-19 virus 03/14/2022    Narcotic dependence 03/14/2022    Cytokine release syndrome, grade 1 03/15/2022    Impacted cerumen of right ear 03/25/2022    Hospital discharge follow-up 03/25/2022    Acute metabolic encephalopathy 03/28/2022    Volume depletion 08/06/2023    PIYUSH (acute kidney injury) 08/06/2023    Dehydration 08/06/2023    Prediabetes 08/06/2023    Screening for colon cancer 12/12/2023     Resolved Ambulatory Problems     Diagnosis Date Noted    Lumbar radiculopathy, acute 07/28/2018    Respiratory failure, post-operative 12/29/2020    Acute blood loss anemia 12/30/2020    Postoperative hypoxia 12/30/2020    Arthritis of shoulder 12/30/2020     Past Medical History:   Diagnosis Date    Arthritis     Chronic back pain     CKD (chronic kidney disease)     Diverticulosis     Foot drop, right     GERD (gastroesophageal reflux disease)     History of transfusion     Hyperlipidemia     Hypertension     Neuropathy     Spinal headache     Torn rotator cuff          PAST SURGICAL HISTORY  Past Surgical History:    Procedure Laterality Date    APPENDECTOMY      CATARACT EXTRACTION EXTRACAPSULAR W/ INTRAOCULAR LENS IMPLANTATION       SECTION      COLONOSCOPY  approx     Anastacio WHARTON    ENDOSCOPY N/A 2020    Procedure: ESOPHAGOGASTRODUODENOSCOPY with biopsies;  Surgeon: Wicho Chaves MD;  Location: [facility] ENDOSCOPY;  Service: Gastroenterology;  Laterality: N/A;  pre- epigastric pain, nausea  post-- gastritis, duodenitis, bile reflux    ENDOSCOPY N/A 2023    Procedure: ESOPHAGOGASTRODUODENOSCOPY with bx;  Surgeon: Jovan Robin MD;  Location: [facility] ENDOSCOPY;  Service: Gastroenterology;  Laterality: N/A;  pre: abd pain, nausea, vomiting   post: normal    LUMBAR DISCECTOMY FUSION INSTRUMENTATION Right 2018    Procedure: Right L2-3 laminectomy with Metrix;  Surgeon: Oscar Paulino MD;  Location: [facility] OR;  Service: Neurosurgery    SHOULDER ARTHROSCOPY W/ ROTATOR CUFF REPAIR Right 2020    Procedure: SHOULDER ARTHROSCOPY, ROTATOR CUFF REPAIR, SUBACROMIAL DECOMPRESSION, DISTAL CLAVICLE EXCISION, BICEPS TENOTOMY, AND LABRAL DEBRIDEMENT;  Surgeon: Rishabh Florence MD;  Location: [facility] OR OSC;  Service: Orthopedics;  Laterality: Right;    SHOULDER ROTATOR CUFF REPAIR Left     SPINAL CORD STIMULATOR IMPLANT N/A 2020    Procedure: SPINAL CORD STIMULATOR INSERTION PHASE 2, triall of right gluteal internal pulse generator;  Surgeon: Cuate Kemp MD;  Location: [facility] OR;  Service: Neurosurgery;  Laterality: N/A;    SPINAL CORD STIMULATOR IMPLANT N/A 2020    Procedure: SPINAL CORD STIMULATOR INSERTION PHASE 1, Thoracic 10 laminotomy for revision of [place] Tom surgical lead at Thoracic 8 to Thoracic 9;  Surgeon: Cuate Kemp MD;  Location: [facility] OR;  Service: Neurosurgery;  Laterality: N/A;    TONSILLECTOMY      TRANSPLANTATION RENAL Right 1979    SISTER GAVE PT     TUBAL ABDOMINAL LIGATION           FAMILY HISTORY  Family History   Problem Relation Age of  Onset    Aneurysm Mother         BRAIN ANEURYSM    Heart failure Father     Kidney failure Father     Lung cancer Sister     Lung cancer Sister     Lung cancer Son     Malig Hyperthermia Neg Hx          SOCIAL HISTORY  Social History     Socioeconomic History    Marital status:     Number of children: 1    Years of education: 12   Tobacco Use    Smoking status: Former     Packs/day: 1.00     Years: 30.00     Additional pack years: 0.00     Total pack years: 30.00     Types: Cigarettes     Quit date: 2012     Years since quittin.8    Smokeless tobacco: Never   Vaping Use    Vaping Use: Never used   Substance and Sexual Activity    Alcohol use: No    Drug use: Never    Sexual activity: Defer         ALLERGIES  Patient has no known allergies.      REVIEW OF SYSTEMS  Review of Systems   Constitutional:  Positive for fatigue. Negative for chills and fever.   HENT:  Negative for ear pain and sore throat.    Respiratory:  Positive for cough and shortness of breath.    Cardiovascular:  Negative for chest pain and palpitations.   Gastrointestinal:  Negative for abdominal pain and vomiting.   Genitourinary:  Negative for dysuria and hematuria.   Musculoskeletal:  Negative for arthralgias and joint swelling.   Skin:  Negative for pallor and rash.   Neurological:  Positive for light-headedness. Negative for numbness and headaches.   Psychiatric/Behavioral:  Negative for confusion and hallucinations.      Included in HPI  All systems reviewed and negative except for those discussed in HPI.      PHYSICAL EXAM    I have reviewed the triage vital signs and nursing notes.    ED Triage Vitals   Temp Heart Rate Resp BP SpO2   24 0915 24 0915 24 0915 24 0938 24 0915   97 °F (36.1 °C) 74 20 131/61 96 %      Temp src Heart Rate Source Patient Position BP Location FiO2 (%)   -- -- -- -- --              Physical Exam  Constitutional:       General: She is not in acute distress.     Appearance:  She is well-developed.   HENT:      Head: Normocephalic and atraumatic.   Eyes:      Extraocular Movements: Extraocular movements intact.   Cardiovascular:      Rate and Rhythm: Normal rate and regular rhythm.      Heart sounds: Normal heart sounds.      Comments: Distal pulses intact  Pulmonary:      Effort: Pulmonary effort is normal.      Breath sounds: Normal breath sounds.      Comments: O2 saturations 96% on room air  Abdominal:      General: There is no distension.   Skin:     General: Skin is warm.   Neurological:      General: No focal deficit present.      Mental Status: She is alert and oriented to person, place, and time.   Psychiatric:         Mood and Affect: Mood normal.           LAB RESULTS  Recent Results (from the past 24 hour(s))   Comprehensive Metabolic Panel    Collection Time: 02/20/24  9:54 AM    Specimen: Blood   Result Value Ref Range    Glucose 104 (H) 65 - 99 mg/dL    BUN 25 (H) 8 - 23 mg/dL    Creatinine 2.07 (H) 0.57 - 1.00 mg/dL    Sodium 141 136 - 145 mmol/L    Potassium 4.1 3.5 - 5.2 mmol/L    Chloride 110 (H) 98 - 107 mmol/L    CO2 19.1 (L) 22.0 - 29.0 mmol/L    Calcium 9.5 8.6 - 10.5 mg/dL    Total Protein 6.7 6.0 - 8.5 g/dL    Albumin 4.4 3.5 - 5.2 g/dL    ALT (SGPT) 10 1 - 33 U/L    AST (SGOT) 17 1 - 32 U/L    Alkaline Phosphatase 86 39 - 117 U/L    Total Bilirubin 0.5 0.0 - 1.2 mg/dL    Globulin 2.3 gm/dL    A/G Ratio 1.9 g/dL    BUN/Creatinine Ratio 12.1 7.0 - 25.0    Anion Gap 11.9 5.0 - 15.0 mmol/L    eGFR 25.1 (L) >60.0 mL/min/1.73   Single High Sensitivity Troponin T    Collection Time: 02/20/24  9:54 AM    Specimen: Blood   Result Value Ref Range    HS Troponin T 17 (H) <14 ng/L   BNP    Collection Time: 02/20/24  9:54 AM    Specimen: Blood   Result Value Ref Range    proBNP 120.0 0.0 - 900.0 pg/mL   Magnesium    Collection Time: 02/20/24  9:54 AM    Specimen: Blood   Result Value Ref Range    Magnesium 2.4 1.6 - 2.4 mg/dL   Procalcitonin    Collection Time: 02/20/24   9:54 AM    Specimen: Blood   Result Value Ref Range    Procalcitonin 0.05 0.00 - 0.25 ng/mL   CBC Auto Differential    Collection Time: 02/20/24  9:54 AM    Specimen: Blood   Result Value Ref Range    WBC 8.41 3.40 - 10.80 10*3/mm3    RBC 3.66 (L) 3.77 - 5.28 10*6/mm3    Hemoglobin 11.4 (L) 12.0 - 15.9 g/dL    Hematocrit 34.1 34.0 - 46.6 %    MCV 93.2 79.0 - 97.0 fL    MCH 31.1 26.6 - 33.0 pg    MCHC 33.4 31.5 - 35.7 g/dL    RDW 12.0 (L) 12.3 - 15.4 %    RDW-SD 40.8 37.0 - 54.0 fl    MPV 9.7 6.0 - 12.0 fL    Platelets 343 140 - 450 10*3/mm3    Neutrophil % 71.3 42.7 - 76.0 %    Lymphocyte % 18.4 (L) 19.6 - 45.3 %    Monocyte % 7.4 5.0 - 12.0 %    Eosinophil % 2.0 0.3 - 6.2 %    Basophil % 0.7 0.0 - 1.5 %    Immature Grans % 0.2 0.0 - 0.5 %    Neutrophils, Absolute 5.99 1.70 - 7.00 10*3/mm3    Lymphocytes, Absolute 1.55 0.70 - 3.10 10*3/mm3    Monocytes, Absolute 0.62 0.10 - 0.90 10*3/mm3    Eosinophils, Absolute 0.17 0.00 - 0.40 10*3/mm3    Basophils, Absolute 0.06 0.00 - 0.20 10*3/mm3    Immature Grans, Absolute 0.02 0.00 - 0.05 10*3/mm3    nRBC 0.0 0.0 - 0.2 /100 WBC   Respiratory Panel PCR w/COVID-19(SARS-CoV-2) LIZZETH/DAVID/NORMA/PAD/COR/VIVIAN In-House, NP Swab in [facility], 2 HR TAT - Swab, Nasopharynx    Collection Time: 02/20/24  9:55 AM    Specimen: Nasopharynx; Swab   Result Value Ref Range    ADENOVIRUS, PCR Not Detected Not Detected    Coronavirus 229E Not Detected Not Detected    Coronavirus HKU1 Not Detected Not Detected    Coronavirus NL63 Not Detected Not Detected    Coronavirus OC43 Not Detected Not Detected    COVID19 Not Detected Not Detected - Ref. Range    Human Metapneumovirus Not Detected Not Detected    Human Rhinovirus/Enterovirus Not Detected Not Detected    Influenza A PCR Not Detected Not Detected    Influenza B PCR Not Detected Not Detected    Parainfluenza Virus 1 Not Detected Not Detected    Parainfluenza Virus 2 Not Detected Not Detected    Parainfluenza Virus 3 Not Detected Not Detected     Parainfluenza Virus 4 Not Detected Not Detected    RSV, PCR Not Detected Not Detected    Bordetella pertussis pcr Not Detected Not Detected    Bordetella parapertussis PCR Not Detected Not Detected    Chlamydophila pneumoniae PCR Not Detected Not Detected    Mycoplasma pneumo by PCR Not Detected Not Detected   ECG 12 Lead Dyspnea    Collection Time: 02/20/24 10:05 AM   Result Value Ref Range    QT Interval 438 ms    QTC Interval 416 ms   Urinalysis With Microscopic If Indicated (No Culture) - Urine, Clean Catch    Collection Time: 02/20/24 11:36 AM    Specimen: Urine, Clean Catch   Result Value Ref Range    Color, UA Yellow Yellow, Straw    Appearance, UA Clear Clear    pH, UA 7.5 5.0 - 8.0    Specific Gravity, UA 1.015 1.005 - 1.030    Glucose,  mg/dL (Trace) (A) Negative    Ketones, UA Trace (A) Negative    Bilirubin, UA Negative Negative    Blood, UA Negative Negative    Protein, UA 30 mg/dL (1+) (A) Negative    Leuk Esterase, UA Negative Negative    Nitrite, UA Negative Negative    Urobilinogen, UA 0.2 E.U./dL 0.2 - 1.0 E.U./dL   Urinalysis, Microscopic Only - Urine, Clean Catch    Collection Time: 02/20/24 11:36 AM    Specimen: Urine, Clean Catch   Result Value Ref Range    RBC, UA 0-2 None Seen, 0-2 /HPF    WBC, UA 6-10 (A) None Seen, 0-2 /HPF    Bacteria, UA None Seen None Seen /HPF    Squamous Epithelial Cells, UA 0-2 None Seen, 0-2 /HPF    Hyaline Casts, UA None Seen None Seen /LPF    Methodology Automated Microscopy          RADIOLOGY  XR Chest 1 View    Result Date: 2/20/2024  XR CHEST 1 VW-  INDICATION: Shortness of breath  COMPARISON: Chest radiographs dating back to 12/29/2020      No focal consolidation. No pleural effusion or pneumothorax. Normal size cardiomediastinal silhouette. Thoracic spinal stimulator.  This report was finalized on 2/20/2024 10:23 AM by Dr. Migue Church M.D on Workstation: [id number]         MEDICATIONS GIVEN IN ER  Medications   sodium chloride 0.9 % flush 10 mL (has  no administration in time range)         ORDERS PLACED DURING THIS VISIT:  Orders Placed This Encounter   Procedures    Respiratory Panel PCR w/COVID-19(SARS-CoV-2) LIZZETH/DAVID/NORMA/PAD/COR/VIVIAN In-House, NP Swab in UTM/VTM, 2 HR TAT - Swab, Nasopharynx    XR Chest 1 View    Comprehensive Metabolic Panel    Urinalysis With Microscopic If Indicated (No Culture) - Urine, Clean Catch    Single High Sensitivity Troponin T    BNP    Magnesium    Procalcitonin    CBC Auto Differential    Urinalysis, Microscopic Only - Urine, Clean Catch    Ambulatory Referral to Cardiology    ECG 12 Lead Dyspnea    Insert Peripheral IV    CBC & Differential         OUTPATIENT MEDICATION MANAGEMENT:  Current Facility-Administered Medications Ordered in Epic   Medication Dose Route Frequency Provider Last Rate Last Admin    sodium chloride 0.9 % flush 10 mL  10 mL Intravenous PRN Yumiko Mendez PA-C         Current Outpatient Medications Ordered in Epic   Medication Sig Dispense Refill    metoprolol succinate XL (TOPROL-XL) 25 MG 24 hr tablet Take 2 tablets by mouth.      ondansetron (ZOFRAN) 4 MG tablet Take 1 tablet by mouth Every 8 (Eight) Hours As Needed for Nausea or Vomiting.      oxyCODONE-acetaminophen (PERCOCET) 7.5-325 MG per tablet Take 1 tablet by mouth 3 times a day.      sodium bicarbonate 650 MG tablet       acetaminophen (TYLENOL) 500 MG tablet Take 1 tablet by mouth Daily.      amLODIPine (NORVASC) 5 MG tablet Take 1 tablet by mouth Daily.      atorvastatin (LIPITOR) 40 MG tablet Take 1 tablet by mouth Every Night.      diphenhydrAMINE-acetaminophen (TYLENOL PM)  MG tablet per tablet Take 1 tablet by mouth At Night As Needed for Sleep.      famotidine (Pepcid) 40 MG tablet Take 1 tablet by mouth Daily. 90 tablet 3    lamoTRIgine (LaMICtal) 25 MG tablet Take 2 tablets twice a day by oral route as directed for 30 days.      methocarbamol (ROBAXIN) 500 MG tablet methocarbamol 500 mg tablet   TAKE 1 TABLET BY MOUTH THREE  TIMES DAILY AS NEEDED      metoprolol succinate XL (TOPROL-XL) 25 MG 24 hr tablet Take 1 tablet by mouth Daily.      nitrofurantoin, macrocrystal-monohydrate, (MACROBID) 100 MG capsule Take 1 capsule by mouth 2 (Two) Times a Day.      oxyCODONE-acetaminophen (PERCOCET) 5-325 MG per tablet Take 1 tablet by mouth Every 6 (Six) Hours As Needed for Severe Pain.  0    polyethylene glycol (MIRALAX) 17 g packet Take 17 g by mouth.      promethazine (PHENERGAN) 12.5 MG tablet Take 1 tablet by mouth 4 (Four) Times a Day As Needed.      vitamin D3 125 MCG (5000 UT) capsule capsule Take 2,000 Units by mouth Daily.               PROGRESS, DATA ANALYSIS, CONSULTS, AND MEDICAL DECISION MAKING  All labs have been independently interpreted by me.  All radiology studies have been reviewed by me. All EKG's have been independently viewed and interpreted by me.  Discussion below represents my analysis of pertinent findings related to patient's condition, differential diagnosis, treatment plan and final disposition.    Differential diagnosis includes but is not limited to viral syndrome, pneumonia, CHF, UTI, PIYUSH, electrolyte deficiency.    Clinical Scores:            Total (NIH Stroke Scale): 1      ED Course as of 02/20/24 1429   Tue Feb 20, 2024   1019 EKG interpreted myself:  1005, sinus rhythm rate of 54, T wave inversion in anterior precordial leads without acute ST segment changes or T wave inversions. [FS]   1019 Chest x-ray interpreted myself:  No pneumothorax or infiltrate. [FS]   1032 WBC: 8.41 [MP]   1032 Hemoglobin(!): 11.4 [MP]   1032 BUN(!): 25 [MP]   1032 Creatinine(!): 2.07 [MP]   1157 Nitrite, UA: Negative [MP]   1157 Leukocytes, UA: Negative [MP]   1157 RBC, UA: 0-2 [MP]   1157 WBC, UA(!): 6-10 [MP]   1157 Bacteria, UA: None Seen [MP]   1212 Patient maintained O2 sat of 96% while ambulating, but upon returning to bed she did desaturate to 89%.   [MP]   1428 Patient presents emergency department with generalized  weakness, shortness of breath, cough.  Worked up today with labs, RPP, urinalysis, chest x-ray, EKG.  No evidence of acute ischemia on EKG.  Chest x-ray negative, labs grossly unremarkable.  Patient does have chronic renal insufficiency with minimal bump in serum creatinine.  She was able to walk and maintain O2 saturations.  She did briefly desaturate to 89% but returned quickly into the low 90s.  I did offer patient admission for further workup of borderline hypoxia but she declines.  Will place referral to cardiology for further workup and evaluation.  Encouraged her to follow-up with PCP and discussed ED return precautions.  She is otherwise well-appearing, hemodynamically stable, and therefore appropriate for discharge. [MP]      ED Course User Index  [FS] Toni Weber MD  [MP] Yumiko Mendez PA-C             AS OF 14:20 EST VITALS:    BP - 145/89  HR - 66  TEMP - 97 °F (36.1 °C)  O2 SATS - 97%    COMPLEXITY OF CARE  Admission was considered but after careful review of the patient's presentation, physical examination, diagnostic results, and response to treatment the patient may be safely discharged with outpatient follow-up.      DIAGNOSIS  Final diagnoses:   Dyspnea, unspecified type   Generalized weakness         DISPOSITION  ED Disposition       ED Disposition   Discharge    Condition   Stable    Comment   --                Please note that portions of this document were completed with a voice recognition program.    Note Disclaimer: At [facility], we believe that sharing information builds trust and better relationships. You are receiving this note because you recently visited [facility]. It is possible you will see health information before a provider has talked with you about it. This kind of information can be easy to misunderstand. To help you fully understand what it means for your health, we urge you to discuss this note with your provider.         Yumiko Mendez PA-C  02/20/24  [id number]

## 2024-02-20 NOTE — ED PROVIDER NOTES
MD ATTESTATION NOTE    The VINNIE and I have discussed this patient's history, physical exam, and treatment plan.  I have reviewed the documentation and personally had a face to face interaction with the patient. I affirm the documentation and agree with the treatment and plan.  The attached note describes my personal findings.      I provided a substantive portion of the care of the patient.  I personally performed the physical exam in its entirety, and below are my findings.        Brief HPI: Patient presented emergency department with generalized weakness, lightheadedness, shortness of breath.  Ongoing for last 3 to 4 days, mild.  Also had some cough and nausea which developed today.  No other recent illness.  No fever.  No chest pain.  Has not eaten very much for the last couple days.    PHYSICAL EXAM  ED Triage Vitals   Temp Heart Rate Resp BP SpO2   02/20/24 0915 02/20/24 0915 02/20/24 0915 02/20/24 0938 02/20/24 0915   97 °F (36.1 °C) 74 20 131/61 96 %      Temp src Heart Rate Source Patient Position BP Location FiO2 (%)   -- -- 02/20/24 1211 -- --     Other (Comment)           GENERAL: no acute distress  HENT: nares patent  EYES: no scleral icterus  CV: regular rhythm, normal rate  RESPIRATORY: normal effort  ABDOMEN: soft  MUSCULOSKELETAL: no deformity  NEURO: alert, moves all extremities, follows commands  PSYCH:  calm, cooperative  SKIN: warm, dry    Vital signs and nursing notes reviewed.        Plan: Patient presented emergency department with generalized fatigue, otherwise well-appearing, vitals otherwise stable.  Labs and imaging otherwise appear largely at her baseline.  Unclear etiology of symptoms.  Offered admission for further testing however patient would like to go home and follow-up as an outpatient.  Given return precautions and discharged home.    ED Course as of 02/20/24 1521   Tue Feb 20, 2024   1019 EKG interpreted myself:  1005, sinus rhythm rate of 54, T wave inversion in anterior  precordial leads without acute ST segment changes or T wave inversions. [FS]   1019 Chest x-ray interpreted myself:  No pneumothorax or infiltrate. [FS]   1032 WBC: 8.41 [MP]   1032 Hemoglobin(!): 11.4 [MP]   1032 BUN(!): 25 [MP]   1032 Creatinine(!): 2.07 [MP]   1157 Nitrite, UA: Negative [MP]   1157 Leukocytes, UA: Negative [MP]   1157 RBC, UA: 0-2 [MP]   1157 WBC, UA(!): 6-10 [MP]   1157 Bacteria, UA: None Seen [MP]   1212 Patient maintained O2 sat of 96% while ambulating, but upon returning to bed she did desaturate to 89%.   [MP]   1428 Patient presents emergency department with generalized weakness, shortness of breath, cough.  Worked up today with labs, RPP, urinalysis, chest x-ray, EKG.  No evidence of acute ischemia on EKG.  Chest x-ray negative, labs grossly unremarkable.  Patient does have chronic renal insufficiency with minimal bump in serum creatinine.  She was able to walk and maintain O2 saturations.  She did briefly desaturate to 89% but returned quickly into the low 90s.  I did offer patient admission for further workup of borderline hypoxia but she declines.  Will place referral to cardiology for further workup and evaluation.  Encouraged her to follow-up with PCP and discussed ED return precautions.  She is otherwise well-appearing, hemodynamically stable, and therefore appropriate for discharge. [MP]      ED Course User Index  [FS] Toni Weber MD  [MP] Yumiko Mendez, NANCY       SHARED VISIT: This visit was performed by BOTH a physician and an APC. The substantive portion of the medical decision making was performed by this attesting physician who made or approved the management plan and takes responsibility for patient management. All studies in the APC note (if performed) were independently interpreted by me.        Toni Wbeer MD  02/20/24 [id number]

## 2024-02-28 ENCOUNTER — OFFICE VISIT (OUTPATIENT)
Dept: CARDIOLOGY | Facility: CLINIC | Age: 74
End: 2024-02-28
Payer: MEDICARE

## 2024-02-28 VITALS
DIASTOLIC BLOOD PRESSURE: 70 MMHG | HEART RATE: 61 BPM | BODY MASS INDEX: 29.09 KG/M2 | HEIGHT: 58 IN | OXYGEN SATURATION: 99 % | WEIGHT: 138.6 LBS | SYSTOLIC BLOOD PRESSURE: 120 MMHG

## 2024-02-28 DIAGNOSIS — R06.02 EXERTIONAL SHORTNESS OF BREATH: ICD-10-CM

## 2024-02-28 DIAGNOSIS — I25.10 CORONARY ARTERY CALCIFICATION SEEN ON CAT SCAN: Primary | ICD-10-CM

## 2024-02-28 DIAGNOSIS — I10 ESSENTIAL HYPERTENSION: ICD-10-CM

## 2024-02-28 PROBLEM — I25.84 CORONARY ARTERY CALCIFICATION: Status: ACTIVE | Noted: 2024-02-28

## 2024-02-28 PROCEDURE — 3074F SYST BP LT 130 MM HG: CPT | Performed by: INTERNAL MEDICINE

## 2024-02-28 PROCEDURE — 99204 OFFICE O/P NEW MOD 45 MIN: CPT | Performed by: INTERNAL MEDICINE

## 2024-02-28 PROCEDURE — 3078F DIAST BP <80 MM HG: CPT | Performed by: INTERNAL MEDICINE

## 2024-02-28 RX ORDER — ASPIRIN 81 MG/1
81 TABLET ORAL DAILY
Qty: 90 TABLET | Refills: 3 | Status: SHIPPED | OUTPATIENT
Start: 2024-02-28

## 2024-02-28 RX ORDER — TOPIRAMATE 25 MG/1
25 CAPSULE, COATED PELLETS ORAL 2 TIMES DAILY
COMMUNITY

## 2024-02-28 NOTE — PROGRESS NOTES
PATIENTINFORMATION    Date of Office Visit: 2024  Encounter Provider: Deon Lua MD  Place of Service: [facility] CARDIOLOGY  Patient Name: Marlena Navarrete  : 1950    Subjective:     Encounter Date:2024      Patient ID: Marlena Navarrete is a 74 y.o. female.    No chief complaint on file.    HPI  Ms. Navarrete is a pleasant 74 years old lady who came to cardiology clinic for evaluation of shortness of breath.  She had to go to the ER on 2024 with worsening shortness of breath of about 4-5 days.  It is exertional and relieved with rest but she denies any exertional chest pain.  No orthopnea, PND, palpitations, presyncope syncope or extremity swelling.  Other than hypertension no known prior cardiovascular illness.  She is very compliant with her medications with excellent blood pressure control.  She had living donor kidney transplant from her sister back in  and currently not on immunosuppressive therapy.  Her kidney function has started to decline over the last few years.  She had stress test in the remote past.  No coronary angiogram.      ROS  All systems reviewed and negative except as noted in HPI.    Past Medical History:   Diagnosis Date    Arthritis     OSTEO    Chronic back pain     Chronic bilateral low back pain with right-sided sciatica 2020    CKD (chronic kidney disease)     FOLLOWED BY DR LANRE BYNUM    Diverticulosis     Foot drop, right     GERD (gastroesophageal reflux disease)     History of DVT (deep vein thrombosis) 2018    RIGHT CALF S/P BACK SURGERY     History of transfusion     no reaction    Hyperlipidemia     Hypertension     Kidney transplant recipient 1979    HX TRANSPLANT D/T REFLUX     Neuropathy     Secondary hyperparathyroidism  3/9/2022    Spinal cord stimulator status 3/9/2022    Spinal headache     Stage 3b chronic kidney disease 2020    FOLLOWED BY DR LANRE BYNUM    Torn rotator cuff     Vitamin D deficiency 3/9/2022        Past Surgical History:   Procedure Laterality Date    APPENDECTOMY      CATARACT EXTRACTION EXTRACAPSULAR W/ INTRAOCULAR LENS IMPLANTATION       SECTION      COLONOSCOPY  approx     Anastacio WHARTON    ENDOSCOPY N/A 2020    Procedure: ESOPHAGOGASTRODUODENOSCOPY with biopsies;  Surgeon: Wicho Chaves MD;  Location: [facility] ENDOSCOPY;  Service: Gastroenterology;  Laterality: N/A;  pre- epigastric pain, nausea  post-- gastritis, duodenitis, bile reflux    ENDOSCOPY N/A 2023    Procedure: ESOPHAGOGASTRODUODENOSCOPY with bx;  Surgeon: Jovan Robin MD;  Location: [facility] ENDOSCOPY;  Service: Gastroenterology;  Laterality: N/A;  pre: abd pain, nausea, vomiting   post: normal    LUMBAR DISCECTOMY FUSION INSTRUMENTATION Right 2018    Procedure: Right L2-3 laminectomy with Metrix;  Surgeon: Oscar Paulino MD;  Location: [facility] MAIN OR;  Service: Neurosurgery    SHOULDER ARTHROSCOPY W/ ROTATOR CUFF REPAIR Right 2020    Procedure: SHOULDER ARTHROSCOPY, ROTATOR CUFF REPAIR, SUBACROMIAL DECOMPRESSION, DISTAL CLAVICLE EXCISION, BICEPS TENOTOMY, AND LABRAL DEBRIDEMENT;  Surgeon: Rishabh Florence MD;  Location: [facility] OR OSC;  Service: Orthopedics;  Laterality: Right;    SHOULDER ROTATOR CUFF REPAIR Left     SPINAL CORD STIMULATOR IMPLANT N/A 2020    Procedure: SPINAL CORD STIMULATOR INSERTION PHASE 2, triall of right gluteal internal pulse generator;  Surgeon: Cuate Kemp MD;  Location: [facility] OR;  Service: Neurosurgery;  Laterality: N/A;    SPINAL CORD STIMULATOR IMPLANT N/A 2020    Procedure: SPINAL CORD STIMULATOR INSERTION PHASE 1, Thoracic 10 laminotomy for revision of St. Tom surgical lead at Thoracic 8 to Thoracic 9;  Surgeon: Cuate Kemp MD;  Location: [facility] OR;  Service: Neurosurgery;  Laterality: N/A;    TONSILLECTOMY      TRANSPLANTATION RENAL Right 1979    SISTER GAVE PT     TUBAL ABDOMINAL LIGATION         Social History  "    Socioeconomic History    Marital status:     Number of children: 1    Years of education: 12   Tobacco Use    Smoking status: Former     Packs/day: 1.00     Years: 30.00     Additional pack years: 0.00     Total pack years: 30.00     Types: Cigarettes     Quit date: 2012     Years since quittin.8    Smokeless tobacco: Never   Vaping Use    Vaping Use: Never used   Substance and Sexual Activity    Alcohol use: No    Drug use: Never    Sexual activity: Defer       Family History   Problem Relation Age of Onset    Aneurysm Mother         BRAIN ANEURYSM    Heart failure Father     Kidney failure Father     Lung cancer Sister     Lung cancer Sister     Lung cancer Son     [place] Hyperthermia Neg Hx          Procedures       Objective:     /70   Pulse 61   Ht 147.3 cm (58\")   Wt 62.9 kg (138 lb 9.6 oz)   SpO2 99%   BMI 28.97 kg/m²  Body mass index is 28.97 kg/m².     Constitutional:       General: Not in acute distress.     Appearance: Well-developed. Not diaphoretic.   Eyes:      Pupils: Pupils are equal, round, and reactive to light.   HENT:      Head: Normocephalic and atraumatic.   Neck:      Thyroid: No thyromegaly.   Pulmonary:      Effort: Pulmonary effort is normal. No respiratory distress.      Breath sounds: Normal breath sounds. No wheezing. No rales.   Chest:      Chest wall: Not tender to palpatation.   Cardiovascular:      Normal rate. Regular rhythm.      No gallop.    Pulses:     Intact distal pulses.   Edema:     Peripheral edema absent.   Abdominal:      General: Bowel sounds are normal. There is no distension.      Palpations: Abdomen is soft.      Tenderness: There is no guarding.   Musculoskeletal: Normal range of motion.         General: No deformity.      Cervical back: Normal range of motion and neck supple. Skin:     General: Skin is warm and dry.      Findings: No rash.   Neurological:      Mental Status: Alert and oriented to person, place, and time.      Cranial " Nerves: No cranial nerve deficit.      Deep Tendon Reflexes: Reflexes are normal and symmetric.   Psychiatric:         Judgment: Judgment normal.         Review Of Data: I have reviewed pertinent recent labs, images and documents and pertinent findings included in HPI or assessment below.          Assessment/Plan:         Exertional shortness of breath relatively subacute.  Most recent ER visit without any significant abnormalities.  Denies any associated symptoms like chest pain, presyncope syncope, cough, wheezing.  Pain is not pleuritic.  Essential hypertension-excellent control on current regimen  Significant coronary artery calcification incidentally noted on thoracic CT  Chronic kidney disease/history of transplant kidney from her sister  Hyperlipidemia on statin    Myocardial perfusion study to rule out any significant obstructive coronary artery disease.  Echocardiogram for further evaluation of exertional shortness of breath.  She will start taking aspirin 81mg daily   Otherwise continue current care.    Diagnosis and plan of care discussed with patient and verbalized understanding.            Your medication list            Accurate as of February 28, 2024  3:11 PM. If you have any questions, ask your nurse or doctor.                CHANGE how you take these medications        Instructions Last Dose Given Next Dose Due   metoprolol succinate XL 25 MG 24 hr tablet  Commonly known as: TOPROL-XL  What changed: Another medication with the same name was removed. Continue taking this medication, and follow the directions you see here.  Changed by: Deon Lua MD      Take 1 tablet by mouth Daily.              CONTINUE taking these medications        Instructions Last Dose Given Next Dose Due   acetaminophen 500 MG tablet  Commonly known as: TYLENOL      Take 1 tablet by mouth Daily.       amLODIPine 5 MG tablet  Commonly known as: NORVASC      Take 1 tablet by mouth Daily.       atorvastatin 40 MG  tablet  Commonly known as: LIPITOR      Take 1 tablet by mouth Every Night.       diphenhydrAMINE-acetaminophen  MG tablet per tablet  Commonly known as: TYLENOL PM      Take 1 tablet by mouth At Night As Needed for Sleep.       famotidine 40 MG tablet  Commonly known as: Pepcid      Take 1 tablet by mouth Daily.       ondansetron 4 MG tablet  Commonly known as: ZOFRAN      Take 1 tablet by mouth Every 8 (Eight) Hours As Needed for Nausea or Vomiting.       oxyCODONE-acetaminophen 5-325 MG per tablet  Commonly known as: PERCOCET      Take 1 tablet by mouth Every 6 (Six) Hours As Needed for Severe Pain.       oxyCODONE-acetaminophen 7.5-325 MG per tablet  Commonly known as: PERCOCET      Take 1 tablet by mouth 3 times a day.       polyethylene glycol 17 g packet  Commonly known as: MIRALAX      Take 17 g by mouth.       sodium bicarbonate 650 MG tablet           topiramate 25 MG capsule (sprinkle)  Commonly known as: TOPAMAX      Take 1 capsule by mouth 2 (Two) Times a Day.       vitamin D3 125 MCG (5000 UT) capsule capsule      Take 2,000 Units by mouth Daily.                    Deon Lua MD  02/28/24  15:11 EST

## 2024-03-18 ENCOUNTER — TELEPHONE (OUTPATIENT)
Dept: CARDIOLOGY | Facility: CLINIC | Age: 74
End: 2024-03-18
Payer: MEDICARE

## 2024-03-19 ENCOUNTER — HOSPITAL ENCOUNTER (OUTPATIENT)
Dept: CARDIOLOGY | Facility: HOSPITAL | Age: 74
Discharge: HOME | End: 2024-03-19
Payer: MEDICARE

## 2024-03-19 VITALS
HEART RATE: 53 BPM | WEIGHT: 138 LBS | BODY MASS INDEX: 28.97 KG/M2 | HEIGHT: 58 IN | OXYGEN SATURATION: 95 % | SYSTOLIC BLOOD PRESSURE: 148 MMHG | DIASTOLIC BLOOD PRESSURE: 62 MMHG

## 2024-03-19 DIAGNOSIS — I25.10 CORONARY ARTERY CALCIFICATION SEEN ON CAT SCAN: ICD-10-CM

## 2024-03-19 DIAGNOSIS — R06.02 EXERTIONAL SHORTNESS OF BREATH: ICD-10-CM

## 2024-03-19 LAB
AORTIC ARCH: 1.9 CM
ASCENDING AORTA: 3.1 CM
BH CV ECHO MEAS - ACS: 1.97 CM
BH CV ECHO MEAS - AO MAX PG: 6.7 MMHG
BH CV ECHO MEAS - AO MEAN PG: 3 MMHG
BH CV ECHO MEAS - AO ROOT DIAM: 3.2 CM
BH CV ECHO MEAS - AO V2 MAX: 129 CM/SEC
BH CV ECHO MEAS - AO V2 VTI: 36.3 CM
BH CV ECHO MEAS - AVA(I,D): 2.36 CM2
BH CV ECHO MEAS - EDV(CUBED): 103.8 ML
BH CV ECHO MEAS - EDV(MOD-SP2): 63 ML
BH CV ECHO MEAS - EDV(MOD-SP4): 69 ML
BH CV ECHO MEAS - EF(MOD-BP): 70.7 %
BH CV ECHO MEAS - EF(MOD-SP2): 68.3 %
BH CV ECHO MEAS - EF(MOD-SP4): 71 %
BH CV ECHO MEAS - ESV(CUBED): 31.2 ML
BH CV ECHO MEAS - ESV(MOD-SP2): 20 ML
BH CV ECHO MEAS - ESV(MOD-SP4): 20 ML
BH CV ECHO MEAS - FS: 33 %
BH CV ECHO MEAS - IVS/LVPW: 0.89 CM
BH CV ECHO MEAS - IVSD: 0.8 CM
BH CV ECHO MEAS - LAT PEAK E' VEL: 8.2 CM/SEC
BH CV ECHO MEAS - LV DIASTOLIC VOL/BSA (35-75): 44.4 CM2
BH CV ECHO MEAS - LV MASS(C)D: 132.3 GRAMS
BH CV ECHO MEAS - LV MAX PG: 4.8 MMHG
BH CV ECHO MEAS - LV MEAN PG: 2 MMHG
BH CV ECHO MEAS - LV SYSTOLIC VOL/BSA (12-30): 12.9 CM2
BH CV ECHO MEAS - LV V1 MAX: 109 CM/SEC
BH CV ECHO MEAS - LV V1 VTI: 27.9 CM
BH CV ECHO MEAS - LVIDD: 4.7 CM
BH CV ECHO MEAS - LVIDS: 3.1 CM
BH CV ECHO MEAS - LVOT AREA: 3.1 CM2
BH CV ECHO MEAS - LVOT DIAM: 1.98 CM
BH CV ECHO MEAS - LVPWD: 0.9 CM
BH CV ECHO MEAS - MED PEAK E' VEL: 8.3 CM/SEC
BH CV ECHO MEAS - MR MAX PG: 80.2 MMHG
BH CV ECHO MEAS - MR MAX VEL: 447.7 CM/SEC
BH CV ECHO MEAS - MV A DUR: 0.12 SEC
BH CV ECHO MEAS - MV A MAX VEL: 134 CM/SEC
BH CV ECHO MEAS - MV DEC SLOPE: 358.8 CM/SEC2
BH CV ECHO MEAS - MV DEC TIME: 0.24 SEC
BH CV ECHO MEAS - MV E MAX VEL: 99.2 CM/SEC
BH CV ECHO MEAS - MV E/A: 0.74
BH CV ECHO MEAS - MV MAX PG: 8.4 MMHG
BH CV ECHO MEAS - MV MEAN PG: 2.48 MMHG
BH CV ECHO MEAS - MV P1/2T: 98.6 MSEC
BH CV ECHO MEAS - MV V2 VTI: 55.6 CM
BH CV ECHO MEAS - MVA(P1/2T): 2.23 CM2
BH CV ECHO MEAS - MVA(VTI): 1.54 CM2
BH CV ECHO MEAS - PA ACC TIME: 0.14 SEC
BH CV ECHO MEAS - PA V2 MAX: 74.7 CM/SEC
BH CV ECHO MEAS - PULM A REVS DUR: 0.13 SEC
BH CV ECHO MEAS - PULM A REVS VEL: 26.8 CM/SEC
BH CV ECHO MEAS - PULM DIAS VEL: 32.3 CM/SEC
BH CV ECHO MEAS - PULM S/D: 1.66
BH CV ECHO MEAS - PULM SYS VEL: 53.6 CM/SEC
BH CV ECHO MEAS - QP/QS: 0.66
BH CV ECHO MEAS - RV MAX PG: 1.59 MMHG
BH CV ECHO MEAS - RV V1 MAX: 63 CM/SEC
BH CV ECHO MEAS - RV V1 VTI: 18.1 CM
BH CV ECHO MEAS - RVOT DIAM: 1.99 CM
BH CV ECHO MEAS - SI(MOD-SP2): 27.6 ML/M2
BH CV ECHO MEAS - SI(MOD-SP4): 31.5 ML/M2
BH CV ECHO MEAS - SUP REN AO DIAM: 2 CM
BH CV ECHO MEAS - SV(LVOT): 85.7 ML
BH CV ECHO MEAS - SV(MOD-SP2): 43 ML
BH CV ECHO MEAS - SV(MOD-SP4): 49 ML
BH CV ECHO MEAS - SV(RVOT): 56.2 ML
BH CV ECHO MEAS - TAPSE (>1.6): 1.96 CM
BH CV ECHO MEASUREMENTS AVERAGE E/E' RATIO: 12.02
BH CV NUCLEAR PRIOR STUDY: 2
BH CV REST NUCLEAR ISOTOPE DOSE: 60 MCI
BH CV STRESS BP STAGE 1: NORMAL
BH CV STRESS COMMENTS STAGE 1: NORMAL
BH CV STRESS DOSE REGADENOSON STAGE 1: 0.4
BH CV STRESS DURATION MIN STAGE 1: 0
BH CV STRESS DURATION SEC STAGE 1: 10
BH CV STRESS HR STAGE 1: 84
BH CV STRESS NUCLEAR ISOTOPE DOSE: 60 MCI
BH CV STRESS PROTOCOL 1: NORMAL
BH CV STRESS RECOVERY BP: NORMAL MMHG
BH CV STRESS RECOVERY HR: 61 BPM
BH CV STRESS STAGE 1: 1
BH CV XLRA - RV BASE: 2.5 CM
BH CV XLRA - RV LENGTH: 6.6 CM
BH CV XLRA - RV MID: 1.75 CM
BH CV XLRA - TDI S': 12.4 CM/SEC
LEFT ATRIUM VOLUME INDEX: 20.7 ML/M2
LV EF NUC BP: 61 %
MAXIMAL PREDICTED HEART RATE: 148 BPM
PERCENT MAX PREDICTED HR: 56.76 %
SINUS: 3.1 CM
STJ: 2.46 CM
STRESS BASELINE BP: NORMAL MMHG
STRESS BASELINE HR: 51 BPM
STRESS PERCENT HR: 67 %
STRESS POST EXERCISE DUR SEC: 10 SEC
STRESS POST PEAK BP: NORMAL MMHG
STRESS POST PEAK HR: 84 BPM
STRESS TARGET HR: 126 BPM

## 2024-03-19 PROCEDURE — 78492 MYOCRD IMG PET MLT RST&STRS: CPT

## 2024-03-19 PROCEDURE — 93017 CV STRESS TEST TRACING ONLY: CPT

## 2024-03-19 PROCEDURE — 0 RUBIDIUM CHLORIDE: Performed by: INTERNAL MEDICINE

## 2024-03-19 PROCEDURE — A9555 RB82 RUBIDIUM: HCPCS | Performed by: INTERNAL MEDICINE

## 2024-03-19 PROCEDURE — 25010000002 AMINOPHYLLINE PER 250 MG: Performed by: INTERNAL MEDICINE

## 2024-03-19 PROCEDURE — 25010000002 REGADENOSON 0.4 MG/5ML SOLUTION: Performed by: INTERNAL MEDICINE

## 2024-03-19 PROCEDURE — 93306 TTE W/DOPPLER COMPLETE: CPT

## 2024-03-19 RX ORDER — AMINOPHYLLINE 25 MG/ML
125 INJECTION, SOLUTION INTRAVENOUS ONCE AS NEEDED
Status: COMPLETED | OUTPATIENT
Start: 2024-03-19 | End: 2024-03-19

## 2024-03-19 RX ORDER — REGADENOSON 0.08 MG/ML
0.4 INJECTION, SOLUTION INTRAVENOUS
Status: COMPLETED | OUTPATIENT
Start: 2024-03-19 | End: 2024-03-19

## 2024-03-19 RX ADMIN — REGADENOSON 0.4 MG: 0.08 INJECTION, SOLUTION INTRAVENOUS at 13:35

## 2024-03-19 RX ADMIN — AMINOPHYLLINE 125 MG: 25 INJECTION, SOLUTION INTRAVENOUS at 13:38

## 2024-03-21 ENCOUNTER — TELEPHONE (OUTPATIENT)
Dept: CARDIOLOGY | Facility: CLINIC | Age: 74
End: 2024-03-21
Payer: MEDICARE

## 2024-03-21 NOTE — TELEPHONE ENCOUNTER
Dr. Lua,    Pt called this afternoon. She was wondering if we have her stress and echo results back yet. Is there anything you'd like me to go over with her?    Thank you,    Guerita Jacobs RN  Triage [facility]  03/21/24 14:02 EDT

## 2024-03-22 DIAGNOSIS — R94.39 ABNORMAL NUCLEAR STRESS TEST: Primary | ICD-10-CM

## 2024-03-25 NOTE — TELEPHONE ENCOUNTER
Notified pt of recommendations from Kamlesh. Pt verbalized understanding.    Thank you,    Guerita Jacobs, RN  [place] [facility] [place]  03/25/24 15:09 EDT

## 2024-03-27 ENCOUNTER — TELEPHONE (OUTPATIENT)
Dept: CARDIOLOGY | Facility: CLINIC | Age: 74
End: 2024-03-27
Payer: MEDICARE

## 2024-03-27 ENCOUNTER — LAB (OUTPATIENT)
Dept: LAB | Facility: HOSPITAL | Age: 74
End: 2024-03-27
Payer: MEDICARE

## 2024-03-27 ENCOUNTER — TRANSCRIBE ORDERS (OUTPATIENT)
Dept: CARDIOLOGY | Facility: CLINIC | Age: 74
End: 2024-03-27
Payer: MEDICARE

## 2024-03-27 DIAGNOSIS — Z13.6 SCREENING FOR ISCHEMIC HEART DISEASE: ICD-10-CM

## 2024-03-27 DIAGNOSIS — Z01.810 PRE-OPERATIVE CARDIOVASCULAR EXAMINATION: ICD-10-CM

## 2024-03-27 DIAGNOSIS — Z01.810 PRE-OPERATIVE CARDIOVASCULAR EXAMINATION: Primary | ICD-10-CM

## 2024-03-27 PROBLEM — R94.39 ABNORMAL NUCLEAR STRESS TEST: Status: ACTIVE | Noted: 2024-03-22

## 2024-03-27 LAB
ANION GAP SERPL CALCULATED.3IONS-SCNC: 13.8 MMOL/L (ref 5–15)
BASOPHILS # BLD AUTO: 0.07 10*3/MM3 (ref 0–0.2)
BASOPHILS NFR BLD AUTO: 0.8 % (ref 0–1.5)
BUN SERPL-MCNC: 31 MG/DL (ref 8–23)
BUN/CREAT SERPL: 12.3 (ref 7–25)
CALCIUM SPEC-SCNC: 9.4 MG/DL (ref 8.6–10.5)
CHLORIDE SERPL-SCNC: 107 MMOL/L (ref 98–107)
CO2 SERPL-SCNC: 20.2 MMOL/L (ref 22–29)
CREAT SERPL-MCNC: 2.53 MG/DL (ref 0.57–1)
DEPRECATED RDW RBC AUTO: 42.6 FL (ref 37–54)
EGFRCR SERPLBLD CKD-EPI 2021: 19.7 ML/MIN/1.73
EOSINOPHIL # BLD AUTO: 0.36 10*3/MM3 (ref 0–0.4)
EOSINOPHIL NFR BLD AUTO: 4.2 % (ref 0.3–6.2)
ERYTHROCYTE [DISTWIDTH] IN BLOOD BY AUTOMATED COUNT: 12.2 % (ref 12.3–15.4)
GLUCOSE SERPL-MCNC: 100 MG/DL (ref 65–99)
HCT VFR BLD AUTO: 32.8 % (ref 34–46.6)
HGB BLD-MCNC: 11 G/DL (ref 12–15.9)
IMM GRANULOCYTES # BLD AUTO: 0.01 10*3/MM3 (ref 0–0.05)
IMM GRANULOCYTES NFR BLD AUTO: 0.1 % (ref 0–0.5)
LYMPHOCYTES # BLD AUTO: 2.55 10*3/MM3 (ref 0.7–3.1)
LYMPHOCYTES NFR BLD AUTO: 29.8 % (ref 19.6–45.3)
MCH RBC QN AUTO: 32.4 PG (ref 26.6–33)
MCHC RBC AUTO-ENTMCNC: 33.5 G/DL (ref 31.5–35.7)
MCV RBC AUTO: 96.5 FL (ref 79–97)
MONOCYTES # BLD AUTO: 0.7 10*3/MM3 (ref 0.1–0.9)
MONOCYTES NFR BLD AUTO: 8.2 % (ref 5–12)
NEUTROPHILS NFR BLD AUTO: 4.88 10*3/MM3 (ref 1.7–7)
NEUTROPHILS NFR BLD AUTO: 56.9 % (ref 42.7–76)
NRBC BLD AUTO-RTO: 0 /100 WBC (ref 0–0.2)
PLATELET # BLD AUTO: 340 10*3/MM3 (ref 140–450)
PMV BLD AUTO: 10.1 FL (ref 6–12)
POTASSIUM SERPL-SCNC: 4.3 MMOL/L (ref 3.5–5.2)
RBC # BLD AUTO: 3.4 10*6/MM3 (ref 3.77–5.28)
SODIUM SERPL-SCNC: 141 MMOL/L (ref 136–145)
WBC NRBC COR # BLD AUTO: 8.57 10*3/MM3 (ref 3.4–10.8)

## 2024-03-27 PROCEDURE — 80048 BASIC METABOLIC PNL TOTAL CA: CPT

## 2024-03-27 PROCEDURE — 36415 COLL VENOUS BLD VENIPUNCTURE: CPT

## 2024-03-27 PROCEDURE — 85025 COMPLETE CBC W/AUTO DIFF WBC: CPT

## 2024-03-27 NOTE — TELEPHONE ENCOUNTER
Got pt scheduled for cath for tomorrow she is coming in today to get Labs to see if she will need fluids due to her kidneys.     Thanks Dee

## 2024-03-28 NOTE — PROGRESS NOTES
I tried to get hold of Ms. Navarrete to discuss her kidney function.  Because of elevated creatinine I canceled her heart cath with plan to discuss with her nephrologist.  In the meantime please advise to continue current care.  Follow-up with me in 1 month.Thank you

## 2024-04-24 ENCOUNTER — OFFICE VISIT (OUTPATIENT)
Dept: CARDIOLOGY | Facility: CLINIC | Age: 74
End: 2024-04-24
Payer: MEDICARE

## 2024-04-24 VITALS
HEART RATE: 67 BPM | WEIGHT: 134 LBS | SYSTOLIC BLOOD PRESSURE: 140 MMHG | OXYGEN SATURATION: 97 % | HEIGHT: 58 IN | DIASTOLIC BLOOD PRESSURE: 80 MMHG | BODY MASS INDEX: 28.13 KG/M2

## 2024-04-24 DIAGNOSIS — I25.10 CORONARY ARTERY CALCIFICATION: ICD-10-CM

## 2024-04-24 DIAGNOSIS — I25.84 CORONARY ARTERY CALCIFICATION: ICD-10-CM

## 2024-04-24 DIAGNOSIS — R94.39 ABNORMAL NUCLEAR STRESS TEST: Primary | ICD-10-CM

## 2024-04-24 DIAGNOSIS — I10 ESSENTIAL HYPERTENSION: ICD-10-CM

## 2024-04-24 PROCEDURE — 99214 OFFICE O/P EST MOD 30 MIN: CPT | Performed by: INTERNAL MEDICINE

## 2024-04-24 PROCEDURE — 3077F SYST BP >= 140 MM HG: CPT | Performed by: INTERNAL MEDICINE

## 2024-04-24 PROCEDURE — 3079F DIAST BP 80-89 MM HG: CPT | Performed by: INTERNAL MEDICINE

## 2024-04-24 NOTE — PROGRESS NOTES
PATIENTINFORMATION    Date of Office Visit: 2024  Encounter Provider: Deon Lua MD  Place of Service: [facility] CARDIOLOGY  Patient Name: Marlena Navarrete  : 1950    Subjective:     Encounter Date:2024      Patient ID: Marlena Navarrete is a 74 y.o. female.    Chief Complaint   Patient presents with    cath follow up     HPI  Ms. Navarrete is a pleasant 75 yo lady who came to cardiology clinic for follow-up visit and also discuss stress test done recently.  She is fairly active at home despite low back pain and right lower extremity radiculopathy pain and she ambulates with a cane.  She reports some shortness of breath that has not changed much recently but denies any significant chest discomfort at rest or during activities.  She denies any orthopnea, PND, palpitations, presyncope syncope or extremity swelling.  Blood pressure runs in the 130s/80s during home monitoring.  She follows up with nephrologist for CKD.      ROS  All systems reviewed and negative except as noted in HPI.    Past Medical History:   Diagnosis Date    Arthritis     OSTEO    Chronic back pain     Chronic bilateral low back pain with right-sided sciatica 2020    CKD (chronic kidney disease)     FOLLOWED BY DR LANRE BYNUM    Diverticulosis     Foot drop, right     GERD (gastroesophageal reflux disease)     History of DVT (deep vein thrombosis) 2018    RIGHT CALF S/P BACK SURGERY     History of transfusion     no reaction    Hyperlipidemia     Hypertension     Kidney transplant recipient 1979    HX TRANSPLANT D/T REFLUX     Neuropathy     Secondary hyperparathyroidism  3/9/2022    Spinal cord stimulator status 3/9/2022    Spinal headache     Stage 3b chronic kidney disease 2020    FOLLOWED BY DR LANRE BYNUM    Torn rotator cuff     Vitamin D deficiency 3/9/2022       Past Surgical History:   Procedure Laterality Date    APPENDECTOMY      CATARACT EXTRACTION EXTRACAPSULAR W/ INTRAOCULAR LENS  IMPLANTATION       SECTION      COLONOSCOPY  approx     Anastacio WHARTON    ENDOSCOPY N/A 2020    Procedure: ESOPHAGOGASTRODUODENOSCOPY with biopsies;  Surgeon: Wicho Chaves MD;  Location: [facility] ENDOSCOPY;  Service: Gastroenterology;  Laterality: N/A;  pre- epigastric pain, nausea  post-- gastritis, duodenitis, bile reflux    ENDOSCOPY N/A 2023    Procedure: ESOPHAGOGASTRODUODENOSCOPY with bx;  Surgeon: Jovan Robin MD;  Location: [facility] ENDOSCOPY;  Service: Gastroenterology;  Laterality: N/A;  pre: abd pain, nausea, vomiting   post: normal    LUMBAR DISCECTOMY FUSION INSTRUMENTATION Right 2018    Procedure: Right L2-3 laminectomy with Metrix;  Surgeon: Oscar Paulino MD;  Location: [facility] OR;  Service: Neurosurgery    SHOULDER ARTHROSCOPY W/ ROTATOR CUFF REPAIR Right 2020    Procedure: SHOULDER ARTHROSCOPY, ROTATOR CUFF REPAIR, SUBACROMIAL DECOMPRESSION, DISTAL CLAVICLE EXCISION, BICEPS TENOTOMY, AND LABRAL DEBRIDEMENT;  Surgeon: Rishabh Florence MD;  [place] [facility]  Service: Orthopedics;  Laterality: Right;    SHOULDER ROTATOR CUFF REPAIR Left     SPINAL CORD STIMULATOR IMPLANT N/A 2020    Procedure: SPINAL CORD STIMULATOR INSERTION PHASE 2, triall of right gluteal internal pulse generator;  Surgeon: Cuate Kemp MD;  Location: [facility] OR;  Service: Neurosurgery;  Laterality: N/A;    SPINAL CORD STIMULATOR IMPLANT N/A 2020    Procedure: SPINAL CORD STIMULATOR INSERTION PHASE 1, Thoracic 10 laminotomy for revision of St. Tom surgical lead at Thoracic 8 to Thoracic 9;  Surgeon: Cuate Kemp MD;  Location: [facility] OR;  Service: Neurosurgery;  Laterality: N/A;    TONSILLECTOMY      TRANSPLANTATION RENAL Right 1979    SISTER GAVE PT     TUBAL ABDOMINAL LIGATION         Social History     Socioeconomic History    Marital status:     Number of children: 1    Years of education: 12   Tobacco Use    Smoking status: Former     " Current packs/day: 0.00     Average packs/day: 1 pack/day for 30.0 years (30.0 ttl pk-yrs)     Types: Cigarettes     Start date: 1982     Quit date: 2012     Years since quittin.0    Smokeless tobacco: Never   Vaping Use    Vaping status: Never Used   Substance and Sexual Activity    Alcohol use: No    Drug use: Never    Sexual activity: Defer       Family History   Problem Relation Age of Onset    Aneurysm Mother         BRAIN ANEURYSM    Heart failure Father     Kidney failure Father     Lung cancer Sister     Lung cancer Sister     Lung cancer Son     [place] Hyperthermia Neg Hx          Procedures       Objective:     /80   Pulse 67   Ht 147.3 cm (58\")   Wt 60.8 kg (134 lb)   SpO2 97%   BMI 28.01 kg/m²  Body mass index is 28.01 kg/m².     Constitutional:       General: Not in acute distress.     Appearance: Well-developed. Not diaphoretic.   Eyes:      Pupils: Pupils are equal, round, and reactive to light.   HENT:      Head: Normocephalic and atraumatic.   Neck:      Thyroid: No thyromegaly.   Pulmonary:      Effort: Pulmonary effort is normal. No respiratory distress.      Breath sounds: Normal breath sounds. No wheezing. No rales.   Chest:      Chest wall: Not tender to palpatation.   Cardiovascular:      Normal rate. Regular rhythm.      No gallop.    Pulses:     Intact distal pulses.   Edema:     Peripheral edema absent.   Abdominal:      General: Bowel sounds are normal. There is no distension.      Palpations: Abdomen is soft.      Tenderness: There is no guarding.   Musculoskeletal: Normal range of motion.         General: No deformity.      Cervical back: Normal range of motion and neck supple. Skin:     General: Skin is warm and dry.      Findings: No rash.   Neurological:      Mental Status: Alert and oriented to person, place, and time.      Cranial Nerves: No cranial nerve deficit.      Deep Tendon Reflexes: Reflexes are normal and symmetric.   Psychiatric:         Judgment: " Judgment normal.         Review Of Data: I have reviewed pertinent recent labs, images and documents and pertinent findings included in HPI or assessment below.          Assessment/Plan:     Exertional shortness of breath-no significant changes since last office visit.  Most recent ER visit without any significant abnormalities.  Denies any associated symptoms like chest pain, presyncope syncope, cough, wheezing.  Mildly abnormal myocardial perfusion study with apical inferior wall ischemia.  Preserved left ventricular ejection fraction.  Mild valvular abnormality on echo.  Coronary angiogram deferred because of signs of ischemia/minimal patient's symptoms and chronic kidney disease on transplanted kidney.  Essential hypertension-excellent control on current regimen  Significant coronary artery calcification incidentally noted on thoracic CT  Chronic kidney disease/history of transplant kidney from her sister  Hyperlipidemia on statin  Severe chronic low back pain and right lower extremity pain on Percocet.  Ambulates using a cane.     Ms. Navarrete is doing fairly well from cardiology standpoint.  Blood pressure near normal.  Encouraged more physical activities/walking and watching out diet to control blood pressure.  Otherwise continue current care  Follow-up in 6 months or sooner with any concerning symptoms.    Diagnosis and plan of care discussed with patient and verbalized understanding.            Your medication list            Accurate as of April 24, 2024  2:05 PM. If you have any questions, ask your nurse or doctor.                CONTINUE taking these medications        Instructions Last Dose Given Next Dose Due   acetaminophen 500 MG tablet  Commonly known as: TYLENOL      Take 1 tablet by mouth Daily.       amLODIPine 5 MG tablet  Commonly known as: NORVASC      Take 1 tablet by mouth Daily.       aspirin 81 MG EC tablet      Take 1 tablet by mouth Daily.       atorvastatin 40 MG tablet  Commonly known  as: LIPITOR      Take 1 tablet by mouth Every Night.       diphenhydrAMINE-acetaminophen  MG tablet per tablet  Commonly known as: TYLENOL PM      Take 1 tablet by mouth Every Night.       famotidine 40 MG tablet  Commonly known as: Pepcid      Take 1 tablet by mouth Daily.       metoprolol succinate XL 25 MG 24 hr tablet  Commonly known as: TOPROL-XL      Take 1 tablet by mouth Daily.       ondansetron 4 MG tablet  Commonly known as: ZOFRAN      Take 1 tablet by mouth Every 8 (Eight) Hours As Needed for Nausea or Vomiting.       oxyCODONE-acetaminophen 5-325 MG per tablet  Commonly known as: PERCOCET      Take 1 tablet by mouth Every 6 (Six) Hours As Needed for Severe Pain.       oxyCODONE-acetaminophen 7.5-325 MG per tablet  Commonly known as: PERCOCET      Take 1 tablet by mouth 3 times a day.       polyethylene glycol 17 g packet  Commonly known as: MIRALAX      Take 17 g by mouth.       sodium bicarbonate 650 MG tablet      Take 1 tablet by mouth 3 (Three) Times a Day.       vitamin D3 125 MCG (5000 UT) capsule capsule      Take 2,000 Units by mouth Daily.                    Deon Lua MD  04/24/24  14:05 EDT

## 2024-04-26 ENCOUNTER — APPOINTMENT (OUTPATIENT)
Dept: CT IMAGING | Facility: HOSPITAL | Age: 74
DRG: 394 | End: 2024-04-26
Payer: MEDICARE

## 2024-04-26 ENCOUNTER — HOSPITAL ENCOUNTER (INPATIENT)
Facility: HOSPITAL | Age: 74
LOS: 6 days | Discharge: HOME | DRG: 394 | End: 2024-05-03
Attending: STUDENT IN AN ORGANIZED HEALTH CARE EDUCATION/TRAINING PROGRAM | Admitting: INTERNAL MEDICINE
Payer: MEDICARE

## 2024-04-26 DIAGNOSIS — K59.03 DRUG-INDUCED CONSTIPATION: Primary | ICD-10-CM

## 2024-04-26 DIAGNOSIS — R10.9 INTRACTABLE ABDOMINAL PAIN: ICD-10-CM

## 2024-04-26 PROBLEM — K59.00 CONSTIPATION: Status: ACTIVE | Noted: 2024-04-26

## 2024-04-26 LAB
ALBUMIN SERPL-MCNC: 5.1 G/DL (ref 3.5–5.2)
ALBUMIN/GLOB SERPL: 2 G/DL
ALP SERPL-CCNC: 118 U/L (ref 39–117)
ALT SERPL W P-5'-P-CCNC: 17 U/L (ref 1–33)
ANION GAP SERPL CALCULATED.3IONS-SCNC: 16 MMOL/L (ref 5–15)
AST SERPL-CCNC: 24 U/L (ref 1–32)
BACTERIA UR QL AUTO: NORMAL /HPF
BASOPHILS # BLD AUTO: 0.09 10*3/MM3 (ref 0–0.2)
BASOPHILS NFR BLD AUTO: 0.5 % (ref 0–1.5)
BILIRUB SERPL-MCNC: 0.4 MG/DL (ref 0–1.2)
BILIRUB UR QL STRIP: NEGATIVE
BUN SERPL-MCNC: 28 MG/DL (ref 8–23)
BUN/CREAT SERPL: 11.2 (ref 7–25)
CALCIUM SPEC-SCNC: 10.3 MG/DL (ref 8.6–10.5)
CHLORIDE SERPL-SCNC: 106 MMOL/L (ref 98–107)
CLARITY UR: CLEAR
CO2 SERPL-SCNC: 19 MMOL/L (ref 22–29)
COLOR UR: ABNORMAL
CREAT SERPL-MCNC: 2.49 MG/DL (ref 0.57–1)
D-LACTATE SERPL-SCNC: 1.8 MMOL/L (ref 0.5–2)
DEPRECATED RDW RBC AUTO: 41.1 FL (ref 37–54)
EGFRCR SERPLBLD CKD-EPI 2021: 20.1 ML/MIN/1.73
EOSINOPHIL # BLD AUTO: 0.04 10*3/MM3 (ref 0–0.4)
EOSINOPHIL NFR BLD AUTO: 0.2 % (ref 0.3–6.2)
ERYTHROCYTE [DISTWIDTH] IN BLOOD BY AUTOMATED COUNT: 12 % (ref 12.3–15.4)
GLOBULIN UR ELPH-MCNC: 2.5 GM/DL
GLUCOSE SERPL-MCNC: 145 MG/DL (ref 65–99)
GLUCOSE UR STRIP-MCNC: ABNORMAL MG/DL
HCT VFR BLD AUTO: 36.9 % (ref 34–46.6)
HGB BLD-MCNC: 12.7 G/DL (ref 12–15.9)
HGB UR QL STRIP.AUTO: NEGATIVE
HYALINE CASTS UR QL AUTO: NORMAL /LPF
IMM GRANULOCYTES # BLD AUTO: 0.1 10*3/MM3 (ref 0–0.05)
IMM GRANULOCYTES NFR BLD AUTO: 0.5 % (ref 0–0.5)
KETONES UR QL STRIP: ABNORMAL
LEUKOCYTE ESTERASE UR QL STRIP.AUTO: ABNORMAL
LIPASE SERPL-CCNC: 41 U/L (ref 13–60)
LYMPHOCYTES # BLD AUTO: 1.16 10*3/MM3 (ref 0.7–3.1)
LYMPHOCYTES NFR BLD AUTO: 6 % (ref 19.6–45.3)
MCH RBC QN AUTO: 32.8 PG (ref 26.6–33)
MCHC RBC AUTO-ENTMCNC: 34.4 G/DL (ref 31.5–35.7)
MCV RBC AUTO: 95.3 FL (ref 79–97)
MONOCYTES # BLD AUTO: 1.28 10*3/MM3 (ref 0.1–0.9)
MONOCYTES NFR BLD AUTO: 6.6 % (ref 5–12)
NEUTROPHILS NFR BLD AUTO: 16.73 10*3/MM3 (ref 1.7–7)
NEUTROPHILS NFR BLD AUTO: 86.2 % (ref 42.7–76)
NITRITE UR QL STRIP: NEGATIVE
NRBC BLD AUTO-RTO: 0 /100 WBC (ref 0–0.2)
PH UR STRIP.AUTO: 7 [PH] (ref 5–8)
PLATELET # BLD AUTO: 359 10*3/MM3 (ref 140–450)
PMV BLD AUTO: 9.7 FL (ref 6–12)
POTASSIUM SERPL-SCNC: 4 MMOL/L (ref 3.5–5.2)
PROCALCITONIN SERPL-MCNC: 0.07 NG/ML (ref 0–0.25)
PROT SERPL-MCNC: 7.6 G/DL (ref 6–8.5)
PROT UR QL STRIP: ABNORMAL
RBC # BLD AUTO: 3.87 10*6/MM3 (ref 3.77–5.28)
RBC # UR STRIP: NORMAL /HPF
REF LAB TEST METHOD: NORMAL
SODIUM SERPL-SCNC: 141 MMOL/L (ref 136–145)
SP GR UR STRIP: 1.02 (ref 1–1.03)
SQUAMOUS #/AREA URNS HPF: NORMAL /HPF
UROBILINOGEN UR QL STRIP: ABNORMAL
WBC # UR STRIP: NORMAL /HPF
WBC NRBC COR # BLD AUTO: 19.4 10*3/MM3 (ref 3.4–10.8)

## 2024-04-26 PROCEDURE — 99285 EMERGENCY DEPT VISIT HI MDM: CPT

## 2024-04-26 PROCEDURE — 85025 COMPLETE CBC W/AUTO DIFF WBC: CPT | Performed by: PHYSICIAN ASSISTANT

## 2024-04-26 PROCEDURE — 83605 ASSAY OF LACTIC ACID: CPT | Performed by: PHYSICIAN ASSISTANT

## 2024-04-26 PROCEDURE — 84145 PROCALCITONIN (PCT): CPT | Performed by: PHYSICIAN ASSISTANT

## 2024-04-26 PROCEDURE — 25810000003 SODIUM CHLORIDE 0.9 % SOLUTION: Performed by: PHYSICIAN ASSISTANT

## 2024-04-26 PROCEDURE — 74176 CT ABD & PELVIS W/O CONTRAST: CPT

## 2024-04-26 PROCEDURE — 80053 COMPREHEN METABOLIC PANEL: CPT | Performed by: PHYSICIAN ASSISTANT

## 2024-04-26 PROCEDURE — 81001 URINALYSIS AUTO W/SCOPE: CPT | Performed by: PHYSICIAN ASSISTANT

## 2024-04-26 PROCEDURE — 36415 COLL VENOUS BLD VENIPUNCTURE: CPT

## 2024-04-26 PROCEDURE — 25010000002 DROPERIDOL PER 5 MG: Performed by: PHYSICIAN ASSISTANT

## 2024-04-26 PROCEDURE — 83690 ASSAY OF LIPASE: CPT | Performed by: PHYSICIAN ASSISTANT

## 2024-04-26 PROCEDURE — 87040 BLOOD CULTURE FOR BACTERIA: CPT | Performed by: PHYSICIAN ASSISTANT

## 2024-04-26 RX ORDER — MINERAL OIL 100 G/100G
1 OIL RECTAL ONCE
Status: COMPLETED | OUTPATIENT
Start: 2024-04-26 | End: 2024-04-26

## 2024-04-26 RX ORDER — DROPERIDOL 2.5 MG/ML
2.5 INJECTION, SOLUTION INTRAMUSCULAR; INTRAVENOUS ONCE
Status: COMPLETED | OUTPATIENT
Start: 2024-04-26 | End: 2024-04-26

## 2024-04-26 RX ADMIN — DROPERIDOL 2.5 MG: 2.5 INJECTION, SOLUTION INTRAMUSCULAR; INTRAVENOUS at 22:13

## 2024-04-26 RX ADMIN — MINERAL OIL 1 ENEMA: 100 ENEMA RECTAL at 19:48

## 2024-04-26 RX ADMIN — SODIUM CHLORIDE 1000 ML: 9 INJECTION, SOLUTION INTRAVENOUS at 19:07

## 2024-04-26 NOTE — ED PROVIDER NOTES
EMERGENCY DEPARTMENT ENCOUNTER    Room Number:  04/04  Date seen:  4/27/2024  PCP: Bradley Hernandez MD    Spoken Language:  English  Language interpretation services not needed     HPI:  Chief Complaint: abdominal cramping    A complete HPI/ROS/PMH/PSH/SH/FH are unobtainable due to: n/a    Context: Marlena Navarrete is a 74 y.o. female presenting to the emergency department complaining of abdominal cramping.  The history is being obtained by the patient, her  and by review of the medical chart.  The patient states that at approximately 3 PM today she began having severe abdominal cramping.  It involves the bilateral abdomen.  The patient does admit to having chronic constipation secondary to opiate use.  She has taken MiraLAX as well as 2 laxatives today.  She states her most recent bowel movement was approximately 3 to 4 days ago.  Typically, she has a bowel movement every other day.  She does endorse having associated nausea, as well as 1 episode of vomiting.  She denies fever, sore throat, cough, chest pain, shortness of breath.    Medical Records Review:  The patient has been routinely received prescriptions for #90 Percocet 7.5/325 mg tablets on a monthly basis for the past year, according to her [facility] report.    PAST MEDICAL HISTORY  Active Ambulatory Problems     Diagnosis Date Noted    Lumbar radiculopathy 06/07/2018    Essential hypertension 08/25/2018    Right foot drop 08/25/2018    Kidney transplant recipient 09/07/1979    History of lumbar laminectomy for spinal cord decompression 08/25/2018    Spinal enthesopathy of thoracic region 03/12/2019    Postlaminectomy syndrome, lumbar region 02/17/2020    Chronic bilateral low back pain with right-sided sciatica 02/17/2020    Displacement of other nervous system device, implant or graft, initial encounter 03/09/2020    Postlaminectomy syndrome of lumbar region 06/04/2020    Epigastric pain 08/13/2020    Nausea 08/13/2020    Gastroesophageal reflux disease  2020    Shoulder arthritis 2020    Acute renal failure (ARF) 2020    Stage 3b chronic kidney disease 2020    Iron deficiency anemia 2020    Status post rotator cuff surgery 2020    History of DVT (deep vein thrombosis) 2020    Peripheral neuropathy 2020    Intractable back pain 2022    Spinal cord stimulator status 2022    Adhesive arachnoiditis 2019    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 04/10/2020    Inflammation of sacroiliac joint 2021    Secondary hyperparathyroidism  2022    Vitamin D deficiency 2022    COVID 2022    Pneumonia due to COVID-19 virus 2022    Narcotic dependence 2022    Cytokine release syndrome, grade 1 03/15/2022    Impacted cerumen of right ear 2022    Hospital discharge follow-up 2022    Acute metabolic encephalopathy 2022    Volume depletion 2023    PIYUSH (acute kidney injury) 2023    Dehydration 2023    Prediabetes 2023    Screening for colon cancer 2023    Coronary artery calcification 2024    Abnormal nuclear stress test 2024     Resolved Ambulatory Problems     Diagnosis Date Noted    Lumbar radiculopathy, acute 2018    Respiratory failure, post-operative 2020    Acute blood loss anemia 2020    Postoperative hypoxia 2020    Arthritis of shoulder 2020     Past Medical History:   Diagnosis Date    Arthritis     Chronic back pain     CKD (chronic kidney disease)     Diverticulosis     Foot drop, right     GERD (gastroesophageal reflux disease)     History of transfusion     Hyperlipidemia     Hypertension     Neuropathy     Spinal headache     Torn rotator cuff        PAST SURGICAL HISTORY  Past Surgical History:   Procedure Laterality Date    APPENDECTOMY      CATARACT EXTRACTION EXTRACAPSULAR W/ INTRAOCULAR LENS IMPLANTATION       SECTION       COLONOSCOPY  approx 2005    Anastacio WHARTON    ENDOSCOPY N/A 9/11/2020    Procedure: ESOPHAGOGASTRODUODENOSCOPY with biopsies;  Surgeon: Wicho Chaves MD;  Location: [facility] ENDOSCOPY;  Service: Gastroenterology;  Laterality: N/A;  pre- epigastric pain, nausea  post-- gastritis, duodenitis, bile reflux    ENDOSCOPY N/A 8/8/2023    Procedure: ESOPHAGOGASTRODUODENOSCOPY with bx;  Surgeon: Jovan Robin MD;  Location: [facility] ENDOSCOPY;  Service: Gastroenterology;  Laterality: N/A;  pre: abd pain, nausea, vomiting   post: normal    LUMBAR DISCECTOMY FUSION INSTRUMENTATION Right 7/25/2018    Procedure: Right L2-3 laminectomy with Metrix;  Surgeon: Oscar Paulino MD;  Location: [facility] OR;  Service: Neurosurgery    SHOULDER ARTHROSCOPY W/ ROTATOR CUFF REPAIR Right 12/29/2020    Procedure: SHOULDER ARTHROSCOPY, ROTATOR CUFF REPAIR, SUBACROMIAL DECOMPRESSION, DISTAL CLAVICLE EXCISION, BICEPS TENOTOMY, AND LABRAL DEBRIDEMENT;  Surgeon: Rishabh Florence MD;  Location: [facility] OSC;  Service: Orthopedics;  Laterality: Right;    SHOULDER ROTATOR CUFF REPAIR Left     SPINAL CORD STIMULATOR IMPLANT N/A 6/5/2020    Procedure: SPINAL CORD STIMULATOR INSERTION PHASE 2, triall of right gluteal internal pulse generator;  Surgeon: Cuate Kemp MD;  Location: [facility] OR;  Service: Neurosurgery;  Laterality: N/A;    SPINAL CORD STIMULATOR IMPLANT N/A 6/4/2020    Procedure: SPINAL CORD STIMULATOR INSERTION PHASE 1, Thoracic 10 laminotomy for revision of [place] surgical lead at Thoracic 8 to Thoracic 9;  Surgeon: Cuate Kemp MD;  Location: [facility] OR;  Service: Neurosurgery;  Laterality: N/A;    TONSILLECTOMY      TRANSPLANTATION RENAL Right 1979    [facility] PT     TUBAL ABDOMINAL LIGATION         FAMILY HISTORY  Family History   Problem Relation Age of Onset    Aneurysm Mother         BRAIN ANEURYSM    Heart failure Father     Kidney failure Father     Lung cancer Sister     Lung cancer Sister      Lung cancer Son     Elier Monzon Neg Hx        SOCIAL HISTORY  Social History     Socioeconomic History    Marital status:     Number of children: 1    Years of education: 12   Tobacco Use    Smoking status: Former     Current packs/day: 0.00     Average packs/day: 1 pack/day for 30.0 years (30.0 ttl pk-yrs)     Types: Cigarettes     Start date: 1982     Quit date: 2012     Years since quittin.0    Smokeless tobacco: Never   Vaping Use    Vaping status: Never Used   Substance and Sexual Activity    Alcohol use: No    Drug use: Never    Sexual activity: Defer       ALLERGIES  Patient has no known allergies.    PHYSICAL EXAM  ED Triage Vitals [24 1745]   Temp Heart Rate Resp BP SpO2   98 °F (36.7 °C) 75 18 123/75 98 %      Temp src Heart Rate Source Patient Position BP Location FiO2 (%)   Tympanic Monitor -- -- --       Physical Exam  Constitutional:       Appearance: Normal appearance.   HENT:      Head: Normocephalic and atraumatic.      Mouth/Throat:      Mouth: Mucous membranes are moist.   Eyes:      Extraocular Movements: Extraocular movements intact.      Pupils: Pupils are equal, round, and reactive to light.   Cardiovascular:      Rate and Rhythm: Normal rate and regular rhythm.   Pulmonary:      Effort: Pulmonary effort is normal.      Breath sounds: Normal breath sounds.   Abdominal:      General: There is no distension.      Palpations: Abdomen is soft.      Tenderness: There is generalized abdominal tenderness.   Musculoskeletal:      Cervical back: Normal range of motion and neck supple.   Neurological:      Mental Status: She is alert and oriented to person, place, and time. Mental status is at baseline.   Psychiatric:         Mood and Affect: Mood normal.         Behavior: Behavior normal.         Thought Content: Thought content normal.         Judgment: Judgment normal.       LAB RESULTS  Recent Results (from the past 24 hour(s))   Urinalysis With Microscopic If  Indicated (No Culture) - Urine, Clean Catch    Collection Time: 04/26/24  6:12 PM    Specimen: Urine, Clean Catch   Result Value Ref Range    Color, UA Dark Yellow (A) Yellow, Straw    Appearance, UA Clear Clear    pH, UA 7.0 5.0 - 8.0    Specific Gravity, UA 1.017 1.005 - 1.030    Glucose,  mg/dL (1+) (A) Negative    Ketones, UA 15 mg/dL (1+) (A) Negative    Bilirubin, UA Negative Negative    Blood, UA Negative Negative    Protein, UA 30 mg/dL (1+) (A) Negative    Leuk Esterase, UA Trace (A) Negative    Nitrite, UA Negative Negative    Urobilinogen, UA 1.0 E.U./dL 0.2 - 1.0 E.U./dL   Urinalysis, Microscopic Only - Urine, Clean Catch    Collection Time: 04/26/24  6:12 PM    Specimen: Urine, Clean Catch   Result Value Ref Range    RBC, UA 0-2 None Seen, 0-2 /HPF    WBC, UA 0-2 None Seen, 0-2 /HPF    Bacteria, UA None Seen None Seen /HPF    Squamous Epithelial Cells, UA 0-2 None Seen, 0-2 /HPF    Hyaline Casts, UA None Seen None Seen /LPF    Methodology Automated Microscopy    Comprehensive Metabolic Panel    Collection Time: 04/26/24  6:23 PM    Specimen: Blood   Result Value Ref Range    Glucose 145 (H) 65 - 99 mg/dL    BUN 28 (H) 8 - 23 mg/dL    Creatinine 2.49 (H) 0.57 - 1.00 mg/dL    Sodium 141 136 - 145 mmol/L    Potassium 4.0 3.5 - 5.2 mmol/L    Chloride 106 98 - 107 mmol/L    CO2 19.0 (L) 22.0 - 29.0 mmol/L    Calcium 10.3 8.6 - 10.5 mg/dL    Total Protein 7.6 6.0 - 8.5 g/dL    Albumin 5.1 3.5 - 5.2 g/dL    ALT (SGPT) 17 1 - 33 U/L    AST (SGOT) 24 1 - 32 U/L    Alkaline Phosphatase 118 (H) 39 - 117 U/L    Total Bilirubin 0.4 0.0 - 1.2 mg/dL    Globulin 2.5 gm/dL    A/G Ratio 2.0 g/dL    BUN/Creatinine Ratio 11.2 7.0 - 25.0    Anion Gap 16.0 (H) 5.0 - 15.0 mmol/L    eGFR 20.1 (L) >60.0 mL/min/1.73   Lipase    Collection Time: 04/26/24  6:23 PM    Specimen: Blood   Result Value Ref Range    Lipase 41 13 - 60 U/L   Procalcitonin    Collection Time: 04/26/24  6:23 PM    Specimen: Blood   Result Value Ref  Range    Procalcitonin 0.07 0.00 - 0.25 ng/mL   Lactic Acid, Plasma    Collection Time: 04/26/24  6:23 PM    Specimen: Blood   Result Value Ref Range    Lactate 1.8 0.5 - 2.0 mmol/L   CBC Auto Differential    Collection Time: 04/26/24  6:23 PM    Specimen: Blood   Result Value Ref Range    WBC 19.40 (H) 3.40 - 10.80 10*3/mm3    RBC 3.87 3.77 - 5.28 10*6/mm3    Hemoglobin 12.7 12.0 - 15.9 g/dL    Hematocrit 36.9 34.0 - 46.6 %    MCV 95.3 79.0 - 97.0 fL    MCH 32.8 26.6 - 33.0 pg    MCHC 34.4 31.5 - 35.7 g/dL    RDW 12.0 (L) 12.3 - 15.4 %    RDW-SD 41.1 37.0 - 54.0 fl    MPV 9.7 6.0 - 12.0 fL    Platelets 359 140 - 450 10*3/mm3    Neutrophil % 86.2 (H) 42.7 - 76.0 %    Lymphocyte % 6.0 (L) 19.6 - 45.3 %    Monocyte % 6.6 5.0 - 12.0 %    Eosinophil % 0.2 (L) 0.3 - 6.2 %    Basophil % 0.5 0.0 - 1.5 %    Immature Grans % 0.5 0.0 - 0.5 %    Neutrophils, Absolute 16.73 (H) 1.70 - 7.00 10*3/mm3    Lymphocytes, Absolute 1.16 0.70 - 3.10 10*3/mm3    Monocytes, Absolute 1.28 (H) 0.10 - 0.90 10*3/mm3    Eosinophils, Absolute 0.04 0.00 - 0.40 10*3/mm3    Basophils, Absolute 0.09 0.00 - 0.20 10*3/mm3    Immature Grans, Absolute 0.10 (H) 0.00 - 0.05 10*3/mm3    nRBC 0.0 0.0 - 0.2 /100 WBC       RADIOLOGY  Imaging Results (Last 24 Hours)       Procedure Component Value Units Date/Time    CT Abdomen Pelvis Without Contrast [[id number]] Collected: 04/26/24 1850     Updated: 04/26/24 1858    Narrative:      CT ABDOMEN PELVIS WO CONTRAST-     Radiation dose reduction techniques were utilized, including automated  exposure control and exposure modulation based on body size.     Clinical: Generalized abdominal pain with constipation     COMPARISON examination dated 8/6/2023     FINDINGS:  1. There is diverticulosis of the colon, no indication of acute  diverticulitis. There is combination of formed fecal material and fluid  within the colon. No colon wall thickening or pericolonic fat induration  to suggest an inflammatory process. The  appendix and small bowel have a  satisfactory appearance. Stomach is within normal limits no duodenal  abnormality seen.     2. No free air or free intraperitoneal fluid. The liver, gallbladder,  pancreas, spleen and adrenal glands have a satisfactory appearance.  Native kidneys are absent, right pelvic renal transplant is stable in  appearance. No calculus, mass or obstructive uropathy. The urinary  bladder is within normal limits. The uterus and ovaries have a  satisfactory appearance.     3. Lung bases within normal limits. Atherosclerotic calcification of a  normal diameter aorta. Punctate atherosclerotic plaque formation within  the SMA. No plaque is causing significant stenosis. The remainder is  unremarkable.              This report was finalized on 4/26/2024 6:55 PM by Dr. Isaiah Forbes M.D  on Workstation: [id number]               PROCEDURES  Procedures  None    MEDICATIONS GIVEN IN ER  Medications   sodium chloride 0.9 % bolus 1,000 mL (0 mL Intravenous Stopped 4/26/24 2216)   mineral oil enema 1 enema (1 enema Rectal Given 4/26/24 1948)   droperidol (INAPSINE) injection 2.5 mg (2.5 mg Intravenous Given 4/26/24 2213)       MEDICAL DECISION MAKING, PROGRESS, and CONSULTS  Vital signs and nursing notes have all been reviewed by me.    All laboratory results have been independently interpreted by me.      Orders placed during this visit:  Orders Placed This Encounter   Procedures    Blood Culture - Blood,    Blood Culture - Blood,    CT Abdomen Pelvis Without Contrast    Comprehensive Metabolic Panel    Urinalysis With Microscopic If Indicated (No Culture) - Urine, Catheter    Lipase    Procalcitonin    Lactic Acid, Plasma    CBC Auto Differential    Urinalysis, Microscopic Only - Urine, Clean Catch    Soap suds enema    [facility] (on-call MD unless specified) Details    Initiate Observation Status    CBC & Differential       Differential diagnosis includes but is not limited to:  -Drug-induced  constipation  -Small bowel obstruction  -Large bowel obstruction  -Fecal impaction    Independent interpretation of labs, radiology studies, and discussions with consultants: Dr. Cornell Gomez with [facility]    Discussion below represents my analysis of pertinent findings related to patient's condition, differential diagnosis, treatment plan and final disposition:    The patient has continued to have severe abdominal cramping while in the ED.  She has been unable to have any relief following 2 enemas, MiraLAX and laxatives.  We will admit her to the hospitalist service for aggressive bowel management.    ED Course as of 04/27/24 0000 Fri Apr 26, 2024   1901 CT abdomen pelvis interpreted myself:  Large stool burden without evidence of bowel obstruction [FS]   2330 I discussed the patient's overall care and ED course with Dr. Gomez with [facility].  He is agreeable to admission.   ED Course User Index  [FS] Toni Weber MD          Additional sources:  -Discussed/ obtained information from independent historians: Patient's     -External (non-ED) record review (see Medical Records Review section above)    -Chronic or social conditions impacting care: chronic opiate dependence    -Shared decision making: I discussed ED evaluation and treatment plan with patient who is in agreement.  Discussed at length ED testing.     Discussed treatment plan and reason for admission with pt/family and admitting physician.  Pt/family voiced understanding of the plan for admission for further testing/treatment as needed.      DIAGNOSIS  Final diagnoses:   Drug-induced constipation   Intractable abdominal pain       DISPOSITION  ED Disposition       ED Disposition   Decision to Admit    Condition   --    Comment   Level of Care: Med/Surg [1]   Diagnosis: Constipation [977120]   Admitting Physician: CORNELL GOMEZ [[id number]]   Attending Physician: CORNELL GOMEZ [[id number]]               RX  Medications   sodium chloride 0.9 % bolus 1,000 mL  (0 mL Intravenous Stopped 4/26/24 2216)   mineral oil enema 1 enema (1 enema Rectal Given 4/26/24 1948)   droperidol (INAPSINE) injection 2.5 mg (2.5 mg Intravenous Given 4/26/24 2213)          Medication List      No changes were made to your prescriptions during this visit.         Latest Documented Vital Signs:  As of 00:00 EDT  BP- 138/75 HR- 81 Temp- 98 °F (36.7 °C) (Tympanic) O2 sat- 94%    Provider Attestation:  I personally reviewed the past medical history, past surgical history, social history, family history, current medications and allergies as they appear in the chart. I reviewed the patient's history, physical, lab/imaging results and overall care with Dr. Weber who is in agreement with the patient's treatment plan.    EMR Dragon/Transcription disclaimer:  Dictated using Dragon dictation    Provider note signed by:    Note Disclaimer: At [facility], we believe that sharing information builds trust and better relationships. You are receiving this note because you are receiving care at [facility] or recently visited. It is possible you will see health information before a provider has talked with you about it. This kind of information can be easy to misunderstand. To help you fully understand what it means for your health, we urge you to discuss this note with your provider.         Annabel Gamez PA  04/27/24 0002       Annabel Gamez PA  04/27/24 0017

## 2024-04-27 ENCOUNTER — APPOINTMENT (OUTPATIENT)
Dept: CT IMAGING | Facility: HOSPITAL | Age: 74
DRG: 394 | End: 2024-04-27
Payer: MEDICARE

## 2024-04-27 PROBLEM — D72.829 LEUKOCYTOSIS: Status: ACTIVE | Noted: 2024-04-27

## 2024-04-27 PROBLEM — R10.84 ABDOMINAL PAIN, GENERALIZED: Status: ACTIVE | Noted: 2024-04-27

## 2024-04-27 PROBLEM — E87.6 HYPOKALEMIA: Status: ACTIVE | Noted: 2024-04-27

## 2024-04-27 LAB
ANION GAP SERPL CALCULATED.3IONS-SCNC: 14 MMOL/L (ref 5–15)
BUN SERPL-MCNC: 32 MG/DL (ref 8–23)
BUN/CREAT SERPL: 16.8 (ref 7–25)
CALCIUM SPEC-SCNC: 8.6 MG/DL (ref 8.6–10.5)
CHLORIDE SERPL-SCNC: 111 MMOL/L (ref 98–107)
CO2 SERPL-SCNC: 14 MMOL/L (ref 22–29)
CREAT SERPL-MCNC: 1.91 MG/DL (ref 0.57–1)
D-LACTATE SERPL-SCNC: 1.9 MMOL/L (ref 0.5–2)
DEPRECATED RDW RBC AUTO: 41.5 FL (ref 37–54)
EGFRCR SERPLBLD CKD-EPI 2021: 27.6 ML/MIN/1.73
ERYTHROCYTE [DISTWIDTH] IN BLOOD BY AUTOMATED COUNT: 11.9 % (ref 12.3–15.4)
GLUCOSE SERPL-MCNC: 135 MG/DL (ref 65–99)
HCT VFR BLD AUTO: 33.2 % (ref 34–46.6)
HGB BLD-MCNC: 11 G/DL (ref 12–15.9)
MCH RBC QN AUTO: 31.8 PG (ref 26.6–33)
MCHC RBC AUTO-ENTMCNC: 33.1 G/DL (ref 31.5–35.7)
MCV RBC AUTO: 96 FL (ref 79–97)
PLATELET # BLD AUTO: 292 10*3/MM3 (ref 140–450)
PMV BLD AUTO: 9.8 FL (ref 6–12)
POTASSIUM SERPL-SCNC: 3.4 MMOL/L (ref 3.5–5.2)
RBC # BLD AUTO: 3.46 10*6/MM3 (ref 3.77–5.28)
SODIUM SERPL-SCNC: 139 MMOL/L (ref 136–145)
WBC NRBC COR # BLD AUTO: 20.56 10*3/MM3 (ref 3.4–10.8)

## 2024-04-27 PROCEDURE — 74174 CTA ABD&PLVS W/CONTRAST: CPT

## 2024-04-27 PROCEDURE — 25810000003 SODIUM CHLORIDE 0.9 % SOLUTION: Performed by: HOSPITALIST

## 2024-04-27 PROCEDURE — 25010000002 PIPERACILLIN SOD-TAZOBACTAM PER 1 G: Performed by: HOSPITALIST

## 2024-04-27 PROCEDURE — 25010000002 MORPHINE PER 10 MG: Performed by: HOSPITALIST

## 2024-04-27 PROCEDURE — 25810000003 SODIUM CHLORIDE 0.9 % SOLUTION: Performed by: NURSE PRACTITIONER

## 2024-04-27 PROCEDURE — 63710000001 DIPHENHYDRAMINE PER 50 MG: Performed by: HOSPITALIST

## 2024-04-27 PROCEDURE — 99223 1ST HOSP IP/OBS HIGH 75: CPT | Performed by: SURGERY

## 2024-04-27 PROCEDURE — 25010000002 HYDROMORPHONE 1 MG/ML SOLUTION: Performed by: HOSPITALIST

## 2024-04-27 PROCEDURE — 71275 CT ANGIOGRAPHY CHEST: CPT

## 2024-04-27 PROCEDURE — 83605 ASSAY OF LACTIC ACID: CPT | Performed by: SURGERY

## 2024-04-27 PROCEDURE — 25810000003 LACTATED RINGERS SOLUTION: Performed by: SURGERY

## 2024-04-27 PROCEDURE — 25510000001 IOPAMIDOL PER 1 ML: Performed by: HOSPITALIST

## 2024-04-27 PROCEDURE — 85027 COMPLETE CBC AUTOMATED: CPT | Performed by: NURSE PRACTITIONER

## 2024-04-27 PROCEDURE — 80048 BASIC METABOLIC PNL TOTAL CA: CPT | Performed by: NURSE PRACTITIONER

## 2024-04-27 RX ORDER — ATORVASTATIN CALCIUM 20 MG/1
40 TABLET, FILM COATED ORAL NIGHTLY
Status: DISCONTINUED | OUTPATIENT
Start: 2024-04-27 | End: 2024-05-03 | Stop reason: HOSPADM

## 2024-04-27 RX ORDER — FAMOTIDINE 20 MG/1
40 TABLET, FILM COATED ORAL DAILY
Status: DISCONTINUED | OUTPATIENT
Start: 2024-04-27 | End: 2024-04-28

## 2024-04-27 RX ORDER — OXYCODONE AND ACETAMINOPHEN 7.5; 325 MG/1; MG/1
1 TABLET ORAL EVERY 4 HOURS PRN
Status: DISCONTINUED | OUTPATIENT
Start: 2024-04-27 | End: 2024-05-03 | Stop reason: HOSPADM

## 2024-04-27 RX ORDER — SODIUM BICARBONATE 650 MG/1
650 TABLET ORAL 3 TIMES DAILY
Status: DISCONTINUED | OUTPATIENT
Start: 2024-04-27 | End: 2024-05-03 | Stop reason: HOSPADM

## 2024-04-27 RX ORDER — POLYETHYLENE GLYCOL 3350 17 G/17G
17 POWDER, FOR SOLUTION ORAL 2 TIMES DAILY
Status: DISCONTINUED | OUTPATIENT
Start: 2024-04-27 | End: 2024-04-28

## 2024-04-27 RX ORDER — BISACODYL 10 MG
10 SUPPOSITORY, RECTAL RECTAL DAILY PRN
Status: DISCONTINUED | OUTPATIENT
Start: 2024-04-27 | End: 2024-04-27

## 2024-04-27 RX ORDER — ACETAMINOPHEN 160 MG/5ML
650 SOLUTION ORAL EVERY 4 HOURS PRN
Status: DISCONTINUED | OUTPATIENT
Start: 2024-04-27 | End: 2024-05-03 | Stop reason: HOSPADM

## 2024-04-27 RX ORDER — METOPROLOL SUCCINATE 25 MG/1
25 TABLET, EXTENDED RELEASE ORAL DAILY
Status: DISCONTINUED | OUTPATIENT
Start: 2024-04-27 | End: 2024-05-03 | Stop reason: HOSPADM

## 2024-04-27 RX ORDER — BISACODYL 5 MG/1
5 TABLET, DELAYED RELEASE ORAL DAILY PRN
Status: DISCONTINUED | OUTPATIENT
Start: 2024-04-27 | End: 2024-04-28

## 2024-04-27 RX ORDER — ACETAMINOPHEN 650 MG/1
650 SUPPOSITORY RECTAL EVERY 4 HOURS PRN
Status: DISCONTINUED | OUTPATIENT
Start: 2024-04-27 | End: 2024-05-03 | Stop reason: HOSPADM

## 2024-04-27 RX ORDER — SODIUM CHLORIDE 0.9 % (FLUSH) 0.9 %
10 SYRINGE (ML) INJECTION EVERY 12 HOURS SCHEDULED
Status: DISCONTINUED | OUTPATIENT
Start: 2024-04-27 | End: 2024-05-03 | Stop reason: HOSPADM

## 2024-04-27 RX ORDER — DIPHENHYDRAMINE HCL 25 MG
25 CAPSULE ORAL NIGHTLY
Status: DISCONTINUED | OUTPATIENT
Start: 2024-04-27 | End: 2024-05-03 | Stop reason: HOSPADM

## 2024-04-27 RX ORDER — BISACODYL 5 MG/1
5 TABLET, DELAYED RELEASE ORAL DAILY PRN
Status: DISCONTINUED | OUTPATIENT
Start: 2024-04-27 | End: 2024-04-27

## 2024-04-27 RX ORDER — POLYETHYLENE GLYCOL 3350 17 G/17G
17 POWDER, FOR SOLUTION ORAL DAILY PRN
Status: DISCONTINUED | OUTPATIENT
Start: 2024-04-27 | End: 2024-04-27

## 2024-04-27 RX ORDER — BISACODYL 10 MG
10 SUPPOSITORY, RECTAL RECTAL DAILY PRN
Status: DISCONTINUED | OUTPATIENT
Start: 2024-04-27 | End: 2024-04-28

## 2024-04-27 RX ORDER — AMOXICILLIN 250 MG
2 CAPSULE ORAL 2 TIMES DAILY
Status: DISCONTINUED | OUTPATIENT
Start: 2024-04-27 | End: 2024-04-27

## 2024-04-27 RX ORDER — ACETAMINOPHEN 500 MG
500 TABLET ORAL NIGHTLY
Status: DISCONTINUED | OUTPATIENT
Start: 2024-04-27 | End: 2024-05-03 | Stop reason: HOSPADM

## 2024-04-27 RX ORDER — AMOXICILLIN 250 MG
2 CAPSULE ORAL 2 TIMES DAILY
Status: DISCONTINUED | OUTPATIENT
Start: 2024-04-27 | End: 2024-04-28

## 2024-04-27 RX ORDER — SODIUM CHLORIDE 9 MG/ML
100 INJECTION, SOLUTION INTRAVENOUS CONTINUOUS
Status: DISCONTINUED | OUTPATIENT
Start: 2024-04-27 | End: 2024-04-29

## 2024-04-27 RX ORDER — AMLODIPINE BESYLATE 5 MG/1
5 TABLET ORAL DAILY
Status: DISCONTINUED | OUTPATIENT
Start: 2024-04-27 | End: 2024-04-28

## 2024-04-27 RX ORDER — SODIUM CHLORIDE 9 MG/ML
40 INJECTION, SOLUTION INTRAVENOUS AS NEEDED
Status: DISCONTINUED | OUTPATIENT
Start: 2024-04-27 | End: 2024-05-03 | Stop reason: HOSPADM

## 2024-04-27 RX ORDER — CALCIUM CARBONATE 500 MG/1
2 TABLET, CHEWABLE ORAL 2 TIMES DAILY PRN
Status: DISCONTINUED | OUTPATIENT
Start: 2024-04-27 | End: 2024-05-03 | Stop reason: HOSPADM

## 2024-04-27 RX ORDER — ACETAMINOPHEN 325 MG/1
650 TABLET ORAL EVERY 4 HOURS PRN
Status: DISCONTINUED | OUTPATIENT
Start: 2024-04-27 | End: 2024-05-03 | Stop reason: HOSPADM

## 2024-04-27 RX ORDER — SODIUM CHLORIDE 0.9 % (FLUSH) 0.9 %
10 SYRINGE (ML) INJECTION AS NEEDED
Status: DISCONTINUED | OUTPATIENT
Start: 2024-04-27 | End: 2024-05-03 | Stop reason: HOSPADM

## 2024-04-27 RX ORDER — POTASSIUM CHLORIDE 750 MG/1
40 TABLET, FILM COATED, EXTENDED RELEASE ORAL ONCE
Status: COMPLETED | OUTPATIENT
Start: 2024-04-27 | End: 2024-04-27

## 2024-04-27 RX ORDER — MORPHINE SULFATE 2 MG/ML
4 INJECTION, SOLUTION INTRAMUSCULAR; INTRAVENOUS ONCE
Status: COMPLETED | OUTPATIENT
Start: 2024-04-27 | End: 2024-04-27

## 2024-04-27 RX ORDER — ASPIRIN 81 MG/1
81 TABLET ORAL DAILY
Status: DISCONTINUED | OUTPATIENT
Start: 2024-04-27 | End: 2024-05-03 | Stop reason: HOSPADM

## 2024-04-27 RX ADMIN — FAMOTIDINE 40 MG: 20 TABLET, FILM COATED ORAL at 09:30

## 2024-04-27 RX ADMIN — IOPAMIDOL 95 ML: 755 INJECTION, SOLUTION INTRAVENOUS at 13:49

## 2024-04-27 RX ADMIN — ACETAMINOPHEN 650 MG: 325 TABLET, FILM COATED ORAL at 06:48

## 2024-04-27 RX ADMIN — AMLODIPINE BESYLATE 5 MG: 5 TABLET ORAL at 11:31

## 2024-04-27 RX ADMIN — METOPROLOL SUCCINATE 25 MG: 25 TABLET, EXTENDED RELEASE ORAL at 11:31

## 2024-04-27 RX ADMIN — POTASSIUM CHLORIDE 40 MEQ: 750 TABLET, EXTENDED RELEASE ORAL at 08:38

## 2024-04-27 RX ADMIN — SODIUM BICARBONATE 650 MG: 650 TABLET ORAL at 20:00

## 2024-04-27 RX ADMIN — PIPERACILLIN AND TAZOBACTAM 3.38 G: 3; .375 INJECTION, POWDER, FOR SOLUTION INTRAVENOUS at 19:08

## 2024-04-27 RX ADMIN — HYDROMORPHONE HYDROCHLORIDE 1 MG: 1 INJECTION, SOLUTION INTRAMUSCULAR; INTRAVENOUS; SUBCUTANEOUS at 14:07

## 2024-04-27 RX ADMIN — PIPERACILLIN AND TAZOBACTAM 3.38 G: 3; .375 INJECTION, POWDER, FOR SOLUTION INTRAVENOUS at 12:04

## 2024-04-27 RX ADMIN — MORPHINE SULFATE 4 MG: 2 INJECTION, SOLUTION INTRAMUSCULAR; INTRAVENOUS at 09:33

## 2024-04-27 RX ADMIN — SODIUM BICARBONATE 650 MG: 650 TABLET ORAL at 16:51

## 2024-04-27 RX ADMIN — BISACODYL 5 MG: 5 TABLET, COATED ORAL at 03:07

## 2024-04-27 RX ADMIN — HYDROMORPHONE HYDROCHLORIDE 1 MG: 1 INJECTION, SOLUTION INTRAMUSCULAR; INTRAVENOUS; SUBCUTANEOUS at 16:51

## 2024-04-27 RX ADMIN — ASPIRIN 81 MG: 81 TABLET, COATED ORAL at 09:30

## 2024-04-27 RX ADMIN — ACETAMINOPHEN 500 MG: 500 TABLET ORAL at 20:00

## 2024-04-27 RX ADMIN — SODIUM CHLORIDE 250 ML: 9 INJECTION, SOLUTION INTRAVENOUS at 13:25

## 2024-04-27 RX ADMIN — DOCUSATE SODIUM 50MG AND SENNOSIDES 8.6MG 2 TABLET: 8.6; 5 TABLET, FILM COATED ORAL at 08:38

## 2024-04-27 RX ADMIN — DIPHENHYDRAMINE HYDROCHLORIDE 25 MG: 25 CAPSULE ORAL at 20:01

## 2024-04-27 RX ADMIN — POLYETHYLENE GLYCOL 3350 17 G: 17 POWDER, FOR SOLUTION ORAL at 08:38

## 2024-04-27 RX ADMIN — ACETAMINOPHEN 650 MG: 325 TABLET, FILM COATED ORAL at 03:07

## 2024-04-27 RX ADMIN — OXYCODONE AND ACETAMINOPHEN 1 TABLET: 7.5; 325 TABLET ORAL at 18:51

## 2024-04-27 RX ADMIN — ATORVASTATIN CALCIUM 40 MG: 20 TABLET, FILM COATED ORAL at 20:00

## 2024-04-27 RX ADMIN — POLYETHYLENE GLYCOL 3350 17 G: 17 POWDER, FOR SOLUTION ORAL at 20:02

## 2024-04-27 RX ADMIN — SODIUM CHLORIDE 100 ML/HR: 9 INJECTION, SOLUTION INTRAVENOUS at 15:12

## 2024-04-27 RX ADMIN — Medication 10 ML: at 20:02

## 2024-04-27 RX ADMIN — SODIUM CHLORIDE, POTASSIUM CHLORIDE, SODIUM LACTATE AND CALCIUM CHLORIDE 1000 ML: 600; 310; 30; 20 INJECTION, SOLUTION INTRAVENOUS at 12:08

## 2024-04-27 RX ADMIN — DOCUSATE SODIUM 50MG AND SENNOSIDES 8.6MG 2 TABLET: 8.6; 5 TABLET, FILM COATED ORAL at 20:01

## 2024-04-27 RX ADMIN — SODIUM CHLORIDE 100 ML/HR: 9 INJECTION, SOLUTION INTRAVENOUS at 02:34

## 2024-04-27 NOTE — PLAN OF CARE
Goal Outcome Evaluation:  Plan of Care Reviewed With: patient           Outcome Evaluation: New admission from ED with c/o constipation for 4 days, had 2 small hard formed  BM in ED after enema. Abdomen distended, rounded. Few bruises to BUE.  IV Line reinserted.  IV Fluids started. Tylenol given for moderate abdominal pain. Up with standby assist with cane, voided freely, unable to deficate, Dulcolax given. SCDs in place. Slept on and off.

## 2024-04-27 NOTE — CONSULTS
General Surgery H&P/Consultation    Addendum: CTA of Chest, Abdomen, and pelvis reviewed and discussed with Dr. Forbes. No evidence of thrombus within SMA, JONA, or celiac axis.  There is colitis and thickening of the distal transverse colon, but vasculature appears patent up to the wall of the colon and there is enhancement of the colon.  Favor infectious vs ischemic colitis (due to watershed area of colon being affected). She continues to have significant abdominal pain, but overall her abdominal exam is improved compared to this morning.  Recommend continuing zosyn, IV fluids, and NPO status except sips with meds.  Does not currently need surgical intervention but I discussed this could change if her condition worsened.   I will follow closely.    Discussed with Dr. Ricardo.    Impression/Plan: 74-year-old lady admitted for abdominal pain and constipation.  She is in distress on examination and her pain is out of proportion to her abdominal exam.  She has significant calcification of her aorta and around the ostia of her SMA.  I have high suspicion for mesenteric ischemia and have recommended a STAT CTA of her chest, abdomen, pelvis for further evaluation of thrombotic or embolic source.  She will be kept NPO.  Zosyn will be ordered.  May require anticoagulation pending results of CTA.  Additional lactated Ringer's bolus ordered for resuscitation for presumed mesenteric ischemia and for hydration surrounding time of contrast administration.  She understands necessity for IV contrast in the current clinical setting and is agreeable to proceeding with CTA.      Discussed with Dr. Ricardo.    CC: Abdominal pain    HPI:   Ms. Marlena Navarrete is a 74 y.o. female that presented to the hospital with acute abdominal pain.  She denies ever experiencing similar pain.  The pain is cramping in nature.  It is most severe over her bilateral flanks and upper abdomen.  She was administered an enema in the emergency room after a CT  abdomen pelvis without contrast was not unremarkable.  She did not have much in the way of relief of her abdominal pain a bowel movement.  Denies any blood in the stool.  She reports temporary relief with the administered morphine earlier this morning.  She reports she had a kidney transplant in  and had bilateral nephrectomy at that time.  She is not currently on any immunosuppression and only recently has developed chronic kidney disease.  Recently underwent cardiac workup and was seen by Dr. Lua.  Coronary angiogram deferred because of minimal patient's symptoms and chronic kidney disease on transplanted kidney.    Past Medical History:   Past Medical History:   Diagnosis Date    Arthritis     OSTEO    Chronic back pain     Chronic bilateral low back pain with right-sided sciatica 2020    CKD (chronic kidney disease)     FOLLOWED BY DR LANRE BYNUM    Diverticulosis     Foot drop, right     GERD (gastroesophageal reflux disease)     History of DVT (deep vein thrombosis) 2018    RIGHT CALF S/P BACK SURGERY     History of transfusion     no reaction    Hyperlipidemia     Hypertension     Kidney transplant recipient 1979    HX TRANSPLANT D/T REFLUX     Neuropathy     Secondary hyperparathyroidism  3/9/2022    Spinal cord stimulator status 3/9/2022    Spinal headache     Stage 3b chronic kidney disease 2020    FOLLOWED BY DR LANRE BYNUM    Torn rotator cuff     Vitamin D deficiency 3/9/2022       Past Surgical History:   Past Surgical History:   Procedure Laterality Date    APPENDECTOMY      CATARACT EXTRACTION EXTRACAPSULAR W/ INTRAOCULAR LENS IMPLANTATION       SECTION      COLONOSCOPY  approx     Anastacio WHARTON    ENDOSCOPY N/A 2020    Procedure: ESOPHAGOGASTRODUODENOSCOPY with biopsies;  Surgeon: Wicho Chaves MD;  Location: [facility] ENDOSCOPY;  Service: Gastroenterology;  Laterality: N/A;  pre- epigastric pain, nausea  post-- gastritis, duodenitis, bile reflux     ENDOSCOPY N/A 8/8/2023    Procedure: ESOPHAGOGASTRODUODENOSCOPY with bx;  Surgeon: Jovan Robin MD;  Location: [facility] ENDOSCOPY;  Service: Gastroenterology;  Laterality: N/A;  pre: abd pain, nausea, vomiting   post: normal    LUMBAR DISCECTOMY FUSION INSTRUMENTATION Right 7/25/2018    Procedure: Right L2-3 laminectomy with Metrix;  Surgeon: Oscar Paulino MD;  Location: [facility] OR;  Service: Neurosurgery    SHOULDER ARTHROSCOPY W/ ROTATOR CUFF REPAIR Right 12/29/2020    Procedure: SHOULDER ARTHROSCOPY, ROTATOR CUFF REPAIR, SUBACROMIAL DECOMPRESSION, DISTAL CLAVICLE EXCISION, BICEPS TENOTOMY, AND LABRAL DEBRIDEMENT;  Surgeon: Rishabh Florence MD;  Location: [facility] OR OSC;  Service: Orthopedics;  Laterality: Right;    SHOULDER ROTATOR CUFF REPAIR Left     SPINAL CORD STIMULATOR IMPLANT N/A 6/5/2020    Procedure: SPINAL CORD STIMULATOR INSERTION PHASE 2, triall of right gluteal internal pulse generator;  Surgeon: Cuate Kemp MD;  Location: [facility] OR;  Service: Neurosurgery;  Laterality: N/A;    SPINAL CORD STIMULATOR IMPLANT N/A 6/4/2020    Procedure: SPINAL CORD STIMULATOR INSERTION PHASE 1, Thoracic 10 laminotomy for revision of [place] surgical lead at Thoracic 8 to Thoracic 9;  Surgeon: Cuate Kemp MD;  Location: [facility] OR;  Service: Neurosurgery;  Laterality: N/A;    TONSILLECTOMY      TRANSPLANTATION RENAL Right 1979    SISTER GAVE PT     TUBAL ABDOMINAL LIGATION         Medications:  Medications Prior to Admission   Medication Sig Dispense Refill Last Dose    acetaminophen (TYLENOL) 500 MG tablet Take 1 tablet by mouth Daily.       amLODIPine (NORVASC) 5 MG tablet Take 1 tablet by mouth Daily.       aspirin 81 MG EC tablet Take 1 tablet by mouth Daily. 90 tablet 3     atorvastatin (LIPITOR) 40 MG tablet Take 1 tablet by mouth Every Night.       diphenhydrAMINE-acetaminophen (TYLENOL PM)  MG tablet per tablet Take 1 tablet by mouth Every Night.       famotidine (Pepcid)  40 MG tablet Take 1 tablet by mouth Daily. 90 tablet 3     metoprolol succinate XL (TOPROL-XL) 25 MG 24 hr tablet Take 1 tablet by mouth Daily.       ondansetron (ZOFRAN) 4 MG tablet Take 1 tablet by mouth Every 8 (Eight) Hours As Needed for Nausea or Vomiting.       oxyCODONE-acetaminophen (PERCOCET) 7.5-325 MG per tablet Take 1 tablet by mouth 3 times a day.       polyethylene glycol (MIRALAX) 17 g packet Take 17 g by mouth.       sodium bicarbonate 650 MG tablet Take 1 tablet by mouth 3 (Three) Times a Day.       vitamin D3 125 MCG (5000 UT) capsule capsule Take 2,000 Units by mouth Daily.       oxyCODONE-acetaminophen (PERCOCET) 5-325 MG per tablet Take 1 tablet by mouth Every 6 (Six) Hours As Needed for Severe Pain.  0          Current Facility-Administered Medications:     acetaminophen (TYLENOL) tablet 650 mg, 650 mg, Oral, Q4H PRN, 650 mg at 04/27/24 0648 **OR** acetaminophen (TYLENOL) 160 MG/5ML oral solution 650 mg, 650 mg, Oral, Q4H PRN **OR** acetaminophen (TYLENOL) suppository 650 mg, 650 mg, Rectal, Q4H PRN, Zofia Razo APRN    diphenhydrAMINE (BENADRYL) capsule 25 mg, 25 mg, Oral, Nightly **AND** acetaminophen (TYLENOL) tablet 500 mg, 500 mg, Oral, Nightly, Dario Ricardo MD    amLODIPine (NORVASC) tablet 5 mg, 5 mg, Oral, Daily, Dario Ricardo MD    aspirin EC tablet 81 mg, 81 mg, Oral, Daily, Dario Ricardo MD, 81 mg at 04/27/24 0930    atorvastatin (LIPITOR) tablet 40 mg, 40 mg, Oral, Nightly, Dario Ricardo MD    sennosides-docusate (PERICOLACE) 8.6-50 MG per tablet 2 tablet, 2 tablet, Oral, BID, 2 tablet at 04/27/24 0838 **AND** polyethylene glycol (MIRALAX) packet 17 g, 17 g, Oral, BID, 17 g at 04/27/24 0838 **AND** bisacodyl (DULCOLAX) EC tablet 5 mg, 5 mg, Oral, Daily PRN **AND** bisacodyl (DULCOLAX) suppository 10 mg, 10 mg, Rectal, Daily PRN, Dario Ricardo MD    calcium carbonate (TUMS) chewable tablet 500 mg (200 mg elemental), 2 tablet, Oral, BID  PRN, Zofia Razo APRN    famotidine (PEPCID) tablet 40 mg, 40 mg, Oral, Daily, Dario Ricardo MD, 40 mg at 24 0930    HYDROmorphone (DILAUDID) injection 1 mg, 1 mg, Intravenous, Q2H PRN, Dario Ricardo MD    lactated ringers bolus 1,000 mL, 1,000 mL, Intravenous, Once, Donny Borjas MD    metoprolol succinate XL (TOPROL-XL) 24 hr tablet 25 mg, 25 mg, Oral, Daily, Dario Ricardo MD    oxyCODONE-acetaminophen (PERCOCET) 7.5-325 MG per tablet 1 tablet, 1 tablet, Oral, Q4H PRN, Dario Ricardo MD    Pharmacy to Dose Zosyn, , Does not apply, Continuous PRN, Dario Ricardo MD    sodium bicarbonate tablet 650 mg, 650 mg, Oral, TID, Dario Ricardo MD    sodium chloride 0.9 % flush 10 mL, 10 mL, Intravenous, Q12H, Zofia Razo APRN    sodium chloride 0.9 % flush 10 mL, 10 mL, Intravenous, PRN, Zofia Razo APRN    sodium chloride 0.9 % infusion 40 mL, 40 mL, Intravenous, PRN, Zofia Razo APRN    sodium chloride 0.9 % infusion, 100 mL/hr, Intravenous, Continuous, Zofia Razo APRN, Last Rate: 100 mL/hr at 24 0234, 100 mL/hr at 24 0234     Allergies: No Known Allergies    Social History:   Social History     Socioeconomic History    Marital status:     Number of children: 1    Years of education: 12   Tobacco Use    Smoking status: Former     Current packs/day: 0.00     Average packs/day: 1 pack/day for 30.0 years (30.0 ttl pk-yrs)     Types: Cigarettes     Start date: 1982     Quit date: 2012     Years since quittin.0    Smokeless tobacco: Never   Vaping Use    Vaping status: Never Used   Substance and Sexual Activity    Alcohol use: No    Drug use: Never    Sexual activity: Defer     Partners: Male       Family History:   Family History   Problem Relation Age of Onset    Aneurysm Mother         BRAIN ANEURYSM    Heart failure Father     Kidney failure Father     Lung cancer Sister     Lung cancer  Sister     Lung cancer Son     Elier Hyperthermia Neg Hx        Review of Systems:  A comprehensive review of systems was negative except for the following positives: Per HPI    Physical Exam:   Vitals:    04/27/24 0514   BP: 137/59   Pulse: 85   Resp: 18   Temp: 97.4 °F (36.3 °C)   SpO2: 96%     BMI: Body mass index is 28.01 kg/m².   60.8 kg (134 lb 0.6 oz)      Intake/Output Summary (Last 24 hours) at 4/27/2024 1036  Last data filed at 4/27/2024 0809  Gross per 24 hour   Intake 510 ml   Output 1600 ml   Net -1090 ml       GENERAL: no acute distress, awake and alert  RESPIRATORY: symmetric excursion bilaterally, normal work of breathing  CARDIOVASCULAR: Regular rate, well perfused  GASTROINTESTINAL: Slightly distended, Very tender throughout abdomen, well healed midline incision      Pertinent labs:   Results from last 7 days   Lab Units 04/27/24  0657 04/26/24  1823   WBC 10*3/mm3 20.56* 19.40*   HEMOGLOBIN g/dL 11.0* 12.7   HEMATOCRIT % 33.2* 36.9   PLATELETS 10*3/mm3 292 359     Results from last 7 days   Lab Units 04/27/24  0657 04/26/24  1823   SODIUM mmol/L 139 141   POTASSIUM mmol/L 3.4* 4.0   CHLORIDE mmol/L 111* 106   CO2 mmol/L 14.0* 19.0*   BUN mg/dL 32* 28*   CREATININE mg/dL 1.91* 2.49*   CALCIUM mg/dL 8.6 10.3   BILIRUBIN mg/dL  --  0.4   ALK PHOS U/L  --  118*   ALT (SGPT) U/L  --  17   AST (SGOT) U/L  --  24   GLUCOSE mg/dL 135* 145*       IMAGING:  CT abdomen and pelvis from 4/24.  There is moderate stool burden throughout the colon.  Evidence of bilateral nephrectomy lower quadrant kidney transplant. significant calcification of the abdominal aorta and proximal SMA            Donny Borjas MD  General and Endoscopic Surgery  [facility]    [address], [address]  [place] [address]  P: [phone]  F: [phone]

## 2024-04-27 NOTE — PLAN OF CARE
Goal Outcome Evaluation:              Outcome Evaluation: High BP reading this AM otherwise VSS. IV fluids infusing. Bolus given. Took pt down to CT scan. General Surgery consulted- pt seen by Dr. Borjas. On IV Zosyn. Dilaudid given for pain. NPO, sips with meds. Dr. Borjas spoke with family. K+: 3.4, one time dose given.

## 2024-04-27 NOTE — ED PROVIDER NOTES
MD ATTESTATION NOTE    The VINNIE and I have discussed this patient's history, physical exam, and treatment plan.  I have reviewed the documentation and personally had a face to face interaction with the patient. I affirm the documentation and agree with the treatment and plan.  The attached note describes my personal findings.      I provided a substantive portion of the care of the patient.  I personally performed the physical exam in its entirety, and below are my findings.        Brief HPI: Patient presented emergency department with abdominal pain and decreased bowel movements over the last 3 to 4 days.  No fevers or chills.  Some nausea, no vomiting.  History of chronic opioid use.    PHYSICAL EXAM  ED Triage Vitals [04/26/24 1745]   Temp Heart Rate Resp BP SpO2   98 °F (36.7 °C) 75 18 123/75 98 %      Temp src Heart Rate Source Patient Position BP Location FiO2 (%)   Tympanic Monitor -- -- --         GENERAL: Mild distress  HENT: nares patent  EYES: no scleral icterus  CV: regular rhythm, normal rate  RESPIRATORY: normal effort  ABDOMEN: soft, diffuse tenderness to palpation without rebound or guarding  MUSCULOSKELETAL: no deformity  NEURO: alert, moves all extremities, follows commands  PSYCH:  calm, cooperative  SKIN: warm, dry    Vital signs and nursing notes reviewed.        Plan: Patient presented the emergency department with diffuse abdominal pain, concern for constipation.  Otherwise well-appearing, vitals otherwise stable.  Labs and imaging otherwise reassuring in the ER, patient is constipation without evidence of obstipation or fecal impaction.  Treated with enema.  Plan to reevaluate for disposition.    ED Course as of 04/26/24 2015 Fri Apr 26, 2024   1901 CT abdomen pelvis interpreted myself:  Large stool burden without evidence of bowel obstruction [FS]      ED Course User Index  [FS] Toni Weber MD       SHARED VISIT: This visit was performed by BOTH a physician and an APC. The substantive  portion of the medical decision making was performed by this attesting physician who made or approved the management plan and takes responsibility for patient management. All studies in the APC note (if performed) were independently interpreted by me.        Toni Weber MD  04/26/24 2016

## 2024-04-27 NOTE — ED NOTES
Nursing report ED to floor  Marlena Navarrete  74 y.o.  female    HPI :  HPI (Adult)  Stated Reason for Visit: abd pain constipation    Chief Complaint  Chief Complaint   Patient presents with    Abdominal Pain    Constipation       Admitting doctor:   Helen Gomez MD    Admitting diagnosis:   The primary encounter diagnosis was Drug-induced constipation. A diagnosis of Intractable abdominal pain was also pertinent to this visit.    Code status:   Current Code Status       Date Active Code Status Order ID Comments User Context       Prior            Allergies:   Patient has no known allergies.    Isolation:   No active isolations    Intake and Output  No intake or output data in the 24 hours ending 04/27/24 0004    Weight:   There were no vitals filed for this visit.    Most recent vitals:   Vitals:    04/26/24 2011 04/26/24 2036 04/26/24 2037 04/26/24 2038   BP: 142/93 138/75  138/75   Pulse:   81    Resp:       Temp:       TempSrc:       SpO2:   94%        Active LDAs/IV Access:   Lines, Drains & Airways       Active LDAs       Name Placement date Placement time Site Days    Peripheral IV 04/26/24 1822 Right Antecubital 04/26/24 1822  Antecubital  less than 1                    Labs (abnormal labs have a star):   Labs Reviewed   COMPREHENSIVE METABOLIC PANEL - Abnormal; Notable for the following components:       Result Value    Glucose 145 (*)     BUN 28 (*)     Creatinine 2.49 (*)     CO2 19.0 (*)     Alkaline Phosphatase 118 (*)     Anion Gap 16.0 (*)     eGFR 20.1 (*)     All other components within normal limits    Narrative:     GFR Normal >60  Chronic Kidney Disease <60  Kidney Failure <15    The GFR formula is only valid for adults with stable renal function between ages 18 and 70.   URINALYSIS W/ MICROSCOPIC IF INDICATED (NO CULTURE) - Abnormal; Notable for the following components:    Color, UA Dark Yellow (*)     Glucose,  mg/dL (1+) (*)     Ketones, UA 15 mg/dL (1+) (*)     Protein, UA 30 mg/dL  "(1+) (*)     Leuk Esterase, UA Trace (*)     All other components within normal limits   CBC WITH AUTO DIFFERENTIAL - Abnormal; Notable for the following components:    WBC 19.40 (*)     RDW 12.0 (*)     Neutrophil % 86.2 (*)     Lymphocyte % 6.0 (*)     Eosinophil % 0.2 (*)     Neutrophils, Absolute 16.73 (*)     Monocytes, Absolute 1.28 (*)     Immature Grans, Absolute 0.10 (*)     All other components within normal limits   LIPASE - Normal   PROCALCITONIN - Normal    Narrative:     As a Marker for Sepsis (Non-Neonates):    1. <0.5 ng/mL represents a low risk of severe sepsis and/or septic shock.  2. >2 ng/mL represents a high risk of severe sepsis and/or septic shock.    As a Marker for Lower Respiratory Tract Infections that require antibiotic therapy:    PCT on Admission    Antibiotic Therapy       6-12 Hrs later    >0.5                Strongly Recommended  >0.25 - <0.5        Recommended   0.1 - 0.25          Discouraged              Remeasure/reassess PCT  <0.1                Strongly Discouraged     Remeasure/reassess PCT    As 28 day mortality risk marker: \"Change in Procalcitonin Result\" (>80% or <=80%) if Day 0 (or Day 1) and Day 4 values are available. Refer to http://www.[email or web address]pct-calculator.com    Change in PCT <=80%  A decrease of PCT levels below or equal to 80% defines a positive change in PCT test result representing a higher risk for 28-day all-cause mortality of patients diagnosed with severe sepsis for septic shock.    Change in PCT >80%  A decrease of PCT levels of more than 80% defines a negative change in PCT result representing a lower risk for 28-day all-cause mortality of patients diagnosed with severe sepsis or septic shock.      LACTIC ACID, PLASMA - Normal   BLOOD CULTURE   BLOOD CULTURE   URINALYSIS, MICROSCOPIC ONLY   CBC AND DIFFERENTIAL    Narrative:     The following orders were created for panel order CBC & Differential.  Procedure                               Abnormality        "  Status                     ---------                               -----------         ------                     CBC Auto Differential[[id number]]        Abnormal            Final result                 Please view results for these tests on the individual orders.       EKG:   No orders to display       Meds given in ED:   Medications   sodium chloride 0.9 % bolus 1,000 mL (0 mL Intravenous Stopped 24)   mineral oil enema 1 enema (1 enema Rectal Given 24)   droperidol (INAPSINE) injection 2.5 mg (2.5 mg Intravenous Given 24)       Imaging results:  No radiology results for the last day    Ambulatory status:   - standby    Social issues:   Social History     Socioeconomic History    Marital status:     Number of children: 1    Years of education: 12   Tobacco Use    Smoking status: Former     Current packs/day: 0.00     Average packs/day: 1 pack/day for 30.0 years (30.0 ttl pk-yrs)     Types: Cigarettes     Start date: 1982     Quit date: 2012     Years since quittin.0    Smokeless tobacco: Never   Vaping Use    Vaping status: Never Used   Substance and Sexual Activity    Alcohol use: No    Drug use: Never    Sexual activity: Defer       Peripheral Neurovascular  Peripheral Neurovascular (Adult)  Peripheral Neurovascular WDL: WDL    Neuro Cognitive  Neuro Cognitive (Adult)  Cognitive/Neuro/Behavioral WDL: WDL    Learning       Respiratory  Respiratory WDL  Respiratory WDL: WDL    Abdominal Pain       Pain Assessments  Pain (Adult)  (0-10) Pain Rating: Rest: 10  (0-10) Pain Rating: Activity: 10    NIH Stroke Scale       Zoë Barnes RN  24 00:04 EDT

## 2024-04-27 NOTE — H&P
HISTORY AND PHYSICAL   [facility]        Date of Admission: 2024  Patient Identification:  Name: Marlena Navarrete  Age: 74 y.o.  Sex: female  :  1950  MRN: [id number]                     Primary Care Physician: Bradley Hernandez MD    Chief Complaint: Abdominal pain    History of Present Illness:   Mrs. Navarrete is a 74-year-old female who openly admits to chronic pain issues and utilizes her Percocet 2-3 times a day.  She has had chronic issues with opioid-induced constipation and came to the ER for abdominal cramping and pain.  In the ER she was felt that CT showed some constipation and they gave her bowel regimen and an enema with reported BM.  I would have anticipated that to have resolved her belly pain but upon entering the room today she is splinting due to her abdominal pain with some distention of her abdomen.  No concerns for full obstruction was noted on exam nor on CT and she admits to passing gas.  Admits to emesis x 1 but none has been witnessed since admitted to the floor.  Denies any fever chills or night sweats.  RN with me at bedside as chaperone is no family present.  Patient seemed genuine with her history though I see concerns for narcotic dependence noted in past problem list.  Denies any hematochezia/melena.    Past Medical History:  Past Medical History:   Diagnosis Date    Arthritis     OSTEO    Chronic back pain     Chronic bilateral low back pain with right-sided sciatica 2020    CKD (chronic kidney disease)     FOLLOWED BY DR LANRE BYNUM    Diverticulosis     Foot drop, right     GERD (gastroesophageal reflux disease)     History of DVT (deep vein thrombosis) 2018    RIGHT CALF S/P BACK SURGERY     History of transfusion     no reaction    Hyperlipidemia     Hypertension     Kidney transplant recipient 1979    HX TRANSPLANT D/T REFLUX     Neuropathy     Secondary hyperparathyroidism  3/9/2022    Spinal cord stimulator status 3/9/2022    Spinal headache      Stage 3b chronic kidney disease 2020    FOLLOWED BY DR LANRE Valdez rotator cuff     Vitamin D deficiency 3/9/2022     Past Surgical History:  Past Surgical History:   Procedure Laterality Date    APPENDECTOMY      CATARACT EXTRACTION EXTRACAPSULAR W/ INTRAOCULAR LENS IMPLANTATION       SECTION      COLONOSCOPY  approx     Anastacio WHARTON    ENDOSCOPY N/A 2020    Procedure: ESOPHAGOGASTRODUODENOSCOPY with biopsies;  Surgeon: Wicho Chaves MD;  Location[facility] ENDOSCOPY;  Service: Gastroenterology;  Laterality: N/A;  pre- epigastric pain, nausea  post-- gastritis, duodenitis, bile reflux    ENDOSCOPY N/A 2023    Procedure: ESOPHAGOGASTRODUODENOSCOPY with bx;  Surgeon: Jovan Robin MD;  Location: [facility] ENDOSCOPY;  Service: Gastroenterology;  Laterality: N/A;  pre: abd pain, nausea, vomiting   post: normal    LUMBAR DISCECTOMY FUSION INSTRUMENTATION Right 2018    Procedure: Right L2-3 laminectomy with Metrix;  Surgeon: Oscar Paulino MD;  Location: [facility] OR;  Service: Neurosurgery    SHOULDER ARTHROSCOPY W/ ROTATOR CUFF REPAIR Right 2020    Procedure: SHOULDER ARTHROSCOPY, ROTATOR CUFF REPAIR, SUBACROMIAL DECOMPRESSION, DISTAL CLAVICLE EXCISION, BICEPS TENOTOMY, AND LABRAL DEBRIDEMENT;  Surgeon: Rishabh Florence MD;  Location: [facility] OSC;  Service: Orthopedics;  Laterality: Right;    SHOULDER ROTATOR CUFF REPAIR Left     SPINAL CORD STIMULATOR IMPLANT N/A 2020    Procedure: SPINAL CORD STIMULATOR INSERTION PHASE 2, triall of right gluteal internal pulse generator;  Surgeon: Cuate Kemp MD;  Location: [facility] OR;  Service: Neurosurgery;  Laterality: N/A;    SPINAL CORD STIMULATOR IMPLANT N/A 2020    Procedure: SPINAL CORD STIMULATOR INSERTION PHASE 1, Thoracic 10 laminotomy for revision of [facility] Tom surgical lead at Thoracic 8 to Thoracic 9;  Surgeon: Cuate Kemp MD;  Location: [facility] OR;  Service: Neurosurgery;   Laterality: N/A;    TONSILLECTOMY      TRANSPLANTATION RENAL Right 1979    SISTER GAVE PT     TUBAL ABDOMINAL LIGATION        Home Meds:  Medications Prior to Admission   Medication Sig Dispense Refill Last Dose    acetaminophen (TYLENOL) 500 MG tablet Take 1 tablet by mouth Daily.       amLODIPine (NORVASC) 5 MG tablet Take 1 tablet by mouth Daily.       aspirin 81 MG EC tablet Take 1 tablet by mouth Daily. 90 tablet 3     atorvastatin (LIPITOR) 40 MG tablet Take 1 tablet by mouth Every Night.       diphenhydrAMINE-acetaminophen (TYLENOL PM)  MG tablet per tablet Take 1 tablet by mouth Every Night.       famotidine (Pepcid) 40 MG tablet Take 1 tablet by mouth Daily. 90 tablet 3     metoprolol succinate XL (TOPROL-XL) 25 MG 24 hr tablet Take 1 tablet by mouth Daily.       ondansetron (ZOFRAN) 4 MG tablet Take 1 tablet by mouth Every 8 (Eight) Hours As Needed for Nausea or Vomiting.       oxyCODONE-acetaminophen (PERCOCET) 7.5-325 MG per tablet Take 1 tablet by mouth 3 times a day.       polyethylene glycol (MIRALAX) 17 g packet Take 17 g by mouth.       sodium bicarbonate 650 MG tablet Take 1 tablet by mouth 3 (Three) Times a Day.       vitamin D3 125 MCG (5000 UT) capsule capsule Take 2,000 Units by mouth Daily.       oxyCODONE-acetaminophen (PERCOCET) 5-325 MG per tablet Take 1 tablet by mouth Every 6 (Six) Hours As Needed for Severe Pain.  0        Allergies:  No Known Allergies  Immunizations:  Immunization History   Administered Date(s) Administered    Fluzone High Dose =>65 Years ([employer] ONLY) 10/11/2017    Pneumococcal Polysaccharide (PPSV23) 04/24/2010     Social History:   Social History     Social History Narrative    LIVES WITH      Social History     Socioeconomic History    Marital status:     Number of children: 1    Years of education: 12   Tobacco Use    Smoking status: Former     Current packs/day: 0.00     Average packs/day: 1 pack/day for 30.0 years (30.0 ttl pk-yrs)      "Types: Cigarettes     Start date: 1982     Quit date: 2012     Years since quittin.0    Smokeless tobacco: Never   Vaping Use    Vaping status: Never Used   Substance and Sexual Activity    Alcohol use: No    Drug use: Never    Sexual activity: Defer     Partners: Male       Family History:  Family History   Problem Relation Age of Onset    Aneurysm Mother         BRAIN ANEURYSM    Heart failure Father     Kidney failure Father     Lung cancer Sister     Lung cancer Sister     Lung cancer Son     Malig Hyperthermia Neg Hx         Review of Systems  See history of present illness and past medical history.  Patient denies fever chills night sweats.  Denies any loss of consciousness or focal loss of function.  Denies any chest pain palpitation cough shortness of breath.  Admits to generalized abdominal pain with constipation.  Denies any GI bleed.  Remainder of ROS is negative.    Objective:  T Max 24 hrs: Temp (24hrs), Av.6 °F (36.4 °C), Min:97.4 °F (36.3 °C), Max:98 °F (36.7 °C)    Vitals Ranges:   Temp:  [97.4 °F (36.3 °C)-98 °F (36.7 °C)] 97.4 °F (36.3 °C)  Heart Rate:  [70-85] 85  Resp:  [18] 18  BP: (123-150)/(59-93) 137/59      Exam:  /59 (BP Location: Left arm, Patient Position: Lying)   Pulse 85   Temp 97.4 °F (36.3 °C) (Oral)   Resp 18   Ht 147.3 cm (58\")   Wt 60.8 kg (134 lb 0.6 oz)   SpO2 96%   BMI 28.01 kg/m²     General Appearance:    Alert, cooperative, seems uncomfortable due to her abdominal pain and seems genuine.  Nontoxic no family present   Head:    Normocephalic, without obvious abnormality, atraumatic   Eyes:    PERRL, nonicteric, EOM's intact, both eyes   Ears:    Normal external ear canals, both ears   Nose:   Nares normal, septum midline, mucosa normal, no drainage    or sinus tenderness   Throat:   Lips, mucosa, and tongue normal   Neck:   No JVD       Lungs:     Clear to auscultation bilaterally, respirations unlabored   Chest Wall:    No tenderness or " deformity    Heart:    Regular rate and rhythm, S1 and S2 normal   Abdomen:     Soft, she is distended, diffusely tender but questionable for a little out of proportion, bowel sounds are good, no true rebound with mild aspects of guarding   Extremities: Moving all, no cyanosis or edema   Pulses:   2+ and symmetric all extremities   Skin: Nonjaundiced       Neurologic:   CNII-XII intact, no focal deficits      .    Data Review:  Labs in chart were reviewed.             Imaging Results (All)       Procedure Component Value Units Date/Time    CT Abdomen Pelvis Without Contrast [[id number]] Collected: 04/26/24 1850     Updated: 04/26/24 1858    Narrative:      CT ABDOMEN PELVIS WO CONTRAST-     Radiation dose reduction techniques were utilized, including automated  exposure control and exposure modulation based on body size.     Clinical: Generalized abdominal pain with constipation     COMPARISON examination dated 8/6/2023     FINDINGS:  1. There is diverticulosis of the colon, no indication of acute  diverticulitis. There is combination of formed fecal material and fluid  within the colon. No colon wall thickening or pericolonic fat induration  to suggest an inflammatory process. The appendix and small bowel have a  satisfactory appearance. Stomach is within normal limits no duodenal  abnormality seen.     2. No free air or free intraperitoneal fluid. The liver, gallbladder,  pancreas, spleen and adrenal glands have a satisfactory appearance.  Native kidneys are absent, right pelvic renal transplant is stable in  appearance. No calculus, mass or obstructive uropathy. The urinary  bladder is within normal limits. The uterus and ovaries have a  satisfactory appearance.     3. Lung bases within normal limits. Atherosclerotic calcification of a  normal diameter aorta. Punctate atherosclerotic plaque formation within  the SMA. No plaque is causing significant stenosis. The remainder is  unremarkable.              This  report was finalized on 4/26/2024 6:55 PM by Dr. Isaiah Forbes M.D  on Workstation: [id number]                 Assessment:  Active Hospital Problems    Diagnosis  POA    **Abdominal pain, generalized [R10.84]  Unknown    Hypokalemia [E87.6]  Unknown    Leukocytosis [D72.829]  Unknown    Constipation [K59.00]  Yes    Narcotic dependence [F11.20]  Yes    History of DVT (deep vein thrombosis) [Z86.718]  Not Applicable    Peripheral neuropathy [G62.9]  Yes    Stage 3b chronic kidney disease [N18.32]  Yes    Gastroesophageal reflux disease [K21.9]  Yes    Lumbar radiculopathy [M54.16]  Yes      Resolved Hospital Problems   No resolved problems to display.       Plan:    Abdominal pain with constipation noted on CT but CT is otherwise reassuring in all other aspects for any acute disease and it is not even demonstrating any aspects of proctocolitis/colonic wall thickening but this patient is wincing in discomfort and splinting due to her abdominal pain.  She is conversational and pleasant and I do not have concern for drug-seeking behavior and she openly admits to taking her Percocet about 2-3 times on a routine basis and deals with chronic opioid constipation.  She had a bowel movement in the ER with an enema and I would have anticipated she would have been back to baseline this morning and I was actually intending on discharging her to home.  I normally do not give any type of narcotic above baseline regimen to a patient with and ultimately negative CT but because she does seem in so much discomfort with distention on her belly in addition to some leukocytosis most likely is reactive (all additional markers leaned against infection), I will give 4 mg of IV morphine x 1 and have consulted general surgery for further recommendations and will hold the idea of admission and discharge in same day at this point   -UA negative and BC pending x 2 with no antibiotics administered to this point   -Normal liver function    Change  diet to n.p.o. except for sips with meds and repeat CBC in a.m.    MAR reviewed and reconciled    GERD/Pepcid/GI PPx    CKD 3 on p.o. bicarb -creatinine baseline.  Replace K x 1    HTN on amlodipine/Toprol    HLD on statin    Observation admission -hold transition to same-day discharge        Addendum -received call by Dr. Borjas and he is worried about mesenteric ischemia and he is going to evaluate with a CTA and start a heparin drip.  I thoroughly appreciate his input and assistance and agree with concern for additional pathology not noted on initial CT.  I will add Zosyn and already made n.p.o. and will await further recommendations per Dr. Borjas likely taken to the OR emergently today.  I thoroughly appreciate the input and the assistance.  I have advanced pain control at this point to Dilaudid IV 1 mg every 12.  He stated the 4 mg of morphine I gave previously was short-lived in regards to relief.  If intermittent Dilaudid does not work, we may need to escalate towards a PCA   -Creatinine currently 1.91.  Patient understands risk as outlined by Dr. Borjas regarding IV contrast and kidney function but also need for emergent evaluation.  IVF is already running at 100 cc an hour and I wrote for 250 cc bolus to be given right before patient goes down for CTA for further renal protection.  No time to consult nephrology for additional assistance given emergent situation and will trend renal function for now and consult nephrology if needed.  Patient is remaining off of renal insulting meds right now which decreases her chance of having any contrast-induced nephropathy      Dario Ricardo MD  4/27/2024  10:03 EDT

## 2024-04-28 PROBLEM — K55.9 COLITIS, ISCHEMIC: Status: ACTIVE | Noted: 2024-04-28

## 2024-04-28 LAB
ALBUMIN SERPL-MCNC: 3.1 G/DL (ref 3.5–5.2)
ANION GAP SERPL CALCULATED.3IONS-SCNC: 10.5 MMOL/L (ref 5–15)
BUN SERPL-MCNC: 25 MG/DL (ref 8–23)
BUN/CREAT SERPL: 12 (ref 7–25)
CALCIUM SPEC-SCNC: 8.3 MG/DL (ref 8.6–10.5)
CHLORIDE SERPL-SCNC: 113 MMOL/L (ref 98–107)
CO2 SERPL-SCNC: 14.5 MMOL/L (ref 22–29)
CREAT SERPL-MCNC: 2.08 MG/DL (ref 0.57–1)
DEPRECATED RDW RBC AUTO: 41.9 FL (ref 37–54)
EGFRCR SERPLBLD CKD-EPI 2021: 24.9 ML/MIN/1.73
ERYTHROCYTE [DISTWIDTH] IN BLOOD BY AUTOMATED COUNT: 11.9 % (ref 12.3–15.4)
GLUCOSE SERPL-MCNC: 101 MG/DL (ref 65–99)
HCT VFR BLD AUTO: 27 % (ref 34–46.6)
HGB BLD-MCNC: 9 G/DL (ref 12–15.9)
MCH RBC QN AUTO: 32.3 PG (ref 26.6–33)
MCHC RBC AUTO-ENTMCNC: 33.3 G/DL (ref 31.5–35.7)
MCV RBC AUTO: 96.8 FL (ref 79–97)
PHOSPHATE SERPL-MCNC: 3.1 MG/DL (ref 2.5–4.5)
PLATELET # BLD AUTO: 234 10*3/MM3 (ref 140–450)
PMV BLD AUTO: 10 FL (ref 6–12)
POTASSIUM SERPL-SCNC: 3.9 MMOL/L (ref 3.5–5.2)
RBC # BLD AUTO: 2.79 10*6/MM3 (ref 3.77–5.28)
SODIUM SERPL-SCNC: 138 MMOL/L (ref 136–145)
URATE SERPL-MCNC: 3.3 MG/DL (ref 2.4–5.7)
WBC NRBC COR # BLD AUTO: 23.09 10*3/MM3 (ref 3.4–10.8)

## 2024-04-28 PROCEDURE — 97161 PT EVAL LOW COMPLEX 20 MIN: CPT

## 2024-04-28 PROCEDURE — 85027 COMPLETE CBC AUTOMATED: CPT | Performed by: HOSPITALIST

## 2024-04-28 PROCEDURE — 84550 ASSAY OF BLOOD/URIC ACID: CPT | Performed by: HOSPITALIST

## 2024-04-28 PROCEDURE — 80069 RENAL FUNCTION PANEL: CPT | Performed by: HOSPITALIST

## 2024-04-28 PROCEDURE — 25010000002 PIPERACILLIN SOD-TAZOBACTAM PER 1 G: Performed by: HOSPITALIST

## 2024-04-28 PROCEDURE — 25810000003 SODIUM CHLORIDE 0.9 % SOLUTION: Performed by: NURSE PRACTITIONER

## 2024-04-28 PROCEDURE — 99232 SBSQ HOSP IP/OBS MODERATE 35: CPT | Performed by: SURGERY

## 2024-04-28 PROCEDURE — 97110 THERAPEUTIC EXERCISES: CPT

## 2024-04-28 PROCEDURE — 25010000002 HYDROMORPHONE 1 MG/ML SOLUTION: Performed by: HOSPITALIST

## 2024-04-28 PROCEDURE — 63710000001 DIPHENHYDRAMINE PER 50 MG: Performed by: HOSPITALIST

## 2024-04-28 RX ORDER — POLYETHYLENE GLYCOL 3350 17 G/17G
17 POWDER, FOR SOLUTION ORAL 2 TIMES DAILY
Status: DISCONTINUED | OUTPATIENT
Start: 2024-04-28 | End: 2024-05-03 | Stop reason: HOSPADM

## 2024-04-28 RX ORDER — FAMOTIDINE 10 MG/ML
20 INJECTION, SOLUTION INTRAVENOUS EVERY 12 HOURS SCHEDULED
Status: DISCONTINUED | OUTPATIENT
Start: 2024-04-28 | End: 2024-05-03 | Stop reason: HOSPADM

## 2024-04-28 RX ADMIN — HYDROMORPHONE HYDROCHLORIDE 1 MG: 1 INJECTION, SOLUTION INTRAMUSCULAR; INTRAVENOUS; SUBCUTANEOUS at 01:31

## 2024-04-28 RX ADMIN — PIPERACILLIN AND TAZOBACTAM 3.38 G: 3; .375 INJECTION, POWDER, FOR SOLUTION INTRAVENOUS at 01:33

## 2024-04-28 RX ADMIN — ATORVASTATIN CALCIUM 40 MG: 20 TABLET, FILM COATED ORAL at 20:48

## 2024-04-28 RX ADMIN — FAMOTIDINE 20 MG: 10 INJECTION INTRAVENOUS at 21:07

## 2024-04-28 RX ADMIN — HYDROMORPHONE HYDROCHLORIDE 1 MG: 1 INJECTION, SOLUTION INTRAMUSCULAR; INTRAVENOUS; SUBCUTANEOUS at 13:11

## 2024-04-28 RX ADMIN — POLYETHYLENE GLYCOL 3350 17 G: 17 POWDER, FOR SOLUTION ORAL at 09:59

## 2024-04-28 RX ADMIN — Medication 10 ML: at 10:11

## 2024-04-28 RX ADMIN — Medication 10 ML: at 21:08

## 2024-04-28 RX ADMIN — SODIUM CHLORIDE 100 ML/HR: 9 INJECTION, SOLUTION INTRAVENOUS at 17:21

## 2024-04-28 RX ADMIN — ASPIRIN 81 MG: 81 TABLET, COATED ORAL at 10:10

## 2024-04-28 RX ADMIN — HYDROMORPHONE HYDROCHLORIDE 1 MG: 1 INJECTION, SOLUTION INTRAMUSCULAR; INTRAVENOUS; SUBCUTANEOUS at 17:16

## 2024-04-28 RX ADMIN — POLYETHYLENE GLYCOL 3350 17 G: 17 POWDER, FOR SOLUTION ORAL at 20:48

## 2024-04-28 RX ADMIN — SODIUM CHLORIDE 100 ML/HR: 9 INJECTION, SOLUTION INTRAVENOUS at 08:00

## 2024-04-28 RX ADMIN — PIPERACILLIN AND TAZOBACTAM 3.38 G: 3; .375 INJECTION, POWDER, FOR SOLUTION INTRAVENOUS at 17:31

## 2024-04-28 RX ADMIN — FAMOTIDINE 40 MG: 20 TABLET, FILM COATED ORAL at 10:10

## 2024-04-28 RX ADMIN — HYDROMORPHONE HYDROCHLORIDE 1 MG: 1 INJECTION, SOLUTION INTRAMUSCULAR; INTRAVENOUS; SUBCUTANEOUS at 07:44

## 2024-04-28 RX ADMIN — SODIUM BICARBONATE 650 MG: 650 TABLET ORAL at 21:08

## 2024-04-28 RX ADMIN — METOPROLOL SUCCINATE 25 MG: 25 TABLET, EXTENDED RELEASE ORAL at 10:10

## 2024-04-28 RX ADMIN — HYDROMORPHONE HYDROCHLORIDE 1 MG: 1 INJECTION, SOLUTION INTRAMUSCULAR; INTRAVENOUS; SUBCUTANEOUS at 09:59

## 2024-04-28 RX ADMIN — SODIUM BICARBONATE 650 MG: 650 TABLET ORAL at 16:21

## 2024-04-28 RX ADMIN — HYDROMORPHONE HYDROCHLORIDE 1 MG: 1 INJECTION, SOLUTION INTRAMUSCULAR; INTRAVENOUS; SUBCUTANEOUS at 20:44

## 2024-04-28 RX ADMIN — PIPERACILLIN AND TAZOBACTAM 3.38 G: 3; .375 INJECTION, POWDER, FOR SOLUTION INTRAVENOUS at 10:18

## 2024-04-28 RX ADMIN — OXYCODONE AND ACETAMINOPHEN 1 TABLET: 7.5; 325 TABLET ORAL at 03:38

## 2024-04-28 RX ADMIN — DIPHENHYDRAMINE HYDROCHLORIDE 25 MG: 25 CAPSULE ORAL at 21:08

## 2024-04-28 RX ADMIN — SODIUM BICARBONATE 650 MG: 650 TABLET ORAL at 10:11

## 2024-04-28 RX ADMIN — ACETAMINOPHEN 500 MG: 500 TABLET ORAL at 21:08

## 2024-04-28 NOTE — PROGRESS NOTES
IMPRESSION & PLAN:  74-year-old lady with colitis.  Suspect ischemic given watershed area and presence of vascular disease in aorta and SMA.  Vessels are patent on imaging though.  Recommend continuing IV fluids and antibiotics.  I will order GI PCR as she is having bowel function.  Continue miralax BID given longstanding history of opioid induced constipation. Okay to start ice chips. We will follow.        CC:    Chief Complaint   Patient presents with    Abdominal Pain    Constipation         HPI: She has had improvement in her abdominal pain.  She had a bowel movement this morning and denies presence of blood.  Thirsty.  WBC increased to 23 from 20.    ROS:   Constitutional: No fever, no chills  CV:  No chest pain. No palpitations.   Lungs:  No shortness of breath or cough.   GI:  No nausea, no vomiting, no diarrhea, no constipation, no anorexia.       PE:    VS:   Vitals:    04/28/24 1403   BP: 122/56   Pulse: 76   Resp: 18   Temp: 97.9 °F (36.6 °C)   SpO2: 93%          Intake/Output Summary (Last 24 hours) at 4/28/2024 1501  Last data filed at 4/28/2024 1245  Gross per 24 hour   Intake 1460 ml   Output 1000 ml   Net 460 ml        CONST: Awake, alert  LUNGS: symmetric excursion, normal inspiratory effort  CV: regular rate, well perfused  Abdomen: Soft, tender to palpation in the left upper quadrant but much improved compared to yesterday      LABS:  Results from last 7 days   Lab Units 04/28/24  0640 04/27/24  0657 04/26/24  1823   WBC 10*3/mm3 23.09* 20.56* 19.40*   HEMOGLOBIN g/dL 9.0* 11.0* 12.7   HEMATOCRIT % 27.0* 33.2* 36.9   PLATELETS 10*3/mm3 234 292 359     Results from last 7 days   Lab Units 04/28/24  0640 04/27/24  0657 04/26/24  1823   SODIUM mmol/L 138 139 141   POTASSIUM mmol/L 3.9 3.4* 4.0   CHLORIDE mmol/L 113* 111* 106   CO2 mmol/L 14.5* 14.0* 19.0*   BUN mg/dL 25* 32* 28*   CREATININE mg/dL 2.08* 1.91* 2.49*   CALCIUM mg/dL 8.3* 8.6 10.3   BILIRUBIN mg/dL  --   --  0.4   ALK PHOS U/L  --   --   118*   ALT (SGPT) U/L  --   --  17   AST (SGOT) U/L  --   --  24   GLUCOSE mg/dL 101* 135* 145*       RADIOLOGY:  CTA reviewed.  Colitis of the transverse colon with a patent SMA and JONA

## 2024-04-28 NOTE — PLAN OF CARE
Goal Outcome Evaluation:  Plan of Care Reviewed With: patient        Progress: improving  Outcome Evaluation: Alert and oriented.  VSS. Intermittent severe abdominal pain. IV Dilaudid given, no c/o nausea. Abdomen distended but soft.  IV Fluids on flow, NPO , sips of water with meds. Due IV Zosyn given. Up with standby assist, voided freely, no BM during the shift, had Miralax and Pericolace. Rested well in between care.

## 2024-04-28 NOTE — THERAPY EVALUATION
Patient Name: Marlena Navarrete  : 1950    MRN: [id number]                              Today's Date: 2024       Admit Date: 2024    Visit Dx:     ICD-10-CM ICD-9-CM   1. Drug-induced constipation  K59.03 564.09     E980.5   2. Intractable abdominal pain  R10.9 789.00     Patient Active Problem List   Diagnosis    Lumbar radiculopathy    Essential hypertension    Right foot drop    Kidney transplant recipient    History of lumbar laminectomy for spinal cord decompression    Spinal enthesopathy of thoracic region    Postlaminectomy syndrome, lumbar region    Chronic bilateral low back pain with right-sided sciatica    Displacement of other nervous system device, implant or graft, initial encounter    Postlaminectomy syndrome of lumbar region    Epigastric pain    Nausea    Gastroesophageal reflux disease    Shoulder arthritis    Acute renal failure (ARF)    Stage 3b chronic kidney disease    Iron deficiency anemia    Status post rotator cuff surgery    History of DVT (deep vein thrombosis)    Peripheral neuropathy    Intractable back pain    Spinal cord stimulator status    Adhesive arachnoiditis    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    Inflammation of sacroiliac joint    Secondary hyperparathyroidism     Vitamin D deficiency    COVID    Pneumonia due to COVID-19 virus    Narcotic dependence    Cytokine release syndrome, grade 1    Impacted cerumen of right ear    Hospital discharge follow-up    Acute metabolic encephalopathy    Volume depletion    PIYUSH (acute kidney injury)    Dehydration    Prediabetes    Screening for colon cancer    Coronary artery calcification    Abnormal nuclear stress test    Constipation    Abdominal pain, generalized    Hypokalemia    Leukocytosis    Colitis, ischemic     Past Medical History:   Diagnosis Date    Arthritis     OSTEO    Chronic back pain     Chronic bilateral low back pain with right-sided sciatica 2020     CKD (chronic kidney disease)     FOLLOWED BY DR LANRE BYNUM    Diverticulosis     Foot drop, right     GERD (gastroesophageal reflux disease)     History of DVT (deep vein thrombosis) 2018    RIGHT CALF S/P BACK SURGERY     History of transfusion     no reaction    Hyperlipidemia     Hypertension     Kidney transplant recipient 1979    HX TRANSPLANT D/T REFLUX     Neuropathy     Secondary hyperparathyroidism  3/9/2022    Spinal cord stimulator status 3/9/2022    Spinal headache     Stage 3b chronic kidney disease 2020    FOLLOWED BY DR LANRE BYNUM    Torn rotator cuff     Vitamin D deficiency 3/9/2022     Past Surgical History:   Procedure Laterality Date    APPENDECTOMY      CATARACT EXTRACTION EXTRACAPSULAR W/ INTRAOCULAR LENS IMPLANTATION       SECTION      COLONOSCOPY  approx     Anastacio WHARTON    ENDOSCOPY N/A 2020    Procedure: ESOPHAGOGASTRODUODENOSCOPY with biopsies;  Surgeon: Wicho Chaves MD;  Location: [facility] ENDOSCOPY;  Service: Gastroenterology;  Laterality: N/A;  pre- epigastric pain, nausea  post-- gastritis, duodenitis, bile reflux    ENDOSCOPY N/A 2023    Procedure: ESOPHAGOGASTRODUODENOSCOPY with bx;  Surgeon: Jovan Robin MD;  Location: [facility] ENDOSCOPY;  Service: Gastroenterology;  Laterality: N/A;  pre: abd pain, nausea, vomiting   post: normal    LUMBAR DISCECTOMY FUSION INSTRUMENTATION Right 2018    Procedure: Right L2-3 laminectomy with Metrix;  Surgeon: Oscar Paulino MD;  Location: [facility] OR;  Service: Neurosurgery    SHOULDER ARTHROSCOPY W/ ROTATOR CUFF REPAIR Right 2020    Procedure: SHOULDER ARTHROSCOPY, ROTATOR CUFF REPAIR, SUBACROMIAL DECOMPRESSION, DISTAL CLAVICLE EXCISION, BICEPS TENOTOMY, AND LABRAL DEBRIDEMENT;  Surgeon: Rishabh Florence MD;  Location: [facility] OR [facility];  Service: Orthopedics;  Laterality: Right;    SHOULDER ROTATOR CUFF REPAIR Left     SPINAL CORD STIMULATOR IMPLANT N/A 2020    Procedure: SPINAL  CORD STIMULATOR INSERTION PHASE 2, triall of right gluteal internal pulse generator;  Surgeon: Cuate Kemp MD;  Location: [facility] MAIN OR;  Service: Neurosurgery;  Laterality: N/A;    SPINAL CORD STIMULATOR IMPLANT N/A 6/4/2020    Procedure: SPINAL CORD STIMULATOR INSERTION PHASE 1, Thoracic 10 laminotomy for revision of [place] surgical lead at Thoracic 8 to Thoracic 9;  Surgeon: Cuate Kemp MD;  Location: [facility] MAIN OR;  Service: Neurosurgery;  Laterality: N/A;    TONSILLECTOMY      TRANSPLANTATION RENAL Right 1979    SISTER GAVE PT     TUBAL ABDOMINAL LIGATION        General Information       Row Name 04/28/24 1340          Physical Therapy Time and Intention    Document Type evaluation;therapy note (daily note)  -     Mode of Treatment physical therapy  -       Row Name 04/28/24 1340          General Information    Patient Profile Reviewed yes  -MW     Prior Level of Function independent:;gait;all household mobility  -     Existing Precautions/Restrictions fall;other (see comments)  R AFO for foot drop  -       Row Name 04/28/24 1340          Home Main Entrance    Number of Stairs, Main Entrance two  -MW     Stair Railings, Main Entrance railings on both sides of stairs  -       Row Name 04/28/24 1340          Cognition    Orientation Status (Cognition) oriented x 4  -MW       Row Name 04/28/24 1340          Safety Issues, Functional Mobility    Impairments Affecting Function (Mobility) pain;balance;endurance/activity tolerance  -     Comment, Safety Issues/Impairments (Mobility) Gait belt and shoes on for safety  -               User Key  (r) = Recorded By, (t) = Taken By, (c) = Cosigned By      Initials Name Provider Type    MW Lucero Jones PT Physical Therapist                   Mobility       Row Name 04/28/24 1342          Bed Mobility    Comment, (Bed Mobility) up in chair  -       Row Name 04/28/24 1342          Sit-Stand Transfer    Sit-Stand [place] (Transfers)  contact guard;minimum assist (75% patient effort)  -MW     Assistive Device (Sit-Stand Transfers) walker, front-wheeled  -MW       Row Name 04/28/24 1342          Gait/Stairs (Locomotion)    [place] Level (Gait) contact guard  -MW     Assistive Device (Gait) walker, front-wheeled  -MW     Distance in Feet (Gait) 80  -MW     Deviations/Abnormal Patterns (Gait) right sided deviations;gait speed decreased;stride length decreased  -MW     Right Sided Gait Deviations heel strike decreased  -MW     Comment, (Gait/Stairs) R knee hikes during swing through, ambulates with R AFO, moves slowly 2/2 pain and spasms  -MW               User Key  (r) = Recorded By, (t) = Taken By, (c) = Cosigned By      Initials Name Provider Type    Lucero Cardona PT Physical Therapist                   Obj/Interventions       Row Name 04/28/24 1344          Range of Motion Comprehensive    General Range of Motion bilateral lower extremity ROM WFL  -MW       Row Name 04/28/24 1344          Strength Comprehensive (MMT)    General Manual Muscle Testing (MMT) Assessment lower extremity strength deficits identified  -MW     Comment, General Manual Muscle Testing (MMT) Assessment R foot drop @ BL  -MW       Row Name 04/28/24 1344          Balance    Balance Assessment standing static balance;standing dynamic balance  -MW     Static Standing Balance standby assist  -MW     Dynamic Standing Balance contact guard;standby assist  -MW     Position/Device Used, Standing Balance supported;walker, front-wheeled  -MW     Balance Interventions sitting;standing;sit to stand;supported;static;dynamic  -MW               User Key  (r) = Recorded By, (t) = Taken By, (c) = Cosigned By      Initials Name Provider Type    Lucero Cardona PT Physical Therapist                   Goals/Plan       Row Name 04/28/24 1353          Bed Mobility Goal 1 (PT)    Activity/Assistive Device (Bed Mobility Goal 1, PT) bed mobility activities, all  -MW     [place]  Level/Cues Needed (Bed Mobility Goal 1, PT) modified independence  -MW     Time Frame (Bed Mobility Goal 1, PT) 2 weeks  -MW       Row Name 04/28/24 1353          Transfer Goal 1 (PT)    Activity/Assistive Device (Transfer Goal 1, PT) transfers, all;walker, rolling  or least restrictive AD  -MW     [place] Level/Cues Needed (Transfer Goal 1, PT) modified independence  -MW     Time Frame (Transfer Goal 1, PT) 2 weeks  -MW       Row Name 04/28/24 1353          Gait Training Goal 1 (PT)    Activity/Assistive Device (Gait Training Goal 1, PT) gait (walking locomotion);walker, rolling;other (see comments)  or least restrictive AD  -MW     [place] Level (Gait Training Goal 1, PT) modified independence  -MW     Distance (Gait Training Goal 1, PT) 150  -MW     Time Frame (Gait Training Goal 1, PT) 2 weeks  -MW       Row Name 04/28/24 1353          Stairs Goal 1 (PT)    Activity/Assistive Device (Stairs Goal 1, PT) stairs, all skills  -MW     [place] Level/Cues Needed (Stairs Goal 1, PT) modified independence;standby assist  -MW     Number of Stairs (Stairs Goal 1, PT) 2  -MW     Time Frame (Stairs Goal 1, PT) 2 weeks  -MW       Row Name 04/28/24 1354          Therapy Assessment/Plan (PT)    Planned Therapy Interventions (PT) balance training;bed mobility training;gait training;home exercise program;patient/family education;transfer training  -MW               User Key  (r) = Recorded By, (t) = Taken By, (c) = Cosigned By      Initials Name Provider Type    MW Lucero Jones, PT Physical Therapist                   Clinical Impression       Row Name 04/28/24 2004          Pain    Pretreatment Pain Rating 4/10  -MW     Posttreatment Pain Rating 7/10  -MW     Pain Location - Side/Orientation Bilateral  -MW     Pain Location - abdomen  -MW     Pre/Posttreatment Pain Comment pain is intermittent  -MW     Pain Intervention(s) Medication (See MAR);Rest;Ambulation/increased activity  -MW       Row Name 04/28/24 0299           Plan of Care Review    Plan of Care Reviewed With patient;spouse  -     Outcome Evaluation Pt is a 73 yo female with PMH of chronic pain, opiod induced constipation, s/p kidney transplant 1979, on immunosuppression, CKD, h/o DVT, and h/o back surgery with R foot drop who was admitted for abominal pain and found to have ischemic colitis.She lives with her spouse in a home with 2 GEREMIAS and uses a cane for I mobility at BL. She presents with abdominal pain and spasms. Today she was able to perform STS at [facility] with CG/Min A and gait x 80 ft with RW CGA. Her mobility appears to be most limited by abdominal pain at this time. Acute PT indicated to address functional deficits.  -       Row Name 04/28/24 3435          Therapy Assessment/Plan (PT)    Rehab Potential (PT) good, to achieve stated therapy goals  -     Criteria for Skilled Interventions Met (PT) yes;meets criteria;skilled treatment is necessary  -MW     Therapy Frequency (PT) 3 times/wk  -MW       Row Name 04/28/24 1345          Vital Signs    O2 Delivery Pre Treatment room air  -MW     O2 Delivery Intra Treatment room air  -MW     O2 Delivery Post Treatment room air  -MW     Pre Patient Position Sitting  -MW     Intra Patient Position Standing  -MW     Post Patient Position Sitting  -MW       Row Name 04/28/24 1348          Positioning and Restraints    Pre-Treatment Position sitting in chair/recliner  -MW     Post Treatment Position chair  -MW     In Chair notified nsg;reclined;sitting;call light within reach;encouraged to call for assist;with family/caregiver  Needs met, lines intact  -MW               User Key  (r) = Recorded By, (t) = Taken By, (c) = Cosigned By      Initials Name Provider Type    Lucero Cardona, PT Physical Therapist                   Outcome Measures       Row Name 04/28/24 8100 04/28/24 0800       How much help from another person do you currently need...    Turning from your back to your side while in flat bed without  using bedrails? 4  -MW 4  -CH    Moving from lying on back to sitting on the side of a flat bed without bedrails? 4  -MW 4  -CH    Moving to and from a bed to a chair (including a wheelchair)? 3  -MW 3  -CH    Standing up from a chair using your arms (e.g., wheelchair, bedside chair)? 3  -MW 4  -CH    Climbing 3-5 steps with a railing? 3  -MW 3  -CH    To walk in hospital room? 3  -MW 3  -CH    AM-PAC 6 Clicks Score (PT) 20  -MW 21  -CH    Highest Level of Mobility Goal 6 --> Walk 10 steps or more  -MW 6 --> Walk 10 steps or more  -CH      Row Name 04/28/24 1355          Functional Assessment    Outcome Measure Options AM-PAC 6 Clicks Basic Mobility (PT)  -               User Key  (r) = Recorded By, (t) = Taken By, (c) = Cosigned By      Initials Name Provider Type     Zoya Spears RN Registered Nurse    Lucero Cardona, PT Physical Therapist                                 Physical Therapy Education       Title: PT OT SLP Therapies (In Progress)       Topic: Physical Therapy (In Progress)       Point: Mobility training (Done)       Learning Progress Summary             Patient Acceptance, E, VU,NR by  at 4/28/2024 1356   Significant Other Acceptance, E, VU,NR by  at 4/28/2024 1356                         Point: Home exercise program (Not Started)       Learner Progress:  Not documented in this visit.              Point: Body mechanics (Done)       Learning Progress Summary             Patient Acceptance, E, VU,NR by  at 4/28/2024 1356   Significant Other Acceptance, E, VU,NR by  at 4/28/2024 1356                         Point: Precautions (Done)       Learning Progress Summary             Patient Acceptance, E, VU,NR by  at 4/28/2024 1356   Significant Other Acceptance, E, VU,NR by  at 4/28/2024 1356                                         User Key       Initials Effective Dates Name Provider Type Discipline     06/16/21 -  Lucero Jones, PT Physical Therapist PT                  PT  Recommendation and Plan  Planned Therapy Interventions (PT): balance training, bed mobility training, gait training, home exercise program, patient/family education, transfer training  Plan of Care Reviewed With: patient, spouse  Outcome Evaluation: Pt is a 73 yo female with PMH of chronic pain, opiod induced constipation, s/p kidney transplant 1979, on immunosuppression, CKD, h/o DVT, and h/o back surgery with R foot drop who was admitted for abominal pain and found to have ischemic colitis.She lives with her spouse in a home with 2 GEREMIAS and uses a cane for I mobility at BL. She presents with abdominal pain and spasms. Today she was able to perform STS at [facility] with CG/Min A and gait x 80 ft with RW CGA. Her mobility appears to be most limited by abdominal pain at this time. Acute PT indicated to address functional deficits.     Time Calculation:   PT Evaluation Complexity  History, PT Evaluation Complexity: 3 or more personal factors and/or comorbidities  Examination of Body Systems (PT Eval Complexity): 1-2 elements  Clinical Presentation (PT Evaluation Complexity): stable  Clinical Decision Making (PT Evaluation Complexity): low complexity  Overall Complexity (PT Evaluation Complexity): low complexity     PT Charges       Row Name 04/28/24 1356             Time Calculation    Start Time 1320  -MW      Stop Time 1335  -MW      Time Calculation (min) 15 min  -MW      PT Received On 04/28/24  -MW      PT - Next Appointment 04/29/24  -MW      PT Goal Re-Cert Due Date 05/12/24  -MW         Time Calculation- PT    Total Timed Code Minutes- PT 14 minute(s)  -MW                User Key  (r) = Recorded By, (t) = Taken By, (c) = Cosigned By      Initials Name Provider Type    Lucero Cardona PT Physical Therapist                  Therapy Charges for Today       Code Description Service Date Service Provider Modifiers Qty    25090099584 HC PT EVAL LOW COMPLEXITY 2 4/28/2024 Lucero Jones, PT GP 1    91925721893 HC PT THER  PROC EA 15 MIN 4/28/2024 Lucero Jones, PT GP 1            PT G-Codes  Outcome Measure Options: AM-PAC 6 Clicks Basic Mobility (PT)  AM-PAC 6 Clicks Score (PT): 20  PT Discharge Summary  Anticipated Discharge Disposition (PT): home with assist    Lucero Jones, CARLOS ENRIQUE  4/28/2024

## 2024-04-28 NOTE — PLAN OF CARE
"Goal Outcome Evaluation:  Plan of Care Reviewed With: patient        Progress: improving  Outcome Evaluation: intermittent, severe waves of abd pain \"sharp\" in nature but overall \"not as bad\" as yesterday. IV dilaudid helpful.   abd rounded, distended, soft.  remains NPO but can now have ice PO.  IVF's infusing and IV zosyn continues.  up with assist to chair and BR.  bed/chair alarm in use for safety.  need stool speciman for GI panel.                               "

## 2024-04-28 NOTE — PLAN OF CARE
Goal Outcome Evaluation:  Plan of Care Reviewed With: patient, spouse           Outcome Evaluation: Pt is a 73 yo female with PMH of chronic pain, opiod induced constipation, s/p kidney transplant 1979, on immunosuppression, CKD, h/o DVT, and h/o back surgery with R foot drop who was admitted for abominal pain and found to have ischemic colitis.She lives with her spouse in a home with 2 GEREMIAS and uses a cane for I mobility at BL. She presents with abdominal pain and spasms. Today she was able to perform STS at [facility] with CG/Min A and gait x 80 ft with RW CGA. Her mobility appears to be most limited by abdominal pain at this time. Acute PT indicated to address functional deficits.      Anticipated Discharge Disposition (PT): home with assist

## 2024-04-28 NOTE — PROGRESS NOTES
"    DAILY PROGRESS NOTE  [facility]    Patient Identification:  Name: Marlena Navarrete  Age: 74 y.o.  Sex: female  :  1950  MRN: [id number]         Primary Care Physician: Bradley Hernandez MD    Subjective:  Interval History: Feeling much better today.  She was not splinting and pain this morning and she states her abdominal swelling greatly improved yesterday though it is a little bit back this morning.  Her abdomen is so much more improved today than it was to yesterday despite the previous negative initial CT.  She denies any new fever chest pain shortness of breath and/or confusion.  Appreciative of the medical care    Objective: Clinically different as opposed to yesterday and improved    Scheduled Meds:diphenhydrAMINE, 25 mg, Oral, Nightly   And  acetaminophen, 500 mg, Oral, Nightly  aspirin, 81 mg, Oral, Daily  atorvastatin, 40 mg, Oral, Nightly  famotidine, 40 mg, Oral, Daily  metoprolol succinate XL, 25 mg, Oral, Daily  piperacillin-tazobactam, 3.375 g, Intravenous, Q8H  polyethylene glycol, 17 g, Oral, BID  sodium bicarbonate, 650 mg, Oral, TID  sodium chloride, 10 mL, Intravenous, Q12H      Continuous Infusions:sodium chloride, 100 mL/hr, Last Rate: 100 mL/hr (24 0800)        Vital signs in last 24 hours:  Temp:  [97.8 °F (36.6 °C)-98.9 °F (37.2 °C)] 97.8 °F (36.6 °C)  Heart Rate:  [71-90] 74  Resp:  [18] 18  BP: (114-145)/(57-72) 124/57    Intake/Output:    Intake/Output Summary (Last 24 hours) at 2024 1107  Last data filed at 2024 1044  Gross per 24 hour   Intake 1460 ml   Output 900 ml   Net 560 ml       Exam:  /57 (BP Location: Right arm, Patient Position: Lying)   Pulse 74   Temp 97.8 °F (36.6 °C) (Oral)   Resp 18   Ht 147.3 cm (58\")   Wt 60.8 kg (134 lb 0.6 oz)   SpO2 94%   BMI 28.01 kg/m²     General Appearance:    Alert, cooperative,  AAOx3                          Head:    Normocephalic, without obvious abnormality, atraumatic                         "   Eyes:  Nonicteric                         Throat:   Oral mucosa pink and moist                           Neck:   No JVD                         Lungs:    Clear to auscultation bilaterally, respirations unlabored                          Heart:    Regular rate and rhythm, S1 and S2 normal                  Abdomen:     Soft, much better abdominal exam today, still some distention though improved, bowel sounds are good, still diffusely tender greatest in her epigastrium but no rebound and improved guarding.  Her abdomen is nonsurgical this a.m.                 Extremities: Moving all, no cyanosis or edema                        Pulses:   Pulses palpable in all extremities                            Skin:   Skin is warm and dry, nonjaundiced                  Neurologic:   CNII-XII intact, no focal deficits noted     Data Review:  Labs in chart were reviewed.    Assessment:  Active Hospital Problems    Diagnosis  POA    **Colitis, ischemic [K55.9]  Unknown    Abdominal pain, generalized [R10.84]  Unknown    Hypokalemia [E87.6]  Unknown    Leukocytosis [D72.829]  Unknown    Constipation [K59.00]  Yes    Narcotic dependence [F11.20]  Yes    History of DVT (deep vein thrombosis) [Z86.718]  Not Applicable    Peripheral neuropathy [G62.9]  Yes    Stage 3b chronic kidney disease [N18.32]  Yes    Gastroesophageal reflux disease [K21.9]  Yes    Lumbar radiculopathy [M54.16]  Yes      Resolved Hospital Problems   No resolved problems to display.       Plan:    Yesterday's extensive workup in conjunction with surgeon Dr. Borjas revealed ischemic colitis   -Norvasc temporarily discontinued   -Continue IVF has got likely perfusing at this point   -Previous emergent plans for surgery have been aborted and will defer further dietary advancement to surgery as currently n.p.o. except for sips with meds    GERD/Pepcid/GI PPx -change p.o. Pepcid to IV     CKD 3 on p.o. bicarb -creatinine baseline.  Replaced K x 1     HTN -amlodipine  temporarily discontinued parameters placed on metoprolol.  Current blood pressure 124/87 with a heart rate of 74     HLD on statin    SCDs for prophylaxis        Disposition -no longer observation adequate.  Patient will remain in house for multiple days and she has been changed to inpatient      Dario Ricardo MD  4/28/2024  11:07 EDT

## 2024-04-29 LAB
ADV 40+41 DNA STL QL NAA+NON-PROBE: NOT DETECTED
ANION GAP SERPL CALCULATED.3IONS-SCNC: 13.9 MMOL/L (ref 5–15)
ASTRO TYP 1-8 RNA STL QL NAA+NON-PROBE: NOT DETECTED
BUN SERPL-MCNC: 27 MG/DL (ref 8–23)
BUN/CREAT SERPL: 13.8 (ref 7–25)
C CAYETANENSIS DNA STL QL NAA+NON-PROBE: NOT DETECTED
C COLI+JEJ+UPSA DNA STL QL NAA+NON-PROBE: NOT DETECTED
CALCIUM SPEC-SCNC: 8.6 MG/DL (ref 8.6–10.5)
CHLORIDE SERPL-SCNC: 115 MMOL/L (ref 98–107)
CO2 SERPL-SCNC: 12.1 MMOL/L (ref 22–29)
CREAT SERPL-MCNC: 1.96 MG/DL (ref 0.57–1)
CRYPTOSP DNA STL QL NAA+NON-PROBE: NOT DETECTED
DEPRECATED RDW RBC AUTO: 40.1 FL (ref 37–54)
E HISTOLYT DNA STL QL NAA+NON-PROBE: NOT DETECTED
EAEC PAA PLAS AGGR+AATA ST NAA+NON-PRB: NOT DETECTED
EC STX1+STX2 GENES STL QL NAA+NON-PROBE: NOT DETECTED
EGFRCR SERPLBLD CKD-EPI 2021: 26.7 ML/MIN/1.73
EPEC EAE GENE STL QL NAA+NON-PROBE: NOT DETECTED
ERYTHROCYTE [DISTWIDTH] IN BLOOD BY AUTOMATED COUNT: 11.7 % (ref 12.3–15.4)
ETEC LTA+ST1A+ST1B TOX ST NAA+NON-PROBE: NOT DETECTED
G LAMBLIA DNA STL QL NAA+NON-PROBE: NOT DETECTED
GLUCOSE SERPL-MCNC: 73 MG/DL (ref 65–99)
HCT VFR BLD AUTO: 26.9 % (ref 34–46.6)
HGB BLD-MCNC: 8.9 G/DL (ref 12–15.9)
MCH RBC QN AUTO: 31.6 PG (ref 26.6–33)
MCHC RBC AUTO-ENTMCNC: 33.1 G/DL (ref 31.5–35.7)
MCV RBC AUTO: 95.4 FL (ref 79–97)
NOROVIRUS GI+II RNA STL QL NAA+NON-PROBE: NOT DETECTED
P SHIGELLOIDES DNA STL QL NAA+NON-PROBE: NOT DETECTED
PLATELET # BLD AUTO: 227 10*3/MM3 (ref 140–450)
PMV BLD AUTO: 9.9 FL (ref 6–12)
POTASSIUM SERPL-SCNC: 3.7 MMOL/L (ref 3.5–5.2)
RBC # BLD AUTO: 2.82 10*6/MM3 (ref 3.77–5.28)
RVA RNA STL QL NAA+NON-PROBE: NOT DETECTED
S ENT+BONG DNA STL QL NAA+NON-PROBE: NOT DETECTED
SAPO I+II+IV+V RNA STL QL NAA+NON-PROBE: NOT DETECTED
SHIGELLA SP+EIEC IPAH ST NAA+NON-PROBE: NOT DETECTED
SODIUM SERPL-SCNC: 141 MMOL/L (ref 136–145)
V CHOL+PARA+VUL DNA STL QL NAA+NON-PROBE: NOT DETECTED
V CHOLERAE DNA STL QL NAA+NON-PROBE: NOT DETECTED
WBC NRBC COR # BLD AUTO: 18.66 10*3/MM3 (ref 3.4–10.8)
Y ENTEROCOL DNA STL QL NAA+NON-PROBE: NOT DETECTED

## 2024-04-29 PROCEDURE — 63710000001 DIPHENHYDRAMINE PER 50 MG: Performed by: HOSPITALIST

## 2024-04-29 PROCEDURE — 85027 COMPLETE CBC AUTOMATED: CPT | Performed by: SURGERY

## 2024-04-29 PROCEDURE — 80048 BASIC METABOLIC PNL TOTAL CA: CPT | Performed by: SURGERY

## 2024-04-29 PROCEDURE — 25010000002 PIPERACILLIN SOD-TAZOBACTAM PER 1 G: Performed by: HOSPITALIST

## 2024-04-29 PROCEDURE — 97530 THERAPEUTIC ACTIVITIES: CPT

## 2024-04-29 PROCEDURE — 25810000003 LACTATED RINGERS PER 1000 ML: Performed by: STUDENT IN AN ORGANIZED HEALTH CARE EDUCATION/TRAINING PROGRAM

## 2024-04-29 PROCEDURE — 99231 SBSQ HOSP IP/OBS SF/LOW 25: CPT | Performed by: SURGERY

## 2024-04-29 PROCEDURE — 25010000002 HYDROMORPHONE 1 MG/ML SOLUTION: Performed by: HOSPITALIST

## 2024-04-29 PROCEDURE — 87507 IADNA-DNA/RNA PROBE TQ 12-25: CPT | Performed by: SURGERY

## 2024-04-29 PROCEDURE — 25810000003 SODIUM CHLORIDE 0.9 % SOLUTION: Performed by: NURSE PRACTITIONER

## 2024-04-29 RX ORDER — SODIUM CHLORIDE, SODIUM LACTATE, POTASSIUM CHLORIDE, CALCIUM CHLORIDE 600; 310; 30; 20 MG/100ML; MG/100ML; MG/100ML; MG/100ML
100 INJECTION, SOLUTION INTRAVENOUS CONTINUOUS
Status: DISCONTINUED | OUTPATIENT
Start: 2024-04-29 | End: 2024-04-30

## 2024-04-29 RX ADMIN — HYDROMORPHONE HYDROCHLORIDE 1 MG: 1 INJECTION, SOLUTION INTRAMUSCULAR; INTRAVENOUS; SUBCUTANEOUS at 04:33

## 2024-04-29 RX ADMIN — FAMOTIDINE 20 MG: 10 INJECTION INTRAVENOUS at 08:34

## 2024-04-29 RX ADMIN — PIPERACILLIN AND TAZOBACTAM 3.38 G: 3; .375 INJECTION, POWDER, FOR SOLUTION INTRAVENOUS at 10:38

## 2024-04-29 RX ADMIN — HYDROMORPHONE HYDROCHLORIDE 1 MG: 1 INJECTION, SOLUTION INTRAMUSCULAR; INTRAVENOUS; SUBCUTANEOUS at 00:42

## 2024-04-29 RX ADMIN — ACETAMINOPHEN 500 MG: 500 TABLET ORAL at 20:33

## 2024-04-29 RX ADMIN — ATORVASTATIN CALCIUM 40 MG: 20 TABLET, FILM COATED ORAL at 20:33

## 2024-04-29 RX ADMIN — POLYETHYLENE GLYCOL 3350 17 G: 17 POWDER, FOR SOLUTION ORAL at 08:27

## 2024-04-29 RX ADMIN — Medication 10 ML: at 20:33

## 2024-04-29 RX ADMIN — PIPERACILLIN AND TAZOBACTAM 3.38 G: 3; .375 INJECTION, POWDER, FOR SOLUTION INTRAVENOUS at 18:42

## 2024-04-29 RX ADMIN — HYDROMORPHONE HYDROCHLORIDE 1 MG: 1 INJECTION, SOLUTION INTRAMUSCULAR; INTRAVENOUS; SUBCUTANEOUS at 08:27

## 2024-04-29 RX ADMIN — ASPIRIN 81 MG: 81 TABLET, COATED ORAL at 08:27

## 2024-04-29 RX ADMIN — DIPHENHYDRAMINE HYDROCHLORIDE 25 MG: 25 CAPSULE ORAL at 20:33

## 2024-04-29 RX ADMIN — SODIUM BICARBONATE 650 MG: 650 TABLET ORAL at 08:27

## 2024-04-29 RX ADMIN — OXYCODONE AND ACETAMINOPHEN 1 TABLET: 7.5; 325 TABLET ORAL at 16:34

## 2024-04-29 RX ADMIN — SODIUM BICARBONATE 650 MG: 650 TABLET ORAL at 16:34

## 2024-04-29 RX ADMIN — FAMOTIDINE 20 MG: 10 INJECTION INTRAVENOUS at 20:33

## 2024-04-29 RX ADMIN — SODIUM CHLORIDE, POTASSIUM CHLORIDE, SODIUM LACTATE AND CALCIUM CHLORIDE 100 ML/HR: 600; 310; 30; 20 INJECTION, SOLUTION INTRAVENOUS at 16:34

## 2024-04-29 RX ADMIN — Medication 10 ML: at 08:33

## 2024-04-29 RX ADMIN — PIPERACILLIN AND TAZOBACTAM 3.38 G: 3; .375 INJECTION, POWDER, FOR SOLUTION INTRAVENOUS at 03:21

## 2024-04-29 RX ADMIN — SODIUM BICARBONATE 650 MG: 650 TABLET ORAL at 20:34

## 2024-04-29 RX ADMIN — SODIUM CHLORIDE 100 ML/HR: 9 INJECTION, SOLUTION INTRAVENOUS at 12:09

## 2024-04-29 RX ADMIN — METOPROLOL SUCCINATE 25 MG: 25 TABLET, EXTENDED RELEASE ORAL at 08:27

## 2024-04-29 RX ADMIN — SODIUM CHLORIDE 100 ML/HR: 9 INJECTION, SOLUTION INTRAVENOUS at 03:19

## 2024-04-29 NOTE — PLAN OF CARE
Goal Outcome Evaluation:  Plan of Care Reviewed With: patient, spouse        Progress: improving  Outcome Evaluation: Pt seen by PT this AM for tx. Pt was UIC w/ spouse in room. Spouse assist pt in donning R AFO and shoes. Pt performed ther ex for strengthening. Pt stood w/ SBA. Pt amb approx 100' req CGA to SBA and use of QC. Pain limited distance. Pt was back in chair at end of session. PT will prog as pt laurent.      Anticipated Discharge Disposition (PT): home with assist

## 2024-04-29 NOTE — PROGRESS NOTES
Follow-up likely ischemic colitis    Subjective:  Clinically feels somewhat better today.  Still with some pain but overall improving.  She is having bowel function now.  GI PCR panel was negative.    Objective:  Afebrile, vitals are stable  General: Awake and alert, mild distress, answers questions appropriately  Abdomen: There is diffuse tenderness, worse in the left lower quadrant.  No mass or hernia.    Hemoglobin is stable at 8.9.  White blood cell count 18.6 from 23.  Creatinine 1.96, essentially stable at her baseline.    Assessment and plan:  -Likely ischemic colitis, clinically somewhat better today  -Continue IV antibiotics and bowel regimen  -Watch labs  -No indication for surgical intervention at this point, will continue to follow closely    Kin Macdonald MD  General and Endoscopic Surgery  [facility]    [address], [address]  [place], [place], [address]  P: [phone]  F: [phone]

## 2024-04-29 NOTE — PLAN OF CARE
Problem: Adult Inpatient Plan of Care  Goal: Plan of Care Review  Outcome: Ongoing, Not Progressing  Flowsheets (Taken 4/29/2024 0941)  Progress: no change  Plan of Care Reviewed With: patient  Outcome Evaluation: alert and oriented, NPO except ice chips and sips of water with meds, c/o intermittent severe abdominal cramping pains, medicated with IV Dilaudid x3 with adequate relief, abdomen distended, firm to touch and guarding when Pt is in pain, hypoactive bowel sounds, Miralax given, had 2 small formed stools during the night, sample collected for GI panel, no c/o nausea, slept between care, falls precaution maintained, assisted to BSC, IV Zosyn continued   Goal Outcome Evaluation:  Plan of Care Reviewed With: patient        Progress: no change  Outcome Evaluation: alert and oriented, NPO except ice chips and sips of water with meds, c/o intermittent severe abdominal cramping pains, medicated with IV Dilaudid x3 with adequate relief, abdomen distended, firm to touch and guarding when Pt is in pain, hypoactive bowel sounds, Miralax given, had 2 small formed stools during the night, sample collected for GI panel, no c/o nausea, slept between care, falls precaution maintained, assisted to BSC, IV Zosyn continued

## 2024-04-29 NOTE — PLAN OF CARE
Goal Outcome Evaluation:  Plan of Care Reviewed With: patient        Progress: improving  Outcome Evaluation: alert and oriented, abdomen softly rounded, pt states pain better throughout shift, denies nausea, remains NPO except ice/water with meds, ivf changed and infusing, IV pain medication give this am - pt in a lot of abdominal pain and moaning, now pt mostly c/o her chronic back pain and PO medication given, pt with many bowel movements and now more loose, voiding well, iv abx given as ordered, vss and afebrile

## 2024-04-29 NOTE — PROGRESS NOTES
Discharge Planning Assessment  [facility]     Patient Name: Marlena Navarrete  MRN: [id number]  Today's Date: 4/29/2024    Admit Date: 4/26/2024    Plan: Home no needs   Discharge Needs Assessment       Row Name 04/29/24 1040       Living Environment    People in Home spouse    Name(s) of People in Home Spouse: Rolf Navarrete    Current Living Arrangements home    Potentially Unsafe Housing Conditions none    Primary Care Provided by self    Provides Primary Care For no one    Family Caregiver if Needed spouse    Quality of Family Relationships helpful;involved;supportive    Able to Return to Prior Arrangements yes       Transition Planning    Patient/Family Anticipates Transition to home with family    Transportation Anticipated family or friend will provide       Discharge Needs Assessment    Equipment Currently Used at Home cane, quad tip;shower chair;grab bar;other (see comments)  Has available walker and wheelchair,    Provided Post Acute Provider List? N/A    N/A Provider List Comment Denies needs. Plan is home                   Discharge Plan       Row Name 04/29/24 1041       Plan    Plan Home no needs    Plan Comments MM noted. Introduced self anf role of CCP. Spouse at bedside. Agreeable to discuss with all present. Patient lives in a house with spouse. Stted she is independent with ADL's and continues to drive, Uses a quad cane when out of the home. Has a standard tub/shower and walk-in shower with shower chair and grab bars present. Has available a walker and wheelchair if needed. Has used [facility], [facility] and has been to [facility] for rehab in the past. DC plan is to return home. Stated her spouse will assist as needed. Denies any needs/equipment.                  Continued Care and Services - Admitted Since 4/26/2024    No active coordination exists for this encounter.          Demographic Summary       Row Name 04/29/24 1038       General Information    Admission Type inpatient    Arrived From  home    Required Notices Provided Important Message from Medicare    Reason for Consult discharge planning    Preferred Language English                   Functional Status       Row Name 04/29/24 1039       Functional Status    Usual Activity Tolerance good    Current Activity Tolerance good       Functional Status, IADL    Medications independent    Meal Preparation independent    Housekeeping independent    Laundry independent    Shopping independent       Mental Status    General Appearance WDL WDL       Employment/    Employment Status retired                   Psychosocial       Row Name 04/29/24 1039       Values/Beliefs    Spiritual, Cultural Beliefs, Sabianist Practices, Values that Affect Care no       Behavior WDL    Behavior WDL WDL       Emotion Mood WDL    Emotion/Mood/Affect WDL WDL       Speech WDL    Speech WDL WDL       Perceptual State WDL    Perceptual State WDL WDL       Coping/Stress    Sources of Support spouse    Reaction to Health Status adjusting    Understanding of Condition and Treatment adequate understanding of treatment       Developmental Stage (Eriksson's)    Developmental Stage Stage 8 (65 years-death/Late Adulthood) Integrity vs. Despair                   Abuse/Neglect       Row Name 04/29/24 1039       Personal Safety    Feels Unsafe at Home or Work/School no    Feels Threatened by Someone no    Does Anyone Try to Keep You From Having Contact with Others or Doing Things Outside Your Home? no    Physical Signs of Abuse Present no             Mary Thornton, RN

## 2024-04-29 NOTE — PLAN OF CARE
Problem: Adult Inpatient Plan of Care  Goal: Plan of Care Review  4/29/2024 0942 by Rasheeda Ferreira RN  Outcome: Ongoing, Not Progressing  Flowsheets (Taken 4/29/2024 0942)  Plan of Care Reviewed With: patient  Outcome Evaluation: Pt in room air, VSS   Goal Outcome Evaluation:  Plan of Care Reviewed With: patient        Progress: no change  Outcome Evaluation: Pt in room air, VSS

## 2024-04-29 NOTE — PROGRESS NOTES
Name: Marlena Navarrete ADMIT: 2024   : 1950  PCP: Bradley Hernandez MD    MRN: [id number] LOS: 2 days   AGE/SEX: 74 y.o. female  ROOM: [facility]     Subjective   Subjective   Sitting up in chair.  She is feeling better today.  Has had numerous loose BMs today.    Objective   Objective   Vital Signs  Temp:  [97.2 °F (36.2 °C)-98.4 °F (36.9 °C)] 97.2 °F (36.2 °C)  Heart Rate:  [72-89] 75  Resp:  [16-20] 16  BP: (113-141)/(62-70) 134/70  SpO2:  [93 %-97 %] 96 %  on   ;   Device (Oxygen Therapy): room air  Body mass index is 28.01 kg/m².  Physical Exam  Constitutional:       General: She is not in acute distress.  Pulmonary:      Effort: Pulmonary effort is normal. No respiratory distress.      Breath sounds: No stridor.   Skin:     Coloration: Skin is not jaundiced.      Findings: No bruising.   Neurological:      General: No focal deficit present.      Mental Status: She is alert.   Psychiatric:         Mood and Affect: Mood normal.         Behavior: Behavior normal.         Results Review     I reviewed the patient's new clinical results.  Results from last 7 days   Lab Units 24  0905 24  0640 24  0657 24  1823   WBC 10*3/mm3 18.66* 23.09* 20.56* 19.40*   HEMOGLOBIN g/dL 8.9* 9.0* 11.0* 12.7   PLATELETS 10*3/mm3 227 234 292 359     Results from last 7 days   Lab Units 24  0905 24  0640 24  0657 24  1823   SODIUM mmol/L 141 138 139 141   POTASSIUM mmol/L 3.7 3.9 3.4* 4.0   CHLORIDE mmol/L 115* 113* 111* 106   CO2 mmol/L 12.1* 14.5* 14.0* 19.0*   BUN mg/dL 27* 25* 32* 28*   CREATININE mg/dL 1.96* 2.08* 1.91* 2.49*   GLUCOSE mg/dL 73 101* 135* 145*   EGFR mL/min/1.73 26.7* 24.9* 27.6* 20.1*     Results from last 7 days   Lab Units 24  0640 24  1823   ALBUMIN g/dL 3.1* 5.1   BILIRUBIN mg/dL  --  0.4   ALK PHOS U/L  --  118*   AST (SGOT) U/L  --  24   ALT (SGPT) U/L  --  17     Results from last 7 days   Lab Units 24  0905 24  0640  "04/27/24  0657 04/26/24  1823   CALCIUM mg/dL 8.6 8.3* 8.6 10.3   ALBUMIN g/dL  --  3.1*  --  5.1   PHOSPHORUS mg/dL  --  3.1  --   --      Results from last 7 days   Lab Units 04/27/24  1104 04/26/24  1823   PROCALCITONIN ng/mL  --  0.07   LACTATE mmol/L 1.9 1.8     No results found for: \"HGBA1C\", \"POCGLU\"    No radiology results for the last day  Scheduled Medications  diphenhydrAMINE, 25 mg, Oral, Nightly   And  acetaminophen, 500 mg, Oral, Nightly  aspirin, 81 mg, Oral, Daily  atorvastatin, 40 mg, Oral, Nightly  famotidine, 20 mg, Intravenous, Q12H  metoprolol succinate XL, 25 mg, Oral, Daily  piperacillin-tazobactam, 3.375 g, Intravenous, Q8H  [Held by provider] polyethylene glycol, 17 g, Oral, BID  sodium bicarbonate, 650 mg, Oral, TID  sodium chloride, 10 mL, Intravenous, Q12H    Infusions  lactated ringers, 100 mL/hr    Diet  NPO Diet NPO Type: Sips with Meds, Ice Chips       Assessment/Plan     Active Hospital Problems    Diagnosis  POA    **Colitis, ischemic [K55.9]  Unknown    Abdominal pain, generalized [R10.84]  Unknown    Hypokalemia [E87.6]  Unknown    Leukocytosis [D72.829]  Unknown    Constipation [K59.00]  Yes    Narcotic dependence [F11.20]  Yes    History of DVT (deep vein thrombosis) [Z86.718]  Not Applicable    Peripheral neuropathy [G62.9]  Yes    Stage 3b chronic kidney disease [N18.32]  Yes    Gastroesophageal reflux disease [K21.9]  Yes    Lumbar radiculopathy [M54.16]  Yes      Resolved Hospital Problems   No resolved problems to display.       74 y.o. female admitted with Colitis, ischemic.      04/29/24  Switch NS to LR.  Continue antibiotics.    Ischemic colitis  Leukocytosis, improving  -General surgery following  -Antihypertensives on hold  -GI PCR negative  -Continue IV fluid  -Diet per surgery  -Empiric Zosyn    CKD4  -Continue bicarb  -she is developing worsening acidosis likely from NS as renal function is improving, switch to LR         DVT prophylaxis: SCDs  Discussed with " patient and family.  Anticipated discharge home, when cleared by consultants            Behzad Maurice MD  [facility]  04/29/24  15:39 EDT

## 2024-04-29 NOTE — THERAPY TREATMENT NOTE
Patient Name: Marlena Navarrete  : 1950    MRN: [id number]                              Today's Date: 2024       Admit Date: 2024    Visit Dx:     ICD-10-CM ICD-9-CM   1. Drug-induced constipation  K59.03 564.09     E980.5   2. Intractable abdominal pain  R10.9 789.00     Patient Active Problem List   Diagnosis    Lumbar radiculopathy    Essential hypertension    Right foot drop    Kidney transplant recipient    History of lumbar laminectomy for spinal cord decompression    Spinal enthesopathy of thoracic region    Postlaminectomy syndrome, lumbar region    Chronic bilateral low back pain with right-sided sciatica    Displacement of other nervous system device, implant or graft, initial encounter    Postlaminectomy syndrome of lumbar region    Epigastric pain    Nausea    Gastroesophageal reflux disease    Shoulder arthritis    Acute renal failure (ARF)    Stage 3b chronic kidney disease    Iron deficiency anemia    Status post rotator cuff surgery    History of DVT (deep vein thrombosis)    Peripheral neuropathy    Intractable back pain    Spinal cord stimulator status    Adhesive arachnoiditis    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    Inflammation of sacroiliac joint    Secondary hyperparathyroidism     Vitamin D deficiency    COVID    Pneumonia due to COVID-19 virus    Narcotic dependence    Cytokine release syndrome, grade 1    Impacted cerumen of right ear    Hospital discharge follow-up    Acute metabolic encephalopathy    Volume depletion    PIYUSH (acute kidney injury)    Dehydration    Prediabetes    Screening for colon cancer    Coronary artery calcification    Abnormal nuclear stress test    Constipation    Abdominal pain, generalized    Hypokalemia    Leukocytosis    Colitis, ischemic     Past Medical History:   Diagnosis Date    Arthritis     OSTEO    Chronic back pain     Chronic bilateral low back pain with right-sided sciatica 2020     CKD (chronic kidney disease)     FOLLOWED BY DR LANRE BYNUM    Diverticulosis     Foot drop, right     GERD (gastroesophageal reflux disease)     History of DVT (deep vein thrombosis) 2018    RIGHT CALF S/P BACK SURGERY     History of transfusion     no reaction    Hyperlipidemia     Hypertension     Kidney transplant recipient 1979    HX TRANSPLANT D/T REFLUX     Neuropathy     Secondary hyperparathyroidism  3/9/2022    Spinal cord stimulator status 3/9/2022    Spinal headache     Stage 3b chronic kidney disease 2020    FOLLOWED BY DR LANRE BYNUM    Torn rotator cuff     Vitamin D deficiency 3/9/2022     Past Surgical History:   Procedure Laterality Date    APPENDECTOMY      CATARACT EXTRACTION EXTRACAPSULAR W/ INTRAOCULAR LENS IMPLANTATION       SECTION      COLONOSCOPY  approx     Anastacio WHARTON    ENDOSCOPY N/A 2020    Procedure: ESOPHAGOGASTRODUODENOSCOPY with biopsies;  Surgeon: Wicho Chaves MD;  Location: [facility] ENDOSCOPY;  Service: Gastroenterology;  Laterality: N/A;  pre- epigastric pain, nausea  post-- gastritis, duodenitis, bile reflux    ENDOSCOPY N/A 2023    Procedure: ESOPHAGOGASTRODUODENOSCOPY with bx;  Surgeon: Jovan Robin MD;  Location: [facility] ENDOSCOPY;  Service: Gastroenterology;  Laterality: N/A;  pre: abd pain, nausea, vomiting   post: normal    LUMBAR DISCECTOMY FUSION INSTRUMENTATION Right 2018    Procedure: Right L2-3 laminectomy with Metrix;  Surgeon: Oscar Paulino MD;  Location: [facility] OR;  Service: Neurosurgery    SHOULDER ARTHROSCOPY W/ ROTATOR CUFF REPAIR Right 2020    Procedure: SHOULDER ARTHROSCOPY, ROTATOR CUFF REPAIR, SUBACROMIAL DECOMPRESSION, DISTAL CLAVICLE EXCISION, BICEPS TENOTOMY, AND LABRAL DEBRIDEMENT;  Surgeon: Rishabh Florence MD;  Location: [facility] OR [facility];  Service: Orthopedics;  Laterality: Right;    SHOULDER ROTATOR CUFF REPAIR Left     SPINAL CORD STIMULATOR IMPLANT N/A 2020    Procedure: SPINAL  CORD STIMULATOR INSERTION PHASE 2, triall of right gluteal internal pulse generator;  Surgeon: Cuate Kemp MD;  Location: [facility] MAIN OR;  Service: Neurosurgery;  Laterality: N/A;    SPINAL CORD STIMULATOR IMPLANT N/A 6/4/2020    Procedure: SPINAL CORD STIMULATOR INSERTION PHASE 1, Thoracic 10 laminotomy for revision of [place] surgical lead at Thoracic 8 to Thoracic 9;  Surgeon: Cuate Kemp MD;  Location: [facility] MAIN OR;  Service: Neurosurgery;  Laterality: N/A;    TONSILLECTOMY      TRANSPLANTATION RENAL Right 1979    SISTER GAVE PT     TUBAL ABDOMINAL LIGATION        General Information       Row Name 04/29/24 1115          Physical Therapy Time and Intention    Document Type therapy note (daily note)  -PH     Mode of Treatment physical therapy  -PH       Row Name 04/29/24 1115          General Information    Existing Precautions/Restrictions fall  -PH     Barriers to Rehab previous functional deficit  -PH       Row Name 04/29/24 1115          Cognition    Orientation Status (Cognition) oriented x 4  -PH       Row Name 04/29/24 1115          Safety Issues, Functional Mobility    Impairments Affecting Function (Mobility) balance;endurance/activity tolerance;pain  -PH     Comment, Safety Issues/Impairments (Mobility) gt belt and shoes w/ R AFO for safety; R drop foot  -PH               User Key  (r) = Recorded By, (t) = Taken By, (c) = Cosigned By      Initials Name Provider Type    PH Brenda Munguia PTA Physical Therapist Assistant                   Mobility       Row Name 04/29/24 1116          Bed Mobility    Comment, (Bed Mobility) UIC  -PH       Row Name 04/29/24 1116          Sit-Stand Transfer    Sit-Stand [place] (Transfers) standby assist  -PH     Assistive Device (Sit-Stand Transfers) other (see comments)  no AD  -PH       Row Name 04/29/24 1116          Gait/Stairs (Locomotion)    [place] Level (Gait) contact guard;standby assist  -PH     Assistive Device (Gait) cane,  quad  -PH     Distance in Feet (Gait) 100  -PH     Deviations/Abnormal Patterns (Gait) sanjuana decreased;gait speed decreased;stride length decreased  -PH     Bilateral Gait Deviations heel strike decreased  -PH     [place] Level (Stairs) not tested  -PH     Comment, (Gait/Stairs) Incr R knee hikes to clear R foot w/ AFO; pain limiting distance  -PH               User Key  (r) = Recorded By, (t) = Taken By, (c) = Cosigned By      Initials Name Provider Type     Brenda Munguia PTA Physical Therapist Assistant                   Obj/Interventions       Row Name 04/29/24 1118          Motor Skills    Therapeutic Exercise other (see comments)  BAP, LAQ, seated march; x 10-15 reps; R ankle deficits at BL limiting R ankle ROM  -PH       Row Name 04/29/24 1118          Balance    Balance Assessment sitting static balance;standing static balance  -PH     Static Sitting Balance standby assist  -PH     Static Standing Balance standby assist  -PH     Position/Device Used, Standing Balance cane, quad  -PH               User Key  (r) = Recorded By, (t) = Taken By, (c) = Cosigned By      Initials Name Provider Type     Brenda Munguia PTA Physical Therapist Assistant                   Goals/Plan    No documentation.                  Clinical Impression       Row Name 04/29/24 1119          Pain    Pretreatment Pain Rating 5/10  -PH     Posttreatment Pain Rating 8/10  -PH     Pain Intervention(s) Medication (See MAR);Ambulation/increased activity  -PH     Additional Documentation Pain Scale: Numbers Pre/Post-Treatment (Group)  -PH       Row Name 04/29/24 1119          Plan of Care Review    Plan of Care Reviewed With patient;spouse  -PH     Progress improving  -PH     Outcome Evaluation Pt seen by PT this AM for tx. Pt was C w/ spouse in room. Spouse assist pt in donning R AFO and shoes. Pt performed ther ex for strengthening. Pt stood w/ SBA. Pt amb approx 100' req CGA to SBA and use of QC. Pain limited  distance. Pt was back in chair at end of session. PT will prog as pt laurent.  -PH       Row Name 04/29/24 1119          Positioning and Restraints    Pre-Treatment Position sitting in chair/recliner  -PH     Post Treatment Position chair  -PH     In Chair reclined;call light within reach;encouraged to call for assist;exit alarm on;with family/caregiver;notified nsg  -PH               User Key  (r) = Recorded By, (t) = Taken By, (c) = Cosigned By      Initials Name Provider Type     Brenda Munguia PTA Physical Therapist Assistant                   Outcome Measures       Row Name 04/29/24 1121 04/29/24 0816       How much help from another person do you currently need...    Turning from your back to your side while in flat bed without using bedrails? 4  -PH 4  -MA    Moving from lying on back to sitting on the side of a flat bed without bedrails? 4  -PH 4  -MA    Moving to and from a bed to a chair (including a wheelchair)? 3  -PH 3  -MA    Standing up from a chair using your arms (e.g., wheelchair, bedside chair)? 4  -PH 3  -MA    Climbing 3-5 steps with a railing? 2  -PH 3  -MA    To walk in hospital room? 3  -PH 3  -MA    AM-PAC 6 Clicks Score (PT) 20  -PH 20  -MA    Highest Level of Mobility Goal 6 --> Walk 10 steps or more  -PH 6 --> Walk 10 steps or more  -MA      Row Name 04/29/24 1121          Functional Assessment    Outcome Measure Options AM-PAC 6 Clicks Basic Mobility (PT)  -PH               User Key  (r) = Recorded By, (t) = Taken By, (c) = Cosigned By      Initials Name Provider Type    Josephine Babb, RN Registered Nurse     Brenda Munguia PTA Physical Therapist Assistant                                 Physical Therapy Education       Title: PT OT SLP Therapies (Done)       Topic: Physical Therapy (Done)       Point: Mobility training (Done)       Learning Progress Summary             Patient Acceptance, E,TB,D, VU by  at 4/29/2024 1121    Acceptance, E, VU,NR by  at  4/28/2024 1356   Significant Other Acceptance, E, VU,NR by  at 4/28/2024 1356                         Point: Home exercise program (Done)       Learning Progress Summary             Patient Acceptance, E,TB,D, VU by  at 4/29/2024 1121                         Point: Body mechanics (Done)       Learning Progress Summary             Patient Acceptance, E,TB,D, VU by  at 4/29/2024 1121    Acceptance, E, VU,NR by  at 4/28/2024 1356   Significant Other Acceptance, E, VU,NR by  at 4/28/2024 1356                         Point: Precautions (Done)       Learning Progress Summary             Patient Acceptance, E,TB,D, VU by  at 4/29/2024 1121    Acceptance, E, VU,NR by  at 4/28/2024 1356   Significant Other Acceptance, E, VU,NR by  at 4/28/2024 1356                                         User Key       Initials Effective Dates Name Provider Type Discipline     06/16/21 -  Brenda Munguia PTA Physical Therapist Assistant PT     06/16/21 -  Lucero Jones PT Physical Therapist PT                  PT Recommendation and Plan     Plan of Care Reviewed With: patient, spouse  Progress: improving  Outcome Evaluation: Pt seen by PT this AM for tx. Pt was UIC w/ spouse in room. Spouse assist pt in donning R AFO and shoes. Pt performed ther ex for strengthening. Pt stood w/ SBA. Pt amb approx 100' req CGA to SBA and use of QC. Pain limited distance. Pt was back in chair at end of session. PT will prog as pt laurent.     Time Calculation:         PT Charges       Row Name 04/29/24 1121             Time Calculation    Start Time 1035  -PH      Stop Time 1050  -PH      Time Calculation (min) 15 min  -PH      PT Received On 04/29/24  -PH      PT - Next Appointment 04/30/24  -PH         Timed Charges    78005 - PT Therapeutic Exercise Minutes 4  -PH      68660 - PT Therapeutic Activity Minutes 11  -PH         Total Minutes    Timed Charges Total Minutes 15  -PH       Total Minutes 15  -PH                User Key   (r) = Recorded By, (t) = Taken By, (c) = Cosigned By      Initials Name Provider Type    PH Brenda Munguia PTA Physical Therapist Assistant                  Therapy Charges for Today       Code Description Service Date Service Provider Modifiers Qty    [id number]  PT THERAPEUTIC ACT EA 15 MIN 4/29/2024 Brenda Munguia PTA GP 1            PT G-Codes  Outcome Measure Options: AM-PAC 6 Clicks Basic Mobility (PT)  AM-PAC 6 Clicks Score (PT): 20  PT Discharge Summary  Anticipated Discharge Disposition (PT): home with assist    Brenda Munguia PTA  4/29/2024

## 2024-04-30 LAB
ANION GAP SERPL CALCULATED.3IONS-SCNC: 18 MMOL/L (ref 5–15)
BUN SERPL-MCNC: 26 MG/DL (ref 8–23)
BUN/CREAT SERPL: 13.3 (ref 7–25)
CALCIUM SPEC-SCNC: 8.6 MG/DL (ref 8.6–10.5)
CHLORIDE SERPL-SCNC: 112 MMOL/L (ref 98–107)
CO2 SERPL-SCNC: 9 MMOL/L (ref 22–29)
CREAT SERPL-MCNC: 1.96 MG/DL (ref 0.57–1)
DEPRECATED RDW RBC AUTO: 41.4 FL (ref 37–54)
EGFRCR SERPLBLD CKD-EPI 2021: 26.7 ML/MIN/1.73
ERYTHROCYTE [DISTWIDTH] IN BLOOD BY AUTOMATED COUNT: 11.8 % (ref 12.3–15.4)
GLUCOSE BLDC GLUCOMTR-MCNC: 123 MG/DL (ref 70–130)
GLUCOSE BLDC GLUCOMTR-MCNC: 71 MG/DL (ref 70–130)
GLUCOSE SERPL-MCNC: 44 MG/DL (ref 65–99)
HCT VFR BLD AUTO: 28.5 % (ref 34–46.6)
HGB BLD-MCNC: 9.2 G/DL (ref 12–15.9)
MCH RBC QN AUTO: 31.1 PG (ref 26.6–33)
MCHC RBC AUTO-ENTMCNC: 32.3 G/DL (ref 31.5–35.7)
MCV RBC AUTO: 96.3 FL (ref 79–97)
PLATELET # BLD AUTO: 269 10*3/MM3 (ref 140–450)
PMV BLD AUTO: 10.2 FL (ref 6–12)
POTASSIUM SERPL-SCNC: 3.9 MMOL/L (ref 3.5–5.2)
RBC # BLD AUTO: 2.96 10*6/MM3 (ref 3.77–5.28)
SODIUM SERPL-SCNC: 139 MMOL/L (ref 136–145)
WBC NRBC COR # BLD AUTO: 15.44 10*3/MM3 (ref 3.4–10.8)

## 2024-04-30 PROCEDURE — 80048 BASIC METABOLIC PNL TOTAL CA: CPT | Performed by: STUDENT IN AN ORGANIZED HEALTH CARE EDUCATION/TRAINING PROGRAM

## 2024-04-30 PROCEDURE — 82948 REAGENT STRIP/BLOOD GLUCOSE: CPT

## 2024-04-30 PROCEDURE — 25810000003 LACTATED RINGERS PER 1000 ML: Performed by: STUDENT IN AN ORGANIZED HEALTH CARE EDUCATION/TRAINING PROGRAM

## 2024-04-30 PROCEDURE — 99232 SBSQ HOSP IP/OBS MODERATE 35: CPT | Performed by: SURGERY

## 2024-04-30 PROCEDURE — 97530 THERAPEUTIC ACTIVITIES: CPT

## 2024-04-30 PROCEDURE — 63710000001 DIPHENHYDRAMINE PER 50 MG: Performed by: HOSPITALIST

## 2024-04-30 PROCEDURE — 25010000002 PIPERACILLIN SOD-TAZOBACTAM PER 1 G: Performed by: HOSPITALIST

## 2024-04-30 PROCEDURE — 85027 COMPLETE CBC AUTOMATED: CPT | Performed by: SURGERY

## 2024-04-30 RX ADMIN — OXYCODONE AND ACETAMINOPHEN 1 TABLET: 7.5; 325 TABLET ORAL at 00:07

## 2024-04-30 RX ADMIN — FAMOTIDINE 20 MG: 10 INJECTION INTRAVENOUS at 22:02

## 2024-04-30 RX ADMIN — ASPIRIN 81 MG: 81 TABLET, COATED ORAL at 08:52

## 2024-04-30 RX ADMIN — OXYCODONE AND ACETAMINOPHEN 1 TABLET: 7.5; 325 TABLET ORAL at 23:57

## 2024-04-30 RX ADMIN — SODIUM BICARBONATE 150 MEQ: 84 INJECTION, SOLUTION INTRAVENOUS at 13:49

## 2024-04-30 RX ADMIN — OXYCODONE AND ACETAMINOPHEN 1 TABLET: 7.5; 325 TABLET ORAL at 16:23

## 2024-04-30 RX ADMIN — Medication 10 ML: at 10:32

## 2024-04-30 RX ADMIN — SODIUM CHLORIDE, POTASSIUM CHLORIDE, SODIUM LACTATE AND CALCIUM CHLORIDE 100 ML/HR: 600; 310; 30; 20 INJECTION, SOLUTION INTRAVENOUS at 10:31

## 2024-04-30 RX ADMIN — PIPERACILLIN AND TAZOBACTAM 3.38 G: 3; .375 INJECTION, POWDER, FOR SOLUTION INTRAVENOUS at 10:32

## 2024-04-30 RX ADMIN — PIPERACILLIN AND TAZOBACTAM 3.38 G: 3; .375 INJECTION, POWDER, FOR SOLUTION INTRAVENOUS at 18:38

## 2024-04-30 RX ADMIN — SODIUM BICARBONATE 150 MEQ: 84 INJECTION, SOLUTION INTRAVENOUS at 23:57

## 2024-04-30 RX ADMIN — OXYCODONE AND ACETAMINOPHEN 1 TABLET: 7.5; 325 TABLET ORAL at 06:32

## 2024-04-30 RX ADMIN — METOPROLOL SUCCINATE 25 MG: 25 TABLET, EXTENDED RELEASE ORAL at 08:52

## 2024-04-30 RX ADMIN — Medication 10 ML: at 22:02

## 2024-04-30 RX ADMIN — SODIUM CHLORIDE, POTASSIUM CHLORIDE, SODIUM LACTATE AND CALCIUM CHLORIDE 100 ML/HR: 600; 310; 30; 20 INJECTION, SOLUTION INTRAVENOUS at 02:38

## 2024-04-30 RX ADMIN — ATORVASTATIN CALCIUM 40 MG: 20 TABLET, FILM COATED ORAL at 22:02

## 2024-04-30 RX ADMIN — SODIUM BICARBONATE 650 MG: 650 TABLET ORAL at 08:52

## 2024-04-30 RX ADMIN — OXYCODONE AND ACETAMINOPHEN 1 TABLET: 7.5; 325 TABLET ORAL at 10:46

## 2024-04-30 RX ADMIN — DIPHENHYDRAMINE HYDROCHLORIDE 25 MG: 25 CAPSULE ORAL at 22:02

## 2024-04-30 RX ADMIN — SODIUM BICARBONATE 650 MG: 650 TABLET ORAL at 16:23

## 2024-04-30 RX ADMIN — SODIUM BICARBONATE 650 MG: 650 TABLET ORAL at 22:04

## 2024-04-30 RX ADMIN — PIPERACILLIN AND TAZOBACTAM 3.38 G: 3; .375 INJECTION, POWDER, FOR SOLUTION INTRAVENOUS at 01:45

## 2024-04-30 RX ADMIN — FAMOTIDINE 20 MG: 10 INJECTION INTRAVENOUS at 10:32

## 2024-04-30 RX ADMIN — ACETAMINOPHEN 500 MG: 500 TABLET ORAL at 22:02

## 2024-04-30 NOTE — PROGRESS NOTES
Chief complaint: Follow-up colitis    Subjective: Patient's abdominal pain is substantially improved today.  She has had multiple bowel movements.  She has some mild tenderness but nothing like which she was having before.  She wants to try to eat something.    Review of systems:  Constitutional: Denies fever, chills, change in weight  Respiratory: Denies cough or wheezing    Physical exam:  Afebrile with stable vitals  General: Awake and alert, no distress  Head: Normocephalic, atraumatic  Eyes: Extraocular movements intact, no icterus  Neck: Supple, trachea midline  Respiratory: No use of accessory muscles, good bilateral chest expansion  Gastrointestinal: Minimal tenderness today, substantially improved abdominal exam, certainly no guarding today  Extremities: No peripheral edema, no deformity  Skin: Warm and dry    Labs reviewed and white count is improved to 15 from 18 yesterday    Assessment and plan:  -Likely ischemic colitis, clinically improving with antibiotics and bowel rest  -Allow clear liquids today  -No plans for any operative intervention    Kin Macdonald MD  General and Endoscopic Surgery  [facility]    [address], [address]  [place], [place], [address]  P: [phone]  F: [phone]

## 2024-04-30 NOTE — PLAN OF CARE
Goal Outcome Evaluation:  Plan of Care Reviewed With: patient        Progress: improving  Outcome Evaluation: Alert and oriented. With bouts of severe abdominal pain with backache, Percocet given. Up with assit to the BR, voided freely, had moderate loose BM. IV Fluids on flow, due IV Zosyn given.

## 2024-04-30 NOTE — THERAPY TREATMENT NOTE
Patient Name: Marlena Navarrete  : 1950    MRN: [id number]                              Today's Date: 2024       Admit Date: 2024    Visit Dx:     ICD-10-CM ICD-9-CM   1. Drug-induced constipation  K59.03 564.09     E980.5   2. Intractable abdominal pain  R10.9 789.00     Patient Active Problem List   Diagnosis    Lumbar radiculopathy    Essential hypertension    Right foot drop    Kidney transplant recipient    History of lumbar laminectomy for spinal cord decompression    Spinal enthesopathy of thoracic region    Postlaminectomy syndrome, lumbar region    Chronic bilateral low back pain with right-sided sciatica    Displacement of other nervous system device, implant or graft, initial encounter    Postlaminectomy syndrome of lumbar region    Epigastric pain    Nausea    Gastroesophageal reflux disease    Shoulder arthritis    Acute renal failure (ARF)    Stage 3b chronic kidney disease    Iron deficiency anemia    Status post rotator cuff surgery    History of DVT (deep vein thrombosis)    Peripheral neuropathy    Intractable back pain    Spinal cord stimulator status    Adhesive arachnoiditis    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    Inflammation of sacroiliac joint    Secondary hyperparathyroidism     Vitamin D deficiency    COVID    Pneumonia due to COVID-19 virus    Narcotic dependence    Cytokine release syndrome, grade 1    Impacted cerumen of right ear    Hospital discharge follow-up    Acute metabolic encephalopathy    Volume depletion    PIYUSH (acute kidney injury)    Dehydration    Prediabetes    Screening for colon cancer    Coronary artery calcification    Abnormal nuclear stress test    Constipation    Abdominal pain, generalized    Hypokalemia    Leukocytosis    Colitis, ischemic     Past Medical History:   Diagnosis Date    Arthritis     OSTEO    Chronic back pain     Chronic bilateral low back pain with right-sided sciatica 2020     CKD (chronic kidney disease)     FOLLOWED BY DR LANRE BYNUM    Diverticulosis     Foot drop, right     GERD (gastroesophageal reflux disease)     History of DVT (deep vein thrombosis) 2018    RIGHT CALF S/P BACK SURGERY     History of transfusion     no reaction    Hyperlipidemia     Hypertension     Kidney transplant recipient 1979    HX TRANSPLANT D/T REFLUX     Neuropathy     Secondary hyperparathyroidism  3/9/2022    Spinal cord stimulator status 3/9/2022    Spinal headache     Stage 3b chronic kidney disease 2020    FOLLOWED BY DR LANRE BYNUM    Torn rotator cuff     Vitamin D deficiency 3/9/2022     Past Surgical History:   Procedure Laterality Date    APPENDECTOMY      CATARACT EXTRACTION EXTRACAPSULAR W/ INTRAOCULAR LENS IMPLANTATION       SECTION      COLONOSCOPY  approx     Anastacio WHARTON    ENDOSCOPY N/A 2020    Procedure: ESOPHAGOGASTRODUODENOSCOPY with biopsies;  Surgeon: Wicho Chaves MD;  Location: [facility] ENDOSCOPY;  Service: Gastroenterology;  Laterality: N/A;  pre- epigastric pain, nausea  post-- gastritis, duodenitis, bile reflux    ENDOSCOPY N/A 2023    Procedure: ESOPHAGOGASTRODUODENOSCOPY with bx;  Surgeon: Jovan Robin MD;  Location: [facility] ENDOSCOPY;  Service: Gastroenterology;  Laterality: N/A;  pre: abd pain, nausea, vomiting   post: normal    LUMBAR DISCECTOMY FUSION INSTRUMENTATION Right 2018    Procedure: Right L2-3 laminectomy with Met[employer];  Surgeon: Oscar Paulino MD;  Location: [facility] OR;  Service: Neurosurgery    SHOULDER ARTHROSCOPY W/ ROTATOR CUFF REPAIR Right 2020    Procedure: SHOULDER ARTHROSCOPY, ROTATOR CUFF REPAIR, SUBACROMIAL DECOMPRESSION, DISTAL CLAVICLE EXCISION, BICEPS TENOTOMY, AND LABRAL DEBRIDEMENT;  Surgeon: Rishabh Florence MD;  [place] [facility] [place] [facility]  Service: Orthopedics;  Laterality: Right;    SHOULDER ROTATOR CUFF REPAIR Left     SPINAL CORD STIMULATOR IMPLANT N/A 2020    Procedure: SPINAL  CORD STIMULATOR INSERTION PHASE 2, triall of right gluteal internal pulse generator;  Surgeon: Cuate Kemp MD;  Location: [facility] MAIN OR;  Service: Neurosurgery;  Laterality: N/A;    SPINAL CORD STIMULATOR IMPLANT N/A 6/4/2020    Procedure: SPINAL CORD STIMULATOR INSERTION PHASE 1, Thoracic 10 laminotomy for revision of [place] surgical lead at Thoracic 8 to Thoracic 9;  Surgeon: Cuate Kemp MD;  Location: [facility] MAIN OR;  Service: Neurosurgery;  Laterality: N/A;    TONSILLECTOMY      TRANSPLANTATION RENAL Right 1979    SISTER GAVE PT     TUBAL ABDOMINAL LIGATION        General Information       Row Name 04/30/24 1032          Physical Therapy Time and Intention    Document Type therapy note (daily note)  -PH     Mode of Treatment physical therapy  -PH       Row Name 04/30/24 1032          General Information    Existing Precautions/Restrictions fall  -PH     Barriers to Rehab previous functional deficit  -PH       Row Name 04/30/24 1032          Cognition    Orientation Status (Cognition) oriented x 4  -PH       Row Name 04/30/24 1032          Safety Issues, Functional Mobility    Impairments Affecting Function (Mobility) balance;endurance/activity tolerance;strength  -PH     Comment, Safety Issues/Impairments (Mobility) gt belt and shles w/ R AFO donned for safety; R foot drop  -PH               User Key  (r) = Recorded By, (t) = Taken By, (c) = Cosigned By      Initials Name Provider Type    PH Brenda Munguia PTA Physical Therapist Assistant                   Mobility       Row Name 04/30/24 1033          Bed Mobility    Comment, (Bed Mobility) [facility]  -       Row Name 04/30/24 1033          Sit-Stand Transfer    Sit-Stand [place] (Transfers) standby assist  -     Assistive Device (Sit-Stand Transfers) other (see comments)  no AD  -PH     Comment, (Sit-Stand Transfer) from reclienr  -       Row Name 04/30/24 1033          Gait/Stairs (Locomotion)    [place] Level (Gait)  contact guard  -PH     Assistive Device (Gait) cane, straight  -PH     Distance in Feet (Gait) 220  -PH     Deviations/Abnormal Patterns (Gait) sanjuana decreased;gait speed decreased;stride length decreased  -PH     Right Sided Gait Deviations weight shift ability decreased  -PH     [place] Level (Stairs) not tested  -PH     Comment, (Gait/Stairs) mild unsteadiness noted w/ no overt LOB; Incr R knee hike to clear R foot w/ AFO  -PH               User Key  (r) = Recorded By, (t) = Taken By, (c) = Cosigned By      Initials Name Provider Type     Brenda Munguia PTA Physical Therapist Assistant                   Obj/Interventions       Row Name 04/30/24 1034          Balance    Balance Assessment sitting static balance;standing static balance  -PH     Static Sitting Balance independent  -PH     Dynamic Standing Balance standby assist  -PH     Position/Device Used, Standing Balance other (see comments)  no AD  -PH               User Key  (r) = Recorded By, (t) = Taken By, (c) = Cosigned By      Initials Name Provider Type     Brenda Munguia PTA Physical Therapist Assistant                   Goals/Plan    No documentation.                  Clinical Impression       Row Name 04/30/24 1034          Pain    Posttreatment Pain Rating 8/10  -PH     Pain Location - back  -PH     Pain Intervention(s) Ambulation/increased activity;Repositioned  -PH     Additional Documentation Pain Scale: Numbers Pre/Post-Treatment (Group)  -PH       Row Name 04/30/24 1034          Plan of Care Review    Plan of Care Reviewed With patient;spouse  -PH     Progress improving  -PH     Outcome Evaluation Pt was [facility] this AM when seen by PT for tx. Pt assisted in donning B shoes w/ R AFO. Pt stood w/ SBA. Pt then amb 220' w/ CGA and use of SPC. Pt was slow w/ mild unsteadiness although no overt LOB. Pt was back in chair at end of session. PT will prog as pt laurent.  -PH       Row Name 04/30/24 103          Positioning and  Restraints    Pre-Treatment Position sitting in chair/recliner  -PH     Post Treatment Position chair  -PH     In Chair reclined;call light within reach;encouraged to call for assist;exit alarm on;with family/caregiver;with nsg  -PH               User Key  (r) = Recorded By, (t) = Taken By, (c) = Cosigned By      Initials Name Provider Type     Brenda Munguia PTA Physical Therapist Assistant                   Outcome Measures       Row Name 04/30/24 1037          How much help from another person do you currently need...    Turning from your back to your side while in flat bed without using bedrails? 4  -PH     Moving from lying on back to sitting on the side of a flat bed without bedrails? 4  -PH     Moving to and from a bed to a chair (including a wheelchair)? 3  -PH     Standing up from a chair using your arms (e.g., wheelchair, bedside chair)? 4  -PH     Climbing 3-5 steps with a railing? 3  -PH     To walk in hospital room? 3  -PH     AM-PAC 6 Clicks Score (PT) 21  -PH     Highest Level of Mobility Goal 6 --> Walk 10 steps or more  -PH       Row Name 04/30/24 1037          Functional Assessment    Outcome Measure Options AM-PAC 6 Clicks Basic Mobility (PT)  -PH               User Key  (r) = Recorded By, (t) = Taken By, (c) = Cosigned By      Initials Name Provider Type     Brenda Munguia PTA Physical Therapist Assistant                                 Physical Therapy Education       Title: PT OT SLP Therapies (Done)       Topic: Physical Therapy (Done)       Point: Mobility training (Done)       Learning Progress Summary             Patient Acceptance, E, VU,DU,NR by  at 4/30/2024 1037    Acceptance, E,TB,D, VU by  at 4/29/2024 1121    Acceptance, E, VU,NR by  at 4/28/2024 1356   Significant Other Acceptance, E, VU,NR by  at 4/28/2024 1356                         Point: Home exercise program (Done)       Learning Progress Summary             Patient Acceptance, E, VU,DU,NR by   at 4/30/2024 1037    Acceptance, E,TB,D, VU by  at 4/29/2024 1121                         Point: Body mechanics (Done)       Learning Progress Summary             Patient Acceptance, E, VU,DU,NR by  at 4/30/2024 1037    Acceptance, E,TB,D, VU by  at 4/29/2024 1121    Acceptance, E, VU,NR by  at 4/28/2024 1356   Significant Other Acceptance, E, VU,NR by  at 4/28/2024 1356                         Point: Precautions (Done)       Learning Progress Summary             Patient Acceptance, E, VU,DU,NR by  at 4/30/2024 1037    Acceptance, E,TB,D, VU by  at 4/29/2024 1121    Acceptance, E, VU,NR by  at 4/28/2024 1356   Significant Other Acceptance, E, VU,NR by  at 4/28/2024 1356                                         User Key       Initials Effective Dates Name Provider Type Discipline     06/16/21 -  Brenda Munguia PTA Physical Therapist Assistant PT     06/16/21 -  Lucero Jones PT Physical Therapist PT                  PT Recommendation and Plan     Plan of Care Reviewed With: patient, spouse  Progress: improving  Outcome Evaluation: Pt was [facility] this AM when seen by PT for tx. Pt assisted in donning B shoes w/ R AFO. Pt stood w/ SBA. Pt then amb 220' w/ CGA and use of SPC. Pt was slow w/ mild unsteadiness although no overt LOB. Pt was back in chair at end of session. PT will prog as pt laurent.     Time Calculation:         PT Charges       Row Name 04/30/24 1038             Time Calculation    Start Time 0938  -PH      Stop Time 0953  -PH      Time Calculation (min) 15 min  -PH      PT Received On 04/30/24  -PH      PT - Next Appointment 05/01/24  -PH         Timed Charges    89103 - PT Therapeutic Activity Minutes 15  -PH         Total Minutes    Timed Charges Total Minutes 15  -PH       Total Minutes 15  -PH                User Key  (r) = Recorded By, (t) = Taken By, (c) = Cosigned By      Initials Name Provider Type     Brenda Munguia PTA Physical Therapist Assistant                   Therapy Charges for Today       Code Description Service Date Service Provider Modifiers Qty    72628435785 HC PT THERAPEUTIC ACT EA 15 MIN 4/29/2024 Brenda Munguia, KELLY GP 1    09204973440 HC PT THERAPEUTIC ACT EA 15 MIN 4/30/2024 Brenda Munguia PTA GP 1            PT G-Codes  Outcome Measure Options: AM-PAC 6 Clicks Basic Mobility (PT)  AM-PAC 6 Clicks Score (PT): 21  PT Discharge Summary  Anticipated Discharge Disposition (PT): home with assist    Brenda Munguia PTA  4/30/2024

## 2024-04-30 NOTE — PLAN OF CARE
Goal Outcome Evaluation:  Plan of Care Reviewed With: patient        Progress: improving  Outcome Evaluation: Tolerating clear liquids. Up with assist and cane. Falls precautions maintained. Up in chair this afternoon. Medicated for back pain PRN. ABX continued. IVFs infusing per orders. Family at bedside.

## 2024-04-30 NOTE — PLAN OF CARE
Goal Outcome Evaluation:  Plan of Care Reviewed With: patient, spouse        Progress: improving  Outcome Evaluation: Pt was [facility] this AM when seen by PT for tx. Pt assisted in donning B shoes w/ R AFO. Pt stood w/ SBA. Pt then amb 220' w/ CGA and use of SPC. Pt was slow w/ mild unsteadiness although no overt LOB. Pt was back in chair at end of session. PT will prog as pt laurent.      Anticipated Discharge Disposition (PT): home with assist

## 2024-04-30 NOTE — PROGRESS NOTES
Name: Marlena Navarrete ADMIT: 2024   : 1950  PCP: Bradley Hernandez MD    MRN: [id number] LOS: 3 days   AGE/SEX: 74 y.o. female  ROOM: [facility]     Subjective   Subjective   Sitting up in chair.  Her abdominal pain is minimal today.  Tolerating liquids.    Objective   Objective   Vital Signs  Temp:  [97 °F (36.1 °C)-99.7 °F (37.6 °C)] 97.6 °F (36.4 °C)  Heart Rate:  [65-73] 65  Resp:  [16] 16  BP: (110-140)/(53-65) 110/53  SpO2:  [95 %-97 %] 97 %  on   ;   Device (Oxygen Therapy): room air  Body mass index is 28.01 kg/m².  Physical Exam  Constitutional:       General: She is not in acute distress.  Pulmonary:      Effort: Pulmonary effort is normal. No respiratory distress.      Breath sounds: No stridor.   Skin:     Coloration: Skin is not jaundiced.      Findings: No bruising.   Neurological:      General: No focal deficit present.      Mental Status: She is alert.   Psychiatric:         Mood and Affect: Mood normal.         Behavior: Behavior normal.         Results Review     I reviewed the patient's new clinical results.  Results from last 7 days   Lab Units 24  0810 24  0905 24  0640 24  0657   WBC 10*3/mm3 15.44* 18.66* 23.09* 20.56*   HEMOGLOBIN g/dL 9.2* 8.9* 9.0* 11.0*   PLATELETS 10*3/mm3 269 227 234 292     Results from last 7 days   Lab Units 24  0845 24  0905 24  0640 24  0657   SODIUM mmol/L 139 141 138 139   POTASSIUM mmol/L 3.9 3.7 3.9 3.4*   CHLORIDE mmol/L 112* 115* 113* 111*   CO2 mmol/L 9.0* 12.1* 14.5* 14.0*   BUN mg/dL 26* 27* 25* 32*   CREATININE mg/dL 1.96* 1.96* 2.08* 1.91*   GLUCOSE mg/dL 44* 73 101* 135*   EGFR mL/min/1.73 26.7* 26.7* 24.9* 27.6*     Results from last 7 days   Lab Units 24  0640 24  1823   ALBUMIN g/dL 3.1* 5.1   BILIRUBIN mg/dL  --  0.4   ALK PHOS U/L  --  118*   AST (SGOT) U/L  --  24   ALT (SGPT) U/L  --  17     Results from last 7 days   Lab Units 24  0845 24  0905 24  0640  04/27/24  0657 04/26/24  1823   CALCIUM mg/dL 8.6 8.6 8.3* 8.6 10.3   ALBUMIN g/dL  --   --  3.1*  --  5.1   PHOSPHORUS mg/dL  --   --  3.1  --   --      Results from last 7 days   Lab Units 04/27/24  1104 04/26/24  1823   PROCALCITONIN ng/mL  --  0.07   LACTATE mmol/L 1.9 1.8     Glucose   Date/Time Value Ref Range Status   04/30/2024 1310 123 70 - 130 mg/dL Final   04/30/2024 1101 71 70 - 130 mg/dL Final       No radiology results for the last day  Scheduled Medications  diphenhydrAMINE, 25 mg, Oral, Nightly   And  acetaminophen, 500 mg, Oral, Nightly  aspirin, 81 mg, Oral, Daily  atorvastatin, 40 mg, Oral, Nightly  famotidine, 20 mg, Intravenous, Q12H  metoprolol succinate XL, 25 mg, Oral, Daily  piperacillin-tazobactam, 3.375 g, Intravenous, Q8H  [Held by provider] polyethylene glycol, 17 g, Oral, BID  sodium bicarbonate, 650 mg, Oral, TID  sodium chloride, 10 mL, Intravenous, Q12H    Infusions  sodium bicarbonate, 150 mEq    Diet  Diet: Liquid; Clear Liquid; Fluid Consistency: Thin (IDDSI 0)       Assessment/Plan     Active Hospital Problems    Diagnosis  POA    **Colitis, ischemic [K55.9]  Unknown    Abdominal pain, generalized [R10.84]  Unknown    Hypokalemia [E87.6]  Unknown    Leukocytosis [D72.829]  Unknown    Constipation [K59.00]  Yes    Narcotic dependence [F11.20]  Yes    History of DVT (deep vein thrombosis) [Z86.718]  Not Applicable    Peripheral neuropathy [G62.9]  Yes    Stage 3b chronic kidney disease [N18.32]  Yes    Gastroesophageal reflux disease [K21.9]  Yes    Lumbar radiculopathy [M54.16]  Yes      Resolved Hospital Problems   No resolved problems to display.       74 y.o. female admitted with Colitis, ischemic.      04/30/24  Metabolic acidosis likely due to combination of normal saline, starvation ketosis.  Will start bicarb gtt., expect this to normalize quickly with resumption of diet and bicarb.    Ischemic colitis  Leukocytosis, improving  -General surgery following  -Antihypertensives  on hold  -GI PCR negative  -Continue IV fluid  -Diet per surgery  -Empiric Zosyn    CKD4  Metabolic acidosis  -bicarb gtt         DVT prophylaxis: SCDs  Discussed with patient, family, and nursing staff.  Anticipated discharge home, when cleared by consultants            Behzad Maurice MD  [facility]  04/30/24  13:47 EDT

## 2024-05-01 ENCOUNTER — APPOINTMENT (OUTPATIENT)
Dept: GENERAL RADIOLOGY | Facility: HOSPITAL | Age: 74
DRG: 394 | End: 2024-05-01
Payer: MEDICARE

## 2024-05-01 LAB
ALBUMIN SERPL-MCNC: 3 G/DL (ref 3.5–5.2)
ALBUMIN/GLOB SERPL: 1.4 G/DL
ALP SERPL-CCNC: 57 U/L (ref 39–117)
ALT SERPL W P-5'-P-CCNC: 10 U/L (ref 1–33)
ANION GAP SERPL CALCULATED.3IONS-SCNC: 11.4 MMOL/L (ref 5–15)
AST SERPL-CCNC: 13 U/L (ref 1–32)
BACTERIA SPEC AEROBE CULT: NORMAL
BACTERIA SPEC AEROBE CULT: NORMAL
BILIRUB SERPL-MCNC: 0.4 MG/DL (ref 0–1.2)
BUN SERPL-MCNC: 15 MG/DL (ref 8–23)
BUN/CREAT SERPL: 8 (ref 7–25)
CALCIUM SPEC-SCNC: 8 MG/DL (ref 8.6–10.5)
CHLORIDE SERPL-SCNC: 106 MMOL/L (ref 98–107)
CO2 SERPL-SCNC: 25.6 MMOL/L (ref 22–29)
CREAT SERPL-MCNC: 1.88 MG/DL (ref 0.57–1)
DEPRECATED RDW RBC AUTO: 44.3 FL (ref 37–54)
EGFRCR SERPLBLD CKD-EPI 2021: 28.1 ML/MIN/1.73
ERYTHROCYTE [DISTWIDTH] IN BLOOD BY AUTOMATED COUNT: 12.5 % (ref 12.3–15.4)
GLOBULIN UR ELPH-MCNC: 2.1 GM/DL
GLUCOSE SERPL-MCNC: 167 MG/DL (ref 65–99)
HCT VFR BLD AUTO: 26.5 % (ref 34–46.6)
HGB BLD-MCNC: 8.8 G/DL (ref 12–15.9)
MAGNESIUM SERPL-MCNC: 1.7 MG/DL (ref 1.6–2.4)
MCH RBC QN AUTO: 32 PG (ref 26.6–33)
MCHC RBC AUTO-ENTMCNC: 33.2 G/DL (ref 31.5–35.7)
MCV RBC AUTO: 96.4 FL (ref 79–97)
PLATELET # BLD AUTO: 269 10*3/MM3 (ref 140–450)
PMV BLD AUTO: 9.9 FL (ref 6–12)
POTASSIUM SERPL-SCNC: 2.7 MMOL/L (ref 3.5–5.2)
PROT SERPL-MCNC: 5.1 G/DL (ref 6–8.5)
RBC # BLD AUTO: 2.75 10*6/MM3 (ref 3.77–5.28)
SODIUM SERPL-SCNC: 143 MMOL/L (ref 136–145)
WBC NRBC COR # BLD AUTO: 11.17 10*3/MM3 (ref 3.4–10.8)

## 2024-05-01 PROCEDURE — 63710000001 ONDANSETRON ODT 4 MG TABLET DISPERSIBLE: Performed by: STUDENT IN AN ORGANIZED HEALTH CARE EDUCATION/TRAINING PROGRAM

## 2024-05-01 PROCEDURE — 85027 COMPLETE CBC AUTOMATED: CPT | Performed by: STUDENT IN AN ORGANIZED HEALTH CARE EDUCATION/TRAINING PROGRAM

## 2024-05-01 PROCEDURE — 80053 COMPREHEN METABOLIC PANEL: CPT | Performed by: STUDENT IN AN ORGANIZED HEALTH CARE EDUCATION/TRAINING PROGRAM

## 2024-05-01 PROCEDURE — 63710000001 DIPHENHYDRAMINE PER 50 MG: Performed by: HOSPITALIST

## 2024-05-01 PROCEDURE — 25010000002 MAGNESIUM SULFATE 2 GM/50ML SOLUTION: Performed by: STUDENT IN AN ORGANIZED HEALTH CARE EDUCATION/TRAINING PROGRAM

## 2024-05-01 PROCEDURE — 99231 SBSQ HOSP IP/OBS SF/LOW 25: CPT

## 2024-05-01 PROCEDURE — 83735 ASSAY OF MAGNESIUM: CPT | Performed by: STUDENT IN AN ORGANIZED HEALTH CARE EDUCATION/TRAINING PROGRAM

## 2024-05-01 PROCEDURE — 71045 X-RAY EXAM CHEST 1 VIEW: CPT

## 2024-05-01 PROCEDURE — 25010000002 HYDROMORPHONE 1 MG/ML SOLUTION: Performed by: HOSPITALIST

## 2024-05-01 PROCEDURE — 25010000002 PIPERACILLIN SOD-TAZOBACTAM PER 1 G: Performed by: HOSPITALIST

## 2024-05-01 RX ORDER — MAGNESIUM SULFATE HEPTAHYDRATE 40 MG/ML
2 INJECTION, SOLUTION INTRAVENOUS ONCE
Status: COMPLETED | OUTPATIENT
Start: 2024-05-01 | End: 2024-05-01

## 2024-05-01 RX ORDER — ONDANSETRON 4 MG/1
4 TABLET, ORALLY DISINTEGRATING ORAL EVERY 8 HOURS PRN
Status: DISCONTINUED | OUTPATIENT
Start: 2024-05-01 | End: 2024-05-03 | Stop reason: HOSPADM

## 2024-05-01 RX ORDER — POTASSIUM CHLORIDE 750 MG/1
40 TABLET, FILM COATED, EXTENDED RELEASE ORAL
Status: COMPLETED | OUTPATIENT
Start: 2024-05-01 | End: 2024-05-01

## 2024-05-01 RX ADMIN — FAMOTIDINE 20 MG: 10 INJECTION INTRAVENOUS at 08:14

## 2024-05-01 RX ADMIN — MAGNESIUM SULFATE HEPTAHYDRATE 2 G: 40 INJECTION, SOLUTION INTRAVENOUS at 15:03

## 2024-05-01 RX ADMIN — HYDROMORPHONE HYDROCHLORIDE 1 MG: 1 INJECTION, SOLUTION INTRAMUSCULAR; INTRAVENOUS; SUBCUTANEOUS at 21:17

## 2024-05-01 RX ADMIN — PIPERACILLIN AND TAZOBACTAM 3.38 G: 3; .375 INJECTION, POWDER, FOR SOLUTION INTRAVENOUS at 18:25

## 2024-05-01 RX ADMIN — ASPIRIN 81 MG: 81 TABLET, COATED ORAL at 08:14

## 2024-05-01 RX ADMIN — PIPERACILLIN AND TAZOBACTAM 3.38 G: 3; .375 INJECTION, POWDER, FOR SOLUTION INTRAVENOUS at 10:14

## 2024-05-01 RX ADMIN — SODIUM BICARBONATE 650 MG: 650 TABLET ORAL at 08:14

## 2024-05-01 RX ADMIN — ATORVASTATIN CALCIUM 40 MG: 20 TABLET, FILM COATED ORAL at 22:29

## 2024-05-01 RX ADMIN — METOPROLOL SUCCINATE 25 MG: 25 TABLET, EXTENDED RELEASE ORAL at 10:18

## 2024-05-01 RX ADMIN — OXYCODONE AND ACETAMINOPHEN 1 TABLET: 7.5; 325 TABLET ORAL at 08:16

## 2024-05-01 RX ADMIN — ACETAMINOPHEN 500 MG: 500 TABLET ORAL at 22:32

## 2024-05-01 RX ADMIN — ONDANSETRON 4 MG: 4 TABLET, ORALLY DISINTEGRATING ORAL at 12:09

## 2024-05-01 RX ADMIN — POTASSIUM CHLORIDE 40 MEQ: 750 TABLET, EXTENDED RELEASE ORAL at 12:09

## 2024-05-01 RX ADMIN — OXYCODONE AND ACETAMINOPHEN 1 TABLET: 7.5; 325 TABLET ORAL at 15:03

## 2024-05-01 RX ADMIN — POTASSIUM CHLORIDE 40 MEQ: 750 TABLET, EXTENDED RELEASE ORAL at 14:06

## 2024-05-01 RX ADMIN — POTASSIUM CHLORIDE 40 MEQ: 750 TABLET, EXTENDED RELEASE ORAL at 16:19

## 2024-05-01 RX ADMIN — SODIUM BICARBONATE 650 MG: 650 TABLET ORAL at 22:29

## 2024-05-01 RX ADMIN — HYDROMORPHONE HYDROCHLORIDE 1 MG: 1 INJECTION, SOLUTION INTRAMUSCULAR; INTRAVENOUS; SUBCUTANEOUS at 05:39

## 2024-05-01 RX ADMIN — FAMOTIDINE 20 MG: 10 INJECTION INTRAVENOUS at 22:32

## 2024-05-01 RX ADMIN — SODIUM BICARBONATE 650 MG: 650 TABLET ORAL at 16:19

## 2024-05-01 RX ADMIN — PIPERACILLIN AND TAZOBACTAM 3.38 G: 3; .375 INJECTION, POWDER, FOR SOLUTION INTRAVENOUS at 01:51

## 2024-05-01 RX ADMIN — SODIUM BICARBONATE 150 MEQ: 84 INJECTION, SOLUTION INTRAVENOUS at 10:14

## 2024-05-01 RX ADMIN — ONDANSETRON 4 MG: 4 TABLET, ORALLY DISINTEGRATING ORAL at 21:21

## 2024-05-01 RX ADMIN — DIPHENHYDRAMINE HYDROCHLORIDE 25 MG: 25 CAPSULE ORAL at 22:32

## 2024-05-01 RX ADMIN — ANTACID TABLETS 2 TABLET: 500 TABLET, CHEWABLE ORAL at 18:00

## 2024-05-01 NOTE — PLAN OF CARE
Problem: Adult Inpatient Plan of Care  Goal: Plan of Care Review  Outcome: Ongoing, Not Progressing  Flowsheets (Taken 5/1/2024 0856)  Progress: improving  Plan of Care Reviewed With: patient  Outcome Evaluation: coping well, c/o mild abdominal pain 2/10, comained mostly of low back pain that radiates to her RLE, medicated Percocet, with some relief, had 5 loose stools during the night, no vomiting, IVF infusing, continued on IV Zosyn, still on Clear liquids, falls precaution maintained, assisted to the bathroom with walker, VSS   Goal Outcome Evaluation:  Plan of Care Reviewed With: patient        Progress: improving  Outcome Evaluation: coping well, c/o mild abdominal pain 2/10, comained mostly of low back pain that radiates to her RLE, medicated Percocet, with some relief, had 5 loose stools during the night, no vomiting, IVF infusing, continued on IV Zosyn, still on Clear liquids, falls precaution maintained, assisted to the bathroom with walker, VSS

## 2024-05-01 NOTE — PLAN OF CARE
Goal Outcome Evaluation:  Plan of Care Reviewed With: patient        Progress: improving  Outcome Evaluation: VSS, on room air, up to bathroom with assist x1 and cane, multiple loose BMs, voiding without issue, patient c/o increased back pain but states the battery in her spinal cord stimulator needs to be charged. Pt states her  can bring  tomorrow. IV abx as ordered, k+ and mag replaced per order, falls precatuions maintained, pt and spouse updated on plan of care

## 2024-05-01 NOTE — PROGRESS NOTES
Name: Marlena Navarrete ADMIT: 2024   : 1950  PCP: Bradley Hernandez MD    MRN: [id number] LOS: 4 days   AGE/SEX: 74 y.o. female  ROOM: [facility]     Subjective   Subjective   She is feeling much better today.  No abdominal pain.  Tolerating p.o.  Still having several episodes of loose stool.    Objective   Objective   Vital Signs  Temp:  [97 °F (36.1 °C)-97.8 °F (36.6 °C)] 97 °F (36.1 °C)  Heart Rate:  [60-71] 60  Resp:  [16] 16  BP: (148-159)/(58-72) 159/72  SpO2:  [96 %-99 %] 98 %  on   ;   Device (Oxygen Therapy): room air  Body mass index is 28.01 kg/m².  Physical Exam  Constitutional:       General: She is not in acute distress.  Pulmonary:      Effort: Pulmonary effort is normal. No respiratory distress.      Breath sounds: No stridor.   Skin:     Coloration: Skin is not jaundiced.      Findings: No bruising.   Neurological:      General: No focal deficit present.      Mental Status: She is alert.   Psychiatric:         Mood and Affect: Mood normal.         Behavior: Behavior normal.         Results Review     I reviewed the patient's new clinical results.  Results from last 7 days   Lab Units 24  0924  0810 24  0905 24  0640   WBC 10*3/mm3 11.17* 15.44* 18.66* 23.09*   HEMOGLOBIN g/dL 8.8* 9.2* 8.9* 9.0*   PLATELETS 10*3/mm3 269 269 227 234     Results from last 7 days   Lab Units 24  0929 24  0845 24  0905 24  0640   SODIUM mmol/L 143 139 141 138   POTASSIUM mmol/L 2.7* 3.9 3.7 3.9   CHLORIDE mmol/L 106 112* 115* 113*   CO2 mmol/L 25.6 9.0* 12.1* 14.5*   BUN mg/dL 15 26* 27* 25*   CREATININE mg/dL 1.88* 1.96* 1.96* 2.08*   GLUCOSE mg/dL 167* 44* 73 101*   EGFR mL/min/1.73 28.1* 26.7* 26.7* 24.9*     Results from last 7 days   Lab Units 24  0929 24  0640 24  1823   ALBUMIN g/dL 3.0* 3.1* 5.1   BILIRUBIN mg/dL 0.4  --  0.4   ALK PHOS U/L 57  --  118*   AST (SGOT) U/L 13  --  24   ALT (SGPT) U/L 10  --  17     Results from last 7 days    Lab Units 05/01/24  0929 04/30/24  0845 04/29/24  0905 04/28/24  0640 04/27/24  0657 04/26/24  1823   CALCIUM mg/dL 8.0* 8.6 8.6 8.3*   < > 10.3   ALBUMIN g/dL 3.0*  --   --  3.1*  --  5.1   MAGNESIUM mg/dL 1.7  --   --   --   --   --    PHOSPHORUS mg/dL  --   --   --  3.1  --   --     < > = values in this interval not displayed.     Results from last 7 days   Lab Units 04/27/24  1104 04/26/24  1823   PROCALCITONIN ng/mL  --  0.07   LACTATE mmol/L 1.9 1.8     Glucose   Date/Time Value Ref Range Status   04/30/2024 1310 123 70 - 130 mg/dL Final   04/30/2024 1101 71 70 - 130 mg/dL Final       No radiology results for the last day  Scheduled Medications  diphenhydrAMINE, 25 mg, Oral, Nightly   And  acetaminophen, 500 mg, Oral, Nightly  aspirin, 81 mg, Oral, Daily  atorvastatin, 40 mg, Oral, Nightly  famotidine, 20 mg, Intravenous, Q12H  metoprolol succinate XL, 25 mg, Oral, Daily  piperacillin-tazobactam, 3.375 g, Intravenous, Q8H  [Held by provider] polyethylene glycol, 17 g, Oral, BID  potassium chloride, 40 mEq, Oral, Q2H  sodium bicarbonate, 650 mg, Oral, TID  sodium chloride, 10 mL, Intravenous, Q12H    Infusions     Diet  Diet: Liquid; Full Liquid; Fluid Consistency: Thin (IDDSI 0)       Assessment/Plan     Active Hospital Problems    Diagnosis  POA    **Colitis, ischemic [K55.9]  Unknown    Abdominal pain, generalized [R10.84]  Unknown    Hypokalemia [E87.6]  Unknown    Leukocytosis [D72.829]  Unknown    Constipation [K59.00]  Yes    Narcotic dependence [F11.20]  Yes    History of DVT (deep vein thrombosis) [Z86.718]  Not Applicable    Peripheral neuropathy [G62.9]  Yes    Stage 3b chronic kidney disease [N18.32]  Yes    Gastroesophageal reflux disease [K21.9]  Yes    Lumbar radiculopathy [M54.16]  Yes      Resolved Hospital Problems   No resolved problems to display.       74 y.o. female admitted with Colitis, ischemic.      05/01/24  Diet as tolerated.  Acidosis resolved. Replace lytes    Ischemic  colitis  Leukocytosis, improving  -General surgery following  -Antihypertensives on hold  -GI PCR negative  -Diet per surgery  -remains on Zosyn    CKD4  Metabolic acidosis, resolved  -renal function now at baseline         DVT prophylaxis: SCDs  Discussed with patient, family, and nursing staff.  Anticipated discharge home, when cleared by consultants            Behzad Maurice MD  [facility]  05/01/24  15:08 EDT

## 2024-05-01 NOTE — PROGRESS NOTES
Acute Care General Surgery Progress Note    Patient: Marlena Navarrete  YOB: 1950  MRN: [id number]      Assessment  Marlena Navarrete is a 74 y.o. female with possible ischemic colitis that seems to be clinically improving with antibiotics and bowel rest.  Denies abdominal pain or tenderness.  She is tolerating her clear liquid diet.  WBC trending down, AVSS.    Plan  Okay to advance diet as tolerated      Subjective  Multiple BMs overnight, diarrhea.  Tolerating clears.  Denies nausea or vomiting.  Denies fever or chills.    Objective    Vitals:    05/01/24 0535   BP: 154/70   Pulse: 71   Resp: 16   Temp: 97.8 °F (36.6 °C)   SpO2: 97%          Physical Exam  Constitutional: Alert, oriented, in no acute distress  Respiratory: Normal work of breathing, Symmetric excursion  Cardiovascular: Well pefused, no jugular venous distention evident   Abdominal: Soft, slight distention, nontender  Skin: Warm, dry      Laboratory Results  Results from last 7 days   Lab Units 04/30/24  0810 04/29/24  0905 04/28/24  0640 04/27/24  0657 04/26/24  1823   WBC 10*3/mm3 15.44* 18.66* 23.09* 20.56* 19.40*   HEMOGLOBIN g/dL 9.2* 8.9* 9.0* 11.0* 12.7   HEMATOCRIT % 28.5* 26.9* 27.0* 33.2* 36.9   PLATELETS 10*3/mm3 269 227 234 292 359     Results from last 7 days   Lab Units 04/30/24  0845 04/29/24  0905 04/28/24  0640 04/27/24  0657 04/26/24  1823   SODIUM mmol/L 139 141 138   < > 141   POTASSIUM mmol/L 3.9 3.7 3.9   < > 4.0   CHLORIDE mmol/L 112* 115* 113*   < > 106   CO2 mmol/L 9.0* 12.1* 14.5*   < > 19.0*   BUN mg/dL 26* 27* 25*   < > 28*   CREATININE mg/dL 1.96* 1.96* 2.08*   < > 2.49*   CALCIUM mg/dL 8.6 8.6 8.3*   < > 10.3   BILIRUBIN mg/dL  --   --   --   --  0.4   ALK PHOS U/L  --   --   --   --  118*   ALT (SGPT) U/L  --   --   --   --  17   AST (SGOT) U/L  --   --   --   --  24   GLUCOSE mg/dL 44* 73 101*   < > 145*    < > = values in this interval not displayed.     I have personally reviewed 4/30 labs      Bebe  DANY Lei  Acute [facility] General Surgery  [facility]    [address], [address]  [place], [place], [address]  P: [phone]  F: [phone]

## 2024-05-02 LAB
ANION GAP SERPL CALCULATED.3IONS-SCNC: 9.8 MMOL/L (ref 5–15)
BUN SERPL-MCNC: 10 MG/DL (ref 8–23)
BUN/CREAT SERPL: 6.4 (ref 7–25)
CALCIUM SPEC-SCNC: 8.7 MG/DL (ref 8.6–10.5)
CHLORIDE SERPL-SCNC: 107 MMOL/L (ref 98–107)
CO2 SERPL-SCNC: 23.2 MMOL/L (ref 22–29)
CREAT SERPL-MCNC: 1.57 MG/DL (ref 0.57–1)
DEPRECATED RDW RBC AUTO: 43.6 FL (ref 37–54)
EGFRCR SERPLBLD CKD-EPI 2021: 34.9 ML/MIN/1.73
ERYTHROCYTE [DISTWIDTH] IN BLOOD BY AUTOMATED COUNT: 12.1 % (ref 12.3–15.4)
GLUCOSE SERPL-MCNC: 100 MG/DL (ref 65–99)
HCT VFR BLD AUTO: 27.4 % (ref 34–46.6)
HGB BLD-MCNC: 9 G/DL (ref 12–15.9)
MCH RBC QN AUTO: 32.1 PG (ref 26.6–33)
MCHC RBC AUTO-ENTMCNC: 32.8 G/DL (ref 31.5–35.7)
MCV RBC AUTO: 97.9 FL (ref 79–97)
PLATELET # BLD AUTO: 258 10*3/MM3 (ref 140–450)
PMV BLD AUTO: 10 FL (ref 6–12)
POTASSIUM SERPL-SCNC: 3.7 MMOL/L (ref 3.5–5.2)
RBC # BLD AUTO: 2.8 10*6/MM3 (ref 3.77–5.28)
SODIUM SERPL-SCNC: 140 MMOL/L (ref 136–145)
WBC NRBC COR # BLD AUTO: 12.51 10*3/MM3 (ref 3.4–10.8)

## 2024-05-02 PROCEDURE — 25010000002 HYDROMORPHONE 1 MG/ML SOLUTION: Performed by: HOSPITALIST

## 2024-05-02 PROCEDURE — 63710000001 DIPHENHYDRAMINE PER 50 MG: Performed by: HOSPITALIST

## 2024-05-02 PROCEDURE — 85027 COMPLETE CBC AUTOMATED: CPT | Performed by: STUDENT IN AN ORGANIZED HEALTH CARE EDUCATION/TRAINING PROGRAM

## 2024-05-02 PROCEDURE — 80048 BASIC METABOLIC PNL TOTAL CA: CPT | Performed by: STUDENT IN AN ORGANIZED HEALTH CARE EDUCATION/TRAINING PROGRAM

## 2024-05-02 PROCEDURE — 25010000002 PIPERACILLIN SOD-TAZOBACTAM PER 1 G: Performed by: HOSPITALIST

## 2024-05-02 PROCEDURE — 99231 SBSQ HOSP IP/OBS SF/LOW 25: CPT

## 2024-05-02 PROCEDURE — 97530 THERAPEUTIC ACTIVITIES: CPT

## 2024-05-02 RX ADMIN — OXYCODONE AND ACETAMINOPHEN 1 TABLET: 7.5; 325 TABLET ORAL at 18:40

## 2024-05-02 RX ADMIN — METOPROLOL SUCCINATE 25 MG: 25 TABLET, EXTENDED RELEASE ORAL at 08:33

## 2024-05-02 RX ADMIN — ASPIRIN 81 MG: 81 TABLET, COATED ORAL at 08:33

## 2024-05-02 RX ADMIN — PIPERACILLIN AND TAZOBACTAM 3.38 G: 3; .375 INJECTION, POWDER, FOR SOLUTION INTRAVENOUS at 10:50

## 2024-05-02 RX ADMIN — OXYCODONE AND ACETAMINOPHEN 1 TABLET: 7.5; 325 TABLET ORAL at 14:06

## 2024-05-02 RX ADMIN — SODIUM BICARBONATE 650 MG: 650 TABLET ORAL at 16:34

## 2024-05-02 RX ADMIN — FAMOTIDINE 20 MG: 10 INJECTION INTRAVENOUS at 08:33

## 2024-05-02 RX ADMIN — ATORVASTATIN CALCIUM 40 MG: 20 TABLET, FILM COATED ORAL at 20:28

## 2024-05-02 RX ADMIN — SODIUM BICARBONATE 650 MG: 650 TABLET ORAL at 08:33

## 2024-05-02 RX ADMIN — Medication 10 ML: at 20:29

## 2024-05-02 RX ADMIN — DIPHENHYDRAMINE HYDROCHLORIDE 25 MG: 25 CAPSULE ORAL at 20:28

## 2024-05-02 RX ADMIN — OXYCODONE AND ACETAMINOPHEN 1 TABLET: 7.5; 325 TABLET ORAL at 04:24

## 2024-05-02 RX ADMIN — FAMOTIDINE 20 MG: 10 INJECTION INTRAVENOUS at 20:29

## 2024-05-02 RX ADMIN — ACETAMINOPHEN 500 MG: 500 TABLET ORAL at 20:28

## 2024-05-02 RX ADMIN — SODIUM BICARBONATE 650 MG: 650 TABLET ORAL at 20:29

## 2024-05-02 RX ADMIN — Medication 10 ML: at 08:33

## 2024-05-02 RX ADMIN — PIPERACILLIN AND TAZOBACTAM 3.38 G: 3; .375 INJECTION, POWDER, FOR SOLUTION INTRAVENOUS at 01:43

## 2024-05-02 RX ADMIN — HYDROMORPHONE HYDROCHLORIDE 1 MG: 1 INJECTION, SOLUTION INTRAMUSCULAR; INTRAVENOUS; SUBCUTANEOUS at 00:31

## 2024-05-02 RX ADMIN — PIPERACILLIN AND TAZOBACTAM 3.38 G: 3; .375 INJECTION, POWDER, FOR SOLUTION INTRAVENOUS at 18:38

## 2024-05-02 NOTE — PROGRESS NOTES
Name: Marlena Navarrete ADMIT: 2024   : 1950  PCP: Bradley Hernandez MD    MRN: [id number] LOS: 5 days   AGE/SEX: 74 y.o. female  ROOM: [facility]     Subjective   Subjective   No acute events overnight.  Patient states she did have some abdominal pain earlier this morning but is feeling better now.  Has been tolerating advancement of her diet.  No nausea or vomiting currently.  No chest pain or shortness of breath.    Objective   Objective     Vital Signs  Temp:  [97.8 °F (36.6 °C)-98.7 °F (37.1 °C)] 97.8 °F (36.6 °C)  Heart Rate:  [63-77] 68  Resp:  [16-20] 16  BP: (140-156)/(60-84) 140/60  SpO2:  [96 %-99 %] 98 %  on  Flow (L/min):  [1] 1;   Device (Oxygen Therapy): room air  Body mass index is 28.01 kg/m².    Physical Exam  General: Alert, no acute distress.  Sitting in a chair at bedside.  ENT: No conjunctival injection or scleral icterus. Moist mucous membranes.   Neuro: Eyes open and moving in all directions, strength normal in all extremities, no focal deficits.   Lungs: Clear to auscultation bilaterally. No wheeze or crackles. No distress.   Heart: RRR, no murmurs. No edema.  Abdomen: Soft, nondistended.  Mild tenderness to palpation with no rebound or guarding.  Normal bowel sounds.  Ext: Mild edema bilateral lower extremities.  Skin: No rashes or lesions. IV site without swelling or erythema.     Results Review     I reviewed the patient's new clinical results:  Results from last 7 days   Lab Units 24  0836 24  0929 24  0810 24  0905   WBC 10*3/mm3 12.51* 11.17* 15.44* 18.66*   HEMOGLOBIN g/dL 9.0* 8.8* 9.2* 8.9*   PLATELETS 10*3/mm3 258 269 269 227     Results from last 7 days   Lab Units 24  0836 24  0929 24  0845 24  0905   SODIUM mmol/L 140 143 139 141   POTASSIUM mmol/L 3.7 2.7* 3.9 3.7   CHLORIDE mmol/L 107 106 112* 115*   CO2 mmol/L 23.2 25.6 9.0* 12.1*   BUN mg/dL 10 15 26* 27*   CREATININE mg/dL 1.57* 1.88* 1.96* 1.96*   GLUCOSE mg/dL 100*  167* 44* 73   EGFR mL/min/1.73 34.9* 28.1* 26.7* 26.7*     Results from last 7 days   Lab Units 05/01/24  0929 04/28/24  0640 04/26/24  1823   ALBUMIN g/dL 3.0* 3.1* 5.1   BILIRUBIN mg/dL 0.4  --  0.4   ALK PHOS U/L 57  --  118*   AST (SGOT) U/L 13  --  24   ALT (SGPT) U/L 10  --  17     Results from last 7 days   Lab Units 05/02/24  0836 05/01/24  0929 04/30/24  0845 04/29/24  0905 04/28/24  0640 04/27/24  0657 04/26/24  1823   CALCIUM mg/dL 8.7 8.0* 8.6 8.6 8.3*   < > 10.3   ALBUMIN g/dL  --  3.0*  --   --  3.1*  --  5.1   MAGNESIUM mg/dL  --  1.7  --   --   --   --   --    PHOSPHORUS mg/dL  --   --   --   --  3.1  --   --     < > = values in this interval not displayed.     Results from last 7 days   Lab Units 04/27/24  1104 04/26/24  1823   PROCALCITONIN ng/mL  --  0.07   LACTATE mmol/L 1.9 1.8     Glucose   Date/Time Value Ref Range Status   04/30/2024 1310 123 70 - 130 mg/dL Final   04/30/2024 1101 71 70 - 130 mg/dL Final       XR Chest 1 View    Result Date: 5/1/2024  No acute findings.  This report was finalized on 5/1/2024 10:58 PM by Dr. Gloria Sol M.D on Workstation: [id number]       I have personally reviewed all medications:  Scheduled Medications  diphenhydrAMINE, 25 mg, Oral, Nightly   And  acetaminophen, 500 mg, Oral, Nightly  aspirin, 81 mg, Oral, Daily  atorvastatin, 40 mg, Oral, Nightly  famotidine, 20 mg, Intravenous, Q12H  metoprolol succinate XL, 25 mg, Oral, Daily  piperacillin-tazobactam, 3.375 g, Intravenous, Q8H  [Held by provider] polyethylene glycol, 17 g, Oral, BID  sodium bicarbonate, 650 mg, Oral, TID  sodium chloride, 10 mL, Intravenous, Q12H    Infusions   Diet  Diet: Regular/House, Gastrointestinal; Fat-Restricted, Low Irritant; Texture: Regular (IDDSI 7); Fluid Consistency: Thin (IDDSI 0)    Assessment/Plan     Active Hospital Problems    Diagnosis  POA    **Colitis, ischemic [K55.9]  Unknown    Abdominal pain, generalized [R10.84]  Unknown    Hypokalemia [E87.6]  Unknown     Leukocytosis [D72.829]  Unknown    Constipation [K59.00]  Yes    Narcotic dependence [F11.20]  Yes    History of DVT (deep vein thrombosis) [Z86.718]  Not Applicable    Peripheral neuropathy [G62.9]  Yes    Stage 3b chronic kidney disease [N18.32]  Yes    Gastroesophageal reflux disease [K21.9]  Yes    Lumbar radiculopathy [M54.16]  Yes      Resolved Hospital Problems   No resolved problems to display.       74 y.o. female with Colitis, ischemic.    Ischemic colitis  Leukocytosis  -General surgery following  -WBC fluctuating, slightly increased to 12.5 K today from 11 K yesterday  -GI PCR negative  -Diet per surgery  -remains on Zosyn     CKD4  Metabolic acidosis, resolved  -Renal function has returned to baseline  -Nephrology following    Pulmonary nodule  -Small right upper lobe nodule noted on CT imaging  -Follow-up in 12 months with repeat/CT for high risk patient, no follow-up for low risk patient    Anemia  -Hgb below baseline but has been stable  -No signs of active bleeding  -Monitor daily CBC, transfuse for Hgb less than 7    SCDs for DVT prophylaxis.  Full code.  Discussed with patient.  Anticipate discharge home in 1-2 days.      Davina Alas MD  [facility]  05/02/24  14:23 EDT

## 2024-05-02 NOTE — PLAN OF CARE
Goal Outcome Evaluation:  Plan of Care Reviewed With: patient        Progress: improving  Outcome Evaluation: VSS, on room air today, IV abx as ordered, falls precautoins maintained, several loose BMs today, percocet for pain and patient  brought the  for her spinal cord stimulator, no n/v today, tolerating GI diet. Painly mainly in patient's back, mild pain in abdomen. Pt and her spouse educated on the use of ambulating with staff for safety. Falls precautions maintained. Pt very eager for d/c.

## 2024-05-02 NOTE — PLAN OF CARE
Goal Outcome Evaluation:  Plan of Care Reviewed With: patient, spouse        Progress: improving  Outcome Evaluation: Pt was seen by PT this PM for tx. Pt was [facility] where she was educ on ther ex to perform at reg intervals. R LE limited in repetitions 2/2 pain. Pt stood from chair w/ SBA. Pt then amb 400' w/ CGA and use of SPC. PT returned to chair at end of session. Encouraged pt to perform ther ex as taught and request amb w/ staff as able. PT will prog as pt laurent.      Anticipated Discharge Disposition (PT): home with assist

## 2024-05-02 NOTE — PROGRESS NOTES
Acute Care General Surgery Progress Note    Patient: Marlena Navarrete  YOB: 1950  MRN: [id number]      Assessment  Marlena Navarrete is a 74 y.o. female with possible ischemic colitis which seems to be clinically improving with antibiotics and bowel rest. Patient is tolerating a full liquid diet. Denies abdominal pain. She states nausea is normal for her with her acid reflux, denies vomiting. Abdominal exam is benign. AVSS.     Plan  Advance to regular diet today  No indications for surgical intervention, general surgery will sign off but remain available if needed      Subjective  Denies vomiting. Diarrhea is improving. Some leg swelling which she states improved after getting up and walking.     Objective    Vitals:    05/02/24 0914   BP: 140/60   Pulse: 68   Resp: 16   Temp: 97.8 °F (36.6 °C)   SpO2: 98%          Physical Exam  Constitutional: alert, oriented, in no acute distress  Respiratory: Normal work of breathing, Symmetric excursion  Cardiovascular: Well pefused, no jugular venous distention evident   Abdominal: soft, non-tender, non-distended  Skin: warm, dry, no jaundice      Laboratory Results  WBC 12.5  HGB 9  HCT 27.4  Platelets 258  Sodium 140  Potassium 3.7  Creatinine 1.57  GFR 34.9        DANY Schneider  Acute Care General Surgery  [facility]    [address], [address]  [place], [place], [address]  P: [phone]  F: [phone]

## 2024-05-02 NOTE — THERAPY TREATMENT NOTE
Patient Name: Marlena Navarrete  : 1950    MRN: [id number]                              Today's Date: 2024       Admit Date: 2024    Visit Dx:     ICD-10-CM ICD-9-CM   1. Drug-induced constipation  K59.03 564.09     E980.5   2. Intractable abdominal pain  R10.9 789.00     Patient Active Problem List   Diagnosis    Lumbar radiculopathy    Essential hypertension    Right foot drop    Kidney transplant recipient    History of lumbar laminectomy for spinal cord decompression    Spinal enthesopathy of thoracic region    Postlaminectomy syndrome, lumbar region    Chronic bilateral low back pain with right-sided sciatica    Displacement of other nervous system device, implant or graft, initial encounter    Postlaminectomy syndrome of lumbar region    Epigastric pain    Nausea    Gastroesophageal reflux disease    Shoulder arthritis    Acute renal failure (ARF)    Stage 3b chronic kidney disease    Iron deficiency anemia    Status post rotator cuff surgery    History of DVT (deep vein thrombosis)    Peripheral neuropathy    Intractable back pain    Spinal cord stimulator status    Adhesive arachnoiditis    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    Inflammation of sacroiliac joint    Secondary hyperparathyroidism     Vitamin D deficiency    COVID    Pneumonia due to COVID-19 virus    Narcotic dependence    Cytokine release syndrome, grade 1    Impacted cerumen of right ear    Hospital discharge follow-up    Acute metabolic encephalopathy    Volume depletion    PIYUSH (acute kidney injury)    Dehydration    Prediabetes    Screening for colon cancer    Coronary artery calcification    Abnormal nuclear stress test    Constipation    Abdominal pain, generalized    Hypokalemia    Leukocytosis    Colitis, ischemic     Past Medical History:   Diagnosis Date    Arthritis     OSTEO    Chronic back pain     Chronic bilateral low back pain with right-sided sciatica 2020     CKD (chronic kidney disease)     FOLLOWED BY DR LANRE BYNUM    Diverticulosis     Foot drop, right     GERD (gastroesophageal reflux disease)     History of DVT (deep vein thrombosis) 2018    RIGHT CALF S/P BACK SURGERY     History of transfusion     no reaction    Hyperlipidemia     Hypertension     Kidney transplant recipient 1979    HX TRANSPLANT D/T REFLUX     Neuropathy     Secondary hyperparathyroidism  3/9/2022    Spinal cord stimulator status 3/9/2022    Spinal headache     Stage 3b chronic kidney disease 2020    FOLLOWED BY DR LANRE BYNUM    Torn rotator cuff     Vitamin D deficiency 3/9/2022     Past Surgical History:   Procedure Laterality Date    APPENDECTOMY      CATARACT EXTRACTION EXTRACAPSULAR W/ INTRAOCULAR LENS IMPLANTATION       SECTION      COLONOSCOPY  approx     Anastacio WHARTON    ENDOSCOPY N/A 2020    Procedure: ESOPHAGOGASTRODUODENOSCOPY with biopsies;  Surgeon: Wicho Chaves MD;  Location: [facility] ENDOSCOPY;  Service: Gastroenterology;  Laterality: N/A;  pre- epigastric pain, nausea  post-- gastritis, duodenitis, bile reflux    ENDOSCOPY N/A 2023    Procedure: ESOPHAGOGASTRODUODENOSCOPY with bx;  Surgeon: Jovan Robin MD;  Location: [facility] ENDOSCOPY;  Service: Gastroenterology;  Laterality: N/A;  pre: abd pain, nausea, vomiting   post: normal    LUMBAR DISCECTOMY FUSION INSTRUMENTATION Right 2018    Procedure: Right L2-3 laminectomy with [employer];  Surgeon: Oscar Paulino MD;  Location: [facility] OR;  Service: Neurosurgery    SHOULDER ARTHROSCOPY W/ ROTATOR CUFF REPAIR Right 2020    Procedure: SHOULDER ARTHROSCOPY, ROTATOR CUFF REPAIR, SUBACROMIAL DECOMPRESSION, DISTAL CLAVICLE EXCISION, BICEPS TENOTOMY, AND LABRAL DEBRIDEMENT;  Surgeon: Rishabh Florence MD;  [place] [facility] [place] [facility]  Service: Orthopedics;  Laterality: Right;    SHOULDER ROTATOR CUFF REPAIR Left     SPINAL CORD STIMULATOR IMPLANT N/A 2020    Procedure: SPINAL CORD  STIMULATOR INSERTION PHASE 2, triall of right gluteal internal pulse generator;  Surgeon: Cuate Kemp MD;  Location: [facility] MAIN OR;  Service: Neurosurgery;  Laterality: N/A;    SPINAL CORD STIMULATOR IMPLANT N/A 6/4/2020    Procedure: SPINAL CORD STIMULATOR INSERTION PHASE 1, Thoracic 10 laminotomy for revision of [place] surgical lead at Thoracic 8 to Thoracic 9;  Surgeon: Cuate Kemp MD;  Location: [facility] MAIN OR;  Service: Neurosurgery;  Laterality: N/A;    TONSILLECTOMY      TRANSPLANTATION RENAL Right 1979    SISTER GAVE PT     TUBAL ABDOMINAL LIGATION        General Information       Row Name 05/02/24 1404          Physical Therapy Time and Intention    Document Type therapy note (daily note)  -PH     Mode of Treatment physical therapy  -       Row Name 05/02/24 1404          General Information    Existing Precautions/Restrictions fall  -PH       Row Name 05/02/24 1404          Cognition    Orientation Status (Cognition) oriented x 4  -PH       Row Name 05/02/24 1404          Safety Issues, Functional Mobility    Impairments Affecting Function (Mobility) balance;endurance/activity tolerance;strength;pain  -PH     Comment, Safety Issues/Impairments (Mobility) gt belt and shoes w/ R AFO donned; R foot drop and BL LBP  -PH               User Key  (r) = Recorded By, (t) = Taken By, (c) = Cosigned By      Initials Name Provider Type    PH Brenda Munguia PTA Physical Therapist Assistant                   Mobility       Row Name 05/02/24 1405          Bed Mobility    Comment, (Bed Mobility) UIC  -PH       Row Name 05/02/24 1405          Sit-Stand Transfer    Sit-Stand Bureau (Transfers) standby assist  -PH     Assistive Device (Sit-Stand Transfers) cane, straight  -PH       Row Name 05/02/24 1405          Gait/Stairs (Locomotion)    Bureau Level (Gait) contact guard;verbal cues  -PH     Assistive Device (Gait) cane, straight  -PH     Distance in Feet (Gait) 400  -PH      Deviations/Abnormal Patterns (Gait) sanjuana decreased;gait speed decreased;stride length decreased  -PH     Mongo Level (Stairs) not tested  -PH     Comment, (Gait/Stairs) 1 mild LOB as pt turned L out of room req min A to correct; slow  -PH               User Key  (r) = Recorded By, (t) = Taken By, (c) = Cosigned By      Initials Name Provider Type     Brenda Munguia PTA Physical Therapist Assistant                   Obj/Interventions       Row Name 05/02/24 1406          Motor Skills    Therapeutic Exercise other (see comments)  BAP, HS, LAQ, seated march; x 5 - 10 w/ back pain limiting R LE  -PH       Row Name 05/02/24 1406          Balance    Balance Assessment sitting static balance;standing static balance  -PH     Static Sitting Balance independent  -PH     Static Standing Balance standby assist  -PH     Position/Device Used, Standing Balance cane, straight  -PH               User Key  (r) = Recorded By, (t) = Taken By, (c) = Cosigned By      Initials Name Provider Type     Brenda Munguia PTA Physical Therapist Assistant                   Goals/Plan    No documentation.                  Clinical Impression       Row Name 05/02/24 1407          Pain    Posttreatment Pain Rating 9/10  -PH     Pain Location - back  -PH     Pain Intervention(s) Ambulation/increased activity  -PH     Additional Documentation Pain Scale: Numbers Pre/Post-Treatment (Group)  -PH       Row Name 05/02/24 1407          Plan of Care Review    Plan of Care Reviewed With patient;spouse  -PH     Progress improving  -PH     Outcome Evaluation Pt was seen by PT this PM for tx. Pt was [facility] where she was educ on ther ex to perform at reg intervals. R LE limited in repetitions 2/2 pain. Pt stood from chair w/ SBA. Pt then amb 400' w/ CGA and use of SPC. PT returned to chair at end of session. Encouraged pt to perform ther ex as taught and request amb w/ staff as able. PT will prog as pt laurent.  -PH       Row Name  05/02/24 1407          Positioning and Restraints    Pre-Treatment Position sitting in chair/recliner  -PH     Post Treatment Position chair  -PH     In Chair reclined;call light within reach;encouraged to call for assist;exit alarm on;notified nsg  -PH               User Key  (r) = Recorded By, (t) = Taken By, (c) = Cosigned By      Initials Name Provider Type     Brenda Munguia PTA Physical Therapist Assistant                   Outcome Measures       Row Name 05/02/24 1409          How much help from another person do you currently need...    Turning from your back to your side while in flat bed without using bedrails? 4  -PH     Moving from lying on back to sitting on the side of a flat bed without bedrails? 4  -PH     Moving to and from a bed to a chair (including a wheelchair)? 3  -PH     Standing up from a chair using your arms (e.g., wheelchair, bedside chair)? 3  -PH     Climbing 3-5 steps with a railing? 3  -PH     To walk in hospital room? 3  -PH     AM-PAC 6 Clicks Score (PT) 20  -PH     Highest Level of Mobility Goal 6 --> Walk 10 steps or more  -PH       Row Name 05/02/24 1409          Functional Assessment    Outcome Measure Options AM-PAC 6 Clicks Basic Mobility (PT)  -PH               User Key  (r) = Recorded By, (t) = Taken By, (c) = Cosigned By      Initials Name Provider Type     Brenda Munguia PTA Physical Therapist Assistant                                 Physical Therapy Education       Title: PT OT SLP Therapies (Done)       Topic: Physical Therapy (Done)       Point: Mobility training (Done)       Learning Progress Summary             Patient Acceptance, E,TB,D, VU,NR by  at 5/2/2024 1409    Acceptance, E, VU,DU,NR by  at 4/30/2024 1037    Acceptance, E,TB,D, VU by  at 4/29/2024 1121    Acceptance, E, VU,NR by  at 4/28/2024 1356   Significant Other Acceptance, E, VU,NR by  at 4/28/2024 1356                         Point: Home exercise program (Done)        Learning Progress Summary             Patient Acceptance, E,TB,D, VU,NR by  at 5/2/2024 1409    Acceptance, E, VU,DU,NR by  at 4/30/2024 1037    Acceptance, E,TB,D, VU by  at 4/29/2024 1121                         Point: Body mechanics (Done)       Learning Progress Summary             Patient Acceptance, E,TB,D, VU,NR by  at 5/2/2024 1409    Acceptance, E, VU,DU,NR by  at 4/30/2024 1037    Acceptance, E,TB,D, VU by  at 4/29/2024 1121    Acceptance, E, VU,NR by  at 4/28/2024 1356   Significant Other Acceptance, E, VU,NR by  at 4/28/2024 1356                         Point: Precautions (Done)       Learning Progress Summary             Patient Acceptance, E,TB,D, VU,NR by  at 5/2/2024 1409    Acceptance, E, VU,DU,NR by  at 4/30/2024 1037    Acceptance, E,TB,D, VU by  at 4/29/2024 1121    Acceptance, E, VU,NR by  at 4/28/2024 1356   Significant Other Acceptance, E, VU,NR by  at 4/28/2024 1356                                         User Key       Initials Effective Dates Name Provider Type Discipline     06/16/21 -  Brenda Munguia PTA Physical Therapist Assistant PT     06/16/21 -  Lucero Jones PT Physical Therapist PT                  PT Recommendation and Plan     Plan of Care Reviewed With: patient, spouse  Progress: improving  Outcome Evaluation: Pt was seen by PT this PM for tx. Pt was [facility] where she was educ on ther ex to perform at reg intervals. R LE limited in repetitions 2/2 pain. Pt stood from chair w/ SBA. Pt then amb 400' w/ CGA and use of SPC. PT returned to chair at end of session. Encouraged pt to perform ther ex as taught and request amb w/ staff as able. PT will prog as pt laurent.     Time Calculation:         PT Charges       Row Name 05/02/24 1410             Time Calculation    Start Time 1343  -PH      Stop Time 1355  -PH      Time Calculation (min) 12 min  -PH      PT Received On 05/02/24  -      PT - Next Appointment 05/03/24  -         Timed Charges     00907 - PT Therapeutic Exercise Minutes 4  -PH      43014 - PT Therapeutic Activity Minutes 8  -PH         Total Minutes    Timed Charges Total Minutes 12  -PH       Total Minutes 12  -PH                User Key  (r) = Recorded By, (t) = Taken By, (c) = Cosigned By      Initials Name Provider Type    Brenda Taylor PTA Physical Therapist Assistant                  Therapy Charges for Today       Code Description Service Date Service Provider Modifiers Qty    21061985195 HC PT THERAPEUTIC ACT EA 15 MIN 5/2/2024 Brenda Munguia PTA GP 1            PT G-Codes  Outcome Measure Options: AM-PAC 6 Clicks Basic Mobility (PT)  AM-PAC 6 Clicks Score (PT): 20  PT Discharge Summary  Anticipated Discharge Disposition (PT): home with assist    Brenda Munguia PTA  5/2/2024

## 2024-05-02 NOTE — PLAN OF CARE
Problem: Adult Inpatient Plan of Care  Goal: Plan of Care Review  Outcome: Ongoing, Not Progressing  Flowsheets (Taken 5/2/2024 0850)  Progress: no change  Plan of Care Reviewed With: patient  Outcome Evaluation: Pt sitting up on a recliner, c/o discomfort lying in bed, called out for bathroom trip, noted to be very anxious, insists that something is wrong, she does not feel good, her legs are swollen and feels like it is going to pop, severe pain in her lower back, reassured, feelings acknowledged, encouraged to do slow deep breaths, O2 1L given for comfort, vital signs  checked, Arabella darden, Portable CXR ordered and completed, medicated with IV Dilaudid for pain with adequate relief, informed Pt of CXR results, another dose of Dilaudid given for c/o back pain, Pt calmed down and was able to sleep, she did stay in the recliner all night where she is more comfortable, waffle cushion provided, buttocks red this am but blanchable. IV Zosyn continued, Percocet given this am, had 2 loose stools during the night, voided   Goal Outcome Evaluation:  Plan of Care Reviewed With: patient        Progress: no change  Outcome Evaluation: Pt sitting up on a recliner, c/o discomfort lying in bed, called out for bathroom trip, noted to be very anxious, insists that something is wrong, she does not feel good, her legs are swollen and feels like it is going to pop, severe pain in her lower back, reassured, feelings acknowledged, encouraged to do slow deep breaths, O2 1L given for comfort, vital signs  checked, Arabella darden, Portable CXR ordered and completed, medicated with IV Dilaudid for pain with adequate relief, informed Pt of CXR results, another dose of Dilaudid given for c/o back pain, Pt calmed down and was able to sleep, she did stay in the recliner all night where she is more comfortable, waffle cushion provided, buttocks red this am but blanchable. IV Zosyn continued, Percocet given this am, had 2 loose  stools during the night, voided

## 2024-05-03 ENCOUNTER — READMISSION MANAGEMENT (OUTPATIENT)
Dept: CALL CENTER | Facility: HOSPITAL | Age: 74
End: 2024-05-03
Payer: MEDICARE

## 2024-05-03 ENCOUNTER — APPOINTMENT (OUTPATIENT)
Dept: CARDIOLOGY | Facility: HOSPITAL | Age: 74
DRG: 394 | End: 2024-05-03
Payer: MEDICARE

## 2024-05-03 VITALS
RESPIRATION RATE: 16 BRPM | HEIGHT: 58 IN | DIASTOLIC BLOOD PRESSURE: 70 MMHG | SYSTOLIC BLOOD PRESSURE: 160 MMHG | WEIGHT: 134.04 LBS | HEART RATE: 68 BPM | TEMPERATURE: 97.4 F | BODY MASS INDEX: 28.14 KG/M2 | OXYGEN SATURATION: 93 %

## 2024-05-03 LAB
ANION GAP SERPL CALCULATED.3IONS-SCNC: 9.7 MMOL/L (ref 5–15)
BH CV LOWER VASCULAR LEFT COMMON FEMORAL AUGMENT: NORMAL
BH CV LOWER VASCULAR LEFT COMMON FEMORAL COMPETENT: NORMAL
BH CV LOWER VASCULAR LEFT COMMON FEMORAL COMPRESS: NORMAL
BH CV LOWER VASCULAR LEFT COMMON FEMORAL PHASIC: NORMAL
BH CV LOWER VASCULAR LEFT COMMON FEMORAL SPONT: NORMAL
BH CV LOWER VASCULAR LEFT DISTAL FEMORAL COMPRESS: NORMAL
BH CV LOWER VASCULAR LEFT GASTRONEMIUS COMPRESS: NORMAL
BH CV LOWER VASCULAR LEFT GREATER SAPH AK COMPRESS: NORMAL
BH CV LOWER VASCULAR LEFT GREATER SAPH BK COMPRESS: NORMAL
BH CV LOWER VASCULAR LEFT LESSER SAPH COMPRESS: NORMAL
BH CV LOWER VASCULAR LEFT MID FEMORAL AUGMENT: NORMAL
BH CV LOWER VASCULAR LEFT MID FEMORAL COMPETENT: NORMAL
BH CV LOWER VASCULAR LEFT MID FEMORAL COMPRESS: NORMAL
BH CV LOWER VASCULAR LEFT MID FEMORAL PHASIC: NORMAL
BH CV LOWER VASCULAR LEFT MID FEMORAL SPONT: NORMAL
BH CV LOWER VASCULAR LEFT PERONEAL COMPRESS: NORMAL
BH CV LOWER VASCULAR LEFT POPLITEAL AUGMENT: NORMAL
BH CV LOWER VASCULAR LEFT POPLITEAL COMPETENT: NORMAL
BH CV LOWER VASCULAR LEFT POPLITEAL COMPRESS: NORMAL
BH CV LOWER VASCULAR LEFT POPLITEAL PHASIC: NORMAL
BH CV LOWER VASCULAR LEFT POPLITEAL SPONT: NORMAL
BH CV LOWER VASCULAR LEFT POSTERIOR TIBIAL COMPRESS: NORMAL
BH CV LOWER VASCULAR LEFT PROFUNDA FEMORAL COMPRESS: NORMAL
BH CV LOWER VASCULAR LEFT PROXIMAL FEMORAL COMPRESS: NORMAL
BH CV LOWER VASCULAR LEFT SAPHENOFEMORAL JUNCTION COMPRESS: NORMAL
BH CV LOWER VASCULAR RIGHT COMMON FEMORAL AUGMENT: NORMAL
BH CV LOWER VASCULAR RIGHT COMMON FEMORAL COMPETENT: NORMAL
BH CV LOWER VASCULAR RIGHT COMMON FEMORAL COMPRESS: NORMAL
BH CV LOWER VASCULAR RIGHT COMMON FEMORAL PHASIC: NORMAL
BH CV LOWER VASCULAR RIGHT COMMON FEMORAL SPONT: NORMAL
BH CV LOWER VASCULAR RIGHT DISTAL FEMORAL COMPRESS: NORMAL
BH CV LOWER VASCULAR RIGHT GASTRONEMIUS COMPRESS: NORMAL
BH CV LOWER VASCULAR RIGHT GREATER SAPH AK COMPRESS: NORMAL
BH CV LOWER VASCULAR RIGHT GREATER SAPH BK COMPRESS: NORMAL
BH CV LOWER VASCULAR RIGHT LESSER SAPH COMPRESS: NORMAL
BH CV LOWER VASCULAR RIGHT MID FEMORAL AUGMENT: NORMAL
BH CV LOWER VASCULAR RIGHT MID FEMORAL COMPETENT: NORMAL
BH CV LOWER VASCULAR RIGHT MID FEMORAL COMPRESS: NORMAL
BH CV LOWER VASCULAR RIGHT MID FEMORAL PHASIC: NORMAL
BH CV LOWER VASCULAR RIGHT MID FEMORAL SPONT: NORMAL
BH CV LOWER VASCULAR RIGHT PERONEAL COMPRESS: NORMAL
BH CV LOWER VASCULAR RIGHT POPLITEAL AUGMENT: NORMAL
BH CV LOWER VASCULAR RIGHT POPLITEAL COMPETENT: NORMAL
BH CV LOWER VASCULAR RIGHT POPLITEAL COMPRESS: NORMAL
BH CV LOWER VASCULAR RIGHT POPLITEAL PHASIC: NORMAL
BH CV LOWER VASCULAR RIGHT POPLITEAL SPONT: NORMAL
BH CV LOWER VASCULAR RIGHT POSTERIOR TIBIAL COMPRESS: NORMAL
BH CV LOWER VASCULAR RIGHT PROFUNDA FEMORAL COMPRESS: NORMAL
BH CV LOWER VASCULAR RIGHT PROXIMAL FEMORAL COMPRESS: NORMAL
BH CV LOWER VASCULAR RIGHT SAPHENOFEMORAL JUNCTION COMPRESS: NORMAL
BUN SERPL-MCNC: 11 MG/DL (ref 8–23)
BUN/CREAT SERPL: 6.7 (ref 7–25)
CALCIUM SPEC-SCNC: 8.2 MG/DL (ref 8.6–10.5)
CHLORIDE SERPL-SCNC: 107 MMOL/L (ref 98–107)
CO2 SERPL-SCNC: 23.3 MMOL/L (ref 22–29)
CREAT SERPL-MCNC: 1.63 MG/DL (ref 0.57–1)
DEPRECATED RDW RBC AUTO: 40.6 FL (ref 37–54)
EGFRCR SERPLBLD CKD-EPI 2021: 33.4 ML/MIN/1.73
ERYTHROCYTE [DISTWIDTH] IN BLOOD BY AUTOMATED COUNT: 12 % (ref 12.3–15.4)
GLUCOSE SERPL-MCNC: 95 MG/DL (ref 65–99)
HCT VFR BLD AUTO: 24.5 % (ref 34–46.6)
HGB BLD-MCNC: 8.1 G/DL (ref 12–15.9)
MCH RBC QN AUTO: 30.8 PG (ref 26.6–33)
MCHC RBC AUTO-ENTMCNC: 33.1 G/DL (ref 31.5–35.7)
MCV RBC AUTO: 93.2 FL (ref 79–97)
PLATELET # BLD AUTO: 275 10*3/MM3 (ref 140–450)
PMV BLD AUTO: 10 FL (ref 6–12)
POTASSIUM SERPL-SCNC: 3.5 MMOL/L (ref 3.5–5.2)
RBC # BLD AUTO: 2.63 10*6/MM3 (ref 3.77–5.28)
SODIUM SERPL-SCNC: 140 MMOL/L (ref 136–145)
WBC NRBC COR # BLD AUTO: 11.04 10*3/MM3 (ref 3.4–10.8)

## 2024-05-03 PROCEDURE — 25010000002 PIPERACILLIN SOD-TAZOBACTAM PER 1 G: Performed by: HOSPITALIST

## 2024-05-03 PROCEDURE — 93970 EXTREMITY STUDY: CPT | Performed by: SURGERY

## 2024-05-03 PROCEDURE — 85027 COMPLETE CBC AUTOMATED: CPT | Performed by: STUDENT IN AN ORGANIZED HEALTH CARE EDUCATION/TRAINING PROGRAM

## 2024-05-03 PROCEDURE — 93970 EXTREMITY STUDY: CPT

## 2024-05-03 PROCEDURE — 80048 BASIC METABOLIC PNL TOTAL CA: CPT | Performed by: STUDENT IN AN ORGANIZED HEALTH CARE EDUCATION/TRAINING PROGRAM

## 2024-05-03 RX ADMIN — OXYCODONE AND ACETAMINOPHEN 1 TABLET: 7.5; 325 TABLET ORAL at 08:01

## 2024-05-03 RX ADMIN — OXYCODONE AND ACETAMINOPHEN 1 TABLET: 7.5; 325 TABLET ORAL at 02:14

## 2024-05-03 RX ADMIN — METOPROLOL SUCCINATE 25 MG: 25 TABLET, EXTENDED RELEASE ORAL at 08:01

## 2024-05-03 RX ADMIN — PIPERACILLIN AND TAZOBACTAM 3.38 G: 3; .375 INJECTION, POWDER, FOR SOLUTION INTRAVENOUS at 02:14

## 2024-05-03 RX ADMIN — ASPIRIN 81 MG: 81 TABLET, COATED ORAL at 08:01

## 2024-05-03 RX ADMIN — SODIUM BICARBONATE 650 MG: 650 TABLET ORAL at 08:01

## 2024-05-03 RX ADMIN — FAMOTIDINE 20 MG: 10 INJECTION INTRAVENOUS at 08:00

## 2024-05-03 NOTE — DISCHARGE SUMMARY
Patient Name: Marlena Navarrete  : 1950  MRN: [id number]    Date of Admission: 2024  Date of Discharge:  5/3/2024  Primary Care Physician: Bradley Hernandez MD      Chief Complaint:   Abdominal Pain and Constipation      Discharge Diagnoses     Active Hospital Problems    Diagnosis  POA    **Colitis, ischemic [K55.9]  Unknown    Abdominal pain, generalized [R10.84]  Unknown    Hypokalemia [E87.6]  Unknown    Leukocytosis [D72.829]  Unknown    Constipation [K59.00]  Yes    Narcotic dependence [F11.20]  Yes    History of DVT (deep vein thrombosis) [Z86.718]  Not Applicable    Peripheral neuropathy [G62.9]  Yes    Stage 3b chronic kidney disease [N18.32]  Yes    Gastroesophageal reflux disease [K21.9]  Yes    Lumbar radiculopathy [M54.16]  Yes      Resolved Hospital Problems   No resolved problems to display.        Hospital Course     Ms. Navarrete is a 74 y.o. female with a history of CKD, prior DVT, and GERD who presented to [facility] initially complaining of abdominal pain, nausea, and vomiting.  Please see the admitting history and physical for further details.  She was admitted to the hospital for further evaluation and treatment.      Initial imaging was consistent with ischemic colitis.  Surgery was consulted for further evaluation.  Overall, the patient had good clinical improvement with supportive care and no surgery was required.  GI PCR was checked given her significant diarrhea, and this was negative.  The patient completed a 7-day course of antibiotics.  She was initially on bowel rest but diet was advanced as tolerated.  She was tolerating a regular diet with minimal abdominal pain and no nausea/vomiting prior to discharge home.  She also had normal bowel function.  The patient did have some lower extremity edema, likely dependent secondary to IV fluids and limited mobility.  A Doppler ultrasound was obtained to ensure no DVT, and this was negative.  Imaging revealed an incidental  finding of a small pulmonary nodule in the right upper lobe measuring 3 mm.  In a low risk patient, no follow-up is recommended.  In a high risk patient, repeat chest CT in 12 months is recommended.  Will defer this to the patient's PCP.  Hgb was low on admission.  This was likely partially due to hemodilution from IV fluids.  Hgb was 8.1 on day of discharge.  The patient was having regular bowel movements with no signs of bleeding.  Recommend she have a repeat CBC within 3 days of discharge with her PCP.  Overall, the patient had good clinical improvement.  Consulting services were agreeable with discharge home.  She was counseled extensively on reasons to return to the hospital and conveyed understanding.  She was discharged home in good condition.    Day of Discharge     Subjective:  No acute events overnight.  Patient has been tolerating a regular diet well.  No nausea or vomiting.  Denies any abdominal pain at this time.  She would really like to be discharged home today.    Physical Exam:  Temp:  [97.4 °F (36.3 °C)-98.9 °F (37.2 °C)] 97.4 °F (36.3 °C)  Heart Rate:  [68-70] 68  Resp:  [16] 16  BP: (156-161)/(65-73) 160/70  Body mass index is 28.01 kg/m².  Physical Exam  General: Alert, no acute distress.  Sitting in a chair at bedside.  Eating breakfast.  ENT: No conjunctival injection or scleral icterus. Moist mucous membranes.   Neuro: Eyes open and moving in all directions, strength normal in all extremities, no focal deficits.   Lungs: Clear to auscultation bilaterally. No wheeze or crackles. No distress.   Heart: RRR, no murmurs. No edema.  Abdomen: Soft, nondistended.  Mild tenderness to palpation with no rebound or guarding.  Normal bowel sounds.  Ext: Mild edema bilateral lower extremities, improved from yesterday.  Skin: No rashes or lesions. IV site without swelling or erythema.      Consultants     Consult Orders (all) (From admission, onward)       Start     Ordered    04/27/24 0904  Inpatient  General Surgery Consult  Once        Specialty:  General Surgery  Provider:  Zenaida Dotson MD    04/27/24 0904    04/26/24 2307  [facility] (on-call MD unless specified) Details  Once        Specialty:  Hospitalist  Provider:  (Not yet assigned)    04/26/24 2309                  Procedures     * Surgery not found *    Imaging Results (All)       Procedure Component Value Units Date/Time    XR Chest 1 View [[id number]] Collected: 05/01/24 2257     Updated: 05/01/24 2301    Narrative:      SINGLE VIEW OF THE CHEST     HISTORY: Shortness of breath     COMPARISON: February 20, 2024     FINDINGS:  There is cardiomegaly. There is no vascular congestion. There is  calcification of the aorta. No pneumothorax, pleural effusion, or acute  infiltrate is seen. Postsurgical changes are noted in the right  shoulder.       Impression:      No acute findings.     This report was finalized on 5/1/2024 10:58 PM by Dr. Gloria Sol M.D on Workstation: [id number]       CT Angiogram Abdomen Pelvis [[id number]] Collected: 04/27/24 1421     Updated: 04/27/24 1517    Narrative:      CT ANGIOGRAM ABDOMEN PELVIS-     Radiation dose reduction techniques were utilized, including automated  exposure control and exposure modulation based on body size.     CT scan of the chest, abdomen and pelvis performed, angiogram protocol  to include coronal, sagittal and three-dimensional reconstruction.     COMPARISON EXAMINATION: CT abdomen and pelvis from 04/26/2024.     CLINICAL: Mesenteric ischemia.     VASCULAR FINDINGS: There is atherosclerotic calcification of the  thoracic aorta, there are areas of mural thrombus formation, the caliber  is within normal limits. Branching great vessels off of the arch are  unremarkable. Atherosclerotic calcification of the abdominal aorta  without aneurysm. There is satisfactory contrast enhancement  demonstrated within the celiac trunk, as well as the SMA and JONA. Few  small atherosclerotic plaques noted along  the SMA. Good contrast  enhancement within the pulmonary arterial tree, no pulmonary embolus.  Cardiac size stable, upper limits of normal to mildly enlarged.     NONVASCULAR FINDINGS: There has been interval development of wall  thickening involving the mid and distal transverse colon with serosal  edema. The remainder of the colon is satisfactory in appearance. This is  consistent with a segmental area of colitis, arterial vessels leading to  this location are nicely enhancing. There is diverticulosis of the  sigmoid colon again seen without acute diverticulitis.     The liver, gallbladder, pancreas, spleen and right renal transplant have  a satisfactory appearance. Urinary bladder is within normal limits.  Uterus and ovaries unremarkable. No free intraperitoneal air. No pleural  effusion or acute pulmonary process has developed. Stomach is collapsed,  overall satisfactory in appearance. The remainder is unremarkable.     Findings of this report discussed with Dr. Porter at 2:15 p.m.           This report was finalized on 4/27/2024 3:13 PM by Dr. Isaiah Forbes M.D  on Workstation: [id number]       CT Angiogram Chest [[id number]] Collected: 04/27/24 1417     Updated: 04/27/24 1440    Narrative:      CT ANGIOGRAM CHEST-     INDICATION: Concern for mesenteric ischemia     COMPARISON: CT abdomen pelvis April 26, 2024     TECHNIQUE:  Routine CTA chest, abdomen and pelvis with IV contrast. Coronal and  sagittal reformats. Three dimensional reconstructions. Radiation dose  reduction techniques were utilized, including automated exposure control  and exposure modulation based on body size.     FINDINGS:      Chest wall: No lymphadenopathy.     Thyroid: Normal.     Mediastinum: Coronary artery atherosclerotic calcifications. Heart is  normal in size. No pericardial effusion. No mediastinal or hilar  lymphadenopathy. Calcified mediastinal lymph nodes, consistent with  prior granulomatous infection.     Lungs/pleura: No  effusions. Patent central airways. Mild centrilobular  emphysema. Solid pulmonary nodule in the apical right upper lobe, series  3, axial mage 36, measures 3 mm. Bibasilar subsegmental atelectasis or  scarring. Multiple calcified pulmonary nodules, consistent with prior  granulomatous infection.     ABDOMEN: Normal liver. Distended gallbladder without gallbladder wall  thickening or surrounding inflammation. No biliary ductal dilatation.  Calcifications in the spleen, consistent with prior granulomatous  infection. Spleen is normal in size. No pancreatic mass or pancreatic  ductal dilatation seen. No adrenal nodules. Native kidneys are absent.     Pelvis: Under distended bladder. Couple small foci of gas in the  nondependent bladder lumen. Anteverted uterus. No adnexal mass. Right  hemipelvis renal transplant with multifocal cortical loss indicating  scarring without a mass, hydronephrosis or peritransplant fluid  collection.     Bowel: No obstruction. Colonic diverticulosis. Distal sigmoid colon and  rectum appears collapsed. Wall thickening seen in the mid and distal  transverse colon with pericolonic stranding/edema, consistent with  colitis, with the involved colon measuring approximately 13 cm in  length. Moderate gas and stool in the colon. Normal appendix.     Abdominal wall: Rectus diastases. Pelvic wall scarring.     Retroperitoneum: No lymphadenopathy.     Vasculature: Bulky atherosclerotic plaque seen in the descending  thoracic aorta. No abdominal aortic aneurysm or dissection. SMA is  patent without stenosis. Celiac axis is patent, without stenosis. JONA is  patent, without stenosis. Moderate aortoiliac atherosclerotic  calcification.     Osseous structures: Left posterior column stimulator. Right humeral head  anchors.       Impression:         1. Colitis seen in the mid/distal transverse colon, new in the interval.  Differential includes infectious, inflammatory and ischemic colitis. No  arterial  occlusion or stenosis identified. Recommend correlation with  lactic acid levels.  2. Couple small foci of gas in the bladder lumen could be related to  recent instrumentation. Cystitis in the appropriate clinical and  laboratory setting.  3. Native kidneys are absent. Right hemipelvis renal transplant with  multifocal scarring.  4. Colonic diverticulosis.  5. Small pulmonary nodule right upper lobe measuring 3 mm. If low risk  patient, no follow-up recommendations. If high risk patient, optional  12-month follow-up chest CT.     Call Report: ESTEBAN Burton was notified of colitis telephone of the above  findings on April 27, 2024 at 2:35 p.m.     This report was finalized on 4/27/2024 2:37 PM by Dr. Wicho Dobson M.D on Workstation: [id number]       CT Abdomen Pelvis Without Contrast [[id number]] Collected: 04/26/24 1850     Updated: 04/26/24 1858    Narrative:      CT ABDOMEN PELVIS WO CONTRAST-     Radiation dose reduction techniques were utilized, including automated  exposure control and exposure modulation based on body size.     Clinical: Generalized abdominal pain with constipation     COMPARISON examination dated 8/6/2023     FINDINGS:  1. There is diverticulosis of the colon, no indication of acute  diverticulitis. There is combination of formed fecal material and fluid  within the colon. No colon wall thickening or pericolonic fat induration  to suggest an inflammatory process. The appendix and small bowel have a  satisfactory appearance. Stomach is within normal limits no duodenal  abnormality seen.     2. No free air or free intraperitoneal fluid. The liver, gallbladder,  pancreas, spleen and adrenal glands have a satisfactory appearance.  Native kidneys are absent, right pelvic renal transplant is stable in  appearance. No calculus, mass or obstructive uropathy. The urinary  bladder is within normal limits. The uterus and ovaries have a  satisfactory appearance.     3. Lung bases within normal  limits. Atherosclerotic calcification of a  normal diameter aorta. Punctate atherosclerotic plaque formation within  the SMA. No plaque is causing significant stenosis. The remainder is  unremarkable.              This report was finalized on 4/26/2024 6:55 PM by Dr. Isaiah Forbes M.D  on Workstation: [id number]             Results for orders placed during the hospital encounter of 04/26/24    Duplex Venous Lower Extremity - Bilateral CAR    Interpretation Summary    Normal bilateral lower extremity venous duplex scan.    Results for orders placed during the hospital encounter of 03/19/24    Adult Transthoracic Echo Complete w/ Color, Spectral and Contrast if necessary per protocol    Interpretation Summary    Left ventricular systolic function is hyperdynamic (EF > 70%). Calculated left ventricular EF = 70.7% Normal left ventricular cavity size and wall thickness noted. All left ventricular wall segments contract normally.    Mild aortic valve regurgitation is present    Mild mitral annular calcification is present. Mild to moderate mitral valve regurgitation is present. No significant mitral valve stenosis is present.    There is a trivial pericardial effusion.    Pertinent Labs     Results from last 7 days   Lab Units 05/03/24  0537 05/02/24  0836 05/01/24  0929 04/30/24  0810   WBC 10*3/mm3 11.04* 12.51* 11.17* 15.44*   HEMOGLOBIN g/dL 8.1* 9.0* 8.8* 9.2*   PLATELETS 10*3/mm3 275 258 269 269     Results from last 7 days   Lab Units 05/03/24  0537 05/02/24  0836 05/01/24  0929 04/30/24  0845   SODIUM mmol/L 140 140 143 139   POTASSIUM mmol/L 3.5 3.7 2.7* 3.9   CHLORIDE mmol/L 107 107 106 112*   CO2 mmol/L 23.3 23.2 25.6 9.0*   BUN mg/dL 11 10 15 26*   CREATININE mg/dL 1.63* 1.57* 1.88* 1.96*   GLUCOSE mg/dL 95 100* 167* 44*   EGFR mL/min/1.73 33.4* 34.9* 28.1* 26.7*     Results from last 7 days   Lab Units 05/01/24  0929 04/28/24  0640 04/26/24  1823   ALBUMIN g/dL 3.0* 3.1* 5.1   BILIRUBIN mg/dL 0.4  --  0.4  "  ALK PHOS U/L 57  --  118*   AST (SGOT) U/L 13  --  24   ALT (SGPT) U/L 10  --  17     Results from last 7 days   Lab Units 05/03/24  0537 05/02/24  0836 05/01/24  0929 04/30/24  0845 04/29/24  0905 04/28/24  0640 04/27/24  0657 04/26/24  1823   CALCIUM mg/dL 8.2* 8.7 8.0* 8.6   < > 8.3*   < > 10.3   ALBUMIN g/dL  --   --  3.0*  --   --  3.1*  --  5.1   MAGNESIUM mg/dL  --   --  1.7  --   --   --   --   --    PHOSPHORUS mg/dL  --   --   --   --   --  3.1  --   --     < > = values in this interval not displayed.     Results from last 7 days   Lab Units 04/26/24  1823   LIPASE U/L 41       Results from last 7 days   Lab Units 04/28/24  0640   URIC ACID mg/dL 3.3         Invalid input(s): \"LDLCALC\"  Results from last 7 days   Lab Units 04/26/24  1928 04/26/24  1849   BLOODCX  No growth at 5 days No growth at 5 days         Test Results Pending at Discharge   No pending test results at time of discharge.    Discharge Details        Discharge Medications        Continue These Medications        Instructions Start Date   acetaminophen 500 MG tablet  Commonly known as: TYLENOL   1 tablet, Oral, Every 24 Hours      amLODIPine 5 MG tablet  Commonly known as: NORVASC   5 mg, Oral, Daily      aspirin 81 MG EC tablet   81 mg, Oral, Daily      atorvastatin 40 MG tablet  Commonly known as: LIPITOR   40 mg, Oral, Nightly      diphenhydrAMINE-acetaminophen  MG tablet per tablet  Commonly known as: TYLENOL PM   1 tablet, Oral, Nightly      famotidine 40 MG tablet  Commonly known as: Pepcid   40 mg, Oral, Daily      metoprolol succinate XL 25 MG 24 hr tablet  Commonly known as: TOPROL-XL   1 tablet, Oral, Daily      ondansetron 4 MG tablet  Commonly known as: ZOFRAN   4 mg, Oral, Every 8 Hours PRN      oxyCODONE-acetaminophen 5-325 MG per tablet  Commonly known as: PERCOCET   1 tablet, Oral, Every 6 Hours PRN      oxyCODONE-acetaminophen 7.5-325 MG per tablet  Commonly known as: PERCOCET   1 tablet, Oral, 3 times daily    "   polyethylene glycol 17 g packet  Commonly known as: MIRALAX   1 packet, Oral      sodium bicarbonate 650 MG tablet   Take 1 tablet by mouth 3 (Three) Times a Day.      vitamin D3 125 MCG (5000 UT) capsule capsule   2,000 Units, Oral, Daily               No Known Allergies    Discharge Disposition:  Home or Self Care      Discharge Diet:  Diet Order   Procedures    Diet: Regular/House, Gastrointestinal; Fat-Restricted, Low Irritant; Texture: Regular (IDDSI 7); Fluid Consistency: Thin (IDDSI 0)       Discharge Activity: Activity as tolerated      CODE STATUS:    Code Status and Medical Interventions:   Ordered at: 04/27/24 0206     Code Status (Patient has no pulse and is not breathing):    CPR (Attempt to Resuscitate)     Medical Interventions (Patient has pulse or is breathing):    Full Support       Future Appointments   Date Time Provider Department Center   10/23/2024 11:30 AM Deon Lua MD [facility]      Follow-up Information       Bradley Hernandez MD .    Specialty: Family Medicine  Contact information:  [address] [facility]  [address] [place] [address]  [phone]                             Time Spent on Discharge:  Greater than 30 minutes      Davina Alas MD  [facility]  05/03/24  10:25 EDT

## 2024-05-03 NOTE — PLAN OF CARE
Goal Outcome Evaluation:  Plan of Care Reviewed With: patient        Progress: improving  Outcome Evaluation: vss, tyelnol and percocet for pain, up with assist and using a walker, voiding freely, chair alarm for safety, on iv zosyn, continue to monitor the pt

## 2024-05-04 NOTE — OUTREACH NOTE
Prep Survey      Flowsheet Row Responses   [facility] facility patient discharged from? [facility]   Is LACE score < 7 ? No   Eligibility Readm Mgmt   Discharge diagnosis Colitis, ischemic   Does the patient have one of the following disease processes/diagnoses(primary or secondary)? Other   Does the patient have Home health ordered? No   Is there a DME ordered? No   Prep survey completed? Yes            CALEB SALAZAR - Registered Nurse

## 2024-05-05 NOTE — CASE MANAGEMENT/SOCIAL WORK
Case Management Discharge Note      Final Note: home    Provided Post Acute Provider List?: N/A  N/A Provider List Comment: Denies needs. Plan is home    Selected Continued Care - Discharged on 5/3/2024 Admission date: 4/26/2024 - Discharge disposition: Home or Self Care      Destination    No services have been selected for the patient.                Durable Medical Equipment    No services have been selected for the patient.                Dialysis/Infusion    No services have been selected for the patient.                Home Medical Care    No services have been selected for the patient.                Therapy    No services have been selected for the patient.                Community Resources    No services have been selected for the patient.                Community & DME    No services have been selected for the patient.                    Transportation Services  Private: Car    Final Discharge Disposition Code: 01 - home or self-care

## 2024-05-08 ENCOUNTER — READMISSION MANAGEMENT (OUTPATIENT)
Dept: CALL CENTER | Facility: HOSPITAL | Age: 74
End: 2024-05-08
Payer: MEDICARE

## 2024-07-15 ENCOUNTER — TELEPHONE (OUTPATIENT)
Dept: GASTROENTEROLOGY | Facility: CLINIC | Age: 74
End: 2024-07-15
Payer: MEDICARE

## 2024-07-15 NOTE — TELEPHONE ENCOUNTER
Hub staff attempted to follow warm transfer process and was unsuccessful     Caller: Marlena Navarrete    Relationship to patient: Self    Best call back number: [phone] OR  [phone]  - VM OKAY    Patient is needing: PATIENT NEEDS TO CANCEL COLONOSCOPY ON 7/19/24 - PLEASE CALL BACK.

## 2025-05-29 RX ORDER — ASPIRIN 81 MG/1
81 TABLET, COATED ORAL DAILY
Qty: 90 TABLET | Refills: 3 | Status: SHIPPED | OUTPATIENT
Start: 2025-05-29

## (undated) DEVICE — TRAP FLD MINIVAC MEGADYNE 100ML

## (undated) DEVICE — PK UNIV COMPL 40

## (undated) DEVICE — DRP MICROSCP LEICA W/GLASS LENS

## (undated) DEVICE — BITEBLOCK OMNI BLOC

## (undated) DEVICE — FRCP BX RADJAW4 NDL 2.8 240CM LG OG BX40

## (undated) DEVICE — ABL ASP APOLLO RF XL 90D

## (undated) DEVICE — NDL SPINE 20G 3 1/2 YEL STRL 1P/U

## (undated) DEVICE — 3M™ STERI-STRIP™ REINFORCED ADHESIVE SKIN CLOSURES, R1547, 1/2 IN X 4 IN (12 MM X 100 MM), 6 STRIPS/ENVELOPE: Brand: 3M™ STERI-STRIP™

## (undated) DEVICE — DRSNG WND GZ PAD BORDERED 4X8IN STRL

## (undated) DEVICE — CABL EXT SPINE TRIAL M/LD 3013

## (undated) DEVICE — Device

## (undated) DEVICE — 6.0MM PRECISION ROUND

## (undated) DEVICE — STPLR SKIN VISISTAT WD 35CT

## (undated) DEVICE — CANN O2 ETCO2 FITS ALL CONN CO2 SMPL A/ 7IN DISP LF

## (undated) DEVICE — DRSNG WND GZ CURAD OIL EMULSION 3X3IN STRL

## (undated) DEVICE — DIFFUSER: Brand: CORE, MAESTRO

## (undated) DEVICE — TUNNELER 12IN

## (undated) DEVICE — BUR SHAVER CLEARCUT 12FLUT 5MM 13CM

## (undated) DEVICE — ANTIBACTERIAL UNDYED BRAIDED (POLYGLACTIN 910), SYNTHETIC ABSORBABLE SUTURE: Brand: COATED VICRYL

## (undated) DEVICE — KT ORCA ORCAPOD DISP STRL

## (undated) DEVICE — WRENCH TORQ

## (undated) DEVICE — PREMIUM WET SKIN PREP TRAY: Brand: MEDLINE INDUSTRIES, INC.

## (undated) DEVICE — ARM SLING: Brand: DEROYAL

## (undated) DEVICE — SUT SILK 3/0 SH CR5 18IN C0135

## (undated) DEVICE — GLV SURG PREMIERPRO ORTHO LTX PF SZ6.5 BRN

## (undated) DEVICE — SYR CONTRL LUERLOK 10CC

## (undated) DEVICE — GLV SURG PREMIERPRO ORTHO LTX PF SZ8 BRN

## (undated) DEVICE — LN SMPL CO2 SHTRM SD STREAM W/M LUER

## (undated) DEVICE — APPL CHLORAPREP W/TINT 26ML ORNG

## (undated) DEVICE — ADHS SKIN DERMABOND TOP ADVANCED

## (undated) DEVICE — SUT ETHLN 3/0 PC5 18IN 1893G

## (undated) DEVICE — CONN TBG Y 5 IN 1 LF STRL

## (undated) DEVICE — Device: Brand: DRILL, 2.7MM, FOR BIOWICK™ SURELOCK™

## (undated) DEVICE — PENCL E/S HNDSWTCH SMOKEEVAC HOLSTR 10FT

## (undated) DEVICE — CODMAN® SURGICAL PATTIES 3/4" X 3/4" (1.91CM X 1.91CM): Brand: CODMAN®

## (undated) DEVICE — SENSR O2 OXIMAX FNGR A/ 18IN NONSTR

## (undated) DEVICE — DRAPE,U/ SHT,SPLIT,PLAS,STERIL: Brand: MEDLINE

## (undated) DEVICE — NDL HYPO PRECISIONGLIDE REG 25G 1 1/2

## (undated) DEVICE — TUBING, SUCTION, 1/4" X 10', STRAIGHT: Brand: MEDLINE

## (undated) DEVICE — UNDYED BRAIDED (POLYGLACTIN 910), SYNTHETIC ABSORBABLE SUTURE: Brand: COATED VICRYL

## (undated) DEVICE — PENCL E/S ULTRAVAC TELESCP NOSE HOLSTR 10FT

## (undated) DEVICE — PK ARTHSCP SHLDR TOWER 40

## (undated) DEVICE — GLV SURG SENSICARE W/ALOE PF LF 7.5 STRL

## (undated) DEVICE — BNDG ADHS CURAD FLX/FABRC 2X4IN STRL LF

## (undated) DEVICE — PK NEURO SPINE 40

## (undated) DEVICE — SHEET, DRAPE, SPLIT, STERILE: Brand: MEDLINE

## (undated) DEVICE — SKIN PREP TRAY W/CHG: Brand: MEDLINE INDUSTRIES, INC.

## (undated) DEVICE — BLD SHAVER RESEC SABRE COOLCUT 5MM 13CM

## (undated) DEVICE — Device: Brand: LOCK-STITCH™ PROCEDURE KIT

## (undated) DEVICE — SOL NACL 0.9PCT 100ML SGL

## (undated) DEVICE — CANN TRPL DAM 7X7MM NO VLV

## (undated) DEVICE — GLV SURG BIOGEL LTX PF 8

## (undated) DEVICE — GOWN,NON-REINFORCED,SIRUS,SET IN SLV,XXL: Brand: MEDLINE

## (undated) DEVICE — SUT VIC 0 CT1 CR8 18IN J840D

## (undated) DEVICE — 3.0MM PRECISION NEURO (MATCH HEAD)

## (undated) DEVICE — BNDG ADHS PLSTC 1X3IN LF

## (undated) DEVICE — DISPOSABLE BIPOLAR FORCEPS 7 3/4" (19.7CM) SCOVILLE BAYONET, 1.5MM TIP AND 12 FT. (3.6M) CABLE: Brand: KIRWAN

## (undated) DEVICE — SMOKE EVACUATION TUBING WITH 7/8 IN TO 1/4 IN REDUCER: Brand: BUFFALO FILTER

## (undated) DEVICE — 3M™ IOBAN™ 2 ANTIMICROBIAL INCISE DRAPE 6650EZ: Brand: IOBAN™ 2

## (undated) DEVICE — SPNG GZ WOVN 4X4IN 12PLY 10/BX STRL

## (undated) DEVICE — TPE NONABS BROADBAND BLK BLK/BLU 1.5MM

## (undated) DEVICE — SUT SILK 2/0 TIES 18IN A185H

## (undated) DEVICE — DRP MICROSCOPE 4 BINOCULAR CV 54X150IN

## (undated) DEVICE — DRAPE,REIN 53X77,STERILE: Brand: MEDLINE

## (undated) DEVICE — OIL CARTRIDGE: Brand: CORE, MAESTRO

## (undated) DEVICE — GLV SURG BIOGEL LTX PF 6 1/2

## (undated) DEVICE — APPL CHLORAPREP HI/LITE 26ML ORNG

## (undated) DEVICE — ADAPT CLN BIOGUARD AIR/H2O DISP

## (undated) DEVICE — 3M™ STERI-DRAPE™ INSTRUMENT POUCH 1018: Brand: STERI-DRAPE™

## (undated) DEVICE — TUBING SET, GRAVITY, 4-SPIKE

## (undated) DEVICE — GLV SURG SIGNATURE ESSENTIAL PF LTX SZ7.5

## (undated) DEVICE — NEEDLE, QUINCKE 22GX3.5": Brand: MEDLINE INDUSTRIES, INC.

## (undated) DEVICE — GLV SURG BIOGEL LTX PF 7

## (undated) DEVICE — DRSNG WND BORDR/ADHS NONADHR/GZ LF 4X4IN STRL

## (undated) DEVICE — POSTN ARMSLV LAT/TRACTION DISP

## (undated) DEVICE — DRSNG WND BORDR/ADHS NONADHR/GZ LF 4X14IN STRL

## (undated) DEVICE — SPNG LAP 18X18IN LF STRL PK/5

## (undated) DEVICE — GLV SURG SIGNATURE ESSENTIAL PF LTX SZ6.5

## (undated) DEVICE — KT HDR TRIAL 2 PRT W/ EXT PULS GEN